# Patient Record
Sex: MALE | Race: WHITE | NOT HISPANIC OR LATINO | URBAN - METROPOLITAN AREA
[De-identification: names, ages, dates, MRNs, and addresses within clinical notes are randomized per-mention and may not be internally consistent; named-entity substitution may affect disease eponyms.]

---

## 2019-03-28 ENCOUNTER — EMERGENCY (EMERGENCY)
Facility: HOSPITAL | Age: 59
LOS: 1 days | Discharge: HOME | End: 2019-03-28
Attending: EMERGENCY MEDICINE | Admitting: EMERGENCY MEDICINE
Payer: COMMERCIAL

## 2019-03-28 VITALS
HEART RATE: 59 BPM | TEMPERATURE: 99 F | DIASTOLIC BLOOD PRESSURE: 93 MMHG | RESPIRATION RATE: 18 BRPM | OXYGEN SATURATION: 95 % | WEIGHT: 195.11 LBS | SYSTOLIC BLOOD PRESSURE: 156 MMHG

## 2019-03-28 VITALS
HEART RATE: 60 BPM | SYSTOLIC BLOOD PRESSURE: 154 MMHG | RESPIRATION RATE: 17 BRPM | OXYGEN SATURATION: 96 % | TEMPERATURE: 98 F | DIASTOLIC BLOOD PRESSURE: 85 MMHG

## 2019-03-28 DIAGNOSIS — Z88.2 ALLERGY STATUS TO SULFONAMIDES: ICD-10-CM
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
## 2019-03-28 DIAGNOSIS — Z23 ENCOUNTER FOR IMMUNIZATION: ICD-10-CM

## 2019-03-28 DIAGNOSIS — I10 ESSENTIAL (PRIMARY) HYPERTENSION: ICD-10-CM

## 2019-03-28 DIAGNOSIS — S01.81XA LACERATION WITHOUT FOREIGN BODY OF OTHER PART OF HEAD, INITIAL ENCOUNTER: ICD-10-CM
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
## 2019-03-28 PROCEDURE — 70450 CT HEAD/BRAIN W/O DYE: CPT | Mod: 26

## 2019-03-28 PROCEDURE — 12015 RPR F/E/E/N/L/M 7.6-12.5 CM: CPT

## 2019-03-28 PROCEDURE — 99284 EMERGENCY DEPT VISIT MOD MDM: CPT | Mod: 25

## 2019-03-28 RX ORDER — TETANUS TOXOID, REDUCED DIPHTHERIA TOXOID AND ACELLULAR PERTUSSIS VACCINE, ADSORBED 5; 2.5; 8; 8; 2.5 [IU]/.5ML; [IU]/.5ML; UG/.5ML; UG/.5ML; UG/.5ML
0.5 SUSPENSION INTRAMUSCULAR ONCE
Qty: 0 | Refills: 0 | Status: COMPLETED | OUTPATIENT
Start: 2019-03-28 | End: 2019-03-28

## 2019-03-28 RX ADMIN — TETANUS TOXOID, REDUCED DIPHTHERIA TOXOID AND ACELLULAR PERTUSSIS VACCINE, ADSORBED 0.5 MILLILITER(S): 5; 2.5; 8; 8; 2.5 SUSPENSION INTRAMUSCULAR at 17:49

## 2019-03-28 NOTE — ED ADULT TRIAGE NOTE - CHIEF COMPLAINT QUOTE
BIBA s/p mechanical trip and fall on sidewalk approx 45 min PTA. denies LOC, HA, nausea. +81 ASA mg. +laceration to left side of face. last tetanus unknown.

## 2019-03-28 NOTE — ED ADULT NURSE NOTE - CHPI ED NUR SYMPTOMS NEG
no vomiting/no loss of consciousness/no deformity/no tingling/no fever/no numbness/no weakness/no confusion

## 2019-03-28 NOTE — ED PROVIDER NOTE - NSFOLLOWUPINSTRUCTIONS_ED_ALL_ED_FT
Laceration    A laceration is a cut that goes through all of the layers of the skin and into the tissue that is right under the skin. Some lacerations heal on their own. Others need to be closed with skin adhesive strips, skin glue, stitches (sutures), or staples. Proper laceration care minimizes the risk of infection and helps the laceration to heal better.  If non-absorbable stitches or staples have been placed, they must be taken out within the time frame instructed by your healthcare provider.    SEEK IMMEDIATE MEDICAL CARE IF YOU HAVE ANY OF THE FOLLOWING SYMPTOMS: swelling around the wound, worsening pain, drainage from the wound, red streaking going away from your wound, inability to move finger or toe near the laceration, or discoloration of skin near the laceration.     PAIN MEDICATIONS   TYLENOL (acetaminophen) 1000mg every 6 hours as needed for pain       SUTURE REMOVAL IN APPROX 5 DAYS, RETURN TO ER OR URGENT CARE

## 2019-03-28 NOTE — ED PROVIDER NOTE - OBJECTIVE STATEMENT
57 y/o Male with a PMHx of HTN on Losartan, baby aspirin, Statin, and Synthroid for thyroid issues presents to the ED for a mechanical slip and fall. Sustained lacerations to the left side of his face. Denies LOC after fall, vision changes, N/V, numbness, tingling and alcohol use. Tetanus not UTD.

## 2019-03-28 NOTE — ED PROVIDER NOTE - ADDITIONAL RISK FACTOR FREE TEXT BOX
59 y/o Male here for a mechanical slip and fall. Sustained lacerations to the left lateral eye and cheek. Will repair wound, update tetanus, head CT, and instruct follow-up instructions for wound care/suture removal.

## 2020-06-16 ENCOUNTER — NON-APPOINTMENT (OUTPATIENT)
Age: 60
End: 2020-06-16

## 2020-07-13 ENCOUNTER — INPATIENT (INPATIENT)
Facility: HOSPITAL | Age: 60
LOS: 11 days | Discharge: HOME | DRG: 871 | End: 2020-07-25
Attending: HOSPITALIST | Admitting: STUDENT IN AN ORGANIZED HEALTH CARE EDUCATION/TRAINING PROGRAM
Payer: COMMERCIAL

## 2020-07-13 VITALS
WEIGHT: 192.02 LBS | RESPIRATION RATE: 18 BRPM | TEMPERATURE: 98 F | HEART RATE: 104 BPM | DIASTOLIC BLOOD PRESSURE: 59 MMHG | SYSTOLIC BLOOD PRESSURE: 87 MMHG | OXYGEN SATURATION: 97 % | HEIGHT: 68 IN

## 2020-07-13 LAB
ANION GAP SERPL CALC-SCNC: 16 MMOL/L — SIGNIFICANT CHANGE UP (ref 5–17)
BASE EXCESS BLDV CALC-SCNC: 0.7 MMOL/L — SIGNIFICANT CHANGE UP (ref -2–2)
BASOPHILS # BLD AUTO: 0.03 K/UL — SIGNIFICANT CHANGE UP (ref 0–0.2)
BASOPHILS NFR BLD AUTO: 0.2 % — SIGNIFICANT CHANGE UP (ref 0–2)
CA-I SERPL-SCNC: 1.15 MMOL/L — SIGNIFICANT CHANGE UP (ref 1.12–1.3)
CHLORIDE BLDV-SCNC: 102 MMOL/L — SIGNIFICANT CHANGE UP (ref 96–108)
CHLORIDE SERPL-SCNC: 98 MMOL/L — SIGNIFICANT CHANGE UP (ref 96–108)
CO2 BLDV-SCNC: 26 MMOL/L — SIGNIFICANT CHANGE UP (ref 22–30)
CO2 SERPL-SCNC: 21 MMOL/L — LOW (ref 22–31)
EOSINOPHIL # BLD AUTO: 0.16 K/UL — SIGNIFICANT CHANGE UP (ref 0–0.5)
EOSINOPHIL NFR BLD AUTO: 1.2 % — SIGNIFICANT CHANGE UP (ref 0–6)
GAS PNL BLDV: 135 MMOL/L — SIGNIFICANT CHANGE UP (ref 135–145)
GAS PNL BLDV: SIGNIFICANT CHANGE UP
GLUCOSE BLDV-MCNC: 112 MG/DL — HIGH (ref 70–99)
HCO3 BLDV-SCNC: 24 MMOL/L — SIGNIFICANT CHANGE UP (ref 21–29)
HCT VFR BLD CALC: 47 % — SIGNIFICANT CHANGE UP (ref 39–50)
HCT VFR BLDA CALC: 49 % — SIGNIFICANT CHANGE UP (ref 39–50)
HGB BLD CALC-MCNC: 16.1 G/DL — SIGNIFICANT CHANGE UP (ref 13–17)
HGB BLD-MCNC: 15.4 G/DL — SIGNIFICANT CHANGE UP (ref 13–17)
IMM GRANULOCYTES NFR BLD AUTO: 0.4 % — SIGNIFICANT CHANGE UP (ref 0–1.5)
LACTATE BLDV-MCNC: 2.5 MMOL/L — HIGH (ref 0.7–2)
LYMPHOCYTES # BLD AUTO: 0.77 K/UL — LOW (ref 1–3.3)
LYMPHOCYTES # BLD AUTO: 5.8 % — LOW (ref 13–44)
MCHC RBC-ENTMCNC: 30.1 PG — SIGNIFICANT CHANGE UP (ref 27–34)
MCHC RBC-ENTMCNC: 32.8 GM/DL — SIGNIFICANT CHANGE UP (ref 32–36)
MCV RBC AUTO: 91.8 FL — SIGNIFICANT CHANGE UP (ref 80–100)
MONOCYTES # BLD AUTO: 1.03 K/UL — HIGH (ref 0–0.9)
MONOCYTES NFR BLD AUTO: 7.7 % — SIGNIFICANT CHANGE UP (ref 2–14)
NEUTROPHILS # BLD AUTO: 11.29 K/UL — HIGH (ref 1.8–7.4)
NEUTROPHILS NFR BLD AUTO: 84.7 % — HIGH (ref 43–77)
NRBC # BLD: 0 /100 WBCS — SIGNIFICANT CHANGE UP (ref 0–0)
PCO2 BLDV: 38 MMHG — SIGNIFICANT CHANGE UP (ref 35–50)
PH BLDV: 7.42 — SIGNIFICANT CHANGE UP (ref 7.35–7.45)
PLATELET # BLD AUTO: 187 K/UL — SIGNIFICANT CHANGE UP (ref 150–400)
PO2 BLDV: 29 MMHG — SIGNIFICANT CHANGE UP (ref 25–45)
POTASSIUM BLDV-SCNC: 3.3 MMOL/L — LOW (ref 3.5–5.3)
POTASSIUM SERPL-MCNC: 3.5 MMOL/L — SIGNIFICANT CHANGE UP (ref 3.5–5.3)
POTASSIUM SERPL-SCNC: 3.5 MMOL/L — SIGNIFICANT CHANGE UP (ref 3.5–5.3)
RBC # BLD: 5.12 M/UL — SIGNIFICANT CHANGE UP (ref 4.2–5.8)
RBC # FLD: 11.9 % — SIGNIFICANT CHANGE UP (ref 10.3–14.5)
SAO2 % BLDV: 52 % — LOW (ref 67–88)
SODIUM SERPL-SCNC: 135 MMOL/L — SIGNIFICANT CHANGE UP (ref 135–145)
WBC # BLD: 13.33 K/UL — HIGH (ref 3.8–10.5)
WBC # FLD AUTO: 13.33 K/UL — HIGH (ref 3.8–10.5)

## 2020-07-13 PROCEDURE — 93010 ELECTROCARDIOGRAM REPORT: CPT

## 2020-07-13 PROCEDURE — 99291 CRITICAL CARE FIRST HOUR: CPT

## 2020-07-13 RX ORDER — SODIUM CHLORIDE 9 MG/ML
2000 INJECTION INTRAMUSCULAR; INTRAVENOUS; SUBCUTANEOUS ONCE
Refills: 0 | Status: COMPLETED | OUTPATIENT
Start: 2020-07-13 | End: 2020-07-13

## 2020-07-13 RX ORDER — VANCOMYCIN HCL 1 G
1000 VIAL (EA) INTRAVENOUS ONCE
Refills: 0 | Status: COMPLETED | OUTPATIENT
Start: 2020-07-13 | End: 2020-07-13

## 2020-07-13 RX ORDER — SODIUM CHLORIDE 9 MG/ML
1000 INJECTION, SOLUTION INTRAVENOUS ONCE
Refills: 0 | Status: COMPLETED | OUTPATIENT
Start: 2020-07-13 | End: 2020-07-13

## 2020-07-13 RX ORDER — PIPERACILLIN AND TAZOBACTAM 4; .5 G/20ML; G/20ML
3.38 INJECTION, POWDER, LYOPHILIZED, FOR SOLUTION INTRAVENOUS ONCE
Refills: 0 | Status: COMPLETED | OUTPATIENT
Start: 2020-07-13 | End: 2020-07-13

## 2020-07-13 RX ADMIN — SODIUM CHLORIDE 1000 MILLILITER(S): 9 INJECTION, SOLUTION INTRAVENOUS at 22:50

## 2020-07-13 RX ADMIN — SODIUM CHLORIDE 2000 MILLILITER(S): 9 INJECTION INTRAMUSCULAR; INTRAVENOUS; SUBCUTANEOUS at 22:11

## 2020-07-13 RX ADMIN — PIPERACILLIN AND TAZOBACTAM 200 GRAM(S): 4; .5 INJECTION, POWDER, LYOPHILIZED, FOR SOLUTION INTRAVENOUS at 22:50

## 2020-07-13 NOTE — ED ADULT NURSE NOTE - OBJECTIVE STATEMENT
60 year old Male, PMH: hypothyroidism, inguinal hernia, HLD, HTN. Patient comes to the ER reporting right sided abdominal pain with shortness of breath, headache and shortness of breath. Patient reports diverticulitis attack x3 weeks ago as well as nights sweats, chills and nausea. As of Friday patient reporting difficulty breathing, worse with exertion, last covid test yesterday- pending results, constant headache since Friday, took Tyelnol no relief.   Denies chest pain

## 2020-07-14 DIAGNOSIS — A41.9 SEPSIS, UNSPECIFIED ORGANISM: ICD-10-CM

## 2020-07-14 PROBLEM — I10 ESSENTIAL (PRIMARY) HYPERTENSION: Chronic | Status: ACTIVE | Noted: 2019-03-28

## 2020-07-14 LAB
ALBUMIN SERPL ELPH-MCNC: 2.9 G/DL — LOW (ref 3.3–5)
ALBUMIN SERPL ELPH-MCNC: 3.7 G/DL — SIGNIFICANT CHANGE UP (ref 3.3–5)
ALP SERPL-CCNC: 50 U/L — SIGNIFICANT CHANGE UP (ref 40–120)
ALP SERPL-CCNC: 62 U/L — SIGNIFICANT CHANGE UP (ref 40–120)
ALT FLD-CCNC: <5 U/L — LOW (ref 10–45)
ALT FLD-CCNC: <5 U/L — LOW (ref 10–45)
ANION GAP SERPL CALC-SCNC: 10 MMOL/L — SIGNIFICANT CHANGE UP (ref 5–17)
APPEARANCE UR: CLEAR — SIGNIFICANT CHANGE UP
APTT BLD: 28.2 SEC — SIGNIFICANT CHANGE UP (ref 27.5–35.5)
AST SERPL-CCNC: 20 U/L — SIGNIFICANT CHANGE UP (ref 10–40)
AST SERPL-CCNC: 24 U/L — SIGNIFICANT CHANGE UP (ref 10–40)
BACTERIA # UR AUTO: NEGATIVE — SIGNIFICANT CHANGE UP
BASE EXCESS BLDV CALC-SCNC: -0.3 MMOL/L — SIGNIFICANT CHANGE UP (ref -2–2)
BASE EXCESS BLDV CALC-SCNC: 0.4 MMOL/L — SIGNIFICANT CHANGE UP (ref -2–2)
BASOPHILS # BLD AUTO: 0.02 K/UL — SIGNIFICANT CHANGE UP (ref 0–0.2)
BASOPHILS # BLD AUTO: 0.02 K/UL — SIGNIFICANT CHANGE UP (ref 0–0.2)
BASOPHILS NFR BLD AUTO: 0.3 % — SIGNIFICANT CHANGE UP (ref 0–2)
BASOPHILS NFR BLD AUTO: 0.3 % — SIGNIFICANT CHANGE UP (ref 0–2)
BILIRUB SERPL-MCNC: 0.4 MG/DL — SIGNIFICANT CHANGE UP (ref 0.2–1.2)
BILIRUB SERPL-MCNC: 0.6 MG/DL — SIGNIFICANT CHANGE UP (ref 0.2–1.2)
BILIRUB UR-MCNC: NEGATIVE — SIGNIFICANT CHANGE UP
BUN SERPL-MCNC: 14 MG/DL — SIGNIFICANT CHANGE UP (ref 7–23)
BUN SERPL-MCNC: 23 MG/DL — SIGNIFICANT CHANGE UP (ref 7–23)
CA-I SERPL-SCNC: 1.09 MMOL/L — LOW (ref 1.12–1.3)
CA-I SERPL-SCNC: 1.11 MMOL/L — LOW (ref 1.12–1.3)
CALCIUM SERPL-MCNC: 8.3 MG/DL — LOW (ref 8.4–10.5)
CALCIUM SERPL-MCNC: 9.3 MG/DL — SIGNIFICANT CHANGE UP (ref 8.4–10.5)
CHLORIDE BLDV-SCNC: 104 MMOL/L — SIGNIFICANT CHANGE UP (ref 96–108)
CHLORIDE BLDV-SCNC: 111 MMOL/L — HIGH (ref 96–108)
CHLORIDE SERPL-SCNC: 107 MMOL/L — SIGNIFICANT CHANGE UP (ref 96–108)
CK MB BLD-MCNC: 3.8 % — HIGH (ref 0–3.5)
CK MB BLD-MCNC: 5.4 % — HIGH (ref 0–3.5)
CK MB CFR SERPL CALC: 6.1 NG/ML — SIGNIFICANT CHANGE UP (ref 0–6.7)
CK MB CFR SERPL CALC: 7.1 NG/ML — HIGH (ref 0–6.7)
CK SERPL-CCNC: 131 U/L — SIGNIFICANT CHANGE UP (ref 30–200)
CK SERPL-CCNC: 159 U/L — SIGNIFICANT CHANGE UP (ref 30–200)
CO2 BLDV-SCNC: 26 MMOL/L — SIGNIFICANT CHANGE UP (ref 22–30)
CO2 BLDV-SCNC: 26 MMOL/L — SIGNIFICANT CHANGE UP (ref 22–30)
CO2 SERPL-SCNC: 23 MMOL/L — SIGNIFICANT CHANGE UP (ref 22–31)
COLOR SPEC: YELLOW — SIGNIFICANT CHANGE UP
CREAT SERPL-MCNC: 0.8 MG/DL — SIGNIFICANT CHANGE UP (ref 0.5–1.3)
CREAT SERPL-MCNC: 1.16 MG/DL — SIGNIFICANT CHANGE UP (ref 0.5–1.3)
DIFF PNL FLD: ABNORMAL
EOSINOPHIL # BLD AUTO: 0.02 K/UL — SIGNIFICANT CHANGE UP (ref 0–0.5)
EOSINOPHIL # BLD AUTO: 0.13 K/UL — SIGNIFICANT CHANGE UP (ref 0–0.5)
EOSINOPHIL NFR BLD AUTO: 0.3 % — SIGNIFICANT CHANGE UP (ref 0–6)
EOSINOPHIL NFR BLD AUTO: 1.7 % — SIGNIFICANT CHANGE UP (ref 0–6)
EPI CELLS # UR: 0 /HPF — SIGNIFICANT CHANGE UP
GAS PNL BLDV: 135 MMOL/L — SIGNIFICANT CHANGE UP (ref 135–145)
GAS PNL BLDV: 137 MMOL/L — SIGNIFICANT CHANGE UP (ref 135–145)
GAS PNL BLDV: SIGNIFICANT CHANGE UP
GLUCOSE BLDV-MCNC: 105 MG/DL — HIGH (ref 70–99)
GLUCOSE BLDV-MCNC: 95 MG/DL — SIGNIFICANT CHANGE UP (ref 70–99)
GLUCOSE SERPL-MCNC: 108 MG/DL — HIGH (ref 70–99)
GLUCOSE SERPL-MCNC: 96 MG/DL — SIGNIFICANT CHANGE UP (ref 70–99)
GLUCOSE UR QL: NEGATIVE — SIGNIFICANT CHANGE UP
HCO3 BLDV-SCNC: 25 MMOL/L — SIGNIFICANT CHANGE UP (ref 21–29)
HCO3 BLDV-SCNC: 25 MMOL/L — SIGNIFICANT CHANGE UP (ref 21–29)
HCT VFR BLD CALC: 39.4 % — SIGNIFICANT CHANGE UP (ref 39–50)
HCT VFR BLD CALC: 39.5 % — SIGNIFICANT CHANGE UP (ref 39–50)
HCT VFR BLDA CALC: 41 % — SIGNIFICANT CHANGE UP (ref 39–50)
HCT VFR BLDA CALC: 42 % — SIGNIFICANT CHANGE UP (ref 39–50)
HGB BLD CALC-MCNC: 13.3 G/DL — SIGNIFICANT CHANGE UP (ref 13–17)
HGB BLD CALC-MCNC: 13.7 G/DL — SIGNIFICANT CHANGE UP (ref 13–17)
HGB BLD-MCNC: 12.7 G/DL — LOW (ref 13–17)
HGB BLD-MCNC: 13.2 G/DL — SIGNIFICANT CHANGE UP (ref 13–17)
HOROWITZ INDEX BLDV+IHG-RTO: 21 — SIGNIFICANT CHANGE UP
HYALINE CASTS # UR AUTO: 2 /LPF — SIGNIFICANT CHANGE UP (ref 0–2)
IMM GRANULOCYTES NFR BLD AUTO: 0.3 % — SIGNIFICANT CHANGE UP (ref 0–1.5)
IMM GRANULOCYTES NFR BLD AUTO: 0.3 % — SIGNIFICANT CHANGE UP (ref 0–1.5)
INR BLD: 1.12 RATIO — SIGNIFICANT CHANGE UP (ref 0.88–1.16)
KETONES UR-MCNC: ABNORMAL
LACTATE BLDV-MCNC: 1 MMOL/L — SIGNIFICANT CHANGE UP (ref 0.7–2)
LACTATE BLDV-MCNC: 1.8 MMOL/L — SIGNIFICANT CHANGE UP (ref 0.7–2)
LEUKOCYTE ESTERASE UR-ACNC: NEGATIVE — SIGNIFICANT CHANGE UP
LYMPHOCYTES # BLD AUTO: 0.55 K/UL — LOW (ref 1–3.3)
LYMPHOCYTES # BLD AUTO: 0.67 K/UL — LOW (ref 1–3.3)
LYMPHOCYTES # BLD AUTO: 7.3 % — LOW (ref 13–44)
LYMPHOCYTES # BLD AUTO: 8.8 % — LOW (ref 13–44)
MAGNESIUM SERPL-MCNC: 1.8 MG/DL — SIGNIFICANT CHANGE UP (ref 1.6–2.6)
MCHC RBC-ENTMCNC: 30.2 PG — SIGNIFICANT CHANGE UP (ref 27–34)
MCHC RBC-ENTMCNC: 31.1 PG — SIGNIFICANT CHANGE UP (ref 27–34)
MCHC RBC-ENTMCNC: 32.2 GM/DL — SIGNIFICANT CHANGE UP (ref 32–36)
MCHC RBC-ENTMCNC: 33.5 GM/DL — SIGNIFICANT CHANGE UP (ref 32–36)
MCV RBC AUTO: 92.9 FL — SIGNIFICANT CHANGE UP (ref 80–100)
MCV RBC AUTO: 93.8 FL — SIGNIFICANT CHANGE UP (ref 80–100)
MONOCYTES # BLD AUTO: 0.35 K/UL — SIGNIFICANT CHANGE UP (ref 0–0.9)
MONOCYTES # BLD AUTO: 0.54 K/UL — SIGNIFICANT CHANGE UP (ref 0–0.9)
MONOCYTES NFR BLD AUTO: 4.6 % — SIGNIFICANT CHANGE UP (ref 2–14)
MONOCYTES NFR BLD AUTO: 7.1 % — SIGNIFICANT CHANGE UP (ref 2–14)
NEUTROPHILS # BLD AUTO: 6.2 K/UL — SIGNIFICANT CHANGE UP (ref 1.8–7.4)
NEUTROPHILS # BLD AUTO: 6.61 K/UL — SIGNIFICANT CHANGE UP (ref 1.8–7.4)
NEUTROPHILS NFR BLD AUTO: 81.8 % — HIGH (ref 43–77)
NEUTROPHILS NFR BLD AUTO: 87.2 % — HIGH (ref 43–77)
NITRITE UR-MCNC: NEGATIVE — SIGNIFICANT CHANGE UP
NRBC # BLD: 0 /100 WBCS — SIGNIFICANT CHANGE UP (ref 0–0)
NRBC # BLD: 0 /100 WBCS — SIGNIFICANT CHANGE UP (ref 0–0)
OTHER CELLS CSF MANUAL: 10 ML/DL — LOW (ref 18–22)
PCO2 BLDV: 43 MMHG — SIGNIFICANT CHANGE UP (ref 35–50)
PCO2 BLDV: 44 MMHG — SIGNIFICANT CHANGE UP (ref 35–50)
PH BLDV: 7.36 — SIGNIFICANT CHANGE UP (ref 7.35–7.45)
PH BLDV: 7.38 — SIGNIFICANT CHANGE UP (ref 7.35–7.45)
PH UR: 6 — SIGNIFICANT CHANGE UP (ref 5–8)
PHOSPHATE SERPL-MCNC: 2.6 MG/DL — SIGNIFICANT CHANGE UP (ref 2.5–4.5)
PLATELET # BLD AUTO: 148 K/UL — LOW (ref 150–400)
PLATELET # BLD AUTO: 165 K/UL — SIGNIFICANT CHANGE UP (ref 150–400)
PO2 BLDV: 25 MMHG — SIGNIFICANT CHANGE UP (ref 25–45)
PO2 BLDV: 31 MMHG — SIGNIFICANT CHANGE UP (ref 25–45)
POTASSIUM BLDV-SCNC: 3.4 MMOL/L — LOW (ref 3.5–5.3)
POTASSIUM BLDV-SCNC: 4 MMOL/L — SIGNIFICANT CHANGE UP (ref 3.5–5.3)
POTASSIUM SERPL-MCNC: 3.5 MMOL/L — SIGNIFICANT CHANGE UP (ref 3.5–5.3)
POTASSIUM SERPL-SCNC: 3.5 MMOL/L — SIGNIFICANT CHANGE UP (ref 3.5–5.3)
PROT SERPL-MCNC: 5.8 G/DL — LOW (ref 6–8.3)
PROT SERPL-MCNC: 7.1 G/DL — SIGNIFICANT CHANGE UP (ref 6–8.3)
PROT UR-MCNC: ABNORMAL
PROTHROM AB SERPL-ACNC: 13.2 SEC — SIGNIFICANT CHANGE UP (ref 10.6–13.6)
RBC # BLD: 4.21 M/UL — SIGNIFICANT CHANGE UP (ref 4.2–5.8)
RBC # BLD: 4.24 M/UL — SIGNIFICANT CHANGE UP (ref 4.2–5.8)
RBC # FLD: 12.2 % — SIGNIFICANT CHANGE UP (ref 10.3–14.5)
RBC # FLD: 12.3 % — SIGNIFICANT CHANGE UP (ref 10.3–14.5)
RBC CASTS # UR COMP ASSIST: 15 /HPF — HIGH (ref 0–4)
SAO2 % BLDV: 39 % — LOW (ref 67–88)
SAO2 % BLDV: 53 % — LOW (ref 67–88)
SARS-COV-2 IGG SERPL QL IA: NEGATIVE — SIGNIFICANT CHANGE UP
SARS-COV-2 IGM SERPL IA-ACNC: <3.8 AU/ML — SIGNIFICANT CHANGE UP
SARS-COV-2 RNA SPEC QL NAA+PROBE: SIGNIFICANT CHANGE UP
SODIUM SERPL-SCNC: 140 MMOL/L — SIGNIFICANT CHANGE UP (ref 135–145)
SP GR SPEC: 1.02 — SIGNIFICANT CHANGE UP (ref 1.01–1.02)
TROPONIN T, HIGH SENSITIVITY RESULT: 159 NG/L — HIGH (ref 0–51)
TROPONIN T, HIGH SENSITIVITY RESULT: 186 NG/L — HIGH (ref 0–51)
TSH SERPL-MCNC: 1.6 UIU/ML — SIGNIFICANT CHANGE UP (ref 0.27–4.2)
UROBILINOGEN FLD QL: NEGATIVE — SIGNIFICANT CHANGE UP
WBC # BLD: 7.57 K/UL — SIGNIFICANT CHANGE UP (ref 3.8–10.5)
WBC # BLD: 7.58 K/UL — SIGNIFICANT CHANGE UP (ref 3.8–10.5)
WBC # FLD AUTO: 7.57 K/UL — SIGNIFICANT CHANGE UP (ref 3.8–10.5)
WBC # FLD AUTO: 7.58 K/UL — SIGNIFICANT CHANGE UP (ref 3.8–10.5)
WBC UR QL: 1 /HPF — SIGNIFICANT CHANGE UP (ref 0–5)

## 2020-07-14 PROCEDURE — 99223 1ST HOSP IP/OBS HIGH 75: CPT | Mod: GC

## 2020-07-14 PROCEDURE — 74177 CT ABD & PELVIS W/CONTRAST: CPT | Mod: 26

## 2020-07-14 PROCEDURE — 93308 TTE F-UP OR LMTD: CPT | Mod: 26,GC

## 2020-07-14 PROCEDURE — 99291 CRITICAL CARE FIRST HOUR: CPT

## 2020-07-14 PROCEDURE — 76604 US EXAM CHEST: CPT | Mod: 26,59,GC

## 2020-07-14 PROCEDURE — 99232 SBSQ HOSP IP/OBS MODERATE 35: CPT

## 2020-07-14 PROCEDURE — 99292 CRITICAL CARE ADDL 30 MIN: CPT

## 2020-07-14 PROCEDURE — 71275 CT ANGIOGRAPHY CHEST: CPT | Mod: 26

## 2020-07-14 RX ORDER — LEVOTHYROXINE SODIUM 125 MCG
87.5 TABLET ORAL
Refills: 0 | Status: DISCONTINUED | OUTPATIENT
Start: 2020-07-14 | End: 2020-07-14

## 2020-07-14 RX ORDER — BUDESONIDE, MICRONIZED 100 %
1 POWDER (GRAM) MISCELLANEOUS
Qty: 0 | Refills: 0 | DISCHARGE

## 2020-07-14 RX ORDER — CEFTRIAXONE 500 MG/1
1000 INJECTION, POWDER, FOR SOLUTION INTRAMUSCULAR; INTRAVENOUS EVERY 24 HOURS
Refills: 0 | Status: DISCONTINUED | OUTPATIENT
Start: 2020-07-14 | End: 2020-07-14

## 2020-07-14 RX ORDER — CHLORHEXIDINE GLUCONATE 213 G/1000ML
1 SOLUTION TOPICAL
Refills: 0 | Status: DISCONTINUED | OUTPATIENT
Start: 2020-07-14 | End: 2020-07-14

## 2020-07-14 RX ORDER — HYDROCORTISONE 20 MG
75 TABLET ORAL EVERY 8 HOURS
Refills: 0 | Status: DISCONTINUED | OUTPATIENT
Start: 2020-07-14 | End: 2020-07-15

## 2020-07-14 RX ORDER — ROSUVASTATIN CALCIUM 5 MG/1
1 TABLET ORAL
Qty: 0 | Refills: 0 | DISCHARGE

## 2020-07-14 RX ORDER — MIDODRINE HYDROCHLORIDE 2.5 MG/1
20 TABLET ORAL EVERY 8 HOURS
Refills: 0 | Status: DISCONTINUED | OUTPATIENT
Start: 2020-07-14 | End: 2020-07-15

## 2020-07-14 RX ORDER — LEVOTHYROXINE SODIUM 125 MCG
80 TABLET ORAL AT BEDTIME
Refills: 0 | Status: DISCONTINUED | OUTPATIENT
Start: 2020-07-14 | End: 2020-07-14

## 2020-07-14 RX ORDER — LEVOTHYROXINE SODIUM 125 MCG
88 TABLET ORAL
Refills: 0 | Status: DISCONTINUED | OUTPATIENT
Start: 2020-07-14 | End: 2020-07-25

## 2020-07-14 RX ORDER — LEVOTHYROXINE SODIUM 125 MCG
88 TABLET ORAL AT BEDTIME
Refills: 0 | Status: DISCONTINUED | OUTPATIENT
Start: 2020-07-14 | End: 2020-07-14

## 2020-07-14 RX ORDER — LEVOTHYROXINE SODIUM 125 MCG
175 TABLET ORAL
Refills: 0 | Status: DISCONTINUED | OUTPATIENT
Start: 2020-07-14 | End: 2020-07-14

## 2020-07-14 RX ORDER — LEVOTHYROXINE SODIUM 125 MCG
175 TABLET ORAL
Refills: 0 | Status: DISCONTINUED | OUTPATIENT
Start: 2020-07-14 | End: 2020-07-25

## 2020-07-14 RX ORDER — SODIUM CHLORIDE 9 MG/ML
1000 INJECTION INTRAMUSCULAR; INTRAVENOUS; SUBCUTANEOUS
Refills: 0 | Status: DISCONTINUED | OUTPATIENT
Start: 2020-07-14 | End: 2020-07-14

## 2020-07-14 RX ORDER — METRONIDAZOLE 500 MG
500 TABLET ORAL EVERY 8 HOURS
Refills: 0 | Status: DISCONTINUED | OUTPATIENT
Start: 2020-07-14 | End: 2020-07-14

## 2020-07-14 RX ORDER — OMEPRAZOLE 10 MG/1
1 CAPSULE, DELAYED RELEASE ORAL
Qty: 0 | Refills: 0 | DISCHARGE

## 2020-07-14 RX ORDER — ACETAMINOPHEN 500 MG
650 TABLET ORAL ONCE
Refills: 0 | Status: COMPLETED | OUTPATIENT
Start: 2020-07-14 | End: 2020-07-14

## 2020-07-14 RX ORDER — NOREPINEPHRINE BITARTRATE/D5W 8 MG/250ML
0.05 PLASTIC BAG, INJECTION (ML) INTRAVENOUS
Qty: 8 | Refills: 0 | Status: DISCONTINUED | OUTPATIENT
Start: 2020-07-14 | End: 2020-07-14

## 2020-07-14 RX ORDER — CIPROFLOXACIN LACTATE 400MG/40ML
500 VIAL (ML) INTRAVENOUS EVERY 12 HOURS
Refills: 0 | Status: DISCONTINUED | OUTPATIENT
Start: 2020-07-14 | End: 2020-07-14

## 2020-07-14 RX ORDER — SODIUM CHLORIDE 9 MG/ML
1000 INJECTION, SOLUTION INTRAVENOUS ONCE
Refills: 0 | Status: COMPLETED | OUTPATIENT
Start: 2020-07-14 | End: 2020-07-14

## 2020-07-14 RX ADMIN — PIPERACILLIN AND TAZOBACTAM 3.38 GRAM(S): 4; .5 INJECTION, POWDER, LYOPHILIZED, FOR SOLUTION INTRAVENOUS at 00:00

## 2020-07-14 RX ADMIN — MIDODRINE HYDROCHLORIDE 20 MILLIGRAM(S): 2.5 TABLET ORAL at 09:41

## 2020-07-14 RX ADMIN — Medication 75 MILLIGRAM(S): at 22:28

## 2020-07-14 RX ADMIN — Medication 500 MILLIGRAM(S): at 12:51

## 2020-07-14 RX ADMIN — CHLORHEXIDINE GLUCONATE 1 APPLICATION(S): 213 SOLUTION TOPICAL at 07:20

## 2020-07-14 RX ADMIN — SODIUM CHLORIDE 150 MILLILITER(S): 9 INJECTION INTRAMUSCULAR; INTRAVENOUS; SUBCUTANEOUS at 04:22

## 2020-07-14 RX ADMIN — SODIUM CHLORIDE 2000 MILLILITER(S): 9 INJECTION INTRAMUSCULAR; INTRAVENOUS; SUBCUTANEOUS at 00:04

## 2020-07-14 RX ADMIN — Medication 100 MILLIGRAM(S): at 12:51

## 2020-07-14 RX ADMIN — SODIUM CHLORIDE 1000 MILLILITER(S): 9 INJECTION, SOLUTION INTRAVENOUS at 00:04

## 2020-07-14 RX ADMIN — CEFTRIAXONE 100 MILLIGRAM(S): 500 INJECTION, POWDER, FOR SOLUTION INTRAMUSCULAR; INTRAVENOUS at 07:09

## 2020-07-14 RX ADMIN — MIDODRINE HYDROCHLORIDE 20 MILLIGRAM(S): 2.5 TABLET ORAL at 17:18

## 2020-07-14 RX ADMIN — Medication 75 MILLIGRAM(S): at 12:52

## 2020-07-14 RX ADMIN — Medication 650 MILLIGRAM(S): at 02:30

## 2020-07-14 RX ADMIN — Medication 75 MILLIGRAM(S): at 07:10

## 2020-07-14 RX ADMIN — SODIUM CHLORIDE 1000 MILLILITER(S): 9 INJECTION, SOLUTION INTRAVENOUS at 02:30

## 2020-07-14 RX ADMIN — Medication 250 MILLIGRAM(S): at 00:01

## 2020-07-14 RX ADMIN — Medication 175 MICROGRAM(S): at 12:51

## 2020-07-14 RX ADMIN — Medication 8.17 MICROGRAM(S)/KG/MIN: at 04:19

## 2020-07-14 NOTE — ED ADULT NURSE REASSESSMENT NOTE - NS ED NURSE REASSESS COMMENT FT1
Additional ABX hung, patient tolerated previous ABX and fluids well. Provided with urinal at this time. States he "feels better". Bed locked and in lowest position for safety with side rails up. IV patent with no redness, swelling or drainage. Call bell at bedside

## 2020-07-14 NOTE — H&P ADULT - NSHPREVIEWOFSYSTEMS_GEN_ALL_CORE
REVIEW OF SYSTEMS:    CONSTITUTIONAL: No weakness, fevers or chills  EYES/ENT: No visual changes;  No vertigo or throat pain   NECK: No pain or stiffness  RESPIRATORY: +GONZALEZ. No cough, wheezing, hemoptysis  CARDIOVASCULAR: No chest pain or palpitations  GASTROINTESTINAL: +LLQ pain. No nausea, vomiting, or hematemesis; No diarrhea or constipation. No melena or hematochezia.  GENITOURINARY: No dysuria, frequency or hematuria  NEUROLOGICAL: No numbness or weakness  SKIN: No itching, burning, rashes, or lesions   All other review of systems is negative unless indicated above.

## 2020-07-14 NOTE — ED PROVIDER NOTE - OBJECTIVE STATEMENT
61 yo M with pmhx of hypothyroidism, HTN, HLD presents with 2 days of lightheadedness, decreased appetite, nausea, headache, sob worse with exertion, fever. Last echo and stress test was 5 yrs ago. Also complains of LT lower abdominal pain started a month ago, had colonoscopy in [facility] that showed diverticulosis. Given NSAIDs for pain. Pain improved for few days until 2 ds ago. Denies cough, cp, diarrhea. Last BM was today. Took Tylenol 500mg 1.5 hrs ago. Has lost about 20 lbs over 1 month. Also mentions he noted blood in his stool 2ds ago.

## 2020-07-14 NOTE — H&P ADULT - ATTENDING COMMENTS
I have personally provided 40 minutes of critical care time concurrently with the resident/fellow.  This time excludes time spent on separate procedures and time spent teaching.  I have reviewed the resident/fellow's documentation and I agree with the assessment and plan of care.     Patient seen and examined.  Agree with resident note as above.  Patient with hx as noted including recent BRBPR dx with SCAD and on steroids who presented to ER with subacute BRBPR (small volume, hgb stable) and fever/dysuria/foul smell/CVA tenderness.  In the ER patient was found to have new aflutter to 150s and hypoTN to 70s systolic.  Received 4L and vitals improved.  Given borderline MAP ER decided to begin Levophed support and so pt is now traiged to MICU.    Will send cx, continue broad empiric abx.  Suspect shock due to sepsis from cystitis vs pyelonephritis + relative adrenal insufficiency from chronic steroids (x1 month).  Stress dose steroids.  Midodrine.  FUll plan as above and I have edited as appropriate.

## 2020-07-14 NOTE — H&P ADULT - ASSESSMENT
Assessment:   60 y M PMHx HTN, HLD, hypothyroidism s/p thyroidectomy  2/2 thyroid cancer, diverticulitis (2010), UTI/prostatitis/cystitis (2002) p/w fevers and progressively worsening LLQ pain likely 2/2 septic shock.    Plan:   #Neuro:   - Stable. A&O x3    #CV:  Atrial flutter- new diagnosis, likely 2/2 septic shock. Rate 80s  - Hold a/c in setting of GI bleed  - Consult cardiology when stable off of pressors    HOTN (70/60 in ED). /58 s/p 5L fluid, levo .05  - Wean off levo .05  - Consider midodrine   - Hold home losartan    Troponinemia- likely 2/2 demand ischemia in setting of rapid aflutter, sepsis, dehydration. Troponin 186 (down from 202).   - Trend troponins    #Respiratory:  - Stable- 96% RA    #GI:   GI bleed likely 2/2 SCAD.  - GI following- will f/u in AM  - Hold home mesalamine 375 mg QID  - F/u FOBT  - Hold home omeprazole    #Renal:  Dysuria  - See plan below    #:  BPH   - Consider doxazosin     #ID:   Septic shock 2/2 GI translocation 2/2 SCAD vs urosepsis 2/2 UTI (dysuria, cloudy urine).  s/p vanc x1, zosyn x1 in ED.  - f/u blood, urine cx  - Continue ceftriaxone 1g for GI prophylaxis     #Endo:   Possible adrenal insufficiency 2/2 steroid use  - F/u cortisol   - Start stress dose steroids (hydrocortisol 75 mg q8h)     Hypothyroidism s/p thyroidectomy 2/2 thyroid cancer   - Continue levothyroxine 80 mcg IV    Diet: NPO   Code: FULL  VTE prophylaxis: SCD Assessment:   60 y M PMHx HTN, HLD, hypothyroidism s/p thyroidectomy  2/2 thyroid cancer, diverticulitis (2010), UTI/prostatitis/cystitis (2002) p/w fevers and progressively worsening LLQ pain likely 2/2 septic shock.    Plan:   #Neuro:   - Stable. A&O x3    #CV:  Atrial flutter- new diagnosis, likely 2/2 septic shock. Rate 80s  - Hold a/c in setting of GI bleed  - Consult cardiology when stable off of pressors    HOTN (70/60 in ED). /58 s/p 5L fluid, levo .05  - Wean off levo .05  - Consider midodrine   - Hold home losartan    Troponinemia- likely 2/2 demand ischemia in setting of rapid aflutter, sepsis, dehydration. Troponin 186 (down from 202).   - Trend troponins    #Respiratory:  - Stable- 96% RA    #GI:   GI bleed likely 2/2 SCAD. CTAP showed colon diverticulosis without diverticulitis  - GI following- will f/u in AM  - Hold home mesalamine 375 mg QID  - F/u FOBT  - Hold home omeprazole    #Renal:  Dysuria  - See plan below    #:  BPH   - Consider doxazosin     #ID:   Septic shock 2/2 GI translocation 2/2 SCAD vs urosepsis 2/2 UTI (dysuria, cloudy urine).  s/p vanc x1, zosyn x1 in ED.  - f/u blood, urine cx  - Continue ceftriaxone 1g for GI prophylaxis     #Endo:   Possible adrenal insufficiency 2/2 steroid use  - F/u cortisol   - Start stress dose steroids (hydrocortisol 75 mg q8h)     Hypothyroidism s/p thyroidectomy 2/2 thyroid cancer   - Continue levothyroxine 80 mcg IV    Diet: NPO   Code: FULL  VTE prophylaxis: SCD Assessment:   60 y M PMHx HTN, HLD, hypothyroidism s/p thyroidectomy  2/2 thyroid cancer, diverticulitis (2010), UTI/prostatitis/cystitis (2002) p/w fevers and progressively worsening LLQ pain likely 2/2 septic shock.    Plan:   #Neuro:   - Stable. A&O x3    #CV:  Atrial flutter- new diagnosis, likely 2/2 septic shock. Rate 80s  - Hold a/c in setting of GI bleed  - Consult cardiology when stable off of pressors    HOTN (70/60 in ED). /58 s/p 5L fluid, levo .05  - Wean off levo .05  - Consider midodrine   - Hold home losartan    Troponinemia- likely 2/2 demand ischemia in setting of rapid aflutter, sepsis, dehydration. Troponin 186 (down from 202).   - Trend troponins    #Respiratory:  - Stable- 96% RA    #GI:   GI bleed likely 2/2 SCAD. CTAP showed colon diverticulosis without diverticulitis, SCAD can resemble IBD  - GI following- will f/u in AM  - F/u GI PCR, C diff toxin    - Hold home mesalamine 375 mg QID  - F/u FOBT  - Hold home omeprazole    #Renal:  Dysuria  - See plan below    #ID:   Septic shock 2/2 likely GI translocation 2/2 SCAD vs 2/2 UTI/pyelo (dysuria, CVA tenderness, cloudy urine). s/p vanc, zosyn, ceftriaxone  - f/u blood, urine cx  - Continue cipro/flagyl for colitis coverage    #Endo:   Possible adrenal insufficiency 2/2 steroid use  - F/u cortisol   - Start stress dose steroids (hydrocortisol 75 mg q8h)     Hypothyroidism s/p thyroidectomy 2/2 thyroid cancer   - Continue levothyroxine 88 mcg IV    Diet: NPO   Code: FULL  VTE prophylaxis: SCD Assessment:   60 y M PMHx HTN, HLD, hypothyroidism s/p thyroidectomy  2/2 thyroid cancer, diverticulitis (2010), abdominal TB ('93) s/p INH p/w fevers and progressively worsening LLQ pain likely 2/2 septic shock.    Plan:   #Neuro:   - Stable. A&O x3    #CV:  Atrial flutter- new diagnosis, likely 2/2 septic shock. Rate 80s  - Hold a/c in setting of GI bleed  - Consult cardiology when stable off of pressors    HOTN (70/60 in ED). /58 s/p 5L fluid, levo .05  - Wean off levo .05  - Consider midodrine   - Hold home losartan    Troponinemia- likely 2/2 demand ischemia in setting of rapid aflutter, sepsis, dehydration. Troponin 186 (down from 202).   - Trend troponins    #Respiratory:  - Stable- 96% RA    #GI:   GI bleed likely 2/2 SCAD. CTAP showed colon diverticulosis without diverticulitis, SCAD can resemble IBD  - GI following- will f/u in AM  - F/u GI PCR, C diff toxin    - Hold home mesalamine 375 mg QID  - F/u FOBT  - Hold home omeprazole    #Renal:  Dysuria  - See plan below    #ID:   Septic shock 2/2 likely GI translocation 2/2 SCAD vs 2/2 UTI/pyelo (dysuria, CVA tenderness, cloudy urine). s/p vanc, zosyn, ceftriaxone  - f/u blood, urine cx  - Continue cipro/flagyl for colitis coverage    #Endo:   Possible adrenal insufficiency 2/2 steroid use  - F/u cortisol   - Start stress dose steroids (hydrocortisol 75 mg q8h)     Hypothyroidism s/p thyroidectomy 2/2 thyroid cancer   - Continue levothyroxine 88 mcg IV    Diet: NPO   Code: FULL  VTE prophylaxis: SCD

## 2020-07-14 NOTE — CHART NOTE - NSCHARTNOTEFT_GEN_A_CORE
: Parish Cruz    INDICATION: Shock    PROCEDURE:  [x ] LIMITED ECHO  [x ] LIMITED CHEST  [ ] LIMITED RETROPERITONEAL  [ ] LIMITED ABDOMINAL  [ ] LIMITED DVT  [ ] NEEDLE GUIDANCE VASCULAR  [ ] NEEDLE GUIDANCE THORACENTESIS  [ ] NEEDLE GUIDANCE PARACENTESIS  [ ] NEEDLE GUIDANCE PERICARDIOCENTESIS  [ ] OTHER    FINDINGS: A line predominant pattern. No consolidations or pleural effusions seen. Normal LV systolic function. RV not enlarged. Normal mitral and aortic valve morphology. No mitral or aortic regurgitation seen. Mild tricuspid regurgitation. VTI 18. E velocity 75. No A consistent with atrial flutter rhythm. E/e' ratio 6.7. Lateral e' velocity 11. IVC 1.8 cm. No pericardial effusion.    INTERPRETATION: Normal aeration pattern. Normal LV systolic function. No clear signs of diastolic dysfunction.     --------------------------------------------------------  Parish Coronel, PGY-5  Pulmonary/Critical Care Fellow  Pager: [phone] ([facility]), [phone] ([place]) : Parish Cruz Craig    INDICATION: Shock    PROCEDURE:  [x ] LIMITED ECHO  [x ] LIMITED CHEST  [ ] LIMITED RETROPERITONEAL  [ ] LIMITED ABDOMINAL  [ ] LIMITED DVT  [ ] NEEDLE GUIDANCE VASCULAR  [ ] NEEDLE GUIDANCE THORACENTESIS  [ ] NEEDLE GUIDANCE PARACENTESIS  [ ] NEEDLE GUIDANCE PERICARDIOCENTESIS  [ ] OTHER    FINDINGS: A line predominant pattern. No consolidations or pleural effusions seen. Normal LV systolic function. RV not enlarged. Normal mitral and aortic valve morphology. No mitral or aortic regurgitation seen. Mild tricuspid regurgitation. VTI 18. E velocity 75. No A consistent with atrial flutter rhythm. E/e' ratio 6.7. Lateral e' velocity 11. IVC 1.8 cm. No pericardial effusion.    INTERPRETATION: Normal aeration pattern. Normal LV systolic function. No clear signs of diastolic dysfunction.     --------------------------------------------------------  Parish Coronel, PGY-5  Pulmonary/Critical Care Fellow  Pager: [phone] ([facility]), [phone] ([place])      Attending Attestation:    I was present during the key portions of the procedure and immediately available during the entire procedure.    Abiel Engel MD  Attending  Pulmonary & Critical Care Medicine : Parish Cruz Craig    INDICATION: Shock    PROCEDURE:  [x ] LIMITED ECHO  [x ] LIMITED CHEST  [ ] LIMITED RETROPERITONEAL  [ ] LIMITED ABDOMINAL  [ ] LIMITED DVT  [ ] NEEDLE GUIDANCE VASCULAR  [ ] NEEDLE GUIDANCE THORACENTESIS  [ ] NEEDLE GUIDANCE PARACENTESIS  [ ] NEEDLE GUIDANCE PERICARDIOCENTESIS  [ ] OTHER    FINDINGS: A line predominant pattern. No consolidations or pleural effusions seen. Normal LV systolic function. RV not enlarged. Normal mitral and aortic valve morphology. No mitral or aortic regurgitation seen. Mild tricuspid regurgitation. VTI 18. E velocity 75. No A consistent with atrial flutter rhythm. E/e' ratio 6.7. Lateral e' velocity 11. IVC 1.8 cm. No pericardial effusion.    INTERPRETATION: Normal aeration pattern. Normal LV systolic function. No clear signs of diastolic dysfunction.     --------------------------------------------------------  Parish Coronel, PGY-5  Pulmonary/Critical Care Fellow  Pager: [phone] ([facility]), [phone] ([place])    Images stored on [employer]    Attending Attestation:    I was present during the key portions of the procedure and immediately available during the entire procedure.    Abiel Engel MD  Attending  Pulmonary & Critical Care Medicine

## 2020-07-14 NOTE — ED PROVIDER NOTE - ATTENDING CONTRIBUTION TO CARE
ED attending Lili CORBETT performed a history and physical exam of the patient and discussed their management with the resident/ACP. I reviewed the resident/ACP's note and agree with the documented findings and plan of care except for the following.    60 year old male with history of HTN, HLD, Hypothyroidism presented to ED with LLQ abdominal pain associated with shortness of breath. EKG noted new onset atrial flutter. No chest pain, palpitations. Pt stated the had reduced appetite and PO intake since Friday due to abdominal discomfort. No fever, chills, recent travels, sick contact. Will treat for presumed sepsis secondary to diverticulitis(IVF, broad spetrum abx0. New onset Atrial flutter likely secondary sepsis/hypovolemia. Low suspicion for ACS. R/O PE Plan: IVF, broad spectrum abs, blood culture. CTA chest/abdomen/pelvis. Labs included trop. Cardiology evaluation for new onset atrial flutter. Re-evaluate

## 2020-07-14 NOTE — PROGRESS NOTE ADULT - ASSESSMENT
60 y M PMHx HTN, HLD, hypothyroidism s/p thyroidectomy  2/2 thyroid cancer, diverticulitis (2010), abdominal TB ('93) s/p INH p/w fevers and progressively worsening LLQ pain likely 2/2 septic shock.    Plan:   #Neuro:   - Stable. A&O x3    #CV:  Atrial flutter- new diagnosis, likely 2/2 septic shock. Rate 70s  - a/c held in setting of GI bleed  - EP consult recs pending    HOTN (70/60 in ED). /58 s/p 5L fluid  - Off levophed  - Started on midodrine 20 mg q8h  - Home med losartan held    Troponinemia- likely 2/2 demand ischemia in setting of rapid aflutter, sepsis, dehydration. Troponin 159 (down from 186).   - Will continue to trend troponin    #Respiratory:  - Stable- 96% RA    #GI:   GI bleed likely 2/2 SCAD. CTAP showed colon diverticulosis without diverticulitis, SCAD can resemble IBD  - GI consult pending  - GI PCR, C diff toxin pending  - Home med mesalamine 375 mg QID held and home omeprazole    #Renal:  Dysuria  - See plan below    #ID:   Septic shock 2/2 likely GI translocation 2/2 SCAD vs 2/2 UTI/pyelo (dysuria, CVA tenderness, cloudy urine). s/p vanc, zosyn, ceftriaxone  - f/u blood, urine cx  - Continue cipro/flagyl for colitis coverage    #Endo:   Possible adrenal insufficiency 2/2 steroid use  - F/u cortisol   - Start stress dose steroids (hydrocortisol 75 mg q8h)     Hypothyroidism s/p thyroidectomy 2/2 thyroid cancer   - Changed to home dose levothyroxine to 175 mcg M-Sat, 87.5 mcg Sunday    Diet: NPO   Code: FULL  VTE prophylaxis: SCD 60 y M PMHx HTN, HLD, hypothyroidism s/p thyroidectomy  2/2 thyroid cancer, diverticulitis (2010), abdominal TB ('93) s/p INH p/w fevers and progressively worsening LLQ pain likely 2/2 septic shock.    Plan:   #Neuro:   - Stable. A&O x3    #CV:  Atrial flutter- new diagnosis, likely 2/2 septic shock. Rate 70s  - a/c held in setting of GI bleed, will trend Hb before starting  - EP recs: once cleared for AC, ablation vs ANA/DCCV, metoprolol PRN if HR >120s  - Ordered TTE    HOTN (70/60 in ED). /58 s/p 5L fluid  - Off levophed  - Started on midodrine 20 mg q8h  - Home med losartan held    Troponinemia- likely 2/2 demand ischemia in setting of rapid aflutter, sepsis, dehydration. Troponin 159 (down from 186).   - Will continue to trend troponin    #Respiratory:  - Stable- 96% RA    #GI:   GI bleed likely 2/2 SCAD. CTAP showed colon diverticulosis without diverticulitis, SCAD can resemble IBD  - GI consult pending  - GI PCR, C diff toxin pending  - Home med mesalamine 375 mg QID and home omeprazole held    #Renal:  Dysuria  - See plan below    #ID:   Septic shock 2/2 likely GI translocation 2/2 SCAD vs 2/2 UTI/pyelo (dysuria, CVA tenderness, cloudy urine). s/p vanc, zosyn, ceftriaxone  - f/u blood, urine cx  - Continue cipro/flagyl for colitis coverage    #Endo:   Possible adrenal insufficiency 2/2 steroid use  - F/u cortisol   - Start stress dose steroids (hydrocortisol 75 mg q8h)     Hypothyroidism s/p thyroidectomy 2/2 thyroid cancer   - Changed to home dose levothyroxine to 175 mcg M-Sat, 87.5 mcg Sunday    Diet: NPO   Code: FULL  VTE prophylaxis: SCD 60 y M PMHx HTN, HLD, hypothyroidism s/p thyroidectomy  2/2 thyroid cancer, diverticulitis (2010), abdominal TB ('93) s/p INH p/w fevers and progressively worsening LLQ pain likely 2/2 septic shock.    Plan:   #Neuro:   - Stable. A&O x3    #CV:  Atrial flutter- new diagnosis, likely 2/2 septic shock. Rate 70s  - a/c held in setting of GI bleed, will trend Hb before starting  - EP recs: once cleared for AC, ablation vs ANA/DCCV, metoprolol PRN if HR >120s  - Ordered TTE    HOTN (70/60 in ED)  - No longer hypotensive, SBP 100s  - Off levophed  - Started on midodrine 20 mg q8h  - Home med losartan held    Troponinemia- likely 2/2 demand ischemia in setting of rapid aflutter, sepsis, dehydration.   - Troponin 159 (down from 186)  - Will continue to trend troponin    #Respiratory:  - Stable- 96% RA    #GI:   GI bleed likely 2/2 SCAD. CTAP showed colon diverticulosis without diverticulitis, SCAD can resemble IBD  - GI consult pending  - GI PCR, C diff toxin pending  - Home med mesalamine 375 mg QID and home omeprazole held    #Renal:  - no active issues    #ID:   Septic shock 2/2 likely GI translocation 2/2 SCAD vs 2/2 UTI/pyelo (dysuria, CVA tenderness, cloudy urine). s/p vanc, zosyn, ceftriaxone  - blood, urine cx results pending  - Continue on cipro/flagyl for colitis coverage    #Endo:   Possible adrenal insufficiency 2/2 steroid use  - F/U w/ cortisol   - On hydrocortisone 75 mg q8h     Hypothyroidism s/p thyroidectomy 2/2 thyroid cancer   - Changed to home dose levothyroxine to 175 mcg M-Sat, 87.5 mcg Sunday    Diet: NPO   Code: FULL  VTE prophylaxis: SCD 60 y M PMHx HTN, HLD, hypothyroidism s/p thyroidectomy  2/2 thyroid cancer, diverticulitis (2010), abdominal TB ('93) s/p INH p/w fevers and progressively worsening LLQ pain likely 2/2 septic shock.    Plan:   #Neuro:   - Stable. A&O x3    #CV:  Atrial flutter- new diagnosis, likely 2/2 septic shock. Rate 70s  - a/c held in setting of GI bleed, will trend Hb before starting  - EP recs: once cleared for AC, ablation vs ANA/DCCV, metoprolol PRN if HR >120s  - Ordered TTE    HOTN (70/60 in ED)  - No longer hypotensive, SBP 100s  - Off levophed  - Started on midodrine 20 mg q8h  - Home med losartan held    Troponinemia- likely 2/2 demand ischemia in setting of rapid aflutter, sepsis, dehydration.   - Troponin 159 (down from 186)  - Will continue to trend troponin    #Respiratory:  - Stable- 96% RA    #GI:   GI bleed likely 2/2 SCAD. CTAP showed colon diverticulosis without diverticulitis, SCAD can resemble IBD  - GI consult pending  - GI PCR, C diff toxin pending  - Home med mesalamine 375 mg QID and home omeprazole held    #Renal:  - no active issues    #ID:   Septic shock 2/2 likely SCAD vs 2/2 UTI/pyelo (dysuria, CVA tenderness, cloudy urine). s/p vanc, zosyn, ceftriaxone  - blood, urine cx results pending  - Continue on cipro/flagyl for colitis coverage    #Endo:   Possible adrenal insufficiency 2/2 steroid use  - F/U w/ cortisol   - On hydrocortisone 75 mg q8h     Hypothyroidism s/p thyroidectomy 2/2 thyroid cancer   - Changed to home dose levothyroxine to 175 mcg M-Sat, 87.5 mcg Sunday    Diet: NPO   Code: FULL  VTE prophylaxis: SCD

## 2020-07-14 NOTE — ED PROVIDER NOTE - PHYSICAL EXAMINATION
Gen: well developed and well nourished, NAD  Eyes: PERRL  ENT: airway patent, mmm  CV: RRR, +S1/S2, no M/R/G  Resp: CTAB, symmetric breath sounds  GI: abdomen soft non-distended, + LLQ TTP  Extremities - FROM, symmetric pulses, no edema  Neuro: A&Ox3, following commands, speech clear, moving all four extremities spontaneously

## 2020-07-14 NOTE — H&P ADULT - NSHPPHYSICALEXAM_GEN_ALL_CORE
CONSTITUTIONAL: No acute distress. Awake and alert.  HEAD: No evidence of trauma. Structures WNL.  EYES: +PERRL. +EOMI. No scleral icterus. No conjunctival injection.  ENT: Moist oral mucosa. No erythema. No pharyngeal exudates.   NECK: Supple. Appropriate ROM. No stiffness. No masses or lymphadenopathy.  RESPIRATORY: CTAB. No wheezes, rales, or rhonchi. No accessory muscle use. No apparent respiratory distress.  CARDIOVASCULAR: +S1/S2. No audible S3/S4. Regular rate and rhythm. No murmurs, rubs, or gallops. 2+ radial pulses x b/l UE; 2+ DP pulses x b/l LE.   GASTROINTESTINAL: + LLQ pain w/active guarding. Soft, nontender, nondistended. No rebound or guarding.   BACK: + L CVA tenderness. No spinal or paraspinal tenderness.   EXTREMITY: No LE swelling or edema. EXTs warm to touch.  MUSCULOSKELETAL: Spontaneous movement in all extremities.  DERMATOLOGICAL: No abnormal rashes or lesions.  NEUROLOGICAL: CN 2-12 grossly intact. No focal deficits. Sensation intact x 4EXT. A&Ox3 (oriented to person, place, and time).  PSYCHIATRIC: Appropriate affect.

## 2020-07-14 NOTE — CONSULT NOTE ADULT - ASSESSMENT
Chief Complaint:  Patient is a 60y old  Male who presents with a chief complaint of shock (2020 05:58)      HPI:  59 yo m with PMH of HTN, HLD, hypothyroidism s/p total thyroidectomy (13) 2/2 thyroid cancer, diverticulitis (), abdominal TB () s/p INH who presents with fevers and progressively worsening LLQ pain.     GI consulted for rectal bleeding.    Patient with previous workup of his LLQ done at [facility] . Reports colonoscopy with SCAD, started on mesalamine and budesonide ***      ****incomplete note***    In the ED:  =======  ED course:  BP 90/60, afib with RVR. Given 3L NS, 2L LR and started on levo .05.       Home Medications:    Hospital Medications:  chlorhexidine 4% Liquid 1 Application(s) Topical <User Schedule>  ciprofloxacin     Tablet 500 milliGRAM(s) Oral every 12 hours  hydrocortisone sodium succinate Injectable 75 milliGRAM(s) IV Push every 8 hours  levothyroxine Injectable 88 MICROGram(s) IV Push at bedtime  metroNIDAZOLE  IVPB 500 milliGRAM(s) IV Intermittent every 8 hours  midodrine 20 milliGRAM(s) Oral every 8 hours  norepinephrine Infusion 0.05 MICROgram(s)/kG/Min IV Continuous <Continuous>  sodium chloride 0.9%. 1000 milliLiter(s) IV Continuous <Continuous>      PMHX/PSHX:      Family history:      Denies family history of colon cancer/polyps, stomach cancer/polyps, pancreatic cancer/masses, liver cancer/disease, ovarian cancer and endometrial cancer.    Social History:     Tob: Denies  EtOH: Denies  Illicit Drugs: Denies    ROS:     General:  No wt loss, fevers, chills, night sweats, fatigue  Eyes:  Good vision, no reported pain  ENT:  No sore throat, pain, runny nose, dysphagia  CV:  No pain, palpitations, hypo/hypertension  Pulm:  No dyspnea, cough, tachypnea, wheezing  GI:  No pain, No nausea, No vomiting, No diarrhea, No constipation, No weight loss, No fever, No pruritis, No rectal bleeding, No tarry stools, No dysphagia,  :  No pain, bleeding, incontinence, nocturia  Muscle:  No pain, weakness  Neuro:  No weakness, tingling, memory problems  Psych:  No fatigue, insomnia, mood problems, depression  Endocrine:  No polyuria, polydipsia, cold/heat intolerance  Heme:  No petechiae, ecchymosis, easy bruisability  Skin:  No rash, tattoos, scars, edema    PHYSICAL EXAM:     GENERAL:  No acute distress  HEENT:  Normocephalic/atraumatic, no scleral icterus  CHEST:  Clear to auscultation bilaterally, no wheezes/rales/ronchi, no accessory muscle use  HEART:  Regular rate and rhythm, no murmurs/rubs/gallops  ABDOMEN:  Soft, non-tender, non-distended, normoactive bowel sounds,  no masses, no hepato-splenomegaly, no signs of chronic liver disease  EXTREMITIES: No cyanosis, clubbing, or edema  SKIN:  No rash/erythema/ecchymoses/petechiae/wounds/abscess/warm/dry  NEURO:  Alert and oriented x 3, no asterixis    Vital Signs:  Vital Signs Last 24 Hrs  T(C): 37.2 (2020 08:00), Max: 37.5 (2020 23:07)  T(F): 98.9 (2020 08:00), Max: 99.5 (2020 23:07)  HR: 87 (2020 08:00) (76 - 127)  BP: 93/62 (2020 08:00) (76/56 - 108/61)  BP(mean): 73 (2020 08:00) (71 - 78)  RR: 26 (2020 08:00) (18 - 45)  SpO2: 95% (2020 08:00) (95% - 100%)  Daily Height in cm: 172.72 (2020 05:56)    Daily     LABS:                        12.7   7.58  )-----------( 148      ( 2020 09:33 )             39.5     Mean Cell Volume: 93.8 fl (07-20 @ 09:33)    -    135  |  98  |  23  ----------------------------<  108<H>  3.5   |  21<L>  |  1.16    Ca    9.3      2020 22:52    TPro  7.1  /  Alb  3.7  /  TBili  0.6  /  DBili  x   /  AST  24  /  ALT  <5<L>  /  AlkPhos  62  07-13    LIVER FUNCTIONS - ( 2020 22:52 )  Alb: 3.7 g/dL / Pro: 7.1 g/dL / ALK PHOS: 62 U/L / ALT: <5 U/L / AST: 24 U/L / GGT: x           PT/INR - ( 2020 22:52 )   PT: 13.2 sec;   INR: 1.12 ratio         PTT - ( 2020 22:52 )  PTT:28.2 sec  Urinalysis Basic - ( 2020 00:26 )    Color: Yellow / Appearance: Clear / S.024 / pH: x  Gluc: x / Ketone: Small  / Bili: Negative / Urobili: Negative   Blood: x / Protein: 30 mg/dL / Nitrite: Negative   Leuk Esterase: Negative / RBC: 15 /hpf / WBC 1 /HPF   Sq Epi: x / Non Sq Epi: 0 /hpf / Bacteria: Negative                              12.7   7.58  )-----------( 148      ( 2020 09:33 )             39.5                         15.4   13.33 )-----------( 187      ( 2020 22:52 )             47.0       Imaging: Chief Complaint:  Patient is a 60y old  Male who presents with a chief complaint of shock (2020 05:58)    HPI:  61 yo m with PMH of HTN, HLD, hypothyroidism s/p total thyroidectomy (13) 2/2 thyroid cancer, diverticulitis (), abdominal TB () s/p INH who presents with fevers and progressively worsening LLQ pain.     GI consulted for rectal bleeding.  Patient has longstanding history (30y) of recurrent LLQ pain, diarrhea and bloody stools (red, bright, not mixed with stool, occasional clots) with known diverticulosis. He follows with Dr. Koch at [facility], with a recent colonoscopy done () showing SCAD: he was started on mesalamine and budesonide with improvement in diarrhea. Completed 30d of budesonide, now on mesalamine.    In the ED:  =======  ED course:  BP 90/60, afib with RVR. Given 3L NS, 2L LR and started on levo .05.       Home Medications:    Hospital Medications:  chlorhexidine 4% Liquid 1 Application(s) Topical <User Schedule>  ciprofloxacin     Tablet 500 milliGRAM(s) Oral every 12 hours  hydrocortisone sodium succinate Injectable 75 milliGRAM(s) IV Push every 8 hours  levothyroxine Injectable 88 MICROGram(s) IV Push at bedtime  metroNIDAZOLE  IVPB 500 milliGRAM(s) IV Intermittent every 8 hours  midodrine 20 milliGRAM(s) Oral every 8 hours  norepinephrine Infusion 0.05 MICROgram(s)/kG/Min IV Continuous <Continuous>  sodium chloride 0.9%. 1000 milliLiter(s) IV Continuous <Continuous>      PMHX/PSHX:      Family history:      Denies family history of colon cancer/polyps, stomach cancer/polyps, pancreatic cancer/masses, liver cancer/disease, ovarian cancer and endometrial cancer.    Social History:     Tob: Denies  EtOH: Denies  Illicit Drugs: Denies    ROS:     General:  No wt loss, fevers, chills, night sweats, fatigue  Eyes:  Good vision, no reported pain  ENT:  No sore throat, pain, runny nose, dysphagia  CV:  No pain, palpitations, hypo/hypertension  Pulm:  No dyspnea, cough, tachypnea, wheezing  GI:  No pain, No nausea, No vomiting, No diarrhea, No constipation, No weight loss, No fever, No pruritis, No rectal bleeding, No tarry stools, No dysphagia,  :  No pain, bleeding, incontinence, nocturia  Muscle:  No pain, weakness  Neuro:  No weakness, tingling, memory problems  Psych:  No fatigue, insomnia, mood problems, depression  Endocrine:  No polyuria, polydipsia, cold/heat intolerance  Heme:  No petechiae, ecchymosis, easy bruisability  Skin:  No rash, tattoos, scars, edema    PHYSICAL EXAM:     GENERAL:  No acute distress  HEENT:  Normocephalic/atraumatic, no scleral icterus  CHEST:  Clear to auscultation bilaterally, no wheezes/rales/ronchi, no accessory muscle use  HEART:  Regular rate and rhythm, no murmurs/rubs/gallops  ABDOMEN:  Soft, non-tender, non-distended, normoactive bowel sounds,  no masses, no hepato-splenomegaly, no signs of chronic liver disease  EXTREMITIES: No cyanosis, clubbing, or edema  SKIN:  No rash/erythema/ecchymoses/petechiae/wounds/abscess/warm/dry  NEURO:  Alert and oriented x 3, no asterixis    Vital Signs:  Vital Signs Last 24 Hrs  T(C): 37.2 (2020 08:00), Max: 37.5 (2020 23:07)  T(F): 98.9 (2020 08:00), Max: 99.5 (2020 23:07)  HR: 87 (2020 08:00) (76 - 127)  BP: 93/62 (2020 08:00) (76/56 - 108/61)  BP(mean): 73 (2020 08:00) (71 - 78)  RR: 26 (2020 08:00) (18 - 45)  SpO2: 95% (2020 08:00) (95% - 100%)  Daily Height in cm: 172.72 (2020 05:56)    Daily     LABS:                        12.7   7.58  )-----------( 148      ( 2020 09:33 )             39.5     Mean Cell Volume: 93.8 fl (07-14-20 @ 09:33)    07-13    135  |  98  |  23  ----------------------------<  108<H>  3.5   |  21<L>  |  1.16    Ca    9.3      2020 22:52    TPro  7.1  /  Alb  3.7  /  TBili  0.6  /  DBili  x   /  AST  24  /  ALT  <5<L>  /  AlkPhos  62  07-13    LIVER FUNCTIONS - ( 2020 22:52 )  Alb: 3.7 g/dL / Pro: 7.1 g/dL / ALK PHOS: 62 U/L / ALT: <5 U/L / AST: 24 U/L / GGT: x           PT/INR - ( 2020 22:52 )   PT: 13.2 sec;   INR: 1.12 ratio         PTT - ( 2020 22:52 )  PTT:28.2 sec  Urinalysis Basic - ( 2020 00:26 )    Color: Yellow / Appearance: Clear / S.024 / pH: x  Gluc: x / Ketone: Small  / Bili: Negative / Urobili: Negative   Blood: x / Protein: 30 mg/dL / Nitrite: Negative   Leuk Esterase: Negative / RBC: 15 /hpf / WBC 1 /HPF   Sq Epi: x / Non Sq Epi: 0 /hpf / Bacteria: Negative                              12.7   7.58  )-----------( 148      ( 2020 09:33 )             39.5                         15.4   13.33 )-----------( 187      ( 2020 22:52 )             47.0       Imaging: Chief Complaint:  Patient is a 60y old  Male who presents with a chief complaint of shock (2020 05:58)    HPI:  61 yo m with PMH of HTN, HLD, hypothyroidism s/p total thyroidectomy (13) 2/2 thyroid cancer, diverticulitis (), abdominal TB () s/p INH who presents with fevers and progressively worsening LLQ pain.     GI consulted for rectal bleeding.    Patient has longstanding history (30y) of recurrent LLQ pain, diarrhea and bloody stools (red, bright, not mixed with stool, occasional clots) with known diverticulosis. He follows with Dr. Koch at [facility], with a recent colonoscopy done () showing SCAD: he was started on mesalamine and budesonide with improvement in diarrhea. Completed 30d of budesonide, now on mesalamine alone.    For the past 4 days prior to admission patient endorses worsening fatigue, fever (up to 102), diffuse myalgia and pain on deep inspiration. He also endorses LLQ pain, similar to previous episodes of his abdominal pain - cramping, localized, intermittent, not related to food, not associated with nausea/vomiting. He has no active rectal bleeding - last seen minimal blood with stools 5d prior to admission. Denies SOB, rhinorrhea, urinary complaints. No exposure to suspicious foods, no sick contacts.  The day prior to admission patient reports worsening of his abdominal pain, 10/10 and worsening fatigue, fever and chill, along    In the ED:  =======  BP 90/60, afib with RVR. Given 3L NS, 2L LR and started on levo .05.     Hospital Medications:  chlorhexidine 4% Liquid 1 Application(s) Topical <User Schedule>  ciprofloxacin     Tablet 500 milliGRAM(s) Oral every 12 hours  hydrocortisone sodium succinate Injectable 75 milliGRAM(s) IV Push every 8 hours  levothyroxine Injectable 88 MICROGram(s) IV Push at bedtime  metroNIDAZOLE  IVPB 500 milliGRAM(s) IV Intermittent every 8 hours  midodrine 20 milliGRAM(s) Oral every 8 hours  norepinephrine Infusion 0.05 MICROgram(s)/kG/Min IV Continuous <Continuous>  sodium chloride 0.9%. 1000 milliLiter(s) IV Continuous <Continuous>      PMHX/PSHX:      Family history:      Denies family history of colon cancer/polyps, stomach cancer/polyps, pancreatic cancer/masses, liver cancer/disease, ovarian cancer and endometrial cancer.    Social History:     Tob: Denies  EtOH: Denies  Illicit Drugs: Denies    ROS:     General:  No wt loss, fevers, chills, night sweats, fatigue  Eyes:  Good vision, no reported pain  ENT:  No sore throat, pain, runny nose, dysphagia  CV:  No pain, palpitations, hypo/hypertension  Pulm:  No dyspnea, cough, tachypnea, wheezing  GI:  No pain, No nausea, No vomiting, No diarrhea, No constipation, No weight loss, No fever, No pruritis, No rectal bleeding, No tarry stools, No dysphagia,  :  No pain, bleeding, incontinence, nocturia  Muscle:  No pain, weakness  Neuro:  No weakness, tingling, memory problems  Psych:  No fatigue, insomnia, mood problems, depression  Endocrine:  No polyuria, polydipsia, cold/heat intolerance  Heme:  No petechiae, ecchymosis, easy bruisability  Skin:  No rash, tattoos, scars, edema    PHYSICAL EXAM:     GENERAL:  No acute distress  HEENT:  Normocephalic/atraumatic, no scleral icterus  CHEST:  Clear to auscultation bilaterally, no wheezes/rales/ronchi, no accessory muscle use  HEART:  Regular rate and rhythm, no murmurs/rubs/gallops  ABDOMEN:  Soft, non-tender, non-distended, normoactive bowel sounds,  no masses, no hepato-splenomegaly, no signs of chronic liver disease  EXTREMITIES: No cyanosis, clubbing, or edema  SKIN:  No rash/erythema/ecchymoses/petechiae/wounds/abscess/warm/dry  NEURO:  Alert and oriented x 3, no asterixis    Vital Signs:  Vital Signs Last 24 Hrs  T(C): 37.2 (2020 08:00), Max: 37.5 (2020 23:07)  T(F): 98.9 (2020 08:00), Max: 99.5 (2020 23:07)  HR: 87 (2020 08:00) (76 - 127)  BP: 93/62 (2020 08:00) (76/56 - 108/61)  BP(mean): 73 (2020 08:00) (71 - 78)  RR: 26 (2020 08:00) (18 - 45)  SpO2: 95% (2020 08:00) (95% - 100%)  Daily Height in cm: 172.72 (2020 05:56)    Daily     LABS:                        12.7   7.58  )-----------( 148      ( 2020 09:33 )             39.5     Mean Cell Volume: 93.8 fl (07-20 @ 09:33)    07-    135  |  98  |  23  ----------------------------<  108<H>  3.5   |  21<L>  |  1.16    Ca    9.3      2020 22:52    TPro  7.1  /  Alb  3.7  /  TBili  0.6  /  DBili  x   /  AST  24  /  ALT  <5<L>  /  AlkPhos  62  07-13    LIVER FUNCTIONS - ( 2020 22:52 )  Alb: 3.7 g/dL / Pro: 7.1 g/dL / ALK PHOS: 62 U/L / ALT: <5 U/L / AST: 24 U/L / GGT: x           PT/INR - ( 2020 22:52 )   PT: 13.2 sec;   INR: 1.12 ratio         PTT - ( 2020 22:52 )  PTT:28.2 sec  Urinalysis Basic - ( 2020 00:26 )    Color: Yellow / Appearance: Clear / S.024 / pH: x  Gluc: x / Ketone: Small  / Bili: Negative / Urobili: Negative   Blood: x / Protein: 30 mg/dL / Nitrite: Negative   Leuk Esterase: Negative / RBC: 15 /hpf / WBC 1 /HPF   Sq Epi: x / Non Sq Epi: 0 /hpf / Bacteria: Negative                              12.7   7.58  )-----------( 148      ( 2020 09:33 )             39.5                         15.4   13.33 )-----------( 187      ( 2020 22:52 )             47.0       Imaging: Chief Complaint:  Patient is a 60y old  Male who presents with a chief complaint of shock (2020 05:58)    HPI:  59 yo m with PMH of HTN, HLD, hypothyroidism s/p total thyroidectomy (13) 2/2 thyroid cancer, diverticulitis (), abdominal TB () s/p INH who presents with fevers and progressively worsening LLQ pain.     GI consulted for rectal bleeding.    Patient has longstanding history (30y) of recurrent LLQ pain, diarrhea and bloody stools (red, bright, not mixed with stool, occasional clots) with known diverticulosis. He follows with Dr. Koch at [facility], with a recent colonoscopy done () showing SCAD: he was started on mesalamine and budesonide with improvement in diarrhea. Completed 30d of budesonide, now on mesalamine alone.    For the past 4 days prior to admission patient endorses worsening fatigue, fever (up to 102), diffuse myalgia and pain on deep inspiration. He also endorses LLQ pain, similar to previous episodes of his abdominal pain - cramping, localized, intermittent, not related to food, not associated with nausea/vomiting. He has no active rectal bleeding - last seen minimal blood with stools 5d prior to admission. Denies SOB, rhinorrhea, urinary complaints. No exposure to suspicious foods, no sick contacts.  The day prior to admission patient reports worsening of his abdominal pain, 10/10 and worsening fatigue, fever and chill, along with new centralized chest pressure and SOB.    In the ED:  =======  BP 90/60, afib with RVR. Given 3L NS, 2L LR and started on levo .05.     Hospital Medications:  chlorhexidine 4% Liquid 1 Application(s) Topical <User Schedule>  ciprofloxacin     Tablet 500 milliGRAM(s) Oral every 12 hours  hydrocortisone sodium succinate Injectable 75 milliGRAM(s) IV Push every 8 hours  levothyroxine Injectable 88 MICROGram(s) IV Push at bedtime  metroNIDAZOLE  IVPB 500 milliGRAM(s) IV Intermittent every 8 hours  midodrine 20 milliGRAM(s) Oral every 8 hours  norepinephrine Infusion 0.05 MICROgram(s)/kG/Min IV Continuous <Continuous>  sodium chloride 0.9%. 1000 milliLiter(s) IV Continuous <Continuous>      PMHX/PSHX:      Family history:      Denies family history of colon cancer/polyps, stomach cancer/polyps, pancreatic cancer/masses, liver cancer/disease, ovarian cancer and endometrial cancer.    Social History:     Tob: Denies  EtOH: Denies  Illicit Drugs: Denies    ROS:     General:  + wt loss, + fevers, + chills, -night sweats, + fatigue  Eyes:  Good vision, no reported pain  ENT:  No sore throat, pain, runny nose, dysphagia  CV:  No pain, palpitations,  Pulm:  No dyspnea, cough, tachypnea, wheezing  GI:  As above  :  No pain, bleeding, incontinence, nocturia  Muscle:  No pain, weakness  Neuro:  No weakness, tingling, memory problems  Psych:  No fatigue, insomnia, mood problems, depression  Endocrine:  No polyuria, polydipsia, cold/heat intolerance  Heme:  No petechiae, ecchymosis, easy bruisability  Skin:  No rash, tattoos, scars, edema    PHYSICAL EXAM:     GENERAL:  No acute distress  HEENT:  Normocephalic/atraumatic, no scleral icterus  CHEST:  Clear to auscultation bilaterally, no wheezes/rales/ronchi, no accessory muscle use  HEART:  Regular rate and rhythm, no murmurs/rubs/gallops  ABDOMEN:  Soft, mild ttp at LLQ, no guarding or rebound, non-distended, normoactive bowel sounds,  no masses, no hepato-splenomegaly, no signs of chronic liver disease. IBIS with normal rectal tone and no stool on glove  EXTREMITIES: No cyanosis, clubbing, or edema  SKIN:  No rash/erythema/ecchymoses/petechiae/wounds/abscess/warm/dry  NEURO:  Alert and oriented x 3, no asterixis    Vital Signs:  Vital Signs Last 24 Hrs  T(C): 37.2 (2020 08:00), Max: 37.5 (2020 23:07)  T(F): 98.9 (2020 08:00), Max: 99.5 (2020 23:07)  HR: 87 (2020 08:00) (76 - 127)  BP: 93/62 (2020 08:00) (76/56 - 108/61)  BP(mean): 73 (2020 08:00) (71 - 78)  RR: 26 (2020 08:00) (18 - 45)  SpO2: 95% (2020 08:00) (95% - 100%)  Daily Height in cm: 172.72 (2020 05:56)    Daily     LABS:                        12.7   7.58  )-----------( 148      ( 2020 09:33 )             39.5     Mean Cell Volume: 93.8 fl (20 @ 09:33)    07-    135  |  98  |  23  ----------------------------<  108<H>  3.5   |  21<L>  |  1.16    Ca    9.3      2020 22:52    TPro  7.1  /  Alb  3.7  /  TBili  0.6  /  DBili  x   /  AST  24  /  ALT  <5<L>  /  AlkPhos  62  07-13    LIVER FUNCTIONS - ( 2020 22:52 )  Alb: 3.7 g/dL / Pro: 7.1 g/dL / ALK PHOS: 62 U/L / ALT: <5 U/L / AST: 24 U/L / GGT: x           PT/INR - ( 2020 22:52 )   PT: 13.2 sec;   INR: 1.12 ratio         PTT - ( 2020 22:52 )  PTT:28.2 sec  Urinalysis Basic - ( 2020 00:26 )    Color: Yellow / Appearance: Clear / S.024 / pH: x  Gluc: x / Ketone: Small  / Bili: Negative / Urobili: Negative   Blood: x / Protein: 30 mg/dL / Nitrite: Negative   Leuk Esterase: Negative / RBC: 15 /hpf / WBC 1 /HPF   Sq Epi: x / Non Sq Epi: 0 /hpf / Bacteria: Negative                              12.7   7.58  )-----------( 148      ( 2020 09:33 )             39.5                         15.4   13.33 )-----------( 187      ( 2020 22:52 )             47.0       Imaging:  CTA C&A&P  No pulmonary embolus.    Colon diverticulosis without diverticulitis. No bowel obstruction, drainable fluid collection or intraperitoneal free air.    Indeterminate bilateral renal lesions, which can be further characterize on a nonemergent ultrasound. Chief Complaint:  Patient is a 60y old  Male who presents with a chief complaint of shock (2020 05:58)    HPI:  61 yo m with PMH of HTN, HLD, hypothyroidism s/p total thyroidectomy (13) 2/2 thyroid cancer, diverticulitis (), abdominal TB () s/p INH who presents with fevers and progressively worsening LLQ pain.     GI consulted for rectal bleeding.    Patient has longstanding history (30y) of recurrent LLQ pain, diarrhea and bloody stools (red, bright, not mixed with stool, occasional clots - with variable frequency and intensity) with known diverticulosis. He follows with Dr. Koch at [facility], with a recent colonoscopy done () showing SCAD: he was started on mesalamine and budesonide with improvement in diarrhea. Completed 30d of budesonide, now on mesalamine alone.    For the past 4 days prior to admission patient endorses worsening fatigue, fever (up to 102), diffuse myalgia and pain on deep inspiration. He also endorses LLQ pain, similar to previous episodes of his abdominal pain - cramping, localized, intermittent, not related to food, not associated with nausea/vomiting. He has no active rectal bleeding - last seen minimal blood with stools 5d prior to admission. Denies SOB, rhinorrhea, urinary complaints. No exposure to suspicious foods, no sick contacts. The day prior to admission patient reports worsening fatigue, fever and chill, along with new centralized chest pressure and SOB.    In the ED:  =======  BP 90/60, afib with RVR. Given 3L NS, 2L LR and started on levo .05.     Hospital Medications:  chlorhexidine 4% Liquid 1 Application(s) Topical <User Schedule>  ciprofloxacin     Tablet 500 milliGRAM(s) Oral every 12 hours  hydrocortisone sodium succinate Injectable 75 milliGRAM(s) IV Push every 8 hours  levothyroxine Injectable 88 MICROGram(s) IV Push at bedtime  metroNIDAZOLE  IVPB 500 milliGRAM(s) IV Intermittent every 8 hours  midodrine 20 milliGRAM(s) Oral every 8 hours  norepinephrine Infusion 0.05 MICROgram(s)/kG/Min IV Continuous <Continuous>  sodium chloride 0.9%. 1000 milliLiter(s) IV Continuous <Continuous>      PMHX/PSHX:      Family history:      Denies family history of colon cancer/polyps, stomach cancer/polyps, pancreatic cancer/masses, liver cancer/disease, ovarian cancer and endometrial cancer.    Social History:     Tob: Denies  EtOH: Denies  Illicit Drugs: Denies    ROS:     General:  + wt loss, + fevers, + chills, -night sweats, + fatigue  Eyes:  Good vision, no reported pain  ENT:  No sore throat, pain, runny nose, dysphagia  CV:  No pain, palpitations,  Pulm:  No dyspnea, cough, tachypnea, wheezing  GI:  As above  :  No pain, bleeding, incontinence, nocturia  Muscle:  No pain, weakness  Neuro:  No weakness, tingling, memory problems  Psych:  No fatigue, insomnia, mood problems, depression  Endocrine:  No polyuria, polydipsia, cold/heat intolerance  Heme:  No petechiae, ecchymosis, easy bruisability  Skin:  No rash, tattoos, scars, edema    PHYSICAL EXAM:     GENERAL:  No acute distress  HEENT:  Normocephalic/atraumatic, no scleral icterus  CHEST:  Clear to auscultation bilaterally, no wheezes/rales/ronchi, no accessory muscle use  HEART:  Regular rate and rhythm, no murmurs/rubs/gallops  ABDOMEN:  Soft, mild ttp at LLQ, no guarding or rebound, non-distended, normoactive bowel sounds,  no masses, no hepato-splenomegaly, no signs of chronic liver disease. IBIS with normal rectal tone and no stool on glove  EXTREMITIES: No cyanosis, clubbing, or edema  SKIN:  No rash/erythema/ecchymoses/petechiae/wounds/abscess/warm/dry  NEURO:  Alert and oriented x 3, no asterixis    Vital Signs:  Vital Signs Last 24 Hrs  T(C): 37.2 (2020 08:00), Max: 37.5 (2020 23:07)  T(F): 98.9 (2020 08:00), Max: 99.5 (2020 23:07)  HR: 87 (2020 08:00) (76 - 127)  BP: 93/62 (2020 08:00) (76/56 - 108/61)  BP(mean): 73 (2020 08:00) (71 - 78)  RR: 26 (2020 08:00) (18 - 45)  SpO2: 95% (2020 08:00) (95% - 100%)  Daily Height in cm: 172.72 (2020 05:56)    Daily     LABS:                        12.7   7.58  )-----------( 148      ( 2020 09:33 )             39.5     Mean Cell Volume: 93.8 fl (20 @ 09:33)    -    135  |  98  |  23  ----------------------------<  108<H>  3.5   |  21<L>  |  1.16    Ca    9.3      2020 22:52    TPro  7.1  /  Alb  3.7  /  TBili  0.6  /  DBili  x   /  AST  24  /  ALT  <5<L>  /  AlkPhos  62      LIVER FUNCTIONS - ( 2020 22:52 )  Alb: 3.7 g/dL / Pro: 7.1 g/dL / ALK PHOS: 62 U/L / ALT: <5 U/L / AST: 24 U/L / GGT: x           PT/INR - ( 2020 22:52 )   PT: 13.2 sec;   INR: 1.12 ratio         PTT - ( 2020 22:52 )  PTT:28.2 sec  Urinalysis Basic - ( 2020 00:26 )    Color: Yellow / Appearance: Clear / S.024 / pH: x  Gluc: x / Ketone: Small  / Bili: Negative / Urobili: Negative   Blood: x / Protein: 30 mg/dL / Nitrite: Negative   Leuk Esterase: Negative / RBC: 15 /hpf / WBC 1 /HPF   Sq Epi: x / Non Sq Epi: 0 /hpf / Bacteria: Negative                              12.7   7.58  )-----------( 148      ( 2020 09:33 )             39.5                         15.4   13.33 )-----------( 187      ( 2020 22:52 )             47.0       Imaging:  CTA C&A&P  No pulmonary embolus.    Colon diverticulosis without diverticulitis. No bowel obstruction, drainable fluid collection or intraperitoneal free air.    Indeterminate bilateral renal lesions, which can be further characterize on a nonemergent ultrasound.      Assessment and Plan  ==============  61 yo m with PMH of HTN, HLD, hypothyroidism s/p total thyroidectomy (13) 2/2 thyroid cancer, diverticulitis (), abdominal TB () s/p INH who presents with fevers and progressively worsening LLQ pain, found to be in septic shock.    Given the chronic nature of the abdominal complaints and lack of localized GI findings in the CT (no signs of diverticulitis, colitis, perforation) low suspicion that the current clinical picture is due to a GI source. As patient is not currently bleeding - with reported minimal blood only 5d prior to admission - low suspicion that the hemodynamic instability was due to GI blood loss, and more likely attributed to sepsis.      Plan:  - No further GI workup currently planned  - Please obtain medical records from [facility] re: GI workup  - Infectious work up per primary team  - Antibiotics per primary team  - Please trend CBC    Discussed with attending Dr. Abraham.    Kev Fields, PGY-4  GI Fellow    Available on [employer] Teams  [phone]  After 5PM, please contact the on-call GI fellow: [phone] Assessment and Plan  ==============  59 yo m with PMH of HTN, HLD, hypothyroidism s/p total thyroidectomy (6/4/13) 2/2 thyroid cancer, diverticulitis (2010), abdominal TB ('93) s/p INH who presents with fevers and progressively worsening LLQ pain, found to be in septic shock.    Given the chronic nature of the abdominal complaints and lack of localized GI findings in the CT (no signs of diverticulitis, colitis, perforation) low suspicion that the current clinical picture is due to a GI source. As patient is not currently bleeding - with reported minimal blood only 5d prior to admission - low suspicion that the hemodynamic instability was due to GI blood loss, and more likely attributed to sepsis, the source of which is unclear at this time.     Plan:  - No further GI workup currently planned at this time given negative CT and recent colonoscopy done at [facility]  - Please obtain medical records from [facility] re: GI workup for review  - Infectious work up per primary team  - Antibiotics per primary team  - Please trend CBC    Discussed with attending Dr. Abraham.    Kev Fields, PGY-4  GI Fellow    Available on [employer] Teams  [phone]  After 5PM, please contact the on-call GI fellow: [phone]

## 2020-07-14 NOTE — H&P ADULT - NSHPLABSRESULTS_GEN_ALL_CORE
The Labs were reviewed by me   The Radiology was reviewed by me    EKG tracing reviewed by me        135  |  98  |  23  ----------------------------<  108<H>  3.5   |  21<L>  |  1.16    Ca    9.3      2020 22:52    TPro  7.1  /  Alb  3.7  /  TBili  0.6  /  DBili  x   /  AST  24  /  ALT  <5<L>  /  AlkPhos  62            PT/INR - ( 2020 22:52 )   PT: 13.2 sec;   INR: 1.12 ratio         PTT - ( 2020 22:52 )  PTT:28.2 sec              Urinalysis Basic - ( 2020 00:26 )    Color: Yellow / Appearance: Clear / S.024 / pH: x  Gluc: x / Ketone: Small  / Bili: Negative / Urobili: Negative   Blood: x / Protein: 30 mg/dL / Nitrite: Negative   Leuk Esterase: Negative / RBC: 15 /hpf / WBC 1 /HPF   Sq Epi: x / Non Sq Epi: 0 /hpf / Bacteria: Negative                         15.4   .33 )-----------( 187      ( 2020 22:52 )             47.0     CAPILLARY BLOOD GLUCOSE    CT Chest, Abdomen and Pelvis w/ IV Cont  (20):   IMPRESSION:   No pulmonary embolus.  Colon diverticulosis without diverticulitis. No bowel obstruction, drainable fluid collection or intraperitoneal free air.  Indeterminate bilateral renal lesions, which can be further characterize on a nonemergent ultrasound. The Labs were reviewed by me   The Radiology was reviewed by me    EKG tracing reviewed by me        135  |  98  |  23  ----------------------------<  108<H>  3.5   |  21<L>  |  1.16    Ca    9.3      2020 22:52    TPro  7.1  /  Alb  3.7  /  TBili  0.6  /  DBili  x   /  AST  24  /  ALT  <5<L>  /  AlkPhos  62      PT/INR - ( 2020 22:52 )   PT: 13.2 sec;   INR: 1.12 ratio       PTT - ( 2020 22:52 )  PTT:28.2 sec              Urinalysis Basic - ( 2020 00:26 )    Color: Yellow / Appearance: Clear / S.024 / pH: x  Gluc: x / Ketone: Small  / Bili: Negative / Urobili: Negative   Blood: x / Protein: 30 mg/dL / Nitrite: Negative   Leuk Esterase: Negative / RBC: 15 /hpf / WBC 1 /HPF   Sq Epi: x / Non Sq Epi: 0 /hpf / Bacteria: Negative                         15.4   .33 )-----------( 187      ( 2020 22:52 )             47.0     CAPILLARY BLOOD GLUCOSE    CT Chest, Abdomen and Pelvis w/ IV Cont  (20):   IMPRESSION:   No pulmonary embolus.  Colon diverticulosis without diverticulitis. No bowel obstruction, drainable fluid collection or intraperitoneal free air.  Indeterminate bilateral renal lesions, which can be further characterize on a nonemergent ultrasound.

## 2020-07-14 NOTE — ED PROVIDER NOTE - CLINICAL SUMMARY MEDICAL DECISION MAKING FREE TEXT BOX
Pt is septic. Eval for pneumonia, covid, PE, diverticulitis, intraabdominal abscess. Continue to monitor. Pt is septic. Eval for pneumonia, covid, PE, diverticulitis, intraabdominal abscess. Continue to monitor.    Dr. Lili Cotton DO: 60 hypothyroidism, HTN, HLD, diverticulosis presented to with left lower quadrant pain and tenderness. Noted to have new onset atrial flutter with variable block and hypotension. Has small amount of the blood mixed with stool for the past month, no gross hematochezia. Will treat for presumed sepsis secondary to diverticulitis. New onset Atrial flutter likely secondary sepsis/hypovolemia. Low suspicion for ACS. R/O PE

## 2020-07-14 NOTE — CHART NOTE - NSCHARTNOTEFT_GEN_A_CORE
MICU Transfer Note    Transfer from: MICU  Transfer to:  (  ) Medicine    (  ) Telemetry    (  ) RCU    (  ) Palliative    (  ) Stroke Unit    (  ) _______________  Accepting physican:      MICU COURSE:    59 y/o male with pmhx of HTN, HLD, hypothyroidism s/p total thyroidectomy (6/4/13) 2/2 thyroid cancer, diverticulitis (2010), abdominal TB ('93) s/p INH who presented with fevers and progressively worsening LLQ pain with hematochezia. Last colonoscopy was 6/17/20 at [facility] and was diagnosed with segmental colitis associated with diverticulitis (SCAD) and started on mesalamine and budesonide. Was in new atrial flutter with rate 100-150 and hypotensive, found to be in septic shock. Was given fluids and levophed, now off levophed and is on midodrine. GI recommended no further workup and to continue the antibiotics and infectious work up. EP was consulted for a. flutter, recommended cardiac ablation vs ANA/DCCV once on a/c.      Vital Signs Last 24 Hrs  T(C): 36.9 (14 Jul 2020 12:00), Max: 37.5 (13 Jul 2020 23:07)  T(F): 98.5 (14 Jul 2020 12:00), Max: 99.5 (13 Jul 2020 23:07)  HR: 73 (14 Jul 2020 12:00) (73 - 127)  BP: 116/80 (14 Jul 2020 12:00) (76/56 - 116/80)  BP(mean): 95 (14 Jul 2020 12:00) (67 - 95)  RR: 21 (14 Jul 2020 12:00) (18 - 45)  SpO2: 93% (14 Jul 2020 12:00) (93% - 100%)  I&O's Summary    13 Jul 2020 07:01  -  14 Jul 2020 07:00  --------------------------------------------------------  IN: 8.2 mL / OUT: 200 mL / NET: -191.8 mL    14 Jul 2020 07:01  -  14 Jul 2020 15:00  --------------------------------------------------------  IN: 250 mL / OUT: 750 mL / NET: -500 mL          MEDICATIONS  (STANDING):  chlorhexidine 4% Liquid 1 Application(s) Topical <User Schedule>  ciprofloxacin     Tablet 500 milliGRAM(s) Oral every 12 hours  hydrocortisone sodium succinate Injectable 75 milliGRAM(s) IV Push every 8 hours  levothyroxine 175 MICROGram(s) Oral <User Schedule>  levothyroxine 88 MICROGram(s) Oral <User Schedule>  metroNIDAZOLE  IVPB 500 milliGRAM(s) IV Intermittent every 8 hours  midodrine 20 milliGRAM(s) Oral every 8 hours    MEDICATIONS  (PRN):        LABS                                            12.7                  Neurophils% (auto):   81.8   (07-14 @ 09:33):    7.58 )-----------(148          Lymphocytes% (auto):  8.8                                           39.5                   Eosinphils% (auto):   1.7      Manual%: Neutrophils x    ; Lymphocytes x    ; Eosinophils x    ; Bands%: x    ; Blasts x                                    140    |  107    |  14                  Calcium: 8.3   / iCa: x      (07-14 @ 09:33)    ----------------------------<  96        Magnesium: 1.8                              3.5     |  23     |  0.80             Phosphorous: 2.6      TPro  5.8    /  Alb  2.9    /  TBili  0.4    /  DBili  x      /  AST  20     /  ALT  <5     /  AlkPhos  50     14 Jul 2020 09:33    ( 07-13 @ 22:52 )   PT: 13.2 sec;   INR: 1.12 ratio  aPTT: 28.2 sec        ASSESSMENT & PLAN:         For Follow-Up: MICU Transfer Note    Transfer from: MICU  Transfer to:  (  ) Medicine    (  ) Telemetry    (  ) RCU    (  ) Palliative    (  ) Stroke Unit    (  ) _______________  Accepting physican:      MICU COURSE:    59 y/o male with pmhx of HTN, HLD, hypothyroidism s/p total thyroidectomy (6/4/13) 2/2 thyroid cancer, diverticulitis (2010), abdominal TB ('93) s/p INH who presented with fevers and progressively worsening LLQ pain with hematochezia. Last colonoscopy was 6/17/20 at [facility] and was diagnosed with segmental colitis associated with diverticulitis (SCAD) and started on mesalamine and budesonide. Was in new atrial flutter with rate 100-150 and hypotensive, found to be in septic shock. Was given fluids and levophed, now off levophed and is on midodrine. GI recommended no further workup and to continue the antibiotics and infectious work up. EP was consulted for a. flutter, recommended cardiac ablation vs ANA/DCCV once on a/c.      Vital Signs Last 24 Hrs  T(C): 36.9 (14 Jul 2020 12:00), Max: 37.5 (13 Jul 2020 23:07)  T(F): 98.5 (14 Jul 2020 12:00), Max: 99.5 (13 Jul 2020 23:07)  HR: 73 (14 Jul 2020 12:00) (73 - 127)  BP: 116/80 (14 Jul 2020 12:00) (76/56 - 116/80)  BP(mean): 95 (14 Jul 2020 12:00) (67 - 95)  RR: 21 (14 Jul 2020 12:00) (18 - 45)  SpO2: 93% (14 Jul 2020 12:00) (93% - 100%)  I&O's Summary    13 Jul 2020 07:01  -  14 Jul 2020 07:00  --------------------------------------------------------  IN: 8.2 mL / OUT: 200 mL / NET: -191.8 mL    14 Jul 2020 07:01  -  14 Jul 2020 15:00  --------------------------------------------------------  IN: 250 mL / OUT: 750 mL / NET: -500 mL          MEDICATIONS  (STANDING):  chlorhexidine 4% Liquid 1 Application(s) Topical <User Schedule>  ciprofloxacin     Tablet 500 milliGRAM(s) Oral every 12 hours  hydrocortisone sodium succinate Injectable 75 milliGRAM(s) IV Push every 8 hours  levothyroxine 175 MICROGram(s) Oral <User Schedule>  levothyroxine 88 MICROGram(s) Oral <User Schedule>  metroNIDAZOLE  IVPB 500 milliGRAM(s) IV Intermittent every 8 hours  midodrine 20 milliGRAM(s) Oral every 8 hours    MEDICATIONS  (PRN):        LABS                                            12.7                  Neurophils% (auto):   81.8   (07-14 @ 09:33):    7.58 )-----------(148          Lymphocytes% (auto):  8.8                                           39.5                   Eosinphils% (auto):   1.7      Manual%: Neutrophils x    ; Lymphocytes x    ; Eosinophils x    ; Bands%: x    ; Blasts x                                    140    |  107    |  14                  Calcium: 8.3   / iCa: x      (07-14 @ 09:33)    ----------------------------<  96        Magnesium: 1.8                              3.5     |  23     |  0.80             Phosphorous: 2.6      TPro  5.8    /  Alb  2.9    /  TBili  0.4    /  DBili  x      /  AST  20     /  ALT  <5     /  AlkPhos  50     14 Jul 2020 09:33    ( 07-13 @ 22:52 )   PT: 13.2 sec;   INR: 1.12 ratio  aPTT: 28.2 sec        ASSESSMENT & PLAN:     60 y M PMHx HTN, HLD, hypothyroidism s/p thyroidectomy  2/2 thyroid cancer, diverticulitis (2010), abdominal TB ('93) s/p INH p/w fevers and progressively worsening LLQ pain likely 2/2 septic shock.    Plan:   #Neuro:   - Stable. A&O x3    #CV:  Atrial flutter- new diagnosis, likely 2/2 septic shock. Rate 70s  - a/c held in setting of GI bleed, will trend Hb before starting  - EP recs: once cleared for AC, ablation vs ANA/DCCV, metoprolol PRN if HR >120s  - Ordered TTE    HOTN (70/60 in ED)  - No longer hypotensive, SBP 100s  - Off levophed  - Started on midodrine 20 mg q8h  - Home med losartan held    Troponinemia- likely 2/2 demand ischemia in setting of rapid aflutter, sepsis, dehydration.   - Troponin 159 (down from 186)  - Will continue to trend troponin    #Respiratory:  - Stable- 96% RA    #GI:   GI bleed likely 2/2 SCAD. CTAP showed colon diverticulosis without diverticulitis, SCAD can resemble IBD  - GI consult pending  - GI PCR, C diff toxin pending  - Home med mesalamine 375 mg QID and home omeprazole held    #Renal:  - no active issues    #ID:   Septic shock 2/2 likely SCAD vs 2/2 UTI/pyelo (dysuria, CVA tenderness, cloudy urine). s/p vanc, zosyn, ceftriaxone  - blood, urine cx results pending  - Continue ceftriaxone     #Endo:   Possible adrenal insufficiency 2/2 steroid use  - F/U w/ cortisol   - On hydrocortisone 75 mg q8h     Hypothyroidism s/p thyroidectomy 2/2 thyroid cancer   - Changed to home dose levothyroxine to 175 mcg M-Sat, 87.5 mcg Sunday    Diet: NPO   Code: FULL  VTE prophylaxis: SCD       Follow up:  -Monitor CBCs  -Obtain records from [facility] per GI re: GI workup  -F/u EP regarding ablation vs ANA/DCCV. Pt will need to be restarted on AC prior to cardioversion  -F/u echo  -F/u blood and urine cultures  -Send stool studies if diarrhea MICU Transfer Note    Transfer from: MICU  Transfer to:  (  ) Medicine    (  ) Telemetry    (  ) RCU    (  ) Palliative    (  ) Stroke Unit    (  ) _______________  Accepting physican:      MICU COURSE:    59 y/o male with pmhx of HTN, HLD, hypothyroidism s/p total thyroidectomy (6/4/13) 2/2 thyroid cancer, diverticulitis (2010), abdominal TB ('93) s/p INH who presented with fevers and progressively worsening LLQ pain with hematochezia. Last colonoscopy was 6/17/20 at [facility] and was diagnosed with segmental colitis associated with diverticulitis (SCAD) and started on mesalamine and budesonide. Was in new atrial flutter with rate 100-150 and hypotensive, found to be in septic shock. Was given fluids and levophed, now off levophed and is on midodrine. GI recommended no further workup and to continue the antibiotics and infectious work up. EP was consulted for a. flutter, recommended cardiac ablation vs ANA/DCCV once on a/c.      Vital Signs Last 24 Hrs  T(C): 36.9 (14 Jul 2020 12:00), Max: 37.5 (13 Jul 2020 23:07)  T(F): 98.5 (14 Jul 2020 12:00), Max: 99.5 (13 Jul 2020 23:07)  HR: 73 (14 Jul 2020 12:00) (73 - 127)  BP: 116/80 (14 Jul 2020 12:00) (76/56 - 116/80)  BP(mean): 95 (14 Jul 2020 12:00) (67 - 95)  RR: 21 (14 Jul 2020 12:00) (18 - 45)  SpO2: 93% (14 Jul 2020 12:00) (93% - 100%)  I&O's Summary    13 Jul 2020 07:01  -  14 Jul 2020 07:00  --------------------------------------------------------  IN: 8.2 mL / OUT: 200 mL / NET: -191.8 mL    14 Jul 2020 07:01  -  14 Jul 2020 15:00  --------------------------------------------------------  IN: 250 mL / OUT: 750 mL / NET: -500 mL          MEDICATIONS  (STANDING):  chlorhexidine 4% Liquid 1 Application(s) Topical <User Schedule>  ciprofloxacin     Tablet 500 milliGRAM(s) Oral every 12 hours  hydrocortisone sodium succinate Injectable 75 milliGRAM(s) IV Push every 8 hours  levothyroxine 175 MICROGram(s) Oral <User Schedule>  levothyroxine 88 MICROGram(s) Oral <User Schedule>  metroNIDAZOLE  IVPB 500 milliGRAM(s) IV Intermittent every 8 hours  midodrine 20 milliGRAM(s) Oral every 8 hours    MEDICATIONS  (PRN):        LABS                                            12.7                  Neurophils% (auto):   81.8   (07-14 @ 09:33):    7.58 )-----------(148          Lymphocytes% (auto):  8.8                                           39.5                   Eosinphils% (auto):   1.7      Manual%: Neutrophils x    ; Lymphocytes x    ; Eosinophils x    ; Bands%: x    ; Blasts x                                    140    |  107    |  14                  Calcium: 8.3   / iCa: x      (07-14 @ 09:33)    ----------------------------<  96        Magnesium: 1.8                              3.5     |  23     |  0.80             Phosphorous: 2.6      TPro  5.8    /  Alb  2.9    /  TBili  0.4    /  DBili  x      /  AST  20     /  ALT  <5     /  AlkPhos  50     14 Jul 2020 09:33    ( 07-13 @ 22:52 )   PT: 13.2 sec;   INR: 1.12 ratio  aPTT: 28.2 sec        ASSESSMENT & PLAN:     60 y M PMHx HTN, HLD, hypothyroidism s/p thyroidectomy  2/2 thyroid cancer, diverticulitis (2010), abdominal TB ('93) s/p INH p/w fevers and progressively worsening LLQ pain likely 2/2 septic shock.    Plan:   #Neuro:   - Stable. A&O x3    #CV:  Atrial flutter- new diagnosis, likely 2/2 septic shock. Rate 70s  - a/c held in setting of GI bleed, will trend Hb before starting  - EP recs: once cleared for AC, ablation vs ANA/DCCV, metoprolol PRN if HR >120s  - Ordered TTE    HOTN (70/60 in ED)  - No longer hypotensive, SBP 100s  - Off levophed  - Started on midodrine 20 mg q8h  - Home med losartan held    Troponinemia- likely 2/2 demand ischemia in setting of rapid aflutter, sepsis, dehydration.   - Troponin 159 (down from 186)  - Will continue to trend troponin    #Respiratory:  - Stable- 96% RA    #GI:   GI bleed likely 2/2 SCAD. CTAP showed colon diverticulosis without diverticulitis, SCAD can resemble IBD  - GI consult pending  - GI PCR, C diff toxin pending  - Home med mesalamine 375 mg QID and home omeprazole held    #Renal:  - no active issues    #ID:   Septic shock 2/2 likely SCAD vs 2/2 UTI/pyelo (dysuria, CVA tenderness, cloudy urine). s/p vanc, zosyn, ceftriaxone  - blood, urine cx results pending  - Continue ceftriaxone     #Endo:   Possible adrenal insufficiency 2/2 steroid use  - F/U w/ cortisol   - On hydrocortisone 75 mg q8h     Hypothyroidism s/p thyroidectomy 2/2 thyroid cancer   - Changed to home dose levothyroxine to 175 mcg M-Sat, 87.5 mcg Sunday    Diet: NPO   Code: FULL  VTE prophylaxis: SCD       Follow up:  -Monitor CBCs  -Obtain records from [facility] per GI re: GI workup  -F/u EP regarding ablation vs ANA/DCCV. Pt will need to be started on AC prior to cardioversion  -F/u echo  -F/u blood and urine cultures  -Send stool studies if diarrhea MICU Transfer Note    Transfer from: MICU  Transfer to:  (*) Medicine    (  ) Telemetry    (  ) RCU    (  ) Palliative    (  ) Stroke Unit    (  ) _______________  Accepting physician: Dr. Richard SHEEHAN COURSE:    59 y/o male with pmhx of HTN, HLD, hypothyroidism s/p total thyroidectomy (6/4/13) 2/2 thyroid cancer, diverticulitis (2010), abdominal TB ('93) s/p INH who presented with fevers and progressively worsening LLQ pain with hematochezia. Last colonoscopy was 6/17/20 at [facility] and was diagnosed with segmental colitis associated with diverticulitis (SCAD) and started on mesalamine and budesonide. Was in new atrial flutter with rate 100-150 and hypotensive, found to be in septic shock. Was given fluids and levophed, now off levophed and is on midodrine. GI recommended no further workup and to continue the antibiotics and infectious work up. EP was consulted for a. flutter, recommended cardiac ablation vs ANA/DCCV once on a/c.      Vital Signs Last 24 Hrs  T(C): 36.9 (14 Jul 2020 12:00), Max: 37.5 (13 Jul 2020 23:07)  T(F): 98.5 (14 Jul 2020 12:00), Max: 99.5 (13 Jul 2020 23:07)  HR: 73 (14 Jul 2020 12:00) (73 - 127)  BP: 116/80 (14 Jul 2020 12:00) (76/56 - 116/80)  BP(mean): 95 (14 Jul 2020 12:00) (67 - 95)  RR: 21 (14 Jul 2020 12:00) (18 - 45)  SpO2: 93% (14 Jul 2020 12:00) (93% - 100%)  I&O's Summary    13 Jul 2020 07:01  -  14 Jul 2020 07:00  --------------------------------------------------------  IN: 8.2 mL / OUT: 200 mL / NET: -191.8 mL    14 Jul 2020 07:01  -  14 Jul 2020 15:00  --------------------------------------------------------  IN: 250 mL / OUT: 750 mL / NET: -500 mL          MEDICATIONS  (STANDING):  chlorhexidine 4% Liquid 1 Application(s) Topical <User Schedule>  ciprofloxacin     Tablet 500 milliGRAM(s) Oral every 12 hours  hydrocortisone sodium succinate Injectable 75 milliGRAM(s) IV Push every 8 hours  levothyroxine 175 MICROGram(s) Oral <User Schedule>  levothyroxine 88 MICROGram(s) Oral <User Schedule>  metroNIDAZOLE  IVPB 500 milliGRAM(s) IV Intermittent every 8 hours  midodrine 20 milliGRAM(s) Oral every 8 hours    MEDICATIONS  (PRN):        LABS                                            12.7                  Neurophils% (auto):   81.8   (07-14 @ 09:33):    7.58 )-----------(148          Lymphocytes% (auto):  8.8                                           39.5                   Eosinphils% (auto):   1.7      Manual%: Neutrophils x    ; Lymphocytes x    ; Eosinophils x    ; Bands%: x    ; Blasts x                                    140    |  107    |  14                  Calcium: 8.3   / iCa: x      (07-14 @ 09:33)    ----------------------------<  96        Magnesium: 1.8                              3.5     |  23     |  0.80             Phosphorous: 2.6      TPro  5.8    /  Alb  2.9    /  TBili  0.4    /  DBili  x      /  AST  20     /  ALT  <5     /  AlkPhos  50     14 Jul 2020 09:33    ( 07-13 @ 22:52 )   PT: 13.2 sec;   INR: 1.12 ratio  aPTT: 28.2 sec        ASSESSMENT & PLAN:     60 y M PMHx HTN, HLD, hypothyroidism s/p thyroidectomy  2/2 thyroid cancer, diverticulitis (2010), abdominal TB ('93) s/p INH p/w fevers and progressively worsening LLQ pain likely 2/2 septic shock.    Plan:   #Neuro:   - Stable. A&O x3    #CV:  Atrial flutter- new diagnosis, likely 2/2 septic shock. Rate 70s  - a/c held in setting of GI bleed, will trend Hb before starting  - EP recs: once cleared for AC, ablation vs ANA/DCCV, metoprolol PRN if HR >120s  - Ordered TTE    HOTN (70/60 in ED)  - No longer hypotensive, SBP 100s  - Off levophed  - Started on midodrine 20 mg q8h  - Home med losartan held    Troponinemia- likely 2/2 demand ischemia in setting of rapid aflutter, sepsis, dehydration.   - Troponin 159 (down from 186)  - Will continue to trend troponin    #Respiratory:  - Stable- 96% RA    #GI:   GI bleed likely 2/2 SCAD. CTAP showed colon diverticulosis without diverticulitis, SCAD can resemble IBD  - GI consult pending  - GI PCR, C diff toxin pending  - Home med mesalamine 375 mg QID and home omeprazole held    #Renal:  - no active issues    #ID:   Septic shock 2/2 likely SCAD vs 2/2 UTI/pyelo (dysuria, CVA tenderness, cloudy urine). s/p vanc, zosyn, ceftriaxone  - blood, urine cx results pending  - Continue ceftriaxone     #Endo:   Possible adrenal insufficiency 2/2 steroid use  - F/U w/ cortisol   - On hydrocortisone 75 mg q8h     Hypothyroidism s/p thyroidectomy 2/2 thyroid cancer   - Changed to home dose levothyroxine to 175 mcg M-Sat, 87.5 mcg Sunday    Diet: NPO   Code: FULL  VTE prophylaxis: SCD       Follow up:  -Monitor CBCs  -Obtain records from [facility] per GI re: GI workup  -F/u EP regarding ablation vs ANA/DCCV. Pt will need to be started on AC prior to cardioversion  -F/u echo  -F/u blood and urine cultures  -Send stool studies if diarrhea

## 2020-07-14 NOTE — PROGRESS NOTE ADULT - SUBJECTIVE AND OBJECTIVE BOX
María Orozco MD  PGY 1 Department of Internal Medicine  Pager: [phone]      Patient is a 60y old  Male who presents with a chief complaint of shock (2020 09:56)      SUBJECTIVE / OVERNIGHT EVENTS: Pt seen and examined. No acute overnight events. Denies fevers, chills, CP, SOB, Abdominal pain, N/V, Constipation, Diarrhea        MEDICATIONS  (STANDING):  chlorhexidine 4% Liquid 1 Application(s) Topical <User Schedule>  ciprofloxacin     Tablet 500 milliGRAM(s) Oral every 12 hours  hydrocortisone sodium succinate Injectable 75 milliGRAM(s) IV Push every 8 hours  levothyroxine Injectable 88 MICROGram(s) IV Push at bedtime  metroNIDAZOLE  IVPB 500 milliGRAM(s) IV Intermittent every 8 hours  midodrine 20 milliGRAM(s) Oral every 8 hours    MEDICATIONS  (PRN):      I&O's Summary    2020 07:  -  2020 07:00  --------------------------------------------------------  IN: 8.2 mL / OUT: 200 mL / NET: -191.8 mL    2020 07:01  -  2020 10:36  --------------------------------------------------------  IN: 150 mL / OUT: 300 mL / NET: -150 mL        Vital Signs Last 24 Hrs  T(C): 37.2 (2020 08:00), Max: 37.5 (2020 23:07)  T(F): 98.9 (2020 08:00), Max: 99.5 (2020 23:07)  HR: 74 (2020 10:00) (74 - 127)  BP: 97/63 (2020 10:00) (76/56 - 113/59)  BP(mean): 75 (2020 10:00) (67 - 78)  RR: 23 (2020 10:00) (18 - 45)  SpO2: 96% (2020 10:00) (95% - 100%)    CAPILLARY BLOOD GLUCOSE          PHYSICAL EXAM:  GENERAL: NAD,   HEAD:  Atraumatic, Normocephalic  EYES: EOMI, PERRL, conjunctiva and sclera clear  NECK: No JVD  CHEST/LUNG: Clear to auscultation bilaterally; No wheeze  HEART: Regular rate and rhythm; No murmurs, rubs, or gallops  ABDOMEN: Soft, Nontender, Nondistended; Bowel sounds present  EXTREMITIES:  2+ Peripheral Pulses, No clubbing, cyanosis, or edema  PSYCH: AAOx3  NEUROLOGY: non-focal  SKIN: No rashes or lesions       LABS:                        12.7   7.58  )-----------( 148      ( 2020 09:33 )             39.5     Auto Eosinophil # 0.13  / Auto Eosinophil % 1.7   / Auto Neutrophil # 6.20  / Auto Neutrophil % 81.8  / BANDS % x                            15.4   13.33 )-----------( 187      ( 2020 22:52 )             47.0     Auto Eosinophil # 0.16  / Auto Eosinophil % 1.2   / Auto Neutrophil # 11.29 / Auto Neutrophil % 84.7  / BANDS % x        07-14    140  |  107  |  14  ----------------------------<  96  3.5   |  23  |  0.80  07-13    135  |  98  |  23  ----------------------------<  108<H>  3.5   |  21<L>  |  1.16    Ca    8.3<L>      2020 09:33  Mg     1.8     07-14  Phos  2.6     07-14  TPro  5.8<L>  /  Alb  2.9<L>  /  TBili  0.4  /  DBili  x   /  AST  20  /  ALT  <5<L>  /  AlkPhos  50  07-14  TPro  7.1  /  Alb  3.7  /  TBili  0.6  /  DBili  x   /  AST  24  /  ALT  <5<L>  /  AlkPhos  62  07-13    PT/INR - ( 2020 22:52 )   PT: 13.2 sec;   INR: 1.12 ratio         PTT - ( 2020 22:52 )  PTT:28.2 sec  CARDIAC MARKERS ( 2020 09:33 )  x     / x     / 131 U/L / x     / 7.1 ng/mL  CARDIAC MARKERS ( 2020 04:17 )  x     / x     / 159 U/L / x     / 6.1 ng/mL      Urinalysis Basic - ( 2020 00:26 )    Color: Yellow / Appearance: Clear / S.024 / pH: x  Gluc: x / Ketone: Small  / Bili: Negative / Urobili: Negative   Blood: x / Protein: 30 mg/dL / Nitrite: Negative   Leuk Esterase: Negative / RBC: 15 /hpf / WBC 1 /HPF   Sq Epi: x / Non Sq Epi: 0 /hpf / Bacteria: Negative            RADIOLOGY & ADDITIONAL TESTS:    Imaging Personally Reviewed:    Consultant(s) Notes Reviewed:      Care Discussed with Consultants/Other Providers: María Orozco MD  PGY 1 Department of Internal Medicine  Pager: [phone]      Patient is a 60y old  Male who presents with a chief complaint of shock (2020 09:56)      SUBJECTIVE / OVERNIGHT EVENTS: Pt seen and examined. No acute overnight events.   Mild abdominal pain in LLQ intermittently. Denies fevers, chills, CP, SOB, N/V, constipation, or diarrhea. Last BM yesterday, firm stool, denies any blood.        MEDICATIONS  (STANDING):  chlorhexidine 4% Liquid 1 Application(s) Topical <User Schedule>  ciprofloxacin     Tablet 500 milliGRAM(s) Oral every 12 hours  hydrocortisone sodium succinate Injectable 75 milliGRAM(s) IV Push every 8 hours  levothyroxine Injectable 88 MICROGram(s) IV Push at bedtime  metroNIDAZOLE  IVPB 500 milliGRAM(s) IV Intermittent every 8 hours  midodrine 20 milliGRAM(s) Oral every 8 hours    MEDICATIONS  (PRN):      I&O's Summary    2020 07:01  -  2020 07:00  --------------------------------------------------------  IN: 8.2 mL / OUT: 200 mL / NET: -191.8 mL    2020 07:01  -  2020 10:36  --------------------------------------------------------  IN: 150 mL / OUT: 300 mL / NET: -150 mL        Vital Signs Last 24 Hrs  T(C): 37.2 (2020 08:00), Max: 37.5 (2020 23:07)  T(F): 98.9 (2020 08:00), Max: 99.5 (2020 23:07)  HR: 74 (2020 10:00) (74 - 127)  BP: 97/63 (2020 10:00) (76/56 - 113/59)  BP(mean): 75 (2020 10:00) (67 - 78)  RR: 23 (2020 10:00) (18 - 45)  SpO2: 96% (2020 10:00) (95% - 100%)    CAPILLARY BLOOD GLUCOSE          PHYSICAL EXAM:  GENERAL: NAD,   HEAD:  Atraumatic, Normocephalic  EYES: EOMI, PERRL, conjunctiva and sclera clear  NECK: No JVD  CHEST/LUNG: Clear to auscultation bilaterally; No wheeze  HEART: Regular rate and rhythm; No murmurs, rubs, or gallops  ABDOMEN: Soft, Nontender, Nondistended; Bowel sounds present  EXTREMITIES:  2+ Peripheral Pulses, No clubbing, cyanosis, or edema  PSYCH: AAOx3  NEUROLOGY: non-focal  SKIN: No rashes or lesions       LABS:                        12.7   7.58  )-----------( 148      ( 2020 09:33 )             39.5     Auto Eosinophil # 0.13  / Auto Eosinophil % 1.7   / Auto Neutrophil # 6.20  / Auto Neutrophil % 81.8  / BANDS % x                            15.4   13.33 )-----------( 187      ( 2020 22:52 )             47.0     Auto Eosinophil # 0.16  / Auto Eosinophil % 1.2   / Auto Neutrophil # 11.29 / Auto Neutrophil % 84.7  / BANDS % x        07-14    140  |  107  |  14  ----------------------------<  96  3.5   |  23  |  0.80  07-13    135  |  98  |  23  ----------------------------<  108<H>  3.5   |  21<L>  |  1.16    Ca    8.3<L>      2020 09:33  Mg     1.8     07-14  Phos  2.6     07-14  TPro  5.8<L>  /  Alb  2.9<L>  /  TBili  0.4  /  DBili  x   /  AST  20  /  ALT  <5<L>  /  AlkPhos  50  07-14  TPro  7.1  /  Alb  3.7  /  TBili  0.6  /  DBili  x   /  AST  24  /  ALT  <5<L>  /  AlkPhos  62  07-13    PT/INR - ( 2020 22:52 )   PT: 13.2 sec;   INR: 1.12 ratio         PTT - ( 2020 22:52 )  PTT:28.2 sec  CARDIAC MARKERS ( 2020 09:33 )  x     / x     / 131 U/L / x     / 7.1 ng/mL  CARDIAC MARKERS ( 2020 04:17 )  x     / x     / 159 U/L / x     / 6.1 ng/mL      Urinalysis Basic - ( 2020 00:26 )    Color: Yellow / Appearance: Clear / S.024 / pH: x  Gluc: x / Ketone: Small  / Bili: Negative / Urobili: Negative   Blood: x / Protein: 30 mg/dL / Nitrite: Negative   Leuk Esterase: Negative / RBC: 15 /hpf / WBC 1 /HPF   Sq Epi: x / Non Sq Epi: 0 /hpf / Bacteria: Negative            RADIOLOGY & ADDITIONAL TESTS:    Imaging Personally Reviewed:    Consultant(s) Notes Reviewed:      Care Discussed with Consultants/Other Providers:

## 2020-07-14 NOTE — CONSULT NOTE ADULT - ASSESSMENT
59 yo M with pmhx of hypothyroidism, HTN, HLD, diverticulosis presents with 2 days of fevers, chills, ab pain, diarrhea, bloody BM. Found in new onset aflutter with variable block likely secondary to sepsis. Patient with improved HR after fluid resuscitation, however still in aflutter. Elevated trops however no ECG i/o active ischemia. Patient denies CP, angina-type sx. Elevated trops likely 2/2 demand in the setting of rapid aflutter, dehydration, and sepsis.     Plan  Patient with stable HR after fluid resuscitation. Due to persistently low BP, may consider amiodarone if persistently tachy 61 yo M with pmhx of hypothyroidism, HTN, HLD, diverticulosis presents with 2 days of fevers, chills, ab pain, diarrhea, bloody BM. Found in new onset aflutter with variable block likely secondary to sepsis. Patient with improved HR after fluid resuscitation, however still in aflutter. Elevated trops however no ECG i/o active ischemia. Patient denies CP, angina-type sx. Elevated trops likely 2/2 demand in the setting of rapid aflutter, dehydration, and sepsis.     Plan  -Patient with stable HR after fluid resuscitation. Due to persistently low BP, may consider amiodarone if persistently tachy. Would titrate BB if BP stable off pressors and pt tachy.   -Recommend starting hep gtt once bloody BM resolved  -Cardiovert if pt tachy and HD unstable 59 yo M with pmhx of hypothyroidism, HTN, HLD, diverticulosis presents with 2 days of fevers, chills, ab pain, diarrhea, bloody BM. Found in new onset aflutter with variable block likely secondary to sepsis. Patient with improved HR after fluid resuscitation, however still in aflutter. Elevated trops however no ECG i/o active ischemia. Patient denies CP, angina-type sx. Elevated trops likely 2/2 demand in the setting of rapid aflutter, dehydration, and sepsis.     Plan  -Patient with stable HR after fluid resuscitation. Due to persistently low BP, may consider amiodarone if persistently tachy. Would titrate BB if BP stable off pressors and pt tachy.   -Recommend starting hep gtt once bloody BM resolved. Once anticoagulated, patient would warrant ablation.       -Luis Luna MD   Cardiology fellow, F1

## 2020-07-14 NOTE — ED PROVIDER NOTE - NS ED ROS FT
Gen: +fever, chills, weight loss  CV: denies chest pain  Resp: +SOB  GI: +nausea  : Denies dysuria, increased frequency/urgency  Neuro: +lightheadedness Denies LOC, weakness, seizures

## 2020-07-14 NOTE — ED PROVIDER NOTE - PROGRESS NOTE DETAILS
Resident Landon: Cardio is consulted at [id number] Resident Landon: MICU constueld for continous soft BP despite receiving 4L bolus. Trop is elevated, repeat is ordered - likely to be elevated secondary to demand ischemia. Resident Landon: MICU constueld for continuos soft BP despite receiving 4L bolus. Trop is elevated, repeat is ordered - likely to be elevated secondary to demand ischemia. Resident Landon: pt was continuing to be hypotensive - started on levophed and MICu was consutled  MICU examined the patient - agrees to admit the patient.

## 2020-07-15 LAB
ANION GAP SERPL CALC-SCNC: 9 MMOL/L — SIGNIFICANT CHANGE UP (ref 5–17)
BASOPHILS # BLD AUTO: 0.01 K/UL — SIGNIFICANT CHANGE UP (ref 0–0.2)
BASOPHILS NFR BLD AUTO: 0.1 % — SIGNIFICANT CHANGE UP (ref 0–2)
BUN SERPL-MCNC: 12 MG/DL — SIGNIFICANT CHANGE UP (ref 7–23)
CALCIUM SERPL-MCNC: 8.8 MG/DL — SIGNIFICANT CHANGE UP (ref 8.4–10.5)
CHLORIDE SERPL-SCNC: 107 MMOL/L — SIGNIFICANT CHANGE UP (ref 96–108)
CO2 SERPL-SCNC: 24 MMOL/L — SIGNIFICANT CHANGE UP (ref 22–31)
CREAT SERPL-MCNC: 0.69 MG/DL — SIGNIFICANT CHANGE UP (ref 0.5–1.3)
CULTURE RESULTS: NO GROWTH — SIGNIFICANT CHANGE UP
EOSINOPHIL # BLD AUTO: 0.01 K/UL — SIGNIFICANT CHANGE UP (ref 0–0.5)
EOSINOPHIL NFR BLD AUTO: 0.1 % — SIGNIFICANT CHANGE UP (ref 0–6)
GLUCOSE SERPL-MCNC: 138 MG/DL — HIGH (ref 70–99)
HCT VFR BLD CALC: 37.1 % — LOW (ref 39–50)
HCV AB S/CO SERPL IA: 0.1 S/CO — SIGNIFICANT CHANGE UP (ref 0–0.99)
HCV AB SERPL-IMP: SIGNIFICANT CHANGE UP
HGB BLD-MCNC: 12.2 G/DL — LOW (ref 13–17)
IMM GRANULOCYTES NFR BLD AUTO: 0.4 % — SIGNIFICANT CHANGE UP (ref 0–1.5)
LYMPHOCYTES # BLD AUTO: 0.89 K/UL — LOW (ref 1–3.3)
LYMPHOCYTES # BLD AUTO: 11.8 % — LOW (ref 13–44)
MAGNESIUM SERPL-MCNC: 2 MG/DL — SIGNIFICANT CHANGE UP (ref 1.6–2.6)
MCHC RBC-ENTMCNC: 30.7 PG — SIGNIFICANT CHANGE UP (ref 27–34)
MCHC RBC-ENTMCNC: 32.9 GM/DL — SIGNIFICANT CHANGE UP (ref 32–36)
MCV RBC AUTO: 93.5 FL — SIGNIFICANT CHANGE UP (ref 80–100)
MONOCYTES # BLD AUTO: 0.51 K/UL — SIGNIFICANT CHANGE UP (ref 0–0.9)
MONOCYTES NFR BLD AUTO: 6.8 % — SIGNIFICANT CHANGE UP (ref 2–14)
NEUTROPHILS # BLD AUTO: 6.08 K/UL — SIGNIFICANT CHANGE UP (ref 1.8–7.4)
NEUTROPHILS NFR BLD AUTO: 80.8 % — HIGH (ref 43–77)
NRBC # BLD: 0 /100 WBCS — SIGNIFICANT CHANGE UP (ref 0–0)
PHOSPHATE SERPL-MCNC: 3.2 MG/DL — SIGNIFICANT CHANGE UP (ref 2.5–4.5)
PLATELET # BLD AUTO: 184 K/UL — SIGNIFICANT CHANGE UP (ref 150–400)
POTASSIUM SERPL-MCNC: 3.6 MMOL/L — SIGNIFICANT CHANGE UP (ref 3.5–5.3)
POTASSIUM SERPL-SCNC: 3.6 MMOL/L — SIGNIFICANT CHANGE UP (ref 3.5–5.3)
RBC # BLD: 3.97 M/UL — LOW (ref 4.2–5.8)
RBC # FLD: 12.2 % — SIGNIFICANT CHANGE UP (ref 10.3–14.5)
SODIUM SERPL-SCNC: 140 MMOL/L — SIGNIFICANT CHANGE UP (ref 135–145)
SPECIMEN SOURCE: SIGNIFICANT CHANGE UP
WBC # BLD: 7.53 K/UL — SIGNIFICANT CHANGE UP (ref 3.8–10.5)
WBC # FLD AUTO: 7.53 K/UL — SIGNIFICANT CHANGE UP (ref 3.8–10.5)

## 2020-07-15 PROCEDURE — 99233 SBSQ HOSP IP/OBS HIGH 50: CPT | Mod: GC

## 2020-07-15 PROCEDURE — 93306 TTE W/DOPPLER COMPLETE: CPT | Mod: 26

## 2020-07-15 PROCEDURE — 99232 SBSQ HOSP IP/OBS MODERATE 35: CPT | Mod: GC

## 2020-07-15 RX ORDER — APIXABAN 2.5 MG/1
5 TABLET, FILM COATED ORAL
Refills: 0 | Status: DISCONTINUED | OUTPATIENT
Start: 2020-07-15 | End: 2020-07-15

## 2020-07-15 RX ORDER — MIDODRINE HYDROCHLORIDE 2.5 MG/1
10 TABLET ORAL EVERY 8 HOURS
Refills: 0 | Status: DISCONTINUED | OUTPATIENT
Start: 2020-07-15 | End: 2020-07-15

## 2020-07-15 RX ORDER — HYDROCORTISONE 20 MG
50 TABLET ORAL EVERY 8 HOURS
Refills: 0 | Status: DISCONTINUED | OUTPATIENT
Start: 2020-07-15 | End: 2020-07-15

## 2020-07-15 RX ORDER — HEPARIN SODIUM 5000 [USP'U]/ML
5000 INJECTION INTRAVENOUS; SUBCUTANEOUS EVERY 12 HOURS
Refills: 0 | Status: DISCONTINUED | OUTPATIENT
Start: 2020-07-15 | End: 2020-07-15

## 2020-07-15 RX ORDER — APIXABAN 2.5 MG/1
5 TABLET, FILM COATED ORAL
Refills: 0 | Status: DISCONTINUED | OUTPATIENT
Start: 2020-07-15 | End: 2020-07-25

## 2020-07-15 RX ORDER — HYDROCORTISONE 20 MG
50 TABLET ORAL EVERY 12 HOURS
Refills: 0 | Status: DISCONTINUED | OUTPATIENT
Start: 2020-07-15 | End: 2020-07-16

## 2020-07-15 RX ADMIN — MIDODRINE HYDROCHLORIDE 20 MILLIGRAM(S): 2.5 TABLET ORAL at 09:23

## 2020-07-15 RX ADMIN — Medication 50 MILLIGRAM(S): at 17:47

## 2020-07-15 RX ADMIN — MIDODRINE HYDROCHLORIDE 20 MILLIGRAM(S): 2.5 TABLET ORAL at 02:22

## 2020-07-15 RX ADMIN — Medication 175 MICROGRAM(S): at 06:37

## 2020-07-15 RX ADMIN — APIXABAN 5 MILLIGRAM(S): 2.5 TABLET, FILM COATED ORAL at 17:49

## 2020-07-15 RX ADMIN — Medication 50 MILLIGRAM(S): at 06:36

## 2020-07-15 NOTE — PROGRESS NOTE ADULT - SUBJECTIVE AND OBJECTIVE BOX
Chief Complaint: Weakness  Reason for consult: GI consult     Interval Events:   - The patient feels better today and has no specific complaints  - He has not had any bowel movements    Allergies:  sulfa drugs (Anaphylaxis)  sulfa drugs (Seizure)    Hospital Medications:  cefTRIAXone   IVPB 1000 milliGRAM(s) IV Intermittent every 24 hours  hydrocortisone sodium succinate Injectable 50 milliGRAM(s) IV Push every 8 hours  levothyroxine 175 MICROGram(s) Oral <User Schedule>  levothyroxine 88 MICROGram(s) Oral <User Schedule>  midodrine 20 milliGRAM(s) Oral every 8 hours    PMHX/PSHX:  HTN (hypertension    ROS:     General:  No wt loss, fevers, chills, night sweats, fatigue,   Eyes:  Good vision, no reported pain  ENT:  No sore throat, pain, runny nose, dysphagia  CV:  No pain, palpitations, hypo/hypertension  Pulm:  No dyspnea, cough, tachypnea, wheezing  GI:  No pain, No nausea, No vomiting, No diarrhea, No constipation, No weight loss, No fever, No pruritis, No rectal bleeding, No tarry stools, No dysphagia  :  No pain, bleeding, incontinence, nocturia  Muscle:  No pain, weakness  Neuro:  No weakness, tingling, memory problems  Psych:  No fatigue, insomnia, mood problems, depression  Endocrine:  No polyuria, polydipsia, cold/heat intolerance  Heme:  No petechiae, ecchymosis, easy bruisability  Skin:  No rash, tattoos, scars, edema    PHYSICAL EXAM:   Vital Signs:  Vital Signs Last 24 Hrs  T(C): 36.7 (15 Jul 2020 08:00), Max: 36.9 (2020 12:00)  T(F): 98.1 (15 Jul 2020 08:00), Max: 98.5 (2020 12:00)  HR: 68 (15 Jul 2020 08:00) (49 - 108)  BP: 111/74 (15 Jul 2020 08:00) (82/52 - 126/84)  BP(mean): 89 (15 Jul 2020 08:00) (63 - 100)  RR: 32 (15 Jul 2020 08:00) (14 - 41)  SpO2: 94% (15 Jul 2020 08:00) (91% - 96%)    GENERAL:  No acute distress  HEENT:  Normocephalic/atraumatic,  no scleral icterus  CHEST:  Normal effort, no accessory muscle use  HEART:  Regular rate and rhythm, no murmurs/rubs/gallops  ABDOMEN:  Soft, non-tender, non-distended, normoactive bowel sounds  EXTREMITIES:  No cyanosis, clubbing, or edema  SKIN:  No rash/erythema/ecchymoses  NEURO:  Alert and oriented x 3, no asterixis    LABS:                        12.2   7.53  )-----------( 184      ( 15 Jul 2020 06:52 )             37.1     Mean Cell Volume: 93.5 fl (07-15- @ 06:52)    07-15    140  |  107  |  12  ----------------------------<  138<H>  3.6   |  24  |  0.69    Ca    8.8      15 Jul 2020 06:52  Phos  3.2     07-15  Mg     2.0     07-15    TPro  5.8<L>  /  Alb  2.9<L>  /  TBili  0.4  /  DBili  x   /  AST  20  /  ALT  <5<L>  /  AlkPhos  50  07-14    LIVER FUNCTIONS - ( 2020 09:33 )  Alb: 2.9 g/dL / Pro: 5.8 g/dL / ALK PHOS: 50 U/L / ALT: <5 U/L / AST: 20 U/L / GGT: x           PT/INR - ( 2020 22:52 )   PT: 13.2 sec;   INR: 1.12 ratio       PTT - ( 2020 22:52 )  PTT:28.2 sec  Urinalysis Basic - ( 2020 00:26 )    Color: Yellow / Appearance: Clear / S.024 / pH: x  Gluc: x / Ketone: Small  / Bili: Negative / Urobili: Negative   Blood: x / Protein: 30 mg/dL / Nitrite: Negative   Leuk Esterase: Negative / RBC: 15 /hpf / WBC 1 /HPF   Sq Epi: x / Non Sq Epi: 0 /hpf / Bacteria: Negative               12.2   7.53  )-----------( 184      ( 15 Jul 2020 06:52 )             37.1                         13.2   7.57  )-----------( 165      ( 2020 14:56 )             39.4                         12.7   7.58  )-----------( 148      ( 2020 09:33 )             39.5                         15.4   13.33 )-----------( 187      ( 2020 22:52 )             47.0     Imaging:    No new imaging

## 2020-07-15 NOTE — PROGRESS NOTE ADULT - SUBJECTIVE AND OBJECTIVE BOX
Alena Villa MD  PGY 1 Department of Internal Medicine  Pager: [phone] ([facility])   Pager: [phone] ([facility])    Patient is a 60y old  Male who presents with a chief complaint of shock (15 Jul 2020 10:45)      SUBJECTIVE / OVERNIGHT EVENTS: Pt seen and examined. No acute overnight events.         MEDICATIONS  (STANDING):  cefTRIAXone   IVPB 1000 milliGRAM(s) IV Intermittent every 24 hours  heparin   Injectable 5000 Unit(s) SubCutaneous every 12 hours  hydrocortisone sodium succinate Injectable 50 milliGRAM(s) IV Push every 12 hours  levothyroxine 175 MICROGram(s) Oral <User Schedule>  levothyroxine 88 MICROGram(s) Oral <User Schedule>    MEDICATIONS  (PRN):      I&O's Summary    2020 07:01  -  15 Jul 2020 07:00  --------------------------------------------------------  IN: 970 mL / OUT: 2800 mL / NET: -1830 mL    15 Jul 2020 07:01  -  15 Jul 2020 12:34  --------------------------------------------------------  IN: 100 mL / OUT: 125 mL / NET: -25 mL        Vital Signs Last 24 Hrs  T(C): 36.7 (15 Jul 2020 08:00), Max: 36.8 (2020 16:00)  T(F): 98.1 (15 Jul 2020 08:00), Max: 98.3 (2020 16:00)  HR: 58 (15 Jul 2020 11:00) (49 - 108)  BP: 122/76 (15 Jul 2020 11:00) (82/52 - 126/84)  BP(mean): 95 (15 Jul 2020 11:00) (63 - 100)  RR: 47 (15 Jul 2020 11:00) (14 - 47)  SpO2: 96% (15 Jul 2020 11:00) (91% - 96%)    CAPILLARY BLOOD GLUCOSE          PHYSICAL EXAM:    GENERAL: NAD,   HEAD:  Atraumatic, Normocephalic  EYES: EOMI, PERRL, conjunctiva and sclera clear  NECK: No JVD  CHEST/LUNG: Clear to auscultation bilaterally; No wheeze  HEART: Regular rate and rhythm; No murmurs, rubs, or gallops  ABDOMEN: Soft, Nontender, Nondistended; Bowel sounds present  EXTREMITIES:  2+ Peripheral Pulses, No clubbing, cyanosis, or edema  PSYCH: AAOx3  NEUROLOGY: non-focal  SKIN: No rashes or lesions     LABS:                        12.2   7.53  )-----------( 184      ( 15 Jul 2020 06:52 )             37.1     Auto Eosinophil # 0.01  / Auto Eosinophil % 0.1   / Auto Neutrophil # 6.08  / Auto Neutrophil % 80.8  / BANDS % x                            13.2   7.57  )-----------( 165      ( 2020 14:56 )             39.4     Auto Eosinophil # 0.02  / Auto Eosinophil % 0.3   / Auto Neutrophil # 6.61  / Auto Neutrophil % 87.2  / BANDS % x                            12.7   7.58  )-----------( 148      ( 2020 09:33 )             39.5     Auto Eosinophil # 0.13  / Auto Eosinophil % 1.7   / Auto Neutrophil # 6.20  / Auto Neutrophil % 81.8  / BANDS % x        07-15    140  |  107  |  12  ----------------------------<  138<H>  3.6   |  24  |  0.69  07-14    140  |  107  |  14  ----------------------------<  96  3.5   |  23  |  0.80  07-13    135  |  98  |  23  ----------------------------<  108<H>  3.5   |  21<L>  |  1.16    Ca    8.8      15 Jul 2020 06:52  Mg     2.0     07-15  Phos  3.2     07-15  TPro  5.8<L>  /  Alb  2.9<L>  /  TBili  0.4  /  DBili  x   /  AST  20  /  ALT  <5<L>  /  AlkPhos  50  07-14  TPro  7.1  /  Alb  3.7  /  TBili  0.6  /  DBili  x   /  AST  24  /  ALT  <5<L>  /  AlkPhos  62  07-13    PT/INR - ( 2020 22:52 )   PT: 13.2 sec;   INR: 1.12 ratio         PTT - ( 2020 22:52 )  PTT:28.2 sec  CARDIAC MARKERS ( 2020 09:33 )  x     / x     / 131 U/L / x     / 7.1 ng/mL  CARDIAC MARKERS ( 2020 04:17 )  x     / x     / 159 U/L / x     / 6.1 ng/mL      Urinalysis Basic - ( 2020 00:26 )    Color: Yellow / Appearance: Clear / S.024 / pH: x  Gluc: x / Ketone: Small  / Bili: Negative / Urobili: Negative   Blood: x / Protein: 30 mg/dL / Nitrite: Negative   Leuk Esterase: Negative / RBC: 15 /hpf / WBC 1 /HPF   Sq Epi: x / Non Sq Epi: 0 /hpf / Bacteria: Negative            RADIOLOGY & ADDITIONAL TESTS:    Imaging Personally Reviewed:    Consultant(s) Notes Reviewed:      Care Discussed with Consultants/Other Providers: María Orozco MD  PGY 1 Department of Internal Medicine  Pager: [phone]      Patient is a 60y old  Male who presents with a chief complaint of shock (15 Jul 2020 12:34)      SUBJECTIVE / OVERNIGHT EVENTS: Pt seen and examined. No acute overnight events.   Denies fevers, chills, CP/palpitations, SOB/cough, abdominal pain, N/V, constipation, or diarrhea.        MEDICATIONS  (STANDING):  cefTRIAXone   IVPB 1000 milliGRAM(s) IV Intermittent every 24 hours  heparin   Injectable 5000 Unit(s) SubCutaneous every 12 hours  hydrocortisone sodium succinate Injectable 50 milliGRAM(s) IV Push every 12 hours  levothyroxine 175 MICROGram(s) Oral <User Schedule>  levothyroxine 88 MICROGram(s) Oral <User Schedule>    MEDICATIONS  (PRN):      I&O's Summary    2020 07:01  -  15 Jul 2020 07:00  --------------------------------------------------------  IN: 970 mL / OUT: 2800 mL / NET: -1830 mL    15 Jul 2020 07:01  -  15 Jul 2020 12:57  --------------------------------------------------------  IN: 100 mL / OUT: 125 mL / NET: -25 mL        Vital Signs Last 24 Hrs  T(C): 36.7 (15 Jul 2020 08:00), Max: 36.8 (2020 16:00)  T(F): 98.1 (15 Jul 2020 08:00), Max: 98.3 (2020 16:00)  HR: 58 (15 Jul 2020 11:00) (49 - 108)  BP: 122/76 (15 Jul 2020 11:00) (82/52 - 126/84)  BP(mean): 95 (15 Jul 2020 11:00) (63 - 100)  RR: 47 (15 Jul 2020 11:00) (14 - 47)  SpO2: 96% (15 Jul 2020 11:00) (91% - 96%)    CAPILLARY BLOOD GLUCOSE          PHYSICAL EXAM:  GENERAL: NAD,   HEAD:  Atraumatic, Normocephalic  EYES: EOMI, PERRL, conjunctiva and sclera clear  NECK: No JVD  CHEST/LUNG: Clear to auscultation bilaterally; No wheeze  HEART: Regular rate and rhythm; No murmurs, rubs, or gallops  ABDOMEN: Soft, Nontender, Nondistended; Bowel sounds present  EXTREMITIES:  2+ Peripheral Pulses, No clubbing, cyanosis, or edema  PSYCH: AAOx3  NEUROLOGY: non-focal  SKIN: No rashes or lesions       LABS:                        12.2   7.53  )-----------( 184      ( 15 Jul 2020 06:52 )             37.1     Auto Eosinophil # 0.01  / Auto Eosinophil % 0.1   / Auto Neutrophil # 6.08  / Auto Neutrophil % 80.8  / BANDS % x                            13.2   7.57  )-----------( 165      ( 2020 14:56 )             39.4     Auto Eosinophil # 0.02  / Auto Eosinophil % 0.3   / Auto Neutrophil # 6.61  / Auto Neutrophil % 87.2  / BANDS % x                            12.7   7.58  )-----------( 148      ( 2020 09:33 )             39.5     Auto Eosinophil # 0.13  / Auto Eosinophil % 1.7   / Auto Neutrophil # 6.20  / Auto Neutrophil % 81.8  / BANDS % x        07-15    140  |  107  |  12  ----------------------------<  138<H>  3.6   |  24  |  0.69  07-14    140  |  107  |  14  ----------------------------<  96  3.5   |  23  |  0.80  07-13    135  |  98  |  23  ----------------------------<  108<H>  3.5   |  21<L>  |  1.16    Ca    8.8      15 Jul 2020 06:52  Mg     2.0     07-15  Phos  3.2     07-15  TPro  5.8<L>  /  Alb  2.9<L>  /  TBili  0.4  /  DBili  x   /  AST  20  /  ALT  <5<L>  /  AlkPhos  50  07-14  TPro  7.1  /  Alb  3.7  /  TBili  0.6  /  DBili  x   /  AST  24  /  ALT  <5<L>  /  AlkPhos  62  07-13    PT/INR - ( 2020 22:52 )   PT: 13.2 sec;   INR: 1.12 ratio         PTT - ( 2020 22:52 )  PTT:28.2 sec  CARDIAC MARKERS ( 2020 09:33 )  x     / x     / 131 U/L / x     / 7.1 ng/mL  CARDIAC MARKERS ( 2020 04:17 )  x     / x     / 159 U/L / x     / 6.1 ng/mL      Urinalysis Basic - ( 2020 00:26 )    Color: Yellow / Appearance: Clear / S.024 / pH: x  Gluc: x / Ketone: Small  / Bili: Negative / Urobili: Negative   Blood: x / Protein: 30 mg/dL / Nitrite: Negative   Leuk Esterase: Negative / RBC: 15 /hpf / WBC 1 /HPF   Sq Epi: x / Non Sq Epi: 0 /hpf / Bacteria: Negative

## 2020-07-15 NOTE — PROGRESS NOTE ADULT - SUBJECTIVE AND OBJECTIVE BOX
Patient seen and examined at bedside.    Overnight Events:   Converted to NSR around 1819 on 7/14.  Current rates 50-60s.  C/o of pleuritic CP, otherwise no other complaints.        REVIEW OF SYSTEMS:  Constitutional:     [x ] negative [ ] fevers [ ] chills [ ] weight loss [ ] weight gain  HEENT:                  [x ] negative [ ] dry eyes [ ] eye irritation [ ] postnasal drip [ ] nasal congestion  CV:                         [ x] negative  [ ] chest pain [ ] orthopnea [ ] palpitations [ ] murmur  Resp:                     [ x] negative [ ] cough [ ] shortness of breath [ ] dyspnea [ ] wheezing [ ] sputum [ ]hemoptysis  GI:                          [ x] negative [ ] nausea [ ] vomiting [ ] diarrhea [ ] constipation [ ] abd pain [ ] dysphagia   :                        [ x] negative [ ] dysuria [ ] nocturia [ ] hematuria [ ] increased urinary frequency  Musculoskeletal: [ x] negative [ ] back pain [ ] myalgias [ ] arthralgias [ ] fracture  Skin:                       [ x] negative [ ] rash [ ] itch  Neurological:        [x ] negative [ ] headache [ ] dizziness [ ] syncope [ ] weakness [ ] numbness  Psychiatric:           [ x] negative [ ] anxiety [ ] depression  Endocrine:            [ x] negative [ ] diabetes [ ] thyroid problem  Heme/Lymph:      [ x] negative [ ] anemia [ ] bleeding problem  Allergic/Immune: [ x] negative [ ] itchy eyes [ ] nasal discharge [ ] hives [ ] angioedema    [ x] All other systems negative  [ ] Unable to assess ROS due to    Current Meds:  cefTRIAXone   IVPB 1000 milliGRAM(s) IV Intermittent every 24 hours  heparin   Injectable 5000 Unit(s) SubCutaneous every 12 hours  hydrocortisone sodium succinate Injectable 50 milliGRAM(s) IV Push every 12 hours  levothyroxine 175 MICROGram(s) Oral <User Schedule>  levothyroxine 88 MICROGram(s) Oral <User Schedule>      PAST MEDICAL & SURGICAL HISTORY:  HTN (hypertension): on losartan      Vitals:  T(F): 98.1 (07-15), Max: 98.5 (07-14)  HR: 75 (07-15) (49 - 108)  BP: 123/82 (07-15) (82/52 - 126/84)  RR: 30 (07-15)  SpO2: 94% (07-15)  I&O's Summary    14 Jul 2020 07:01  -  15 Jul 2020 07:00  --------------------------------------------------------  IN: 970 mL / OUT: 2800 mL / NET: -1830 mL        Physical Exam:  Appearance: No acute distress; well appearing  Eyes: PERRL, EOMI, pink conjunctiva  HENT: Normal oral mucosa  Cardiovascular: RRR, S1, S2, no murmurs, rubs, or gallops; no edema; no JVD  Respiratory: Clear to auscultation bilaterally  Gastrointestinal: soft, non-tender, non-distended with normal bowel sounds  Musculoskeletal: No clubbing; no joint deformity   Neurologic: Non-focal  Lymphatic: No lymphadenopathy  Psychiatry: AAOx3, mood & affect appropriate  Skin: No rashes, ecchymoses, or cyanosis                          12.2   7.53  )-----------( 184      ( 15 Jul 2020 06:52 )             37.1     07-15    140  |  107  |  12  ----------------------------<  138<H>  3.6   |  24  |  0.69    Ca    8.8      15 Jul 2020 06:52  Phos  3.2     07-15  Mg     2.0     07-15    TPro  5.8<L>  /  Alb  2.9<L>  /  TBili  0.4  /  DBili  x   /  AST  20  /  ALT  <5<L>  /  AlkPhos  50  07-14    PT/INR - ( 13 Jul 2020 22:52 )   PT: 13.2 sec;   INR: 1.12 ratio         PTT - ( 13 Jul 2020 22:52 )  PTT:28.2 sec  CARDIAC MARKERS ( 14 Jul 2020 09:33 )  x     / x     / 131 U/L / x     / 7.1 ng/mL  CARDIAC MARKERS ( 14 Jul 2020 04:17 )  x     / x     / 159 U/L / x     / 6.1 ng/mL      Serum Pro-Brain Natriuretic Peptide: 3299 pg/mL (07-13 @ 22:52)      Echo: pending      Interpretation of Telemetry:  sinus tarsha 50-60s

## 2020-07-15 NOTE — PROGRESS NOTE ADULT - ASSESSMENT
61 y/o M w/ hx of HTN, HLD, hypothyroidism s/p total thyroidectomy (6/4/13) 2/2 thyroid cancer, diverticulitis (2010), abdominal TB ('93) s/p INH who presents with fevers and progressively worsening LLQ pain, found to be in septic shock.    Given the chronic nature of the abdominal complaints and lack of localized GI findings in the CT (no signs of diverticulitis, colitis, perforation) low suspicion that the current clinical picture is due to a GI source. As patient is not currently bleeding - with reported minimal blood only 5d prior to admission - low suspicion that the hemodynamic instability was due to GI blood loss, and more likely attributed to sepsis, the source of which is unclear at this time.     Recommendations:  - No further GI workup currently planned at this time given negative CT and recent colonoscopy done at [facility]  - Please obtain medical records from [facility] re: GI workup for review  - Infectious work up per primary team  - Antibiotics per primary team  - Rest of care per primary team    Jorge A Addison MD  Gastroenterology Fellow  Please contact via Microsoft Teams    Please call GI ([phone]) or e-mail [email or web address] if there are any additional questions or concerns, during weekdays from 8 am to 5 pm.     Please call answering service for on-call GI fellow ([phone]) after 5pm and before 8am, and on weekends. 59 y/o M w/ hx of HTN, HLD, hypothyroidism s/p total thyroidectomy (6/4/13) 2/2 thyroid cancer, diverticulitis (2010), abdominal TB ('93) s/p INH who presents with fevers and progressively worsening LLQ pain, found to be in septic shock.    Given the chronic nature of the abdominal complaints and lack of localized GI findings in the CT (no signs of diverticulitis, colitis, perforation) low suspicion that the current clinical picture is due to a GI source. As patient is not currently bleeding - with reported minimal blood only 5d prior to admission - low suspicion that the hemodynamic instability was due to GI blood loss, and more likely attributed to sepsis, the source of which is unclear at this time.     Recommendations:  - No further GI workup currently planned at this time given negative CT and recent colonoscopy done at [facility]  - Please obtain medical records from [facility] re: GI workup for review  - Continue home dose of mesalamine  - Infectious work up per primary team  - Antibiotics per primary team  - Rest of care per primary team    Jorge A Addison MD  Gastroenterology Fellow  Please contact via Microsoft Teams    Please call GI ([phone]) or e-mail [email or web address] if there are any additional questions or concerns, during weekdays from 8 am to 5 pm.     Please call answering service for on-call GI fellow ([phone]) after 5pm and before 8am, and on weekends.

## 2020-07-16 ENCOUNTER — TRANSCRIPTION ENCOUNTER (OUTPATIENT)
Age: 60
End: 2020-07-16

## 2020-07-16 DIAGNOSIS — I48.92 UNSPECIFIED ATRIAL FLUTTER: ICD-10-CM

## 2020-07-16 LAB
ALBUMIN SERPL ELPH-MCNC: 3.6 G/DL — SIGNIFICANT CHANGE UP (ref 3.3–5)
ALP SERPL-CCNC: 68 U/L — SIGNIFICANT CHANGE UP (ref 40–120)
ALT FLD-CCNC: <5 U/L — LOW (ref 10–45)
ANION GAP SERPL CALC-SCNC: 14 MMOL/L — SIGNIFICANT CHANGE UP (ref 5–17)
AST SERPL-CCNC: 20 U/L — SIGNIFICANT CHANGE UP (ref 10–40)
BASOPHILS # BLD AUTO: 0.02 K/UL — SIGNIFICANT CHANGE UP (ref 0–0.2)
BASOPHILS NFR BLD AUTO: 0.2 % — SIGNIFICANT CHANGE UP (ref 0–2)
BILIRUB SERPL-MCNC: 0.4 MG/DL — SIGNIFICANT CHANGE UP (ref 0.2–1.2)
BUN SERPL-MCNC: 11 MG/DL — SIGNIFICANT CHANGE UP (ref 7–23)
CALCIUM SERPL-MCNC: 9.5 MG/DL — SIGNIFICANT CHANGE UP (ref 8.4–10.5)
CHLORIDE SERPL-SCNC: 104 MMOL/L — SIGNIFICANT CHANGE UP (ref 96–108)
CO2 SERPL-SCNC: 25 MMOL/L — SIGNIFICANT CHANGE UP (ref 22–31)
CORTIS AM PEAK SERPL-MCNC: 61.7 UG/DL — HIGH (ref 6–18.4)
CREAT SERPL-MCNC: 0.84 MG/DL — SIGNIFICANT CHANGE UP (ref 0.5–1.3)
EOSINOPHIL # BLD AUTO: 0.1 K/UL — SIGNIFICANT CHANGE UP (ref 0–0.5)
EOSINOPHIL NFR BLD AUTO: 1.2 % — SIGNIFICANT CHANGE UP (ref 0–6)
GLUCOSE SERPL-MCNC: 101 MG/DL — HIGH (ref 70–99)
HCT VFR BLD CALC: 41.9 % — SIGNIFICANT CHANGE UP (ref 39–50)
HGB BLD-MCNC: 13.4 G/DL — SIGNIFICANT CHANGE UP (ref 13–17)
IMM GRANULOCYTES NFR BLD AUTO: 0.3 % — SIGNIFICANT CHANGE UP (ref 0–1.5)
LYMPHOCYTES # BLD AUTO: 1.9 K/UL — SIGNIFICANT CHANGE UP (ref 1–3.3)
LYMPHOCYTES # BLD AUTO: 22.1 % — SIGNIFICANT CHANGE UP (ref 13–44)
MAGNESIUM SERPL-MCNC: 2.2 MG/DL — SIGNIFICANT CHANGE UP (ref 1.6–2.6)
MCHC RBC-ENTMCNC: 29.8 PG — SIGNIFICANT CHANGE UP (ref 27–34)
MCHC RBC-ENTMCNC: 32 GM/DL — SIGNIFICANT CHANGE UP (ref 32–36)
MCV RBC AUTO: 93.3 FL — SIGNIFICANT CHANGE UP (ref 80–100)
MONOCYTES # BLD AUTO: 0.61 K/UL — SIGNIFICANT CHANGE UP (ref 0–0.9)
MONOCYTES NFR BLD AUTO: 7.1 % — SIGNIFICANT CHANGE UP (ref 2–14)
NEUTROPHILS # BLD AUTO: 5.94 K/UL — SIGNIFICANT CHANGE UP (ref 1.8–7.4)
NEUTROPHILS NFR BLD AUTO: 69.1 % — SIGNIFICANT CHANGE UP (ref 43–77)
NRBC # BLD: 0 /100 WBCS — SIGNIFICANT CHANGE UP (ref 0–0)
PHOSPHATE SERPL-MCNC: 3.8 MG/DL — SIGNIFICANT CHANGE UP (ref 2.5–4.5)
PLATELET # BLD AUTO: 216 K/UL — SIGNIFICANT CHANGE UP (ref 150–400)
POTASSIUM SERPL-MCNC: 3.4 MMOL/L — LOW (ref 3.5–5.3)
POTASSIUM SERPL-SCNC: 3.4 MMOL/L — LOW (ref 3.5–5.3)
PROT SERPL-MCNC: 6.7 G/DL — SIGNIFICANT CHANGE UP (ref 6–8.3)
RBC # BLD: 4.49 M/UL — SIGNIFICANT CHANGE UP (ref 4.2–5.8)
RBC # FLD: 12.1 % — SIGNIFICANT CHANGE UP (ref 10.3–14.5)
SODIUM SERPL-SCNC: 143 MMOL/L — SIGNIFICANT CHANGE UP (ref 135–145)
WBC # BLD: 8.6 K/UL — SIGNIFICANT CHANGE UP (ref 3.8–10.5)
WBC # FLD AUTO: 8.6 K/UL — SIGNIFICANT CHANGE UP (ref 3.8–10.5)

## 2020-07-16 PROCEDURE — 99232 SBSQ HOSP IP/OBS MODERATE 35: CPT | Mod: GC

## 2020-07-16 RX ORDER — MESALAMINE 400 MG
1.5 TABLET, DELAYED RELEASE (ENTERIC COATED) ORAL DAILY
Refills: 0 | Status: DISCONTINUED | OUTPATIENT
Start: 2020-07-16 | End: 2020-07-25

## 2020-07-16 RX ORDER — CEFUROXIME AXETIL 250 MG
250 TABLET ORAL EVERY 12 HOURS
Refills: 0 | Status: COMPLETED | OUTPATIENT
Start: 2020-07-17 | End: 2020-07-18

## 2020-07-16 RX ORDER — POTASSIUM CHLORIDE 20 MEQ
40 PACKET (EA) ORAL ONCE
Refills: 0 | Status: COMPLETED | OUTPATIENT
Start: 2020-07-16 | End: 2020-07-16

## 2020-07-16 RX ORDER — LOSARTAN POTASSIUM 100 MG/1
100 TABLET, FILM COATED ORAL DAILY
Refills: 0 | Status: DISCONTINUED | OUTPATIENT
Start: 2020-07-16 | End: 2020-07-16

## 2020-07-16 RX ORDER — MESALAMINE 400 MG
4 TABLET, DELAYED RELEASE (ENTERIC COATED) ORAL
Qty: 0 | Refills: 0 | DISCHARGE

## 2020-07-16 RX ORDER — MESALAMINE 400 MG
1 TABLET, DELAYED RELEASE (ENTERIC COATED) ORAL
Qty: 0 | Refills: 0 | DISCHARGE

## 2020-07-16 RX ORDER — ATORVASTATIN CALCIUM 80 MG/1
80 TABLET, FILM COATED ORAL AT BEDTIME
Refills: 0 | Status: DISCONTINUED | OUTPATIENT
Start: 2020-07-16 | End: 2020-07-25

## 2020-07-16 RX ORDER — MESALAMINE 400 MG
375 TABLET, DELAYED RELEASE (ENTERIC COATED) ORAL
Refills: 0 | Status: DISCONTINUED | OUTPATIENT
Start: 2020-07-16 | End: 2020-07-16

## 2020-07-16 RX ADMIN — Medication 1.5 GRAM(S): at 14:35

## 2020-07-16 RX ADMIN — APIXABAN 5 MILLIGRAM(S): 2.5 TABLET, FILM COATED ORAL at 17:37

## 2020-07-16 RX ADMIN — Medication 50 MILLIGRAM(S): at 07:04

## 2020-07-16 RX ADMIN — APIXABAN 5 MILLIGRAM(S): 2.5 TABLET, FILM COATED ORAL at 07:04

## 2020-07-16 RX ADMIN — Medication 175 MICROGRAM(S): at 07:04

## 2020-07-16 RX ADMIN — Medication 40 MILLIEQUIVALENT(S): at 08:31

## 2020-07-16 NOTE — DIETITIAN INITIAL EVALUATION ADULT. - PERTINENT MEDS FT
MEDICATIONS  (STANDING):  apixaban 5 milliGRAM(s) Oral two times a day  levothyroxine 175 MICROGram(s) Oral <User Schedule>  levothyroxine 88 MICROGram(s) Oral <User Schedule>  mesalamine ER (24-Hour) Capsule 1.5 Gram(s) Oral daily

## 2020-07-16 NOTE — PROGRESS NOTE ADULT - ASSESSMENT
60 y M PMHx HTN, HLD, hypothyroidism s/p thyroidectomy  2/2 thyroid cancer, diverticulitis (2010), abdominal TB ('93) s/p INH p/w fevers and progressively worsening LLQ pain likely 2/2 septic shock.    Plan:     #Neuro:   - Stable. A&O x3    #CV:  Atrial flutter- new diagnosis, likely 2/2 septic shock. Rate 70s  - converted NSR 7/14 approx. 1800  - started on heparin  -  As per EP recs : start on Eliquis 5 mg BID and outpatient for eval for elective ablation  - ANA results pending      HOTN (70/60 in ED)  - No longer hypotensive, SBP 120s  - Off midodrine  - Home med losartan held    Troponinemia- likely 2/2 demand ischemia in setting of rapid aflutter, sepsis, dehydration.   - Troponin 159 (down from 186)    #Respiratory:  - Stable- 96% RA    #GI:   GI bleed    - CTAP showed colon diverticulosis without diverticulitis, SCAD can resemble IBD  - GI recs pending: possible restart home med mesalamine 375 mg QID   - Hold home omeprazole    #Renal:  - no active issues    #ID:   Septic shock 2/2 likely SCAD vs 2/2 UTI/pyelo (dysuria, CVA tenderness, cloudy urine). s/p vanc, zosyn, ceftriaxone  - blood cx: no growth, urine cx: no growth  - Continue on ceftriaxone    #Endo:   Possible adrenal insufficiency 2/2 steroid use  - On hydrocortisone 50 mg BID    Hypothyroidism s/p thyroidectomy 2/2 thyroid cancer   - On home dose levothyroxine to 175 mcg M-Sat, 87.5 mcg Sunday    Diet: NPO   Code: FULL  VTE prophylaxis: Heparin sq 60 y M PMHx HTN, HLD, hypothyroidism s/p thyroidectomy  2/2 thyroid cancer, diverticulitis (2010), abdominal TB ('93) s/p INH p/w fevers and progressively worsening LLQ pain likely 2/2 septic shock.    Plan:     #Neuro:   - Stable. A&O x3    #CV:  Atrial flutter- new diagnosis, likely 2/2 septic shock. Rate 70s  - converted NSR 7/14 approx. 1800  - On heparin  -  As per EP recs : start on Eliquis 5 mg BID and outpatient for eval for elective ablation  - ANA results pending  < from: Transthoracic Echocardiogram (07.15.20 @ 10:15) >  EF (Visual Estimate): 65-70 %    Conclusions:  1. Mitral annular calcification, otherwise normal mitral  valve. Mild-moderate mitral regurgitation.  2. Calcified trileaflet aortic valve with normal opening.  No aortic valve regurgitation seen.  3. Mildly dilated left atrium.  LA volume index = 38 cc/m2.  4. Normal left ventricular systolic function. No segmental  wall motion abnormalities.  5. Severe reversible diastolic dysfunction (Stage III).  6. Right ventricular enlargement with normal right  ventricular systolic function.  7. Normal pericardium with no pericardial effusion.    < end of copied text >      HOTN (70/60 in ED)  - No longer hypotensive, SBP 120s  - Off midodrine  - Home med losartan held    Troponinemia- likely 2/2 demand ischemia in setting of rapid aflutter, sepsis, dehydration.   - Troponin 159 (down from 186)    #Respiratory:  - Stable- 96% RA    #GI:   GI bleed    - CTAP showed colon diverticulosis without diverticulitis, SCAD can resemble IBD  - Start home med mesalamine 375 mg QID   - Hold home omeprazole    #Renal:  - no active issues    #ID:   Septic shock 2/2 likely SCAD vs 2/2 UTI/pyelo (dysuria, CVA tenderness, cloudy urine). s/p vanc, zosyn, ceftriaxone  - blood cx: no growth, urine cx: no growth  - Continue on ceftriaxone    #Endo:   Possible adrenal insufficiency 2/2 steroid use  - On hydrocortisone 50 mg BID    Hypothyroidism s/p thyroidectomy 2/2 thyroid cancer   - On home dose levothyroxine to 175 mcg M-Sat, 87.5 mcg Sunday    Diet: NPO   Code: FULL  VTE prophylaxis: Heparin sq 60 y M PMHx HTN, HLD, hypothyroidism s/p thyroidectomy  2/2 thyroid cancer, diverticulitis (2010), abdominal TB ('93) s/p INH p/w fevers and progressively worsening LLQ pain likely 2/2 septic shock.    Plan:     #Neuro:   - Stable. A&O x3    #CV:  Atrial flutter- new diagnosis, likely 2/2 septic shock. Rate 70s  - converted NSR 7/14 approx. 1800  - On heparin  -  As per EP recs : start on Eliquis 5 mg BID and outpatient for eval for elective ablation  - ANA results pending  < from: Transthoracic Echocardiogram (07.15.20 @ 10:15) >  EF (Visual Estimate): 65-70 %    Conclusions:  1. Mitral annular calcification, otherwise normal mitral  valve. Mild-moderate mitral regurgitation.  2. Calcified trileaflet aortic valve with normal opening.  No aortic valve regurgitation seen.  3. Mildly dilated left atrium.  LA volume index = 38 cc/m2.  4. Normal left ventricular systolic function. No segmental  wall motion abnormalities.  5. Severe reversible diastolic dysfunction (Stage III).  6. Right ventricular enlargement with normal right  ventricular systolic function.  7. Normal pericardium with no pericardial effusion.    < end of copied text >      HOTN (70/60 in ED)  - No longer hypotensive, SBP 120s  - Off midodrine  - Home med losartan held    Troponinemia- likely 2/2 demand ischemia in setting of rapid aflutter, sepsis, dehydration.   - Troponin 159 (down from 186)    #Respiratory:  - Stable- 96% RA    #GI:   GI bleed    - CTAP showed colon diverticulosis without diverticulitis, SCAD can resemble IBD  - Start home med mesalamine 375 mg QID   - Hold home omeprazole    #Renal:  - no active issues      #ID:   Septic shock 2/2 likely SCAD vs 2/2 UTI/pyelo (dysuria, CVA tenderness, cloudy urine). s/p vanc, zosyn, ceftriaxone  - blood cx: no growth, urine cx: no growth  - Continue on ceftriaxone    #Endo:   Possible adrenal insufficiency 2/2 steroid use  - On hydrocortisone 50 mg BID    Hypothyroidism s/p thyroidectomy 2/2 thyroid cancer   - On home dose levothyroxine to 175 mcg M-Sat, 87.5 mcg Sunday    Diet: NPO   Code: FULL  VTE prophylaxis: Heparin sq 60 y M PMHx HTN, HLD, hypothyroidism s/p thyroidectomy  2/2 thyroid cancer, diverticulitis (2010), abdominal TB ('93) s/p INH p/w fevers and progressively worsening LLQ pain likely 2/2 septic shock.    Plan:     #Neuro:   - Stable. A&O x3    #CV:  Atrial flutter- new diagnosis, likely 2/2 septic shock. Rate 70s  - converted NSR 7/14 approx. 1800  - On heparin  -  As per EP recs : start on Eliquis 5 mg BID and outpatient for eval for elective ablation  - ANA results pending  < from: Transthoracic Echocardiogram (07.15.20 @ 10:15) >  EF (Visual Estimate): 65-70 %    Conclusions:  1. Mitral annular calcification, otherwise normal mitral  valve. Mild-moderate mitral regurgitation.  2. Calcified trileaflet aortic valve with normal opening.  No aortic valve regurgitation seen.  3. Mildly dilated left atrium.  LA volume index = 38 cc/m2.  4. Normal left ventricular systolic function. No segmental  wall motion abnormalities.  5. Severe reversible diastolic dysfunction (Stage III).  6. Right ventricular enlargement with normal right  ventricular systolic function.  7. Normal pericardium with no pericardial effusion.    HOTN (70/60 in ED)  - No longer hypotensive, SBP 120s  - Off midodrine  - Home med losartan held    Troponinemia- likely 2/2 demand ischemia in setting of rapid aflutter, sepsis, dehydration.   - Troponin 159 (down from 186)    #Respiratory:  - Stable- 96% RA    #GI:   GI bleed    - CTAP showed colon diverticulosis without diverticulitis, SCAD can resemble IBD  - Start home med mesalamine 375 mg QID   - Hold home omeprazole    #Renal:  - no active issues      #ID:   Septic shock 2/2 likely SCAD vs 2/2 UTI/pyelo (dysuria, CVA tenderness, cloudy urine). s/p vanc, zosyn, ceftriaxone  - blood cx: no growth, urine cx: no growth  - Last day on ceftriaxone, starting tomorrow switch to PO med: Cefuroxime 250 mg BID x 2 days    #Endo:   Possible adrenal insufficiency 2/2 steroid use  - Discontinued hydrocortisone    Hypothyroidism s/p thyroidectomy 2/2 thyroid cancer   - On home dose levothyroxine to 175 mcg M-Sat, 87.5 mcg Sunday    Diet: Advanced Diet (DASH)  Code: FULL  VTE prophylaxis: Heparin sq

## 2020-07-16 NOTE — PROGRESS NOTE ADULT - SUBJECTIVE AND OBJECTIVE BOX
María Orozco MD  PGY 1 Department of Internal Medicine  Pager: [phone]      Patient is a 60y old  Male who presents with a chief complaint of shock (15 Jul 2020 12:34)      SUBJECTIVE / OVERNIGHT EVENTS: Pt seen and examined. No acute overnight events.   Denies fevers, chills, CP/palpitations, SOB/cough, abdominal pain, N/V, constipation, or diarrhea.        MEDICATIONS  (STANDING):  apixaban 5 milliGRAM(s) Oral two times a day  cefTRIAXone   IVPB 1000 milliGRAM(s) IV Intermittent every 24 hours  hydrocortisone sodium succinate Injectable 50 milliGRAM(s) IV Push every 12 hours  levothyroxine 175 MICROGram(s) Oral <User Schedule>  levothyroxine 88 MICROGram(s) Oral <User Schedule>    MEDICATIONS  (PRN):      I&O's Summary    15 Jul 2020 07:01  -  16 Jul 2020 07:00  --------------------------------------------------------  IN: 1570 mL / OUT: 2105 mL / NET: -535 mL        Vital Signs Last 24 Hrs  T(C): 36.8 (16 Jul 2020 04:00), Max: 36.9 (15 Jul 2020 20:00)  T(F): 98.2 (16 Jul 2020 04:00), Max: 98.5 (15 Jul 2020 20:00)  HR: 56 (16 Jul 2020 06:00) (50 - 75)  BP: 119/73 (16 Jul 2020 06:00) (110/73 - 159/101)  BP(mean): 91 (16 Jul 2020 06:00) (86 - 114)  RR: 43 (16 Jul 2020 06:00) (14 - 58)  SpO2: 89% (16 Jul 2020 06:00) (88% - 98%)    CAPILLARY BLOOD GLUCOSE          PHYSICAL EXAM:  GENERAL: NAD,   HEAD:  Atraumatic, Normocephalic  EYES: EOMI, PERRL, conjunctiva and sclera clear  NECK: No JVD  CHEST/LUNG: Clear to auscultation bilaterally; No wheeze  HEART: Regular rate and rhythm; No murmurs, rubs, or gallops  ABDOMEN: Soft, Nontender, Nondistended; Bowel sounds present  EXTREMITIES:  2+ Peripheral Pulses, No clubbing, cyanosis, or edema  PSYCH: AAOx3  NEUROLOGY: non-focal  SKIN: No rashes or lesions       LABS:                        13.4   8.60  )-----------( 216      ( 16 Jul 2020 06:33 )             41.9     Auto Eosinophil # 0.10  / Auto Eosinophil % 1.2   / Auto Neutrophil # 5.94  / Auto Neutrophil % 69.1  / BANDS % x                            12.2   7.53  )-----------( 184      ( 15 Jul 2020 06:52 )             37.1     Auto Eosinophil # 0.01  / Auto Eosinophil % 0.1   / Auto Neutrophil # 6.08  / Auto Neutrophil % 80.8  / BANDS % x                            13.2   7.57  )-----------( 165      ( 14 Jul 2020 14:56 )             39.4     Auto Eosinophil # 0.02  / Auto Eosinophil % 0.3   / Auto Neutrophil # 6.61  / Auto Neutrophil % 87.2  / BANDS % x        07-16    143  |  104  |  11  ----------------------------<  101<H>  3.4<L>   |  25  |  0.84  07-15    140  |  107  |  12  ----------------------------<  138<H>  3.6   |  24  |  0.69  07-14    140  |  107  |  14  ----------------------------<  96  3.5   |  23  |  0.80    Ca    9.5      16 Jul 2020 06:33  Mg     2.2     07-16  Phos  3.8     07-16  TPro  6.7  /  Alb  3.6  /  TBili  0.4  /  DBili  x   /  AST  20  /  ALT  <5<L>  /  AlkPhos  68  07-16  TPro  5.8<L>  /  Alb  2.9<L>  /  TBili  0.4  /  DBili  x   /  AST  20  /  ALT  <5<L>  /  AlkPhos  50  07-14      CARDIAC MARKERS ( 14 Jul 2020 09:33 )  x     / x     / 131 U/L / x     / 7.1 ng/mL María Orozco MD  PGY 1 Department of Internal Medicine  Pager: [phone]      Patient is a 60y old  Male who presents with a chief complaint of shock (15 Jul 2020 12:34)      SUBJECTIVE / OVERNIGHT EVENTS: Pt seen and examined. No acute overnight events. Am K dropped slightly to 3.4, was given KCl tab of 40 meq 1x.  Denies fevers, chills, CP/palpitations, SOB/cough, abdominal pain, N/V, constipation, or diarrhea.        MEDICATIONS  (STANDING):  apixaban 5 milliGRAM(s) Oral two times a day  cefTRIAXone   IVPB 1000 milliGRAM(s) IV Intermittent every 24 hours  hydrocortisone sodium succinate Injectable 50 milliGRAM(s) IV Push every 12 hours  levothyroxine 175 MICROGram(s) Oral <User Schedule>  levothyroxine 88 MICROGram(s) Oral <User Schedule>    MEDICATIONS  (PRN):      I&O's Summary    15 Jul 2020 07:01  -  16 Jul 2020 07:00  --------------------------------------------------------  IN: 1570 mL / OUT: 2105 mL / NET: -535 mL        Vital Signs Last 24 Hrs  T(C): 36.8 (16 Jul 2020 04:00), Max: 36.9 (15 Jul 2020 20:00)  T(F): 98.2 (16 Jul 2020 04:00), Max: 98.5 (15 Jul 2020 20:00)  HR: 56 (16 Jul 2020 06:00) (50 - 75)  BP: 119/73 (16 Jul 2020 06:00) (110/73 - 159/101)  BP(mean): 91 (16 Jul 2020 06:00) (86 - 114)  RR: 43 (16 Jul 2020 06:00) (14 - 58)  SpO2: 89% (16 Jul 2020 06:00) (88% - 98%)    CAPILLARY BLOOD GLUCOSE          PHYSICAL EXAM:  GENERAL: NAD,   HEAD:  Atraumatic, Normocephalic  EYES: EOMI, PERRL, conjunctiva and sclera clear  NECK: No JVD  CHEST/LUNG: Clear to auscultation bilaterally; No wheeze  HEART: Regular rate and rhythm; No murmurs, rubs, or gallops  ABDOMEN: Soft, Nontender, Nondistended; Bowel sounds present  EXTREMITIES:  2+ Peripheral Pulses, No clubbing, cyanosis, or edema  PSYCH: AAOx3  NEUROLOGY: non-focal  SKIN: No rashes or lesions       LABS:                        13.4   8.60  )-----------( 216      ( 16 Jul 2020 06:33 )             41.9     Auto Eosinophil # 0.10  / Auto Eosinophil % 1.2   / Auto Neutrophil # 5.94  / Auto Neutrophil % 69.1  / BANDS % x                            12.2   7.53  )-----------( 184      ( 15 Jul 2020 06:52 )             37.1     Auto Eosinophil # 0.01  / Auto Eosinophil % 0.1   / Auto Neutrophil # 6.08  / Auto Neutrophil % 80.8  / BANDS % x                            13.2   7.57  )-----------( 165      ( 14 Jul 2020 14:56 )             39.4     Auto Eosinophil # 0.02  / Auto Eosinophil % 0.3   / Auto Neutrophil # 6.61  / Auto Neutrophil % 87.2  / BANDS % x        07-16    143  |  104  |  11  ----------------------------<  101<H>  3.4<L>   |  25  |  0.84  07-15    140  |  107  |  12  ----------------------------<  138<H>  3.6   |  24  |  0.69  07-14    140  |  107  |  14  ----------------------------<  96  3.5   |  23  |  0.80    Ca    9.5      16 Jul 2020 06:33  Mg     2.2     07-16  Phos  3.8     07-16  TPro  6.7  /  Alb  3.6  /  TBili  0.4  /  DBili  x   /  AST  20  /  ALT  <5<L>  /  AlkPhos  68  07-16  TPro  5.8<L>  /  Alb  2.9<L>  /  TBili  0.4  /  DBili  x   /  AST  20  /  ALT  <5<L>  /  AlkPhos  50  07-14      CARDIAC MARKERS ( 14 Jul 2020 09:33 )  x     / x     / 131 U/L / x     / 7.1 ng/mL María Orozco MD  PGY 1 Department of Internal Medicine  Pager: [phone]      Patient is a 60y old  Male who presents with a chief complaint of shock (15 Jul 2020 12:34)      SUBJECTIVE / OVERNIGHT EVENTS: Pt seen and examined. No acute overnight events.  Denies fevers, chills, CP/palpitations, SOB/cough, abdominal pain, N/V, constipation, or diarrhea.        MEDICATIONS  (STANDING):  apixaban 5 milliGRAM(s) Oral two times a day  cefTRIAXone   IVPB 1000 milliGRAM(s) IV Intermittent every 24 hours  hydrocortisone sodium succinate Injectable 50 milliGRAM(s) IV Push every 12 hours  levothyroxine 175 MICROGram(s) Oral <User Schedule>  levothyroxine 88 MICROGram(s) Oral <User Schedule>    MEDICATIONS  (PRN):      I&O's Summary    15 Jul 2020 07:01  -  16 Jul 2020 07:00  --------------------------------------------------------  IN: 1570 mL / OUT: 2105 mL / NET: -535 mL        Vital Signs Last 24 Hrs  T(C): 36.8 (16 Jul 2020 04:00), Max: 36.9 (15 Jul 2020 20:00)  T(F): 98.2 (16 Jul 2020 04:00), Max: 98.5 (15 Jul 2020 20:00)  HR: 56 (16 Jul 2020 06:00) (50 - 75)  BP: 119/73 (16 Jul 2020 06:00) (110/73 - 159/101)  BP(mean): 91 (16 Jul 2020 06:00) (86 - 114)  RR: 43 (16 Jul 2020 06:00) (14 - 58)  SpO2: 89% (16 Jul 2020 06:00) (88% - 98%)    CAPILLARY BLOOD GLUCOSE          PHYSICAL EXAM:  GENERAL: NAD,   HEAD:  Atraumatic, Normocephalic  EYES: EOMI, PERRL, conjunctiva and sclera clear  NECK: No JVD  CHEST/LUNG: Clear to auscultation bilaterally; No wheeze  HEART: Regular rate and rhythm; No murmurs, rubs, or gallops  ABDOMEN: Soft, Nontender, Nondistended; Bowel sounds present  EXTREMITIES:  2+ Peripheral Pulses, No clubbing, cyanosis, or edema  PSYCH: AAOx3  NEUROLOGY: non-focal  SKIN: No rashes or lesions       LABS:                        13.4   8.60  )-----------( 216      ( 16 Jul 2020 06:33 )             41.9     Auto Eosinophil # 0.10  / Auto Eosinophil % 1.2   / Auto Neutrophil # 5.94  / Auto Neutrophil % 69.1  / BANDS % x                            12.2   7.53  )-----------( 184      ( 15 Jul 2020 06:52 )             37.1     Auto Eosinophil # 0.01  / Auto Eosinophil % 0.1   / Auto Neutrophil # 6.08  / Auto Neutrophil % 80.8  / BANDS % x                            13.2   7.57  )-----------( 165      ( 14 Jul 2020 14:56 )             39.4     Auto Eosinophil # 0.02  / Auto Eosinophil % 0.3   / Auto Neutrophil # 6.61  / Auto Neutrophil % 87.2  / BANDS % x        07-16    143  |  104  |  11  ----------------------------<  101<H>  3.4<L>   |  25  |  0.84  07-15    140  |  107  |  12  ----------------------------<  138<H>  3.6   |  24  |  0.69  07-14    140  |  107  |  14  ----------------------------<  96  3.5   |  23  |  0.80    Ca    9.5      16 Jul 2020 06:33  Mg     2.2     07-16  Phos  3.8     07-16  TPro  6.7  /  Alb  3.6  /  TBili  0.4  /  DBili  x   /  AST  20  /  ALT  <5<L>  /  AlkPhos  68  07-16  TPro  5.8<L>  /  Alb  2.9<L>  /  TBili  0.4  /  DBili  x   /  AST  20  /  ALT  <5<L>  /  AlkPhos  50  07-14      CARDIAC MARKERS ( 14 Jul 2020 09:33 )  x     / x     / 131 U/L / x     / 7.1 ng/mL

## 2020-07-16 NOTE — DIETITIAN INITIAL EVALUATION ADULT. - ENERGY NEEDS
Ht: 68"  Wt: 190  BMI: 29 kg/m2   IBW: 154 (+/-10%)     123% IBW  Edema: none        Skin: no pressure injuries documented

## 2020-07-16 NOTE — CHART NOTE - NSCHARTNOTEFT_GEN_A_CORE
Upon Nutritional Assessment by the Registered Dietitian your patient was determined to meet criteria / has evidence of the following diagnosis/diagnoses:          [ ]  Mild Protein Calorie Malnutrition        [ ]  Moderate Protein Calorie Malnutrition        [x ] Severe Protein Calorie Malnutrition        [ ] Unspecified Protein Calorie Malnutrition        [ ] Underweight / BMI <19        [ ] Morbid Obesity / BMI > 40      Findings as based on:  [x ] Comprehensive nutrition assessment   [ ] Nutrition Focused Physical Exam  [ ] Other:       Nutrition Plan/Recommendations:  see Initial Nutrition Assessment for recommendations        PROVIDER Section:     By signing this assessment you are acknowledging and agree with the diagnosis/diagnoses assigned by the Registered Dietitian    Comments:

## 2020-07-16 NOTE — DISCHARGE NOTE PROVIDER - CARE PROVIDERS DIRECT ADDRESSES
,[email or web address],DirectAddress_Unknown ,DirectAddress_Unknown,DirectAddress_Unknown ,DirectAddress_Unknown,DirectAddress_Unknown,DirectAddress_Unknown

## 2020-07-16 NOTE — DISCHARGE NOTE PROVIDER - PROVIDER TOKENS
PROVIDER:[TOKEN:[[id number]]] PROVIDER:[TOKEN:[[id number]]],PROVIDER:[TOKEN:[[id number]],FOLLOWUP:[1 week]] PROVIDER:[TOKEN:[[id number]],FOLLOWUP:[1 week]],PROVIDER:[TOKEN:[[id number]],SCHEDULEDAPPT:[07/31/2020]] PROVIDER:[TOKEN:[[id number]],FOLLOWUP:[1 week]],PROVIDER:[TOKEN:[[id number]],SCHEDULEDAPPT:[07/31/2020]],PROVIDER:[TOKEN:[[id number]],FOLLOWUP:[2 weeks]]

## 2020-07-16 NOTE — DISCHARGE NOTE PROVIDER - HOSPITAL COURSE
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 Pt was noted to be in new atrial flutter and was hypotensive requiring admission to MICU for pressor support and management of septic shock. hospital course c/b fevers.          Problem/Plan - 1:    ·  Problem: Septic shock.  Plan: - unclear source    ? pyelonephritis treated with 5 days of abx.     - s/p pressor support in MICU now transferred to telemetry floor    - bld cx NGTD, urine cx NGTD     procalcitonin - 0.08    Patient reports he has a nerve stimulator for upper airway stimulation for his NINOSKA he reports no discomfort at the insertion site or his check - no tenderness in area. no infection seen on ct chest.     ID consult appreciated - NM wbc scan nonspecific not suggestive of infection, monitor off abx per ID    esr/crp elevated    cpk, ldh, michele, anca, rf wnl     ?fever due to malignancy -CT c/a/p reviewed - renal and liver cysts - us with renal lesion with septation suggesting complex cyst - Urology consult appreciated - s/p dedicated ct with likely benign renal cyst    was spiking fevers though afebrile now over the last ~72 hours - no further inpatient workup - patient will follow up with ID and urology as outpatient.          Problem/Plan - 2:    ·  Problem: Typical atrial flutter.  Plan: - new onset aflutter in the setting of septic shock     - EP following - recommended d/c lopressor and started sotalol and titrated up - converted to sinus rhythm but now bradycardic currently in sinus rhythm. no need for ablation. f/u regarding bradycardia    qtc 400s    - c/w Eliquis 5mg po q12    - c/w Tele monitoring - monitor bradycardia.          Problem/Plan - 3:    ·  Problem: Gastrointestinal hemorrhage, unspecified gastrointestinal hemorrhage type.  Plan: no s/s of acute bleed with stable h/h - monitor h/h    - GI f/u appreciated - no clear utility in endoscopic evaluation at this time given negative NM scan.     NMwbc scan without uptake in bowel.     will continue home dose of mesalamine. Patient may have IBD though recent colonoscopy negative 60M with h/o HTN, HLD, hypothyroidism s/p total thyroidectomy, diverticulitis, abdominal TB ('93) s/p INH who presented with septic shock with unclear source. Initially suspected pyelonephritis treated with 5 days of anitbiotics. patient required pressor support in MICU. Blood cultures remained negative and Urine culture negative. patient was having perisistent fevers and ID consult was called. patient infectious workup negative with negative procalcitonin. patient had NM wbc scan that was negative for source of infection. With concern for possible intestinal Tb with LLQ abdominal pain, patient evaluated by GI who recommended no further inpatient workup given negative tagged scan and recent negative colonoscopy. Further blood work sent to be followed up with ID as outpatient. patient was afebrile for >72 hours prior to discharge. patient was found to have complex right renal cyst on CT c/a/p that was further evaluated with CT urogram thought to be likely benign. patient also evaluated by Urology inpatient and he will follow up with urology as outpatient. Patient also had new onset atrial flutter in the setting of septic shock. patient was evaluated by EP initially placed on lopressor without rate control changed to sotalol. He was planned for an ablation but converted to sinus prior to procedure. Patient developed bradycardia and sotalol held. Per EP, discharge on NO antiarrhythmic therapy. patient was started on eliquis and will continue that on discharge. patient to follow up with EP next week. patient to follow up with pmd and urology as well.

## 2020-07-16 NOTE — DISCHARGE NOTE PROVIDER - NSDCMRMEDTOKEN_GEN_ALL_CORE_FT
budesonide 9 mg oral tablet, extended release: 1 tab(s) orally once a day (in the morning)  levothyroxine 175 mcg (0.175 mg) oral tablet: 1 tab(s) orally once a day  losartan 100 mg oral tablet: 1 tab(s) orally once a day  mesalamine 0.375 g oral capsule, extended release: 4 tab(s) orally once a day in morning  omeprazole 20 mg oral delayed release capsule: 1 cap(s) orally once a day  rosuvastatin 40 mg oral tablet: 1 tab(s) orally once a day acetaminophen 325 mg oral tablet: 2 tab(s) orally every 6 hours, As needed, Mild Pain (1 - 3)  apixaban 5 mg oral tablet: 1 tab(s) orally 2 times a day  budesonide 9 mg oral tablet, extended release: 1 tab(s) orally once a day (in the morning)  levothyroxine 175 mcg (0.175 mg) oral tablet: 1 tab(s) orally once a day on Mondays, Tuesdays, Wednesdays, Thursdays, Fridays, and Saturdays. Please take levothyroxine 88mcg once daily on Sundays.   mesalamine 0.375 g oral capsule, extended release: 4 tab(s) orally once a day in morning  omeprazole 20 mg oral delayed release capsule: 1 cap(s) orally once a day  rosuvastatin 40 mg oral tablet: 1 tab(s) orally once a day  tamsulosin 0.4 mg oral capsule: 1 cap(s) orally once a day

## 2020-07-16 NOTE — PHARMACOTHERAPY INTERVENTION NOTE - COMMENTS
Patient was on ceftriaxone 1g IV q24h for UTI. Suggest to transition to cefuroxime 250mg PO q12h x 2 days.

## 2020-07-16 NOTE — DISCHARGE NOTE PROVIDER - NSDCCPCAREPLAN_GEN_ALL_CORE_FT
PRINCIPAL DISCHARGE DIAGNOSIS  Diagnosis: Septic shock  Assessment and Plan of Treatment:       SECONDARY DISCHARGE DIAGNOSES  Diagnosis: GI bleed  Assessment and Plan of Treatment:     Diagnosis: Unspecified atrial flutter  Assessment and Plan of Treatment: PRINCIPAL DISCHARGE DIAGNOSIS  Diagnosis: Septic shock  Assessment and Plan of Treatment: Take all antibiotics as ordered.  Call you Health care provider upon arrival home to make a one week follow up appointment.  If you develop fever, chills, malaise, or change in mental status call your Health Care Provider or go to the Emergency Department.  Nutrition is important, eat small frequent meals to help ensure you get adequate calories.  Do not stay in bed all day!  Increase your activity daily as tolerated.        SECONDARY DISCHARGE DIAGNOSES  Diagnosis: HTN (hypertension)  Assessment and Plan of Treatment: Low salt diet  Activity as tolerated.  Take all medication as prescribed.  Follow up with your medical doctor for routine blood pressure monitoring at your next visit.  Notify your doctor if you have any of the following symptoms:   Dizziness, Lightheadedness, Blurry vision, Headache, Chest pain, Shortness of breath      Diagnosis: Unspecified atrial flutter  Assessment and Plan of Treatment: New onset aflutter in the setting of septic shock   - EP following -  converted to sinus rhythm but now bradycardic currently in sinus rhythm. no need for ablation.   qtc 400s  - c/w Eliquis 5mg po q12      Diagnosis: GI bleed  Assessment and Plan of Treatment: There are 2 common types of GI Bleed, Upper GI Bleed and Lower GI Bleed.  Upper GI Bleed affects the esophagus, stomach, and first part of the small intestine. Lower GI Bleed affects the colon and rectum.  Upper GI Bleed signs and symptoms to notify your Health Care Provider are vomiting blood, or coffee ground vomitus, and bowel movements that look like black tar.  Lower GI Bleed signs and symptoms to notify your health care provider are bright red bloody bowel movements.   Take your medications as prescribed by your Gastroenterologist.  If you have had an Endoscopy or Colonoscopy, follow up with your Gastroenterologist for Pathology results.  Avoid NSAIDs unless your Health Care Provider tells you that it is ok (Aspirin, Ibuprofen, Advil, Motrin, Aleve).  Follow up with your Gastroenterologist within 1-2 weeks of discharge. PRINCIPAL DISCHARGE DIAGNOSIS  Diagnosis: Septic shock  Assessment and Plan of Treatment: you completed antibiotics  Call you Health care provider upon arrival home to make a one week follow up appointment.  If you develop fever, chills, malaise, or change in mental status call your Health Care Provider or go to the Emergency Department.  Nutrition is important, eat small frequent meals to help ensure you get adequate calories.  Do not stay in bed all day!  Increase your activity daily as tolerated.        SECONDARY DISCHARGE DIAGNOSES  Diagnosis: Gross hematuria  Assessment and Plan of Treatment: Folloup with urology for further work up in 1-2 weeks    Diagnosis: HTN (hypertension)  Assessment and Plan of Treatment: Low salt diet  Activity as tolerated.  Take all medication as prescribed.  Follow up with your medical doctor for routine blood pressure monitoring at your next visit.  Notify your doctor if you have any of the following symptoms:   Dizziness, Lightheadedness, Blurry vision, Headache, Chest pain, Shortness of breath      Diagnosis: Unspecified atrial flutter  Assessment and Plan of Treatment: New onset aflutter in the setting of septic shock   - EP following -  converted to sinus rhythm but now bradycardic currently in sinus rhythm. no need for ablation.   - c/w Eliquis 5mg po q12  Follow up with electrophysiology on 7/31       Diagnosis: Chronic diastolic heart failure  Assessment and Plan of Treatment: Weigh yourself daily.  If you gain 3lbs in 3 days, or 5lbs in a week call your Health Care Provider.  Do not eat or drink foods containing more than 2000mg of salt (sodium) in your diet every day.  Call your Health Care Provider if you have any swelling or increased swelling in your feet, ankles, and/or stomach.  Take all of your medication as directed.  If you become dizzy call your Health Care Provider.      Diagnosis: GI bleed  Assessment and Plan of Treatment: There are 2 common types of GI Bleed, Upper GI Bleed and Lower GI Bleed.  Upper GI Bleed affects the esophagus, stomach, and first part of the small intestine. Lower GI Bleed affects the colon and rectum.  Upper GI Bleed signs and symptoms to notify your Health Care Provider are vomiting blood, or coffee ground vomitus, and bowel movements that look like black tar.  Lower GI Bleed signs and symptoms to notify your health care provider are bright red bloody bowel movements.   Take your medications as prescribed by your Gastroenterologist.  If you have had an Endoscopy or Colonoscopy, follow up with your Gastroenterologist for Pathology results.  Avoid NSAIDs unless your Health Care Provider tells you that it is ok (Aspirin, Ibuprofen, Advil, Motrin, Aleve).  Follow up with your Gastroenterologist within 1-2 weeks of discharge. PRINCIPAL DISCHARGE DIAGNOSIS  Diagnosis: Septic shock  Assessment and Plan of Treatment: you completed antibiotics  Call you Health care provider upon arrival home to make a one week follow up appointment.  If you develop fever, chills, malaise, or change in mental status call your Health Care Provider or go to the Emergency Department.  Nutrition is important, eat small frequent meals to help ensure you get adequate calories.  Do not stay in bed all day!  Increase your activity daily as tolerated.        SECONDARY DISCHARGE DIAGNOSES  Diagnosis: Renal cyst  Assessment and Plan of Treatment: Follow up with Urology for possible cystoscopy    Diagnosis: HTN (hypertension)  Assessment and Plan of Treatment: Low salt diet  Activity as tolerated.  Take all medication as prescribed.  Follow up with your medical doctor for routine blood pressure monitoring at your next visit.  Notify your doctor if you have any of the following symptoms:   Dizziness, Lightheadedness, Blurry vision, Headache, Chest pain, Shortness of breath      Diagnosis: Unspecified atrial flutter  Assessment and Plan of Treatment: New onset aflutter in the setting of septic shock   - EP following -  converted to sinus rhythm but now bradycardic currently in sinus rhythm. no need for ablation.   - c/w Eliquis 5mg po q12  Follow up with electrophysiology on 7/31       Diagnosis: Chronic diastolic heart failure  Assessment and Plan of Treatment: Weigh yourself daily.  If you gain 3lbs in 3 days, or 5lbs in a week call your Health Care Provider.  Do not eat or drink foods containing more than 2000mg of salt (sodium) in your diet every day.  Call your Health Care Provider if you have any swelling or increased swelling in your feet, ankles, and/or stomach.  Take all of your medication as directed.  If you become dizzy call your Health Care Provider.      Diagnosis: GI bleed  Assessment and Plan of Treatment: There are 2 common types of GI Bleed, Upper GI Bleed and Lower GI Bleed.  Upper GI Bleed affects the esophagus, stomach, and first part of the small intestine. Lower GI Bleed affects the colon and rectum.  Upper GI Bleed signs and symptoms to notify your Health Care Provider are vomiting blood, or coffee ground vomitus, and bowel movements that look like black tar.  Lower GI Bleed signs and symptoms to notify your health care provider are bright red bloody bowel movements.   Take your medications as prescribed by your Gastroenterologist.  If you have had an Endoscopy or Colonoscopy, follow up with your Gastroenterologist for Pathology results.  Avoid NSAIDs unless your Health Care Provider tells you that it is ok (Aspirin, Ibuprofen, Advil, Motrin, Aleve).  Follow up with your Gastroenterologist within 1-2 weeks of discharge.

## 2020-07-16 NOTE — DIETITIAN INITIAL EVALUATION ADULT. - OTHER INFO
Pt seen for: MICU Length Of Stay   Adm dx: septic shock, cystitis, pyelonephritis    GI issues: none noted  Last BM: 7/16    Food allergies/Intolerances: NKFA, avoids dairy   Vit/supplement PTA: vitamin D, multivitamin     Diet PTA: doesn't eat much meat, likes fish, had been on vegan diet a while ago, avoids dairy products     Subjective/Objective information: pt reports decreased appetite and 15 lb wt loss over past month 2/2 diverticulitis (has had flair ups over past 33 years requiring hospitalization).  States usual wt 205 lb, dosing wt (stated) 190 lb. Declined wanting any supplements.    Education: discussed increasing protein, kcal intake

## 2020-07-16 NOTE — DISCHARGE NOTE PROVIDER - CARE PROVIDER_API CALL
Alden Mari)  Cardiac Electrophysiology; Cardiovascular Disease; Internal Medicine  [address] [place] [address]  Phone: [phone]  Fax: [phone]  Follow Up Time: Alden Mari)  Cardiac Electrophysiology; Cardiovascular Disease; Internal Medicine  [address] [place] [address]  Phone: [phone]  Fax: [phone]  Follow Up Time:     Peter Salcedo  [id number]  [address] [place] [id number]  Phone: ()-  Fax: ()-  Follow Up Time: 1 week Peter Salcedo  [id number]  [address] [place] [address], [place] [address]  Phone: ()-  Fax: ()-  Follow Up Time: 1 week    Oralia Barnes (MD; PhD)  Cardiac Electrophysiology; Cardiovascular Disease; Internal Medicine  [facility]  Dept of Cardiology, [address] [place] [address]  Phone: [phone]  Fax: [phone]  Scheduled Appointment: 07/31/2020 Peter Salcedo  [id number]  [address] [place] [address], [facility] [address]  [place] [address]  Phone: ()-  Fax: ()-  Follow Up Time: 1 week    Oralia Barnes (MD; PhD)  Cardiac Electrophysiology; Cardiovascular Disease; Internal Medicine  [facility]  Dept of Cardiology, [address] [place] [address]  Phone: [phone]  Fax: [phone]  Scheduled Appointment: 07/31/2020    Heriberto Weiner  UROLOGY  [address] [place] [address]  Phone: [phone]  Fax: [phone]  Follow Up Time: 2 weeks

## 2020-07-17 LAB
ALBUMIN SERPL ELPH-MCNC: 3 G/DL — LOW (ref 3.3–5)
ALP SERPL-CCNC: 56 U/L — SIGNIFICANT CHANGE UP (ref 40–120)
ALT FLD-CCNC: <5 U/L — LOW (ref 10–45)
ANION GAP SERPL CALC-SCNC: 12 MMOL/L — SIGNIFICANT CHANGE UP (ref 5–17)
AST SERPL-CCNC: 21 U/L — SIGNIFICANT CHANGE UP (ref 10–40)
BASE EXCESS BLDV CALC-SCNC: 3 MMOL/L — HIGH (ref -2–2)
BILIRUB SERPL-MCNC: 0.4 MG/DL — SIGNIFICANT CHANGE UP (ref 0.2–1.2)
BUN SERPL-MCNC: 13 MG/DL — SIGNIFICANT CHANGE UP (ref 7–23)
CA-I SERPL-SCNC: 1.12 MMOL/L — SIGNIFICANT CHANGE UP (ref 1.12–1.3)
CALCIUM SERPL-MCNC: 8.6 MG/DL — SIGNIFICANT CHANGE UP (ref 8.4–10.5)
CHLORIDE BLDV-SCNC: 111 MMOL/L — HIGH (ref 96–108)
CHLORIDE SERPL-SCNC: 107 MMOL/L — SIGNIFICANT CHANGE UP (ref 96–108)
CO2 BLDV-SCNC: 29 MMOL/L — SIGNIFICANT CHANGE UP (ref 22–30)
CO2 SERPL-SCNC: 23 MMOL/L — SIGNIFICANT CHANGE UP (ref 22–31)
CREAT SERPL-MCNC: 0.93 MG/DL — SIGNIFICANT CHANGE UP (ref 0.5–1.3)
GAS PNL BLDV: 137 MMOL/L — SIGNIFICANT CHANGE UP (ref 135–145)
GAS PNL BLDV: SIGNIFICANT CHANGE UP
GLUCOSE BLDV-MCNC: 98 MG/DL — SIGNIFICANT CHANGE UP (ref 70–99)
GLUCOSE SERPL-MCNC: 90 MG/DL — SIGNIFICANT CHANGE UP (ref 70–99)
HCO3 BLDV-SCNC: 28 MMOL/L — SIGNIFICANT CHANGE UP (ref 21–29)
HCT VFR BLD CALC: 36.7 % — LOW (ref 39–50)
HCT VFR BLDA CALC: 44 % — SIGNIFICANT CHANGE UP (ref 39–50)
HGB BLD CALC-MCNC: 14.3 G/DL — SIGNIFICANT CHANGE UP (ref 13–17)
HGB BLD-MCNC: 11.9 G/DL — LOW (ref 13–17)
LACTATE BLDV-MCNC: 1.2 MMOL/L — SIGNIFICANT CHANGE UP (ref 0.7–2)
LACTATE BLDV-MCNC: 1.2 MMOL/L — SIGNIFICANT CHANGE UP (ref 0.7–2)
MAGNESIUM SERPL-MCNC: 1.9 MG/DL — SIGNIFICANT CHANGE UP (ref 1.6–2.6)
MCHC RBC-ENTMCNC: 30.2 PG — SIGNIFICANT CHANGE UP (ref 27–34)
MCHC RBC-ENTMCNC: 32.4 GM/DL — SIGNIFICANT CHANGE UP (ref 32–36)
MCV RBC AUTO: 93.1 FL — SIGNIFICANT CHANGE UP (ref 80–100)
NRBC # BLD: 0 /100 WBCS — SIGNIFICANT CHANGE UP (ref 0–0)
OTHER CELLS CSF MANUAL: 13 ML/DL — LOW (ref 18–22)
PCO2 BLDV: 46 MMHG — SIGNIFICANT CHANGE UP (ref 35–50)
PH BLDV: 7.4 — SIGNIFICANT CHANGE UP (ref 7.35–7.45)
PHOSPHATE SERPL-MCNC: 3.6 MG/DL — SIGNIFICANT CHANGE UP (ref 2.5–4.5)
PLATELET # BLD AUTO: 212 K/UL — SIGNIFICANT CHANGE UP (ref 150–400)
PO2 BLDV: 38 MMHG — SIGNIFICANT CHANGE UP (ref 25–45)
POTASSIUM BLDV-SCNC: 3.2 MMOL/L — LOW (ref 3.5–5.3)
POTASSIUM SERPL-MCNC: 3.2 MMOL/L — LOW (ref 3.5–5.3)
POTASSIUM SERPL-SCNC: 3.2 MMOL/L — LOW (ref 3.5–5.3)
PROT SERPL-MCNC: 5.5 G/DL — LOW (ref 6–8.3)
RBC # BLD: 3.94 M/UL — LOW (ref 4.2–5.8)
RBC # FLD: 12.1 % — SIGNIFICANT CHANGE UP (ref 10.3–14.5)
SAO2 % BLDV: 67 % — SIGNIFICANT CHANGE UP (ref 67–88)
SODIUM SERPL-SCNC: 142 MMOL/L — SIGNIFICANT CHANGE UP (ref 135–145)
WBC # BLD: 8.8 K/UL — SIGNIFICANT CHANGE UP (ref 3.8–10.5)
WBC # FLD AUTO: 8.8 K/UL — SIGNIFICANT CHANGE UP (ref 3.8–10.5)

## 2020-07-17 PROCEDURE — 93010 ELECTROCARDIOGRAM REPORT: CPT

## 2020-07-17 PROCEDURE — 93010 ELECTROCARDIOGRAM REPORT: CPT | Mod: 77

## 2020-07-17 PROCEDURE — 71045 X-RAY EXAM CHEST 1 VIEW: CPT | Mod: 26

## 2020-07-17 PROCEDURE — 99233 SBSQ HOSP IP/OBS HIGH 50: CPT | Mod: GC

## 2020-07-17 RX ORDER — SODIUM CHLORIDE 9 MG/ML
500 INJECTION, SOLUTION INTRAVENOUS ONCE
Refills: 0 | Status: COMPLETED | OUTPATIENT
Start: 2020-07-17 | End: 2020-07-17

## 2020-07-17 RX ORDER — DILTIAZEM HCL 120 MG
60 CAPSULE, EXT RELEASE 24 HR ORAL EVERY 6 HOURS
Refills: 0 | Status: DISCONTINUED | OUTPATIENT
Start: 2020-07-17 | End: 2020-07-18

## 2020-07-17 RX ORDER — DILTIAZEM HCL 120 MG
30 CAPSULE, EXT RELEASE 24 HR ORAL EVERY 6 HOURS
Refills: 0 | Status: DISCONTINUED | OUTPATIENT
Start: 2020-07-17 | End: 2020-07-17

## 2020-07-17 RX ORDER — METOPROLOL TARTRATE 50 MG
5 TABLET ORAL ONCE
Refills: 0 | Status: COMPLETED | OUTPATIENT
Start: 2020-07-17 | End: 2020-07-17

## 2020-07-17 RX ORDER — MORPHINE SULFATE 50 MG/1
1 CAPSULE, EXTENDED RELEASE ORAL ONCE
Refills: 0 | Status: DISCONTINUED | OUTPATIENT
Start: 2020-07-17 | End: 2020-07-17

## 2020-07-17 RX ORDER — HYDROMORPHONE HYDROCHLORIDE 2 MG/ML
1 INJECTION INTRAMUSCULAR; INTRAVENOUS; SUBCUTANEOUS ONCE
Refills: 0 | Status: DISCONTINUED | OUTPATIENT
Start: 2020-07-17 | End: 2020-07-17

## 2020-07-17 RX ORDER — ONDANSETRON 8 MG/1
4 TABLET, FILM COATED ORAL ONCE
Refills: 0 | Status: COMPLETED | OUTPATIENT
Start: 2020-07-17 | End: 2020-07-17

## 2020-07-17 RX ORDER — POTASSIUM CHLORIDE 20 MEQ
40 PACKET (EA) ORAL ONCE
Refills: 0 | Status: COMPLETED | OUTPATIENT
Start: 2020-07-17 | End: 2020-07-17

## 2020-07-17 RX ORDER — DILTIAZEM HCL 120 MG
15 CAPSULE, EXT RELEASE 24 HR ORAL ONCE
Refills: 0 | Status: DISCONTINUED | OUTPATIENT
Start: 2020-07-17 | End: 2020-07-17

## 2020-07-17 RX ORDER — DILTIAZEM HCL 120 MG
20 CAPSULE, EXT RELEASE 24 HR ORAL ONCE
Refills: 0 | Status: COMPLETED | OUTPATIENT
Start: 2020-07-17 | End: 2020-07-17

## 2020-07-17 RX ORDER — DILTIAZEM HCL 120 MG
30 CAPSULE, EXT RELEASE 24 HR ORAL ONCE
Refills: 0 | Status: COMPLETED | OUTPATIENT
Start: 2020-07-17 | End: 2020-07-17

## 2020-07-17 RX ORDER — TAMSULOSIN HYDROCHLORIDE 0.4 MG/1
0.4 CAPSULE ORAL DAILY
Refills: 0 | Status: DISCONTINUED | OUTPATIENT
Start: 2020-07-17 | End: 2020-07-25

## 2020-07-17 RX ORDER — CALCIUM GLUCONATE 100 MG/ML
1 VIAL (ML) INTRAVENOUS ONCE
Refills: 0 | Status: COMPLETED | OUTPATIENT
Start: 2020-07-17 | End: 2020-07-17

## 2020-07-17 RX ORDER — SIMETHICONE 80 MG/1
80 TABLET, CHEWABLE ORAL ONCE
Refills: 0 | Status: COMPLETED | OUTPATIENT
Start: 2020-07-17 | End: 2020-07-17

## 2020-07-17 RX ORDER — HYDROMORPHONE HYDROCHLORIDE 2 MG/ML
0.5 INJECTION INTRAMUSCULAR; INTRAVENOUS; SUBCUTANEOUS ONCE
Refills: 0 | Status: DISCONTINUED | OUTPATIENT
Start: 2020-07-17 | End: 2020-07-17

## 2020-07-17 RX ORDER — HYDROMORPHONE HYDROCHLORIDE 2 MG/ML
1 INJECTION INTRAMUSCULAR; INTRAVENOUS; SUBCUTANEOUS EVERY 4 HOURS
Refills: 0 | Status: DISCONTINUED | OUTPATIENT
Start: 2020-07-17 | End: 2020-07-18

## 2020-07-17 RX ORDER — TAMSULOSIN HYDROCHLORIDE 0.4 MG/1
0.4 CAPSULE ORAL AT BEDTIME
Refills: 0 | Status: DISCONTINUED | OUTPATIENT
Start: 2020-07-17 | End: 2020-07-17

## 2020-07-17 RX ORDER — PANTOPRAZOLE SODIUM 20 MG/1
40 TABLET, DELAYED RELEASE ORAL DAILY
Refills: 0 | Status: DISCONTINUED | OUTPATIENT
Start: 2020-07-17 | End: 2020-07-19

## 2020-07-17 RX ORDER — ONDANSETRON 8 MG/1
4 TABLET, FILM COATED ORAL EVERY 4 HOURS
Refills: 0 | Status: DISCONTINUED | OUTPATIENT
Start: 2020-07-17 | End: 2020-07-25

## 2020-07-17 RX ORDER — POTASSIUM CHLORIDE 20 MEQ
10 PACKET (EA) ORAL
Refills: 0 | Status: COMPLETED | OUTPATIENT
Start: 2020-07-17 | End: 2020-07-17

## 2020-07-17 RX ORDER — TRAMADOL HYDROCHLORIDE 50 MG/1
25 TABLET ORAL EVERY 6 HOURS
Refills: 0 | Status: DISCONTINUED | OUTPATIENT
Start: 2020-07-17 | End: 2020-07-17

## 2020-07-17 RX ORDER — ACETAMINOPHEN 500 MG
650 TABLET ORAL EVERY 6 HOURS
Refills: 0 | Status: DISCONTINUED | OUTPATIENT
Start: 2020-07-17 | End: 2020-07-25

## 2020-07-17 RX ADMIN — Medication 40 MILLIEQUIVALENT(S): at 12:03

## 2020-07-17 RX ADMIN — MORPHINE SULFATE 1 MILLIGRAM(S): 50 CAPSULE, EXTENDED RELEASE ORAL at 08:20

## 2020-07-17 RX ADMIN — SODIUM CHLORIDE 1000 MILLILITER(S): 9 INJECTION, SOLUTION INTRAVENOUS at 13:55

## 2020-07-17 RX ADMIN — Medication 100 MILLIEQUIVALENT(S): at 09:55

## 2020-07-17 RX ADMIN — ONDANSETRON 4 MILLIGRAM(S): 8 TABLET, FILM COATED ORAL at 08:13

## 2020-07-17 RX ADMIN — Medication 175 MICROGRAM(S): at 06:51

## 2020-07-17 RX ADMIN — HYDROMORPHONE HYDROCHLORIDE 1 MILLIGRAM(S): 2 INJECTION INTRAMUSCULAR; INTRAVENOUS; SUBCUTANEOUS at 09:40

## 2020-07-17 RX ADMIN — MORPHINE SULFATE 1 MILLIGRAM(S): 50 CAPSULE, EXTENDED RELEASE ORAL at 09:00

## 2020-07-17 RX ADMIN — ATORVASTATIN CALCIUM 80 MILLIGRAM(S): 80 TABLET, FILM COATED ORAL at 19:14

## 2020-07-17 RX ADMIN — HYDROMORPHONE HYDROCHLORIDE 1 MILLIGRAM(S): 2 INJECTION INTRAMUSCULAR; INTRAVENOUS; SUBCUTANEOUS at 18:40

## 2020-07-17 RX ADMIN — ATORVASTATIN CALCIUM 80 MILLIGRAM(S): 80 TABLET, FILM COATED ORAL at 01:19

## 2020-07-17 RX ADMIN — HYDROMORPHONE HYDROCHLORIDE 1 MILLIGRAM(S): 2 INJECTION INTRAMUSCULAR; INTRAVENOUS; SUBCUTANEOUS at 21:45

## 2020-07-17 RX ADMIN — Medication 100 MILLIEQUIVALENT(S): at 11:55

## 2020-07-17 RX ADMIN — TAMSULOSIN HYDROCHLORIDE 0.4 MILLIGRAM(S): 0.4 CAPSULE ORAL at 16:40

## 2020-07-17 RX ADMIN — MORPHINE SULFATE 1 MILLIGRAM(S): 50 CAPSULE, EXTENDED RELEASE ORAL at 08:36

## 2020-07-17 RX ADMIN — HYDROMORPHONE HYDROCHLORIDE 1 MILLIGRAM(S): 2 INJECTION INTRAMUSCULAR; INTRAVENOUS; SUBCUTANEOUS at 21:15

## 2020-07-17 RX ADMIN — SIMETHICONE 80 MILLIGRAM(S): 80 TABLET, CHEWABLE ORAL at 07:22

## 2020-07-17 RX ADMIN — HYDROMORPHONE HYDROCHLORIDE 1 MILLIGRAM(S): 2 INJECTION INTRAMUSCULAR; INTRAVENOUS; SUBCUTANEOUS at 18:14

## 2020-07-17 RX ADMIN — Medication 5 MILLIGRAM(S): at 09:24

## 2020-07-17 RX ADMIN — Medication 5 MILLIGRAM(S): at 09:45

## 2020-07-17 RX ADMIN — TRAMADOL HYDROCHLORIDE 25 MILLIGRAM(S): 50 TABLET ORAL at 13:37

## 2020-07-17 RX ADMIN — Medication 100 GRAM(S): at 11:55

## 2020-07-17 RX ADMIN — Medication 250 MILLIGRAM(S): at 19:13

## 2020-07-17 RX ADMIN — TRAMADOL HYDROCHLORIDE 25 MILLIGRAM(S): 50 TABLET ORAL at 14:12

## 2020-07-17 RX ADMIN — Medication 1.5 GRAM(S): at 11:56

## 2020-07-17 RX ADMIN — Medication 20 MILLIGRAM(S): at 10:20

## 2020-07-17 RX ADMIN — Medication 30 MILLIGRAM(S): at 12:02

## 2020-07-17 RX ADMIN — HYDROMORPHONE HYDROCHLORIDE 0.5 MILLIGRAM(S): 2 INJECTION INTRAMUSCULAR; INTRAVENOUS; SUBCUTANEOUS at 07:45

## 2020-07-17 RX ADMIN — HYDROMORPHONE HYDROCHLORIDE 1 MILLIGRAM(S): 2 INJECTION INTRAMUSCULAR; INTRAVENOUS; SUBCUTANEOUS at 10:01

## 2020-07-17 RX ADMIN — HYDROMORPHONE HYDROCHLORIDE 0.5 MILLIGRAM(S): 2 INJECTION INTRAMUSCULAR; INTRAVENOUS; SUBCUTANEOUS at 07:46

## 2020-07-17 RX ADMIN — Medication 250 MILLIGRAM(S): at 09:55

## 2020-07-17 RX ADMIN — Medication 30 MILLILITER(S): at 19:14

## 2020-07-17 RX ADMIN — SODIUM CHLORIDE 1000 MILLILITER(S): 9 INJECTION, SOLUTION INTRAVENOUS at 23:17

## 2020-07-17 RX ADMIN — Medication 30 MILLIGRAM(S): at 13:06

## 2020-07-17 RX ADMIN — HYDROMORPHONE HYDROCHLORIDE 0.5 MILLIGRAM(S): 2 INJECTION INTRAMUSCULAR; INTRAVENOUS; SUBCUTANEOUS at 07:19

## 2020-07-17 RX ADMIN — ONDANSETRON 4 MILLIGRAM(S): 8 TABLET, FILM COATED ORAL at 21:35

## 2020-07-17 RX ADMIN — Medication 60 MILLIGRAM(S): at 17:05

## 2020-07-17 RX ADMIN — HYDROMORPHONE HYDROCHLORIDE 1 MILLIGRAM(S): 2 INJECTION INTRAMUSCULAR; INTRAVENOUS; SUBCUTANEOUS at 14:13

## 2020-07-17 RX ADMIN — MORPHINE SULFATE 1 MILLIGRAM(S): 50 CAPSULE, EXTENDED RELEASE ORAL at 08:35

## 2020-07-17 RX ADMIN — HYDROMORPHONE HYDROCHLORIDE 1 MILLIGRAM(S): 2 INJECTION INTRAMUSCULAR; INTRAVENOUS; SUBCUTANEOUS at 14:40

## 2020-07-17 RX ADMIN — PANTOPRAZOLE SODIUM 40 MILLIGRAM(S): 20 TABLET, DELAYED RELEASE ORAL at 06:52

## 2020-07-17 RX ADMIN — APIXABAN 5 MILLIGRAM(S): 2.5 TABLET, FILM COATED ORAL at 06:50

## 2020-07-17 RX ADMIN — APIXABAN 5 MILLIGRAM(S): 2.5 TABLET, FILM COATED ORAL at 17:05

## 2020-07-17 RX ADMIN — Medication 650 MILLIGRAM(S): at 20:52

## 2020-07-17 RX ADMIN — Medication 100 MILLIEQUIVALENT(S): at 08:13

## 2020-07-17 RX ADMIN — Medication 30 MILLILITER(S): at 06:51

## 2020-07-17 RX ADMIN — SODIUM CHLORIDE 1000 MILLILITER(S): 9 INJECTION, SOLUTION INTRAVENOUS at 14:50

## 2020-07-17 RX ADMIN — HYDROMORPHONE HYDROCHLORIDE 0.5 MILLIGRAM(S): 2 INJECTION INTRAMUSCULAR; INTRAVENOUS; SUBCUTANEOUS at 08:10

## 2020-07-17 NOTE — CHART NOTE - NSCHARTNOTEFT_GEN_A_CORE
Chart Note     Malnutrition f/u. Initial Nutrition Assessment completed 7/16. Pt on DASH diet, reports not feeling well this am, doesn't want any breakfast, was able to eat a small amount of dinner last night, continues to decline supplements. Will f/u on po intake.

## 2020-07-17 NOTE — PROGRESS NOTE ADULT - ASSESSMENT
60 y M PMHx HTN, HLD, hypothyroidism s/p thyroidectomy  2/2 thyroid cancer, diverticulitis (2010), abdominal TB ('93) s/p INH p/w fevers and progressively worsening LLQ pain likely 2/2 septic shock.    Plan:     #Neuro:   - Stable. A&O x3    #CV:  Atrial flutter- new diagnosis, likely 2/2 septic shock. Rate 70s  - converted NSR 7/14 approx. 1800  - On heparin  -  As per EP recs : start on Eliquis 5 mg BID and outpatient for eval for elective ablation  - ANA results pending  < from: Transthoracic Echocardiogram (07.15.20 @ 10:15) >  EF (Visual Estimate): 65-70 %    Conclusions:  1. Mitral annular calcification, otherwise normal mitral  valve. Mild-moderate mitral regurgitation.  2. Calcified trileaflet aortic valve with normal opening.  No aortic valve regurgitation seen.  3. Mildly dilated left atrium.  LA volume index = 38 cc/m2.  4. Normal left ventricular systolic function. No segmental  wall motion abnormalities.  5. Severe reversible diastolic dysfunction (Stage III).  6. Right ventricular enlargement with normal right  ventricular systolic function.  7. Normal pericardium with no pericardial effusion.    HOTN (70/60 in ED)  - No longer hypotensive, SBP 120s  - Off midodrine  - Home med losartan held    Troponinemia- likely 2/2 demand ischemia in setting of rapid aflutter, sepsis, dehydration.   - Troponin 159 (down from 186)    #Respiratory:  - Stable- 96% RA    #GI:   GI bleed    - CTAP showed colon diverticulosis without diverticulitis, SCAD can resemble IBD  - Start home med mesalamine 375 mg QID   - Hold home omeprazole    #Renal:  - no active issues      #ID:   Septic shock 2/2 likely SCAD vs 2/2 UTI/pyelo (dysuria, CVA tenderness, cloudy urine). s/p vanc, zosyn, ceftriaxone  - blood cx: no growth, urine cx: no growth  - Last day on ceftriaxone, starting tomorrow switch to PO med: Cefuroxime 250 mg BID x 2 days    #Endo:   Possible adrenal insufficiency 2/2 steroid use  - Discontinued hydrocortisone    Hypothyroidism s/p thyroidectomy 2/2 thyroid cancer   - On home dose levothyroxine to 175 mcg M-Sat, 87.5 mcg Sunday    Diet: Advanced Diet (DASH)  Code: FULL  VTE prophylaxis: Heparin sq 60 y M PMHx HTN, HLD, hypothyroidism s/p thyroidectomy  2/2 thyroid cancer, diverticulitis (2010), abdominal TB ('93) s/p INH p/w fevers and progressively worsening LLQ pain likely 2/2 septic shock.    Plan:     #Neuro:   - Stable. A&O x3    #CV:  Atrial flutter- new diagnosis, likely 2/2 septic shock. Rate 70s  - back into a. flutter and tachycardic in 140s  - On heparin  - On Eliquis 5 mg BID and outpatient for eval for elective ablation  < from: Transthoracic Echocardiogram (07.15.20 @ 10:15) >  EF (Visual Estimate): 65-70 %    Conclusions:  1. Mitral annular calcification, otherwise normal mitral  valve. Mild-moderate mitral regurgitation.  2. Calcified trileaflet aortic valve with normal opening.  No aortic valve regurgitation seen.  3. Mildly dilated left atrium.  LA volume index = 38 cc/m2.  4. Normal left ventricular systolic function. No segmental  wall motion abnormalities.  5. Severe reversible diastolic dysfunction (Stage III).  6. Right ventricular enlargement with normal right  ventricular systolic function.  7. Normal pericardium with no pericardial effusion.    HOTN (70/60 in ED)  - No longer hypotensive, SBP 70s-130s  - Off midodrine  - Home med losartan held    Troponinemia- likely 2/2 demand ischemia in setting of rapid aflutter, sepsis, dehydration.   - Troponin 159 (down from 186)    #Respiratory:  - Stable- 96% RA    #GI:   Abdominal pain  - Xray Abdomen results pending  - Given Maalox, protonix, adn simethacone    GI bleed    - CTAP showed colon diverticulosis without diverticulitis, SCAD can resemble IBD  - Start home med mesalamine 375 mg QID   - Hold home omeprazole    #Renal:  - no active issues    #ID:   Septic shock 2/2 likely SCAD vs 2/2 UTI/pyelo (dysuria, CVA tenderness, cloudy urine). s/p vanc, zosyn, ceftriaxone  - blood cx: no growth, urine cx: no growth  - On Cefuroxime 250 mg BID x 2 days    #Endo:   Possible adrenal insufficiency 2/2 steroid use  - AM cortisol: 61  - Not on hydrocortisone    Hypothyroidism s/p thyroidectomy 2/2 thyroid cancer   - On home dose levothyroxine to 175 mcg M-Sat, 87.5 mcg Sunday    Diet: Advanced Diet (DASH)  Code: FULL  VTE prophylaxis: Heparin sq 60 y M PMHx HTN, HLD, hypothyroidism s/p thyroidectomy  2/2 thyroid cancer, diverticulitis (2010), abdominal TB ('93) s/p INH p/w fevers and progressively worsening LLQ pain likely 2/2 septic shock. Found to have sudden LLQ abdominal pain and back into atrial flutter.    Plan:     #Neuro:   - Stable. A&O x3    #CV:  Atrial flutter- new diagnosis, likely 2/2 septic shock. Rate 70s  - back into a. flutter and tachycardic in 140s  - Given metoprol 0.5 mg x2 and cardizem 20 mg IV push.  - On cardizem 30 q6  - On heparin  - On Eliquis 5 mg BID   - EP recs pending  < from: Transthoracic Echocardiogram (07.15.20 @ 10:15) >  EF (Visual Estimate): 65-70 %    Conclusions:  1. Mitral annular calcification, otherwise normal mitral  valve. Mild-moderate mitral regurgitation.  2. Calcified trileaflet aortic valve with normal opening.  No aortic valve regurgitation seen.  3. Mildly dilated left atrium.  LA volume index = 38 cc/m2.  4. Normal left ventricular systolic function. No segmental  wall motion abnormalities.  5. Severe reversible diastolic dysfunction (Stage III).  6. Right ventricular enlargement with normal right  ventricular systolic function.  7. Normal pericardium with no pericardial effusion.    HOTN (70/60 in ED)  - No longer hypotensive, SBP 70s-130s  - Off midodrine  - Home med losartan held    Troponinemia- likely 2/2 demand ischemia in setting of rapid aflutter, sepsis, dehydration.   - Troponin 159 (down from 186)    #Respiratory:  - Stable- 96% RA    #GI:   Abdominal pain  - Xray Abdomen results pending  - Given Maalox, protonix, and simethacone    GI bleed    - CTAP showed colon diverticulosis without diverticulitis, SCAD can resemble IBD  - Start home med mesalamine 375 mg QID   - Hold home omeprazole    #Renal:  - no active issues    #ID:   Septic shock 2/2 likely SCAD vs 2/2 UTI/pyelo (dysuria, CVA tenderness, cloudy urine). s/p vanc, zosyn, ceftriaxone  - blood cx: no growth, urine cx: no growth  - On Cefuroxime 250 mg BID x 2 days    #Endo:   Possible adrenal insufficiency 2/2 steroid use  - AM cortisol: 61  - Not on hydrocortisone    Hypothyroidism s/p thyroidectomy 2/2 thyroid cancer   - On home dose levothyroxine to 175 mcg M-Sat, 87.5 mcg Sunday    Diet: Advanced Diet (DASH)  Code: FULL  VTE prophylaxis: Heparin sq

## 2020-07-17 NOTE — PROGRESS NOTE ADULT - ASSESSMENT
60M hx of hypothyroidism, HTN, HLD, diverticulosis p/w colitis c/b atrial flutter w/ varying conduction, s/p conversion to NSR 7/14 now back in atrial flutter w/ RVR in on 7/17.    #AFl  -initially RVR in setting of sepsis  -converted NSR 7/14 ~ 1800, however AM 7/17 reverted back to aflutter w/ RVR  -suspect in setting of catecholamine surge 2/2 intraabdominal process  -s/p metoprolol 5mg IV x2, cardizem 20mg IVP x1  -TTE reviewed, LV wnl  -on cardizem 30mg q6h, can transition to Cardizem CD 120mg daily if HR stable into PM  -if HR > 100 sustained, can increase cardizem 60mg q6h  -c/w Eliquis for AC  -no contraindication to discharge once rates are controlled.  Can be seen by Dr. Mari as outpatient for eval for elective ablation.  Please have patient call [phone] to schedule an appointment  -If patient remains in hospital for alternative reasons into next week, will consider advanced EP therapies at this time      Will discuss w/ attending  Jose Self MD  Cardiology Fellow - PGY 5  Text or Call: [phone]  For all New Consults and Questions:  [email or web address]   Login: [employer]

## 2020-07-17 NOTE — PROGRESS NOTE ADULT - SUBJECTIVE AND OBJECTIVE BOX
María Orozco MD  PGY 1 Department of Internal Medicine  Pager: [phone]      Patient is a 60y old  Male who presents with a chief complaint of shock (16 Jul 2020 15:42)      SUBJECTIVE / OVERNIGHT EVENTS: Pt seen and examined. No acute overnight events.   Denies fevers, chills, CP/palpitations, SOB/cough, abdominal pain, N/V, constipation, or diarrhea.        MEDICATIONS  (STANDING):  apixaban 5 milliGRAM(s) Oral two times a day  atorvastatin 80 milliGRAM(s) Oral at bedtime  cefuroxime   Tablet 250 milliGRAM(s) Oral every 12 hours  levothyroxine 175 MICROGram(s) Oral <User Schedule>  levothyroxine 88 MICROGram(s) Oral <User Schedule>  mesalamine ER (24-Hour) Capsule 1.5 Gram(s) Oral daily  pantoprazole  Injectable 40 milliGRAM(s) IV Push daily    MEDICATIONS  (PRN):  aluminum hydroxide/magnesium hydroxide/simethicone Suspension 30 milliLiter(s) Oral every 4 hours PRN Dyspepsia      I&O's Summary    16 Jul 2020 07:01  -  17 Jul 2020 07:00  --------------------------------------------------------  IN: 1070 mL / OUT: 600 mL / NET: 470 mL        Vital Signs Last 24 Hrs  T(C): 36.7 (17 Jul 2020 04:00), Max: 36.9 (17 Jul 2020 00:00)  T(F): 98.1 (17 Jul 2020 04:00), Max: 98.4 (17 Jul 2020 00:00)  HR: 143 (17 Jul 2020 07:00) (54 - 143)  BP: 130/86 (17 Jul 2020 07:00) (78/53 - 144/86)  BP(mean): 102 (17 Jul 2020 07:00) (61 - 110)  RR: 27 (17 Jul 2020 07:00) (12 - 72)  SpO2: 95% (17 Jul 2020 07:00) (89% - 97%)    CAPILLARY BLOOD GLUCOSE          PHYSICAL EXAM:  GENERAL: NAD,   HEAD:  Atraumatic, Normocephalic  EYES: EOMI, PERRL, conjunctiva and sclera clear  NECK: No JVD  CHEST/LUNG: Clear to auscultation bilaterally; No wheeze  HEART: Regular rate and rhythm; No murmurs, rubs, or gallops  ABDOMEN: Soft, Nontender, Nondistended; Bowel sounds present  EXTREMITIES:  2+ Peripheral Pulses, No clubbing, cyanosis, or edema  PSYCH: AAOx3  NEUROLOGY: non-focal  SKIN: No rashes or lesions       LABS:                        11.9   8.80  )-----------( 212      ( 17 Jul 2020 06:28 )             36.7     Auto Eosinophil # x     / Auto Eosinophil % x     / Auto Neutrophil # x     / Auto Neutrophil % x     / BANDS % x                            13.4   8.60  )-----------( 216      ( 16 Jul 2020 06:33 )             41.9     Auto Eosinophil # 0.10  / Auto Eosinophil % 1.2   / Auto Neutrophil # 5.94  / Auto Neutrophil % 69.1  / BANDS % x        07-16    143  |  104  |  11  ----------------------------<  101<H>  3.4<L>   |  25  |  0.84    Ca    9.5      16 Jul 2020 06:33  Mg     2.2     07-16  Phos  3.8     07-16  TPro  6.7  /  Alb  3.6  /  TBili  0.4  /  DBili  x   /  AST  20  /  ALT  <5<L>  /  AlkPhos  68  07-16                  RADIOLOGY & ADDITIONAL TESTS:    Imaging Personally Reviewed:    Consultant(s) Notes Reviewed:      Care Discussed with Consultants/Other Providers: María Orozco MD  PGY 1 Department of Internal Medicine  Pager: [phone]        SUBJECTIVE / OVERNIGHT EVENTS: Pt seen and examined.   Patient is a 60y old  Male who presents with a chief complaint of shock (16 Jul 2020 15:42). At 5 pm yesterday, he complained of sharp abdominal pain mostly in the LUQ and LLQ. Was reported to be tachycardic in the 140s and had loose stool, no blood. This morning he woke up at 1:45 am, nausea with abdominal pain in LUQ and LLQ with loose stool, no blood. X ray of Abdomen performed.  EKG: A. flutter with 2:1 AV conduction. Was given Maalox, protonix, adn simethacone. 1/2 of dilaudid?  Denies fevers, chills, CP/palpitations, SOB/cough or emesis,.        MEDICATIONS  (STANDING):  apixaban 5 milliGRAM(s) Oral two times a day  atorvastatin 80 milliGRAM(s) Oral at bedtime  cefuroxime   Tablet 250 milliGRAM(s) Oral every 12 hours  levothyroxine 175 MICROGram(s) Oral <User Schedule>  levothyroxine 88 MICROGram(s) Oral <User Schedule>  mesalamine ER (24-Hour) Capsule 1.5 Gram(s) Oral daily  pantoprazole  Injectable 40 milliGRAM(s) IV Push daily    MEDICATIONS  (PRN):  aluminum hydroxide/magnesium hydroxide/simethicone Suspension 30 milliLiter(s) Oral every 4 hours PRN Dyspepsia      I&O's Summary    16 Jul 2020 07:01  -  17 Jul 2020 07:00  --------------------------------------------------------  IN: 1070 mL / OUT: 600 mL / NET: 470 mL        Vital Signs Last 24 Hrs  T(C): 36.7 (17 Jul 2020 04:00), Max: 36.9 (17 Jul 2020 00:00)  T(F): 98.1 (17 Jul 2020 04:00), Max: 98.4 (17 Jul 2020 00:00)  HR: 143 (17 Jul 2020 07:00) (54 - 143)  BP: 130/86 (17 Jul 2020 07:00) (78/53 - 144/86)  BP(mean): 102 (17 Jul 2020 07:00) (61 - 110)  RR: 27 (17 Jul 2020 07:00) (12 - 72)  SpO2: 95% (17 Jul 2020 07:00) (89% - 97%)    CAPILLARY BLOOD GLUCOSE          PHYSICAL EXAM:  GENERAL: NAD,   HEAD:  Atraumatic, Normocephalic  EYES: EOMI, PERRL, conjunctiva and sclera clear  NECK: No JVD  CHEST/LUNG: Clear to auscultation bilaterally; No wheeze  HEART: Regular rate and rhythm; No murmurs, rubs, or gallops  ABDOMEN: Soft, Nontender, Nondistended; Bowel sounds present  EXTREMITIES:  2+ Peripheral Pulses, No clubbing, cyanosis, or edema  PSYCH: AAOx3  NEUROLOGY: non-focal  SKIN: No rashes or lesions       LABS:                        11.9   8.80  )-----------( 212      ( 17 Jul 2020 06:28 )             36.7     Auto Eosinophil # x     / Auto Eosinophil % x     / Auto Neutrophil # x     / Auto Neutrophil % x     / BANDS % x                            13.4   8.60  )-----------( 216      ( 16 Jul 2020 06:33 )             41.9     Auto Eosinophil # 0.10  / Auto Eosinophil % 1.2   / Auto Neutrophil # 5.94  / Auto Neutrophil % 69.1  / BANDS % x        07-16    143  |  104  |  11  ----------------------------<  101<H>  3.4<L>   |  25  |  0.84    Ca    9.5      16 Jul 2020 06:33  Mg     2.2     07-16  Phos  3.8     07-16  TPro  6.7  /  Alb  3.6  /  TBili  0.4  /  DBili  x   /  AST  20  /  ALT  <5<L>  /  AlkPhos  68  07-16 María Orozco MD  PGY 1 Department of Internal Medicine  Pager: [phone]        SUBJECTIVE / OVERNIGHT EVENTS: Pt seen and examined.   Patient is a 60y old  Male who presents with a chief complaint of shock (16 Jul 2020 15:42). At 5 pm yesterday, he complained of sharp abdominal pain mostly in the LUQ and LLQ. Was reported to be tachycardic in the 140s and had loose stool, no blood. This morning he woke up at 1:45 am, nausea with abdominal pain in LUQ and LLQ with loose stool, no blood. X ray of Abdomen performed. Was given Maalox, protonix, and 80 mg simethacone. Received Tylenol, 2 mg of morphine, and 2 mg dilaudid.    Went back into a. flutter with 2:1 AV conduction w/ HR in 140s-150s. Given metoprolol 0.5 x2, cardizem 20 mg IV push, and currently started on cardizem 30mg q6.  Denies fevers, chills, CP/palpitations, SOB/cough or emesis.      MEDICATIONS  (STANDING):  apixaban 5 milliGRAM(s) Oral two times a day  atorvastatin 80 milliGRAM(s) Oral at bedtime  cefuroxime   Tablet 250 milliGRAM(s) Oral every 12 hours  levothyroxine 175 MICROGram(s) Oral <User Schedule>  levothyroxine 88 MICROGram(s) Oral <User Schedule>  mesalamine ER (24-Hour) Capsule 1.5 Gram(s) Oral daily  pantoprazole  Injectable 40 milliGRAM(s) IV Push daily    MEDICATIONS  (PRN):  aluminum hydroxide/magnesium hydroxide/simethicone Suspension 30 milliLiter(s) Oral every 4 hours PRN Dyspepsia      I&O's Summary    16 Jul 2020 07:01  -  17 Jul 2020 07:00  --------------------------------------------------------  IN: 1070 mL / OUT: 600 mL / NET: 470 mL        Vital Signs Last 24 Hrs  T(C): 36.7 (17 Jul 2020 04:00), Max: 36.9 (17 Jul 2020 00:00)  T(F): 98.1 (17 Jul 2020 04:00), Max: 98.4 (17 Jul 2020 00:00)  HR: 143 (17 Jul 2020 07:00) (54 - 143)  BP: 130/86 (17 Jul 2020 07:00) (78/53 - 144/86)  BP(mean): 102 (17 Jul 2020 07:00) (61 - 110)  RR: 27 (17 Jul 2020 07:00) (12 - 72)  SpO2: 95% (17 Jul 2020 07:00) (89% - 97%)      PHYSICAL EXAM:  GENERAL: NAD,   HEAD:  Atraumatic, Normocephalic  EYES: EOMI, PERRL, conjunctiva and sclera clear  NECK: No JVD  CHEST/LUNG: Clear to auscultation bilaterally; No wheeze  HEART: Regular rate and rhythm; No murmurs, rubs, or gallops  ABDOMEN: Soft, Nontender, Nondistended; Bowel sounds present  EXTREMITIES:  2+ Peripheral Pulses, No clubbing, cyanosis, or edema  PSYCH: AAOx3  NEUROLOGY: non-focal  SKIN: No rashes or lesions       LABS:                        11.9   8.80  )-----------( 212      ( 17 Jul 2020 06:28 )             36.7     Auto Eosinophil # x     / Auto Eosinophil % x     / Auto Neutrophil # x     / Auto Neutrophil % x     / BANDS % x                            13.4   8.60  )-----------( 216      ( 16 Jul 2020 06:33 )             41.9     Auto Eosinophil # 0.10  / Auto Eosinophil % 1.2   / Auto Neutrophil # 5.94  / Auto Neutrophil % 69.1  / BANDS % x        07-16    143  |  104  |  11  ----------------------------<  101<H>  3.4<L>   |  25  |  0.84    Ca    9.5      16 Jul 2020 06:33  Mg     2.2     07-16  Phos  3.8     07-16  TPro  6.7  /  Alb  3.6  /  TBili  0.4  /  DBili  x   /  AST  20  /  ALT  <5<L>  /  AlkPhos  68  07-16

## 2020-07-17 NOTE — PROGRESS NOTE ADULT - SUBJECTIVE AND OBJECTIVE BOX
Patient seen and examined at bedside.    Overnight Events:  Pt reports excrutiating abd pain overnight into the AM.  PT subsequently converted back into atrial flutter w/ RVR, given metoprolol 5mg IV x2, diltiazem 20mg IV x1 with eventual rate control after diluaid x3 given for pain control.  Denies Cp, palpitations, dyspnea.  States abd pain has improved significantly.      REVIEW OF SYSTEMS:  Constitutional:     [x ] negative [ ] fevers [ ] chills [ ] weight loss [ ] weight gain  HEENT:                  [x ] negative [ ] dry eyes [ ] eye irritation [ ] postnasal drip [ ] nasal congestion  CV:                         [ x] negative  [ ] chest pain [ ] orthopnea [ ] palpitations [ ] murmur  Resp:                     [ x] negative [ ] cough [ ] shortness of breath [ ] dyspnea [ ] wheezing [ ] sputum [ ]hemoptysis  GI:                          [ x] negative [ ] nausea [ ] vomiting [ ] diarrhea [ ] constipation [ ] abd pain [ ] dysphagia   :                        [ x] negative [ ] dysuria [ ] nocturia [ ] hematuria [ ] increased urinary frequency  Musculoskeletal: [ x] negative [ ] back pain [ ] myalgias [ ] arthralgias [ ] fracture  Skin:                       [ x] negative [ ] rash [ ] itch  Neurological:        [x ] negative [ ] headache [ ] dizziness [ ] syncope [ ] weakness [ ] numbness  Psychiatric:           [ x] negative [ ] anxiety [ ] depression  Endocrine:            [ x] negative [ ] diabetes [ ] thyroid problem  Heme/Lymph:      [ x] negative [ ] anemia [ ] bleeding problem  Allergic/Immune: [ x] negative [ ] itchy eyes [ ] nasal discharge [ ] hives [ ] angioedema    [ x] All other systems negative  [ ] Unable to assess ROS due to    Current Meds:  acetaminophen   Tablet .. 650 milliGRAM(s) Oral every 6 hours PRN  aluminum hydroxide/magnesium hydroxide/simethicone Suspension 30 milliLiter(s) Oral every 4 hours PRN  apixaban 5 milliGRAM(s) Oral two times a day  atorvastatin 80 milliGRAM(s) Oral at bedtime  cefuroxime   Tablet 250 milliGRAM(s) Oral every 12 hours  diltiazem    Tablet 30 milliGRAM(s) Oral every 6 hours  HYDROmorphone  Injectable 1 milliGRAM(s) IV Push every 4 hours PRN  levothyroxine 175 MICROGram(s) Oral <User Schedule>  levothyroxine 88 MICROGram(s) Oral <User Schedule>  mesalamine ER (24-Hour) Capsule 1.5 Gram(s) Oral daily  ondansetron Injectable 4 milliGRAM(s) IV Push every 4 hours PRN  pantoprazole  Injectable 40 milliGRAM(s) IV Push daily  tamsulosin 0.4 milliGRAM(s) Oral at bedtime  traMADol 25 milliGRAM(s) Oral every 6 hours PRN      PAST MEDICAL & SURGICAL HISTORY:  HTN (hypertension): on losartan      Vitals:  T(F): 98 (07-17), Max: 98.4 (07-17)  HR: 130 (07-17) (54 - 149)  BP: 100/61 (07-17) (78/53 - 144/86)  RR: 33 (07-17)  SpO2: 92% (07-17)  I&O's Summary    16 Jul 2020 07:01  -  17 Jul 2020 07:00  --------------------------------------------------------  IN: 1070 mL / OUT: 700 mL / NET: 370 mL    17 Jul 2020 07:01  -  17 Jul 2020 12:53  --------------------------------------------------------  IN: 500 mL / OUT: 0 mL / NET: 500 mL        Physical Exam:  Appearance: No acute distress; well appearing  Eyes: PERRL, EOMI, pink conjunctiva  HENT: Normal oral mucosa  Cardiovascular: irregular, tachycardic, normal S1, S2, no murmurs, rubs, or gallops; no edema; no JVD  Respiratory: Clear to auscultation bilaterally  Gastrointestinal: soft, non-tender, non-distended with normal bowel sounds  Musculoskeletal: No clubbing; no joint deformity   Neurologic: Non-focal  Lymphatic: No lymphadenopathy  Psychiatry: AAOx3, mood & affect appropriate  Skin: No rashes, ecchymoses, or cyanosis                          11.9   8.80  )-----------( 212      ( 17 Jul 2020 06:28 )             36.7     07-17    142  |  107  |  13  ----------------------------<  90  3.2<L>   |  23  |  0.93    Ca    8.6      17 Jul 2020 06:27  Phos  3.6     07-17  Mg     1.9     07-17    TPro  5.5<L>  /  Alb  3.0<L>  /  TBili  0.4  /  DBili  x   /  AST  21  /  ALT  <5<L>  /  AlkPhos  56  07-17          Serum Pro-Brain Natriuretic Peptide: 3299 pg/mL (07-13 @ 22:52)    Echo:  7/15/20  Observations:  Mitral Valve: Mitral annular calcification, otherwise  normal mitral valve. Mild-moderate mitral regurgitation.  Aortic Valve/Aorta: Calcified trileaflet aortic valve with  normal opening. Peak transaortic valve gradient equals 4 mm  Hg, estimated aortic valve area equals 2.7 sqcm. No aortic  valve regurgitation seen. Peak left ventricular outflow  tract gradient equals 3 mm Hg.  Aortic Root: 3.3 cm.  Left Atrium: Mildly dilated left atrium.  LA volume index =  38 cc/m2.  Left Ventricle: Normal left ventricular systolic function.  No segmental wall motion abnormalities. Normal left  ventricular internal dimensions and wall thicknesses.  Severe reversible diastolic dysfunction (Stage III).  Right Heart: Normal right atrium. Right ventricular  enlargement with normal right ventricular systolic  function. Normal tricuspid valve. Mild tricuspid  regurgitation. Normal pulmonic valve. No pulmonic  regurgitation.  Pericardium/Pleura: Normal pericardium with no pericardial  effusion.  Hemodynamic: Estimated right atrial pressure is 8 mm Hg.  Estimated right ventricular systolic pressure equals 35 mm  Hg, assuming right atrial pressure equals 8 mm Hg,  consistent with borderline pulmonary hypertension.        Interpretation of Telemetry:  atrial flutter w/ varying conduction, V-rates 90-150s, now mainly 80-100s

## 2020-07-18 DIAGNOSIS — I51.89 OTHER ILL-DEFINED HEART DISEASES: ICD-10-CM

## 2020-07-18 DIAGNOSIS — I48.3 TYPICAL ATRIAL FLUTTER: ICD-10-CM

## 2020-07-18 DIAGNOSIS — I50.32 CHRONIC DIASTOLIC (CONGESTIVE) HEART FAILURE: ICD-10-CM

## 2020-07-18 DIAGNOSIS — A41.9 SEPSIS, UNSPECIFIED ORGANISM: ICD-10-CM

## 2020-07-18 DIAGNOSIS — K92.2 GASTROINTESTINAL HEMORRHAGE, UNSPECIFIED: ICD-10-CM

## 2020-07-18 DIAGNOSIS — I48.4 ATYPICAL ATRIAL FLUTTER: ICD-10-CM

## 2020-07-18 DIAGNOSIS — Z29.9 ENCOUNTER FOR PROPHYLACTIC MEASURES, UNSPECIFIED: ICD-10-CM

## 2020-07-18 DIAGNOSIS — I10 ESSENTIAL (PRIMARY) HYPERTENSION: ICD-10-CM

## 2020-07-18 DIAGNOSIS — E89.0 POSTPROCEDURAL HYPOTHYROIDISM: ICD-10-CM

## 2020-07-18 LAB
ANION GAP SERPL CALC-SCNC: 8 MMOL/L — SIGNIFICANT CHANGE UP (ref 5–17)
APPEARANCE UR: CLEAR — SIGNIFICANT CHANGE UP
BACTERIA # UR AUTO: 0 — SIGNIFICANT CHANGE UP
BASOPHILS # BLD AUTO: 0.03 K/UL — SIGNIFICANT CHANGE UP (ref 0–0.2)
BASOPHILS NFR BLD AUTO: 0.3 % — SIGNIFICANT CHANGE UP (ref 0–2)
BILIRUB UR-MCNC: NEGATIVE — SIGNIFICANT CHANGE UP
BUN SERPL-MCNC: 8 MG/DL — SIGNIFICANT CHANGE UP (ref 7–23)
CALCIUM SERPL-MCNC: 8.4 MG/DL — SIGNIFICANT CHANGE UP (ref 8.4–10.5)
CHLORIDE SERPL-SCNC: 101 MMOL/L — SIGNIFICANT CHANGE UP (ref 96–108)
CO2 SERPL-SCNC: 27 MMOL/L — SIGNIFICANT CHANGE UP (ref 22–31)
COLOR SPEC: SIGNIFICANT CHANGE UP
CREAT SERPL-MCNC: 1 MG/DL — SIGNIFICANT CHANGE UP (ref 0.5–1.3)
DIFF PNL FLD: ABNORMAL
EOSINOPHIL # BLD AUTO: 0.14 K/UL — SIGNIFICANT CHANGE UP (ref 0–0.5)
EOSINOPHIL NFR BLD AUTO: 1.2 % — SIGNIFICANT CHANGE UP (ref 0–6)
EPI CELLS # UR: 0 /HPF — SIGNIFICANT CHANGE UP
GLUCOSE SERPL-MCNC: 99 MG/DL — SIGNIFICANT CHANGE UP (ref 70–99)
GLUCOSE UR QL: NEGATIVE — SIGNIFICANT CHANGE UP
HCT VFR BLD CALC: 36.2 % — LOW (ref 39–50)
HGB BLD-MCNC: 11.6 G/DL — LOW (ref 13–17)
IMM GRANULOCYTES NFR BLD AUTO: 0.7 % — SIGNIFICANT CHANGE UP (ref 0–1.5)
KETONES UR-MCNC: NEGATIVE — SIGNIFICANT CHANGE UP
LEUKOCYTE ESTERASE UR-ACNC: NEGATIVE — SIGNIFICANT CHANGE UP
LYMPHOCYTES # BLD AUTO: 1.05 K/UL — SIGNIFICANT CHANGE UP (ref 1–3.3)
LYMPHOCYTES # BLD AUTO: 8.9 % — LOW (ref 13–44)
MAGNESIUM SERPL-MCNC: 1.6 MG/DL — SIGNIFICANT CHANGE UP (ref 1.6–2.6)
MCHC RBC-ENTMCNC: 30.5 PG — SIGNIFICANT CHANGE UP (ref 27–34)
MCHC RBC-ENTMCNC: 32 GM/DL — SIGNIFICANT CHANGE UP (ref 32–36)
MCV RBC AUTO: 95.3 FL — SIGNIFICANT CHANGE UP (ref 80–100)
MONOCYTES # BLD AUTO: 0.7 K/UL — SIGNIFICANT CHANGE UP (ref 0–0.9)
MONOCYTES NFR BLD AUTO: 5.9 % — SIGNIFICANT CHANGE UP (ref 2–14)
NEUTROPHILS # BLD AUTO: 9.82 K/UL — HIGH (ref 1.8–7.4)
NEUTROPHILS NFR BLD AUTO: 83 % — HIGH (ref 43–77)
NITRITE UR-MCNC: NEGATIVE — SIGNIFICANT CHANGE UP
NRBC # BLD: 0 /100 WBCS — SIGNIFICANT CHANGE UP (ref 0–0)
PH UR: 6 — SIGNIFICANT CHANGE UP (ref 5–8)
PHOSPHATE SERPL-MCNC: 3.8 MG/DL — SIGNIFICANT CHANGE UP (ref 2.5–4.5)
PLATELET # BLD AUTO: 235 K/UL — SIGNIFICANT CHANGE UP (ref 150–400)
POTASSIUM SERPL-MCNC: 3.9 MMOL/L — SIGNIFICANT CHANGE UP (ref 3.5–5.3)
POTASSIUM SERPL-SCNC: 3.9 MMOL/L — SIGNIFICANT CHANGE UP (ref 3.5–5.3)
PROT UR-MCNC: NEGATIVE — SIGNIFICANT CHANGE UP
RBC # BLD: 3.8 M/UL — LOW (ref 4.2–5.8)
RBC # FLD: 12.3 % — SIGNIFICANT CHANGE UP (ref 10.3–14.5)
RBC CASTS # UR COMP ASSIST: 2 /HPF — SIGNIFICANT CHANGE UP (ref 0–4)
SODIUM SERPL-SCNC: 136 MMOL/L — SIGNIFICANT CHANGE UP (ref 135–145)
SP GR SPEC: 1.01 — SIGNIFICANT CHANGE UP (ref 1.01–1.02)
UROBILINOGEN FLD QL: NEGATIVE — SIGNIFICANT CHANGE UP
WBC # BLD: 11.82 K/UL — HIGH (ref 3.8–10.5)
WBC # FLD AUTO: 11.82 K/UL — HIGH (ref 3.8–10.5)
WBC UR QL: 1 /HPF — SIGNIFICANT CHANGE UP (ref 0–5)

## 2020-07-18 PROCEDURE — 99233 SBSQ HOSP IP/OBS HIGH 50: CPT

## 2020-07-18 PROCEDURE — 99223 1ST HOSP IP/OBS HIGH 75: CPT

## 2020-07-18 RX ORDER — MAGNESIUM SULFATE 500 MG/ML
1 VIAL (ML) INJECTION ONCE
Refills: 0 | Status: COMPLETED | OUTPATIENT
Start: 2020-07-18 | End: 2020-07-18

## 2020-07-18 RX ORDER — OXYCODONE HYDROCHLORIDE 5 MG/1
5 TABLET ORAL EVERY 4 HOURS
Refills: 0 | Status: DISCONTINUED | OUTPATIENT
Start: 2020-07-18 | End: 2020-07-25

## 2020-07-18 RX ORDER — ACETAMINOPHEN 500 MG
1000 TABLET ORAL ONCE
Refills: 0 | Status: COMPLETED | OUTPATIENT
Start: 2020-07-18 | End: 2020-07-18

## 2020-07-18 RX ORDER — SODIUM CHLORIDE 9 MG/ML
1000 INJECTION, SOLUTION INTRAVENOUS ONCE
Refills: 0 | Status: COMPLETED | OUTPATIENT
Start: 2020-07-18 | End: 2020-07-18

## 2020-07-18 RX ORDER — METOPROLOL TARTRATE 50 MG
25 TABLET ORAL EVERY 8 HOURS
Refills: 0 | Status: DISCONTINUED | OUTPATIENT
Start: 2020-07-18 | End: 2020-07-20

## 2020-07-18 RX ORDER — SODIUM CHLORIDE 9 MG/ML
500 INJECTION, SOLUTION INTRAVENOUS ONCE
Refills: 0 | Status: COMPLETED | OUTPATIENT
Start: 2020-07-18 | End: 2020-07-18

## 2020-07-18 RX ORDER — SODIUM CHLORIDE 9 MG/ML
500 INJECTION INTRAMUSCULAR; INTRAVENOUS; SUBCUTANEOUS ONCE
Refills: 0 | Status: COMPLETED | OUTPATIENT
Start: 2020-07-18 | End: 2020-07-18

## 2020-07-18 RX ADMIN — ATORVASTATIN CALCIUM 80 MILLIGRAM(S): 80 TABLET, FILM COATED ORAL at 21:43

## 2020-07-18 RX ADMIN — HYDROMORPHONE HYDROCHLORIDE 1 MILLIGRAM(S): 2 INJECTION INTRAMUSCULAR; INTRAVENOUS; SUBCUTANEOUS at 16:02

## 2020-07-18 RX ADMIN — TAMSULOSIN HYDROCHLORIDE 0.4 MILLIGRAM(S): 0.4 CAPSULE ORAL at 11:38

## 2020-07-18 RX ADMIN — Medication 1.5 GRAM(S): at 11:38

## 2020-07-18 RX ADMIN — Medication 175 MICROGRAM(S): at 04:28

## 2020-07-18 RX ADMIN — Medication 250 MILLIGRAM(S): at 08:28

## 2020-07-18 RX ADMIN — Medication 400 MILLIGRAM(S): at 04:40

## 2020-07-18 RX ADMIN — APIXABAN 5 MILLIGRAM(S): 2.5 TABLET, FILM COATED ORAL at 05:59

## 2020-07-18 RX ADMIN — Medication 1000 MILLIGRAM(S): at 20:37

## 2020-07-18 RX ADMIN — HYDROMORPHONE HYDROCHLORIDE 1 MILLIGRAM(S): 2 INJECTION INTRAMUSCULAR; INTRAVENOUS; SUBCUTANEOUS at 01:25

## 2020-07-18 RX ADMIN — PANTOPRAZOLE SODIUM 40 MILLIGRAM(S): 20 TABLET, DELAYED RELEASE ORAL at 11:38

## 2020-07-18 RX ADMIN — Medication 25 MILLIGRAM(S): at 21:44

## 2020-07-18 RX ADMIN — HYDROMORPHONE HYDROCHLORIDE 1 MILLIGRAM(S): 2 INJECTION INTRAMUSCULAR; INTRAVENOUS; SUBCUTANEOUS at 06:26

## 2020-07-18 RX ADMIN — Medication 400 MILLIGRAM(S): at 20:30

## 2020-07-18 RX ADMIN — HYDROMORPHONE HYDROCHLORIDE 1 MILLIGRAM(S): 2 INJECTION INTRAMUSCULAR; INTRAVENOUS; SUBCUTANEOUS at 06:56

## 2020-07-18 RX ADMIN — HYDROMORPHONE HYDROCHLORIDE 1 MILLIGRAM(S): 2 INJECTION INTRAMUSCULAR; INTRAVENOUS; SUBCUTANEOUS at 01:55

## 2020-07-18 RX ADMIN — HYDROMORPHONE HYDROCHLORIDE 1 MILLIGRAM(S): 2 INJECTION INTRAMUSCULAR; INTRAVENOUS; SUBCUTANEOUS at 16:40

## 2020-07-18 RX ADMIN — SODIUM CHLORIDE 500 MILLILITER(S): 9 INJECTION INTRAMUSCULAR; INTRAVENOUS; SUBCUTANEOUS at 20:36

## 2020-07-18 RX ADMIN — Medication 100 MILLIGRAM(S): at 21:44

## 2020-07-18 RX ADMIN — Medication 100 GRAM(S): at 06:27

## 2020-07-18 RX ADMIN — SODIUM CHLORIDE 1000 MILLILITER(S): 9 INJECTION, SOLUTION INTRAVENOUS at 00:34

## 2020-07-18 RX ADMIN — Medication 60 MILLIGRAM(S): at 11:38

## 2020-07-18 RX ADMIN — Medication 250 MILLIGRAM(S): at 20:30

## 2020-07-18 RX ADMIN — HYDROMORPHONE HYDROCHLORIDE 1 MILLIGRAM(S): 2 INJECTION INTRAMUSCULAR; INTRAVENOUS; SUBCUTANEOUS at 11:45

## 2020-07-18 RX ADMIN — HYDROMORPHONE HYDROCHLORIDE 1 MILLIGRAM(S): 2 INJECTION INTRAMUSCULAR; INTRAVENOUS; SUBCUTANEOUS at 12:15

## 2020-07-18 RX ADMIN — APIXABAN 5 MILLIGRAM(S): 2.5 TABLET, FILM COATED ORAL at 18:15

## 2020-07-18 RX ADMIN — SODIUM CHLORIDE 2000 MILLILITER(S): 9 INJECTION, SOLUTION INTRAVENOUS at 09:38

## 2020-07-18 NOTE — PROGRESS NOTE ADULT - PROBLEM SELECTOR PLAN 5
- c/w Synthroid - on Losartan 100mg po qd at home ; on hold for now given soft BPs  - c/w Lopressor as above with hold parameters

## 2020-07-18 NOTE — PROGRESS NOTE ADULT - SUBJECTIVE AND OBJECTIVE BOX
María Orozco MD  PG[facility] 1 Department of Internal Medicine  Pager: [phone]      Patient is a 60y old  Male who presents with a chief complaint of shock (2020 12:51)      SUBJECTIVE / OVERNIGHT EVENTS: Pt seen and examined. No acute overnight events.   Denies fevers, chills, CP/palpitations, SOB/cough, abdominal pain, N/V, constipation, or diarrhea.        MEDICATIONS  (STANDING):  apixaban 5 milliGRAM(s) Oral two times a day  atorvastatin 80 milliGRAM(s) Oral at bedtime  cefuroxime   Tablet 250 milliGRAM(s) Oral every 12 hours  diltiazem    Tablet 60 milliGRAM(s) Oral every 6 hours  levothyroxine 175 MICROGram(s) Oral <User Schedule>  levothyroxine 88 MICROGram(s) Oral <User Schedule>  mesalamine ER (24-Hour) Capsule 1.5 Gram(s) Oral daily  pantoprazole  Injectable 40 milliGRAM(s) IV Push daily  tamsulosin 0.4 milliGRAM(s) Oral daily    MEDICATIONS  (PRN):  acetaminophen   Tablet .. 650 milliGRAM(s) Oral every 6 hours PRN Mild Pain (1 - 3)  aluminum hydroxide/magnesium hydroxide/simethicone Suspension 30 milliLiter(s) Oral every 4 hours PRN Dyspepsia  HYDROmorphone  Injectable 1 milliGRAM(s) IV Push every 4 hours PRN Severe Pain (7 - 10)  ondansetron Injectable 4 milliGRAM(s) IV Push every 4 hours PRN Nausea and/or Vomiting      I&O's Summary    2020 07:01  -  2020 07:00  --------------------------------------------------------  IN: 3140 mL / OUT: 2550 mL / NET: 590 mL        Vital Signs Last 24 Hrs  T(C): 37.1 (2020 06:00), Max: 38.3 (2020 20:50)  T(F): 98.8 (2020 06:00), Max: 101 (2020 20:50)  HR: 122 (2020 07:00) (71 - 149)  BP: 81/65 (2020 07:00) (80/53 - 120/77)  BP(mean): 70 (2020 07:00) (61 - 93)  RR: 29 (2020 07:00) (14 - 37)  SpO2: 92% (2020 07:00) (84% - 95%)    CAPILLARY BLOOD GLUCOSE          PHYSICAL EXAM:  GENERAL: NAD,   HEAD:  Atraumatic, Normocephalic  EYES: EOMI, PERRL, conjunctiva and sclera clear  NECK: No JVD  CHEST/LUNG: Clear to auscultation bilaterally; No wheeze  HEART: Regular rate and rhythm; No murmurs, rubs, or gallops  ABDOMEN: Soft, Nontender, Nondistended; Bowel sounds present  EXTREMITIES:  2+ Peripheral Pulses, No clubbing, cyanosis, or edema  PSYCH: AAOx3  NEUROLOGY: non-focal  SKIN: No rashes or lesions       LABS:                        11.6   11.82 )-----------( 235      ( 2020 04:37 )             36.2     Auto Eosinophil # 0.14  / Auto Eosinophil % 1.2   / Auto Neutrophil # 9.82  / Auto Neutrophil % 83.0  / BANDS % x                            11.9   8.80  )-----------( 212      ( 2020 06:28 )             36.7     Auto Eosinophil # x     / Auto Eosinophil % x     / Auto Neutrophil # x     / Auto Neutrophil % x     / BANDS % x        07-18    136  |  101  |  8   ----------------------------<  99  3.9   |  27  |  1.00  07-17    142  |  107  |  13  ----------------------------<  90  3.2<L>   |  23  |  0.93    Ca    8.4      2020 04:38  Mg     1.6     0718  Phos  3.8     18  TPro  5.5<L>  /  Alb  3.0<L>  /  TBili  0.4  /  DBili  x   /  AST  21  /  ALT  <5<L>  /  AlkPhos  56  07-17          Urinalysis Basic - ( 2020 00:05 )    Color: Light Yellow / Appearance: Clear / S.012 / pH: x  Gluc: x / Ketone: Negative  / Bili: Negative / Urobili: Negative   Blood: x / Protein: Negative / Nitrite: Negative   Leuk Esterase: Negative / RBC: 2 /hpf / WBC 1 /HPF   Sq Epi: x / Non Sq Epi: 0 /hpf / Bacteria: 0.0            RADIOLOGY & ADDITIONAL TESTS:    Imaging Personally Reviewed:    Consultant(s) Notes Reviewed:      Care Discussed with Consultants/Other Providers: María Orozco MD  [facility] 1 Department of Internal Medicine  Pager: [phone]      Patient is a 60y old  Male who presents with a chief complaint of shock (2020 12:51)      SUBJECTIVE / OVERNIGHT EVENTS: Pt seen and examined. No acute overnight events.     O/N   Denies fevers, chills, CP/palpitations, SOB/cough, abdominal pain, N/V, constipation, or diarrhea.        MEDICATIONS  (STANDING):  apixaban 5 milliGRAM(s) Oral two times a day  atorvastatin 80 milliGRAM(s) Oral at bedtime  cefuroxime   Tablet 250 milliGRAM(s) Oral every 12 hours  diltiazem    Tablet 60 milliGRAM(s) Oral every 6 hours  levothyroxine 175 MICROGram(s) Oral <User Schedule>  levothyroxine 88 MICROGram(s) Oral <User Schedule>  mesalamine ER (24-Hour) Capsule 1.5 Gram(s) Oral daily  pantoprazole  Injectable 40 milliGRAM(s) IV Push daily  tamsulosin 0.4 milliGRAM(s) Oral daily    MEDICATIONS  (PRN):  acetaminophen   Tablet .. 650 milliGRAM(s) Oral every 6 hours PRN Mild Pain (1 - 3)  aluminum hydroxide/magnesium hydroxide/simethicone Suspension 30 milliLiter(s) Oral every 4 hours PRN Dyspepsia  HYDROmorphone  Injectable 1 milliGRAM(s) IV Push every 4 hours PRN Severe Pain (7 - 10)  ondansetron Injectable 4 milliGRAM(s) IV Push every 4 hours PRN Nausea and/or Vomiting      I&O's Summary    2020 07:01  -  2020 07:00  --------------------------------------------------------  IN: 3140 mL / OUT: 2550 mL / NET: 590 mL        Vital Signs Last 24 Hrs  T(C): 37.1 (2020 06:00), Max: 38.3 (2020 20:50)  T(F): 98.8 (2020 06:00), Max: 101 (2020 20:50)  HR: 122 (2020 07:00) (71 - 149)  BP: 81/65 (2020 07:00) (80/53 - 120/77)  BP(mean): 70 (2020 07:00) (61 - 93)  RR: 29 (2020 07:00) (14 - 37)  SpO2: 92% (2020 07:00) (84% - 95%)    CAPILLARY BLOOD GLUCOSE          PHYSICAL EXAM:  GENERAL: NAD,   HEAD:  Atraumatic, Normocephalic  EYES: EOMI, PERRL, conjunctiva and sclera clear  NECK: No JVD  CHEST/LUNG: Clear to auscultation bilaterally; No wheeze  HEART: Regular rate and rhythm; No murmurs, rubs, or gallops  ABDOMEN: Soft, Nontender, Nondistended; Bowel sounds present  EXTREMITIES:  2+ Peripheral Pulses, No clubbing, cyanosis, or edema  PSYCH: AAOx3  NEUROLOGY: non-focal  SKIN: No rashes or lesions       LABS:                        11.6   11.82 )-----------( 235      ( 2020 04:37 )             36.2     Auto Eosinophil # 0.14  / Auto Eosinophil % 1.2   / Auto Neutrophil # 9.82  / Auto Neutrophil % 83.0  / BANDS % x                            11.9   8.80  )-----------( 212      ( 2020 06:28 )             36.7     Auto Eosinophil # x     / Auto Eosinophil % x     / Auto Neutrophil # x     / Auto Neutrophil % x     / BANDS % x        07-18    136  |  101  |  8   ----------------------------<  99  3.9   |  27  |  1.00  07-17    142  |  107  |  13  ----------------------------<  90  3.2<L>   |  23  |  0.93    Ca    8.4      2020 04:38  Mg     1.6     0718  Phos  3.8     18  TPro  5.5<L>  /  Alb  3.0<L>  /  TBili  0.4  /  DBili  x   /  AST  21  /  ALT  <5<L>  /  AlkPhos  56  07-17          Urinalysis Basic - ( 2020 00:05 )    Color: Light Yellow / Appearance: Clear / S.012 / pH: x  Gluc: x / Ketone: Negative  / Bili: Negative / Urobili: Negative   Blood: x / Protein: Negative / Nitrite: Negative   Leuk Esterase: Negative / RBC: 2 /hpf / WBC 1 /HPF   Sq Epi: x / Non Sq Epi: 0 /hpf / Bacteria: 0.0            RADIOLOGY & ADDITIONAL TESTS:    Imaging Personally Reviewed:    Consultant(s) Notes Reviewed:      Care Discussed with Consultants/Other Providers: María Orozco MD  [facility] 1 Department of Internal Medicine  Pager: [phone]      Patient is a 60y old  Male who presents with a chief complaint of shock (2020 12:51)      SUBJECTIVE / OVERNIGHT EVENTS: Pt seen and examined. Poor appetite. MAP < 65, 54-58, received 2 L of bolus during morning, MAP improved. On 2L of bolus b/c has a hx of sleep apnea and saturating in 91-92%.  O/N   Denies fevers, chills, CP/palpitations, SOB/cough, abdominal pain, N/V, constipation, or diarrhea.        MEDICATIONS  (STANDING):  apixaban 5 milliGRAM(s) Oral two times a day  atorvastatin 80 milliGRAM(s) Oral at bedtime  cefuroxime   Tablet 250 milliGRAM(s) Oral every 12 hours  diltiazem    Tablet 60 milliGRAM(s) Oral every 6 hours  levothyroxine 175 MICROGram(s) Oral <User Schedule>  levothyroxine 88 MICROGram(s) Oral <User Schedule>  mesalamine ER (24-Hour) Capsule 1.5 Gram(s) Oral daily  pantoprazole  Injectable 40 milliGRAM(s) IV Push daily  tamsulosin 0.4 milliGRAM(s) Oral daily    MEDICATIONS  (PRN):  acetaminophen   Tablet .. 650 milliGRAM(s) Oral every 6 hours PRN Mild Pain (1 - 3)  aluminum hydroxide/magnesium hydroxide/simethicone Suspension 30 milliLiter(s) Oral every 4 hours PRN Dyspepsia  HYDROmorphone  Injectable 1 milliGRAM(s) IV Push every 4 hours PRN Severe Pain (7 - 10)  ondansetron Injectable 4 milliGRAM(s) IV Push every 4 hours PRN Nausea and/or Vomiting      I&O's Summary    2020 07:01  -  2020 07:00  --------------------------------------------------------  IN: 3140 mL / OUT: 2550 mL / NET: 590 mL        Vital Signs Last 24 Hrs  T(C): 37.1 (2020 06:00), Max: 38.3 (2020 20:50)  T(F): 98.8 (2020 06:00), Max: 101 (2020 20:50)  HR: 122 (2020 07:00) (71 - 149)  BP: 81/65 (2020 07:00) (80/53 - 120/77)  BP(mean): 70 (2020 07:00) (61 - 93)  RR: 29 (2020 07:00) (14 - 37)  SpO2: 92% (2020 07:00) (84% - 95%)    CAPILLARY BLOOD GLUCOSE          PHYSICAL EXAM:  GENERAL: NAD,   HEAD:  Atraumatic, Normocephalic  EYES: EOMI, PERRL, conjunctiva and sclera clear  NECK: No JVD  CHEST/LUNG: Clear to auscultation bilaterally; No wheeze  HEART: Regular rate and rhythm; No murmurs, rubs, or gallops  ABDOMEN: Soft, Nontender, Nondistended; Bowel sounds present  EXTREMITIES:  2+ Peripheral Pulses, No clubbing, cyanosis, or edema  PSYCH: AAOx3  NEUROLOGY: non-focal  SKIN: No rashes or lesions       LABS:                        11.6   11.82 )-----------( 235      ( 2020 04:37 )             36.2     Auto Eosinophil # 0.14  / Auto Eosinophil % 1.2   / Auto Neutrophil # 9.82  / Auto Neutrophil % 83.0  / BANDS % x                            11.9   8.80  )-----------( 212      ( 2020 06:28 )             36.7     Auto Eosinophil # x     / Auto Eosinophil % x     / Auto Neutrophil # x     / Auto Neutrophil % x     / BANDS % x        07-18    136  |  101  |  8   ----------------------------<  99  3.9   |  27  |  1.00  07-17    142  |  107  |  13  ----------------------------<  90  3.2<L>   |  23  |  0.93    Ca    8.4      2020 04:38  Mg     1.6     07-18  Phos  3.8     07-18  TPro  5.5<L>  /  Alb  3.0<L>  /  TBili  0.4  /  DBili  x   /  AST  21  /  ALT  <5<L>  /  AlkPhos  56  07-17          Urinalysis Basic - ( 2020 00:05 )    Color: Light Yellow / Appearance: Clear / S.012 / pH: x  Gluc: x / Ketone: Negative  / Bili: Negative / Urobili: Negative   Blood: x / Protein: Negative / Nitrite: Negative   Leuk Esterase: Negative / RBC: 2 /hpf / WBC 1 /HPF   Sq Epi: x / Non Sq Epi: 0 /hpf / Bacteria: 0.0            RADIOLOGY & ADDITIONAL TESTS:    Imaging Personally Reviewed:    Consultant(s) Notes Reviewed:      Care Discussed with Consultants/Other Providers: María Orozco MD  [facility] 1 Department of Internal Medicine  Pager: [phone]      Patient is a 60y old  Male who presents with a chief complaint of shock (2020 12:51)      SUBJECTIVE / OVERNIGHT EVENTS: Pt seen and examined. Poor appetite. MAP < 65, 54-58, received 2 L of bolus during morning, MAP improved. Has a hx of sleep apnea and as  saturating in 91-92%, was given 2 L of NC, saturating better at 96%.  O/N   Denies fevers, chills, CP/palpitations, SOB/cough, abdominal pain, N/V, constipation, or diarrhea.        MEDICATIONS  (STANDING):  apixaban 5 milliGRAM(s) Oral two times a day  atorvastatin 80 milliGRAM(s) Oral at bedtime  cefuroxime   Tablet 250 milliGRAM(s) Oral every 12 hours  diltiazem    Tablet 60 milliGRAM(s) Oral every 6 hours  levothyroxine 175 MICROGram(s) Oral <User Schedule>  levothyroxine 88 MICROGram(s) Oral <User Schedule>  mesalamine ER (24-Hour) Capsule 1.5 Gram(s) Oral daily  pantoprazole  Injectable 40 milliGRAM(s) IV Push daily  tamsulosin 0.4 milliGRAM(s) Oral daily    MEDICATIONS  (PRN):  acetaminophen   Tablet .. 650 milliGRAM(s) Oral every 6 hours PRN Mild Pain (1 - 3)  aluminum hydroxide/magnesium hydroxide/simethicone Suspension 30 milliLiter(s) Oral every 4 hours PRN Dyspepsia  HYDROmorphone  Injectable 1 milliGRAM(s) IV Push every 4 hours PRN Severe Pain (7 - 10)  ondansetron Injectable 4 milliGRAM(s) IV Push every 4 hours PRN Nausea and/or Vomiting      I&O's Summary    2020 07:01  -  2020 07:00  --------------------------------------------------------  IN: 3140 mL / OUT: 2550 mL / NET: 590 mL        Vital Signs Last 24 Hrs  T(C): 37.1 (2020 06:00), Max: 38.3 (2020 20:50)  T(F): 98.8 (2020 06:00), Max: 101 (2020 20:50)  HR: 122 (2020 07:00) (71 - 149)  BP: 81/65 (2020 07:00) (80/53 - 120/77)  BP(mean): 70 (2020 07:00) (61 - 93)  RR: 29 (2020 07:00) (14 - 37)  SpO2: 92% (2020 07:00) (84% - 95%)    CAPILLARY BLOOD GLUCOSE          PHYSICAL EXAM:  GENERAL: NAD,   HEAD:  Atraumatic, Normocephalic  EYES: EOMI, PERRL, conjunctiva and sclera clear  NECK: No JVD  CHEST/LUNG: Clear to auscultation bilaterally; No wheeze  HEART: Regular rate and rhythm; No murmurs, rubs, or gallops  ABDOMEN: Soft, Nontender, Nondistended; Bowel sounds present  EXTREMITIES:  2+ Peripheral Pulses, No clubbing, cyanosis, or edema  PSYCH: AAOx3  NEUROLOGY: non-focal  SKIN: No rashes or lesions       LABS:                        11.6   11.82 )-----------( 235      ( 2020 04:37 )             36.2     Auto Eosinophil # 0.14  / Auto Eosinophil % 1.2   / Auto Neutrophil # 9.82  / Auto Neutrophil % 83.0  / BANDS % x                            11.9   8.80  )-----------( 212      ( 2020 06:28 )             36.7     Auto Eosinophil # x     / Auto Eosinophil % x     / Auto Neutrophil # x     / Auto Neutrophil % x     / BANDS % x        07-18    136  |  101  |  8   ----------------------------<  99  3.9   |  27  |  1.00  07-17    142  |  107  |  13  ----------------------------<  90  3.2<L>   |  23  |  0.93    Ca    8.4      2020 04:38  Mg     1.6     07-18  Phos  3.8     07-18  TPro  5.5<L>  /  Alb  3.0<L>  /  TBili  0.4  /  DBili  x   /  AST  21  /  ALT  <5<L>  /  AlkPhos  56  07-17          Urinalysis Basic - ( 2020 00:05 )    Color: Light Yellow / Appearance: Clear / S.012 / pH: x  Gluc: x / Ketone: Negative  / Bili: Negative / Urobili: Negative   Blood: x / Protein: Negative / Nitrite: Negative   Leuk Esterase: Negative / RBC: 2 /hpf / WBC 1 /HPF   Sq Epi: x / Non Sq Epi: 0 /hpf / Bacteria: 0.0            RADIOLOGY & ADDITIONAL TESTS:    Imaging Personally Reviewed:    Consultant(s) Notes Reviewed:      Care Discussed with Consultants/Other Providers:

## 2020-07-18 NOTE — PROGRESS NOTE ADULT - SUBJECTIVE AND OBJECTIVE BOX
Cardiology Progress Note    Interval: Pt resting comfortably in bed. Noted abd discomfort but no chest pain, palpitations, and SOB.    Tele: atrial flutter    Medications:  acetaminophen   Tablet .. 650 milliGRAM(s) Oral every 6 hours PRN  aluminum hydroxide/magnesium hydroxide/simethicone Suspension 30 milliLiter(s) Oral every 4 hours PRN  apixaban 5 milliGRAM(s) Oral two times a day  atorvastatin 80 milliGRAM(s) Oral at bedtime  cefuroxime   Tablet 250 milliGRAM(s) Oral every 12 hours  diltiazem    Tablet 60 milliGRAM(s) Oral every 6 hours  HYDROmorphone  Injectable 1 milliGRAM(s) IV Push every 4 hours PRN  levothyroxine 175 MICROGram(s) Oral <User Schedule>  levothyroxine 88 MICROGram(s) Oral <User Schedule>  mesalamine ER (24-Hour) Capsule 1.5 Gram(s) Oral daily  ondansetron Injectable 4 milliGRAM(s) IV Push every 4 hours PRN  pantoprazole  Injectable 40 milliGRAM(s) IV Push daily  tamsulosin 0.4 milliGRAM(s) Oral daily      Review of Systems:  Constitutional: [ ] Fever [ ] Chills [ ] Fatigue [ ] Weight change   HEENT: [ ] Blurred vision [ ] Eye Pain [ ] Headache [ ] Runny nose [ ] Sore Throat   Respiratory: [ ] Cough [ ] Wheezing [ ] Shortness of breath  Cardiovascular: [ ] Chest Pain [ ] Palpitations [ ] GONZALEZ [ ] PND [ ] Orthopnea  Gastrointestinal: [ ] Abdominal Pain [ ] Diarrhea [ ] Constipation [ ] Hemorrhoids [ ] Nausea [ ] Vomiting  Genitourinary: [ ] Nocturia [ ] Dysuria [ ] Incontinence  Extremities: [ ] Swelling [ ] Joint Pain  Neurologic: [ ] Focal deficit [ ] Paresthesias [ ] Syncope  Lymphatic: [ ] Swelling [ ] Lymphadenopathy   Skin: [ ] Rash [ ] Ecchymoses [ ] Wounds [ ] Lesions  Psychiatry: [ ] Depression [ ] Suicidal/Homicidal Ideation [ ] Anxiety [ ] Sleep Disturbances  [ ] 10 point review of systems is otherwise negative except as mentioned above            [ ]Unable to obtain    Vitals:  T(C): 37.1 (07-18-20 @ 08:00), Max: 38.3 (07-17-20 @ 20:50)  HR: 116 (07-18-20 @ 10:30) (77 - 145)  BP: 102/78 (07-18-20 @ 10:30) (74/50 - 103/64)  BP(mean): 88 (07-18-20 @ 10:30) (56 - 88)  RR: 26 (07-18-20 @ 10:30) (12 - 36)  SpO2: 92% (07-18-20 @ 10:30) (84% - 95%)  Wt(kg): --  Daily     Daily   I&O's Summary    17 Jul 2020 07:01  -  18 Jul 2020 07:00  --------------------------------------------------------  IN: 3140 mL / OUT: 2550 mL / NET: 590 mL    18 Jul 2020 07:01  -  18 Jul 2020 11:08  --------------------------------------------------------  IN: 1050 mL / OUT: 1000 mL / NET: 50 mL        Physical Exam:  General: NAD  Eye: PERRL, EOMI  HENT: Normal oral mucosa NC/AT  CV: Normal S1/S2, irregular, tachycardic, No M/R/G, no edema, no elevation in JVP  Resp: Normal respiratory effort, clear to auscultation bilaterally, no wheezing, no crackles  Abd: Soft, tender to palpation, Non-distended, BS+  Ext: No clubbing, No joint deformity   Neuro: Non-focal, motor and sensory intact  Lymph: No lymphadenopathy  Psych: AAOx3, Mood & affect appropriate  Skin: No rashes, No ecchymoses, No cyanosis    Labs:                        11.6   11.82 )-----------( 235      ( 18 Jul 2020 04:37 )             36.2     07-18    136  |  101  |  8   ----------------------------<  99  3.9   |  27  |  1.00    Ca    8.4      18 Jul 2020 04:38  Phos  3.8     07-18  Mg     1.6     07-18    TPro  5.5<L>  /  Alb  3.0<L>  /  TBili  0.4  /  DBili  x   /  AST  21  /  ALT  <5<L>  /  AlkPhos  56  07-17          Serum Pro-Brain Natriuretic Peptide: 3299 pg/mL (07-13 @ 22:52)          New results/imaging:

## 2020-07-18 NOTE — PROGRESS NOTE ADULT - ASSESSMENT
60 y M PMHx HTN, HLD, hypothyroidism s/p thyroidectomy  2/2 thyroid cancer, diverticulitis (2010), abdominal TB ('93) s/p INH p/w fevers and progressively worsening LLQ pain likely 2/2 septic shock. Found to have sudden LLQ abdominal pain and back into atrial flutter.    Plan:     #Neuro:   - Stable. A&O x3    #CV:  Atrial flutter- new diagnosis, likely 2/2 septic shock. Rate 70s  - back into a. flutter and tachycardic in 140s  - Given metoprol 0.5 mg x2 and cardizem 20 mg IV push.  - On cardizem 30 q6  - On heparin  - On Eliquis 5 mg BID   - EP recs pending  < from: Transthoracic Echocardiogram (07.15.20 @ 10:15) >  EF (Visual Estimate): 65-70 %    Conclusions:  1. Mitral annular calcification, otherwise normal mitral  valve. Mild-moderate mitral regurgitation.  2. Calcified trileaflet aortic valve with normal opening.  No aortic valve regurgitation seen.  3. Mildly dilated left atrium.  LA volume index = 38 cc/m2.  4. Normal left ventricular systolic function. No segmental  wall motion abnormalities.  5. Severe reversible diastolic dysfunction (Stage III).  6. Right ventricular enlargement with normal right  ventricular systolic function.  7. Normal pericardium with no pericardial effusion.    HOTN (70/60 in ED)  - No longer hypotensive, SBP 70s-130s  - Off midodrine  - Home med losartan held    Troponinemia- likely 2/2 demand ischemia in setting of rapid aflutter, sepsis, dehydration.   - Troponin 159 (down from 186)    #Respiratory:  - Stable- 96% RA    #GI:   Abdominal pain  - Xray Abdomen results pending  - Given Maalox, protonix, and simethacone    GI bleed    - CTAP showed colon diverticulosis without diverticulitis, SCAD can resemble IBD  - Start home med mesalamine 375 mg QID   - Hold home omeprazole    #Renal:  - no active issues    #ID:   Septic shock 2/2 likely SCAD vs 2/2 UTI/pyelo (dysuria, CVA tenderness, cloudy urine). s/p vanc, zosyn, ceftriaxone  - blood cx: no growth, urine cx: no growth  - On Cefuroxime 250 mg BID x 2 days    #Endo:   Possible adrenal insufficiency 2/2 steroid use  - AM cortisol: 61  - Not on hydrocortisone    Hypothyroidism s/p thyroidectomy 2/2 thyroid cancer   - On home dose levothyroxine to 175 mcg M-Sat, 87.5 mcg Sunday    Diet: Advanced Diet (DASH)  Code: FULL  VTE prophylaxis: Heparin sq 60 y M PMHx HTN, HLD, hypothyroidism s/p thyroidectomy  2/2 thyroid cancer, diverticulitis (2010), abdominal TB ('93) s/p INH p/w fevers and progressively worsening LLQ pain likely 2/2 septic shock. Found to have sudden LLQ abdominal pain and back into atrial flutter. BP responsive to LR bolus and HR responsive to cardizem, will keep monitor BP and feeding, otherwise could transfer to floors.     Plan:     #Neuro:   - Stable. A&O x3    #CV:  Atrial flutter- new diagnosis, likely 2/2 septic shock. Rate 70s  - back into a. flutter and tachycardic in 140s  - Given metoprol 0.5 mg x2 and cardizem 20 mg IV push.  - On cardizem 30 q6  - On heparin  - On Eliquis 5 mg BID   - EP recs pending  < from: Transthoracic Echocardiogram (07.15.20 @ 10:15) >  EF (Visual Estimate): 65-70 %    Conclusions:  1. Mitral annular calcification, otherwise normal mitral  valve. Mild-moderate mitral regurgitation.  2. Calcified trileaflet aortic valve with normal opening.  No aortic valve regurgitation seen.  3. Mildly dilated left atrium.  LA volume index = 38 cc/m2.  4. Normal left ventricular systolic function. No segmental  wall motion abnormalities.  5. Severe reversible diastolic dysfunction (Stage III).  6. Right ventricular enlargement with normal right  ventricular systolic function.  7. Normal pericardium with no pericardial effusion.    HOTN (70/60 in ED)  - Hypotensive resolving SBPs in 100s  - Received 2 L of LR bolus in morning  - Off midodrine  - Home med losartan held    Troponinemia- likely 2/2 demand ischemia in setting of rapid aflutter, sepsis, dehydration.   - Troponin 159 (down from 186)    #Respiratory:  - Stable- 96% RA    #GI:   Abdominal pain  - Xray Abdomen results pending  - Given Maalox, protonix, and simethacone    GI bleed    - CTAP showed colon diverticulosis without diverticulitis, SCAD can resemble IBD  - Start home med mesalamine 375 mg QID   - Hold home omeprazole    #Renal:  - no active issues    #ID:   Septic shock 2/2 likely SCAD vs 2/2 UTI/pyelo (dysuria, CVA tenderness, cloudy urine). s/p vanc, zosyn, ceftriaxone  - blood cx: no growth, urine cx: no growth  - On Cefuroxime 250 mg BID x 2 days    #Endo:   Possible adrenal insufficiency 2/2 steroid use  - AM cortisol: 61  - Not on hydrocortisone    Hypothyroidism s/p thyroidectomy 2/2 thyroid cancer   - On home dose levothyroxine to 175 mcg M-Sat, 87.5 mcg Sunday    Diet: Advanced Diet (DASH)- will try to eat today, if not may need to start on maintenance fluids  Code: FULL  VTE prophylaxis: Heparin sq 60 y M PMHx HTN, HLD, hypothyroidism s/p thyroidectomy  2/2 thyroid cancer, diverticulitis (2010), abdominal TB ('93) s/p INH p/w fevers and progressively worsening LLQ pain likely 2/2 septic shock. Found to have sudden LLQ abdominal pain and back into atrial flutter. BP responsive to LR bolus and HR responsive to cardizem, will keep monitor BP and feeding, otherwise could transfer to floors.     Plan:     #Neuro:   - Stable. A&O x3    #CV:  Atrial flutter- new diagnosis, likely 2/2 septic shock. Rate 70s  - back into a. flutter and tachycardic in 140s  - ANA: EF: 65-70%, normal LV systolic function, however severe reversible diastolic dysfunction (Stage III).  - Switch cardizem 60q6 to lopressor 25q8  - On Eliquis 5 mg BID     HOTN (70/60 in ED)  - Hypotensive resolving SBPs in 100s  - Received 2 L of LR bolus in morning    Troponinemia- likely 2/2 demand ischemia in setting of rapid aflutter, sepsis, dehydration.   - Troponin 159 (down from 186)    #Respiratory:  - hx of sleep apnea- needed 2L of NC b/c O2 sat: 91-92%  - Normally stable- 96% RA    #GI:   Abdominal pain  - Xray no significant findings (perforation, obstruction)  - Given Maalox, protonix, simethacone, tylenol, tramadol, and dilaudid    GI bleed    - CTAP showed colon diverticulosis without diverticulitis, SCAD can resemble IBD  - On home med mesalamine 375 mg QID     #Renal:  - no active issues    #ID:   Septic shock 2/2 likely SCAD vs 2/2 UTI/pyelo (dysuria, CVA tenderness, cloudy urine). s/p vanc, zosyn, ceftriaxone  - blood cx: no growth, urine cx: no growth  - On Cefuroxime 250 mg BID x 2 days    #Endo:   Possible adrenal insufficiency 2/2 steroid use  - AM cortisol: 61  - Not on hydrocortisone    Hypothyroidism s/p thyroidectomy 2/2 thyroid cancer   - On home dose levothyroxine to 175 mcg M-Sat, 87.5 mcg Sunday    Diet: Advanced Diet (DASH)- will try to eat today, if not may need to start on maintenance fluids  Code: FULL  VTE prophylaxis: Heparin sq

## 2020-07-18 NOTE — CHART NOTE - NSCHARTNOTEFT_GEN_A_CORE
MICU Transfer Note    Transfer from: MICU  Transfer to:  (*) Medicine    (  ) Telemetry    (  ) RCU    (  ) Palliative    (  ) Stroke Unit    (  ) _______________  Accepting physician: Dr. Richard SHEEHAN COURSE:    61 y/o male with pmhx of HTN, HLD, hypothyroidism s/p total thyroidectomy (6/4/13) 2/2 thyroid cancer, diverticulitis (2010), abdominal TB ('93) s/p INH who presented with fevers and progressively worsening LLQ pain with hematochezia. Last colonoscopy was 6/17/20 at [facility] and was diagnosed with segmental colitis associated with diverticulitis (SCAD) and started on mesalamine and budesonide. Was in new atrial flutter with rate 100-150 and hypotensive, found to be in septic shock. Was given fluids and levophed, now off levophed and is on midodrine. GI recommended no further workup and to continue the antibiotics and infectious work up. EP was consulted for a. flutter, recommended cardiac ablation vs ANA/DCCV once on a/c.      Vital Signs Last 24 Hrs  T(C): 36.9 (14 Jul 2020 12:00), Max: 37.5 (13 Jul 2020 23:07)  T(F): 98.5 (14 Jul 2020 12:00), Max: 99.5 (13 Jul 2020 23:07)  HR: 73 (14 Jul 2020 12:00) (73 - 127)  BP: 116/80 (14 Jul 2020 12:00) (76/56 - 116/80)  BP(mean): 95 (14 Jul 2020 12:00) (67 - 95)  RR: 21 (14 Jul 2020 12:00) (18 - 45)  SpO2: 93% (14 Jul 2020 12:00) (93% - 100%)  I&O's Summary    13 Jul 2020 07:01  -  14 Jul 2020 07:00  --------------------------------------------------------  IN: 8.2 mL / OUT: 200 mL / NET: -191.8 mL    14 Jul 2020 07:01  -  14 Jul 2020 15:00  --------------------------------------------------------  IN: 250 mL / OUT: 750 mL / NET: -500 mL          MEDICATIONS  (STANDING):  chlorhexidine 4% Liquid 1 Application(s) Topical <User Schedule>  ciprofloxacin     Tablet 500 milliGRAM(s) Oral every 12 hours  hydrocortisone sodium succinate Injectable 75 milliGRAM(s) IV Push every 8 hours  levothyroxine 175 MICROGram(s) Oral <User Schedule>  levothyroxine 88 MICROGram(s) Oral <User Schedule>  metroNIDAZOLE  IVPB 500 milliGRAM(s) IV Intermittent every 8 hours  midodrine 20 milliGRAM(s) Oral every 8 hours    MEDICATIONS  (PRN):        LABS                                            12.7                  Neurophils% (auto):   81.8   (07-14 @ 09:33):    7.58 )-----------(148          Lymphocytes% (auto):  8.8                                           39.5                   Eosinphils% (auto):   1.7      Manual%: Neutrophils x    ; Lymphocytes x    ; Eosinophils x    ; Bands%: x    ; Blasts x                                    140    |  107    |  14                  Calcium: 8.3   / iCa: x      (07-14 @ 09:33)    ----------------------------<  96        Magnesium: 1.8                              3.5     |  23     |  0.80             Phosphorous: 2.6      TPro  5.8    /  Alb  2.9    /  TBili  0.4    /  DBili  x      /  AST  20     /  ALT  <5     /  AlkPhos  50     14 Jul 2020 09:33    ( 07-13 @ 22:52 )   PT: 13.2 sec;   INR: 1.12 ratio  aPTT: 28.2 sec        ASSESSMENT & PLAN:     60 y M PMHx HTN, HLD, hypothyroidism s/p thyroidectomy  2/2 thyroid cancer, diverticulitis (2010), abdominal TB ('93) s/p INH p/w fevers and progressively worsening LLQ pain likely 2/2 septic shock.    Plan:   #Neuro:   - Stable. A&O x3    #CV:  Atrial flutter- new diagnosis, likely 2/2 septic shock. Rate 70s  - a/c held in setting of GI bleed, will trend Hb before starting  - EP recs: once cleared for AC, ablation vs ANA/DCCV, metoprolol PRN if HR >120s  - Ordered TTE    HOTN (70/60 in ED)  - No longer hypotensive, SBP 100s  - Off levophed  - Started on midodrine 20 mg q8h  - Home med losartan held    Troponinemia- likely 2/2 demand ischemia in setting of rapid aflutter, sepsis, dehydration.   - Troponin 159 (down from 186)  - Will continue to trend troponin    #Respiratory:  - Stable- 96% RA    #GI:   GI bleed likely 2/2 SCAD. CTAP showed colon diverticulosis without diverticulitis, SCAD can resemble IBD  - GI consult pending  - GI PCR, C diff toxin pending  - Home med mesalamine 375 mg QID and home omeprazole held    #Renal:  - no active issues    #ID:   Septic shock 2/2 likely SCAD vs 2/2 UTI/pyelo (dysuria, CVA tenderness, cloudy urine). s/p vanc, zosyn, ceftriaxone  - blood, urine cx results pending  - Continue ceftriaxone     #Endo:   Possible adrenal insufficiency 2/2 steroid use  - F/U w/ cortisol   - On hydrocortisone 75 mg q8h     Hypothyroidism s/p thyroidectomy 2/2 thyroid cancer   - Changed to home dose levothyroxine to 175 mcg M-Sat, 87.5 mcg Sunday    Diet: NPO   Code: FULL  VTE prophylaxis: SCD       Follow up:  -Monitor CBCs  -Obtain records from [facility] per GI re: GI workup  -F/u EP regarding ablation vs ANA/DCCV. Pt will need to be started on AC prior to cardioversion  -F/u echo  -F/u blood and urine cultures  -Send stool studies if diarrhea. MICU Transfer Note    Transfer from: MICU  Transfer to:  (*) Medicine    (  ) Telemetry    (  ) RCU    (  ) Palliative    (  ) Stroke Unit    (  ) _______________  Accepting physician: Dr. Richard SHEEHAN COURSE:    61 y/o male with pmhx of HTN, HLD, sleep apnea, hypothyroidism s/p total thyroidectomy (6/4/13) 2/2 thyroid cancer, diverticulitis (2010), abdominal TB ('93) s/p INH who presented with fevers and progressively worsening LLQ pain with hematochezia. Last colonoscopy was 6/17/20 at [facility] and was diagnosed with segmental colitis associated with diverticulitis (SCAD) and started on mesalamine and budesonide. Was in new atrial flutter with rate 100-150 and hypotensive, found to be in septic shock. Was given fluids and levophed, now off pressors. GI recommended no further workup and to continue his home medication of mesalamine. EP was consulted for a. flutter, recommended cardiac ablation vs ANA/DCCV once on a/c. On 7/14 EKG was in NSR and went back into atrial flutter w/ RVR on 7/17. He was tachycardic in 140s-150s. Given cardizem 20 mg IV push and currently started on cardizem 30mg q6 then recommended by EP to change to 60 mgq6. He also reported sharp abdominal pain 2 days ago in LUQ and LLQ with loose stool, no blood. Xray didn't demonstrate an acute abdomen. Was given Maalox, protonix, and simethacone. Received Tylenol, morphine, tramadol, and dilaudid. MAP was <65, however has been doing well after receiving 2 L of LR bolus.    Vital Signs Last 24 Hrs  T(C): 36.9 (14 Jul 2020 12:00), Max: 37.5 (13 Jul 2020 23:07)  T(F): 98.5 (14 Jul 2020 12:00), Max: 99.5 (13 Jul 2020 23:07)  HR: 73 (14 Jul 2020 12:00) (73 - 127)  BP: 116/80 (14 Jul 2020 12:00) (76/56 - 116/80)  BP(mean): 95 (14 Jul 2020 12:00) (67 - 95)  RR: 21 (14 Jul 2020 12:00) (18 - 45)  SpO2: 93% (14 Jul 2020 12:00) (93% - 100%)  I&O's Summary    13 Jul 2020 07:01  -  14 Jul 2020 07:00  --------------------------------------------------------  IN: 8.2 mL / OUT: 200 mL / NET: -191.8 mL    14 Jul 2020 07:01  -  14 Jul 2020 15:00  --------------------------------------------------------  IN: 250 mL / OUT: 750 mL / NET: -500 mL          MEDICATIONS  (STANDING):  chlorhexidine 4% Liquid 1 Application(s) Topical <User Schedule>  ciprofloxacin     Tablet 500 milliGRAM(s) Oral every 12 hours  hydrocortisone sodium succinate Injectable 75 milliGRAM(s) IV Push every 8 hours  levothyroxine 175 MICROGram(s) Oral <User Schedule>  levothyroxine 88 MICROGram(s) Oral <User Schedule>  metroNIDAZOLE  IVPB 500 milliGRAM(s) IV Intermittent every 8 hours  midodrine 20 milliGRAM(s) Oral every 8 hours    MEDICATIONS  (PRN):        LABS                                            12.7                  Neurophils% (auto):   81.8   (07-14 @ 09:33):    7.58 )-----------(148          Lymphocytes% (auto):  8.8                                           39.5                   Eosinphils% (auto):   1.7      Manual%: Neutrophils x    ; Lymphocytes x    ; Eosinophils x    ; Bands%: x    ; Blasts x                                    140    |  107    |  14                  Calcium: 8.3   / iCa: x      (07-14 @ 09:33)    ----------------------------<  96        Magnesium: 1.8                              3.5     |  23     |  0.80             Phosphorous: 2.6      TPro  5.8    /  Alb  2.9    /  TBili  0.4    /  DBili  x      /  AST  20     /  ALT  <5     /  AlkPhos  50     14 Jul 2020 09:33    ( 07-13 @ 22:52 )   PT: 13.2 sec;   INR: 1.12 ratio  aPTT: 28.2 sec        ASSESSMENT & PLAN:     60 y M PMHx HTN, HLD, hypothyroidism s/p thyroidectomy  2/2 thyroid cancer, diverticulitis (2010), abdominal TB ('93) s/p INH p/w fevers and progressively worsening LLQ pain likely 2/2 septic shock. Found to have sudden LLQ abdominal pain and back into atrial flutter. BP responsive to LR bolus and HR responsive to cardizem, will keep monitor BP and feeding, otherwise could transfer to floors.     Plan:     #Neuro:   - Stable. A&O x3    #CV:  Atrial flutter- new diagnosis, likely 2/2 septic shock. Rate 70s  - back into a. flutter and tachycardic in 140s  - Given metoprol 0.5 mg x2 and cardizem 20 mg IV push.  - On cardizem 30 q6  - On heparin  - On Eliquis 5 mg BID   - EP recs pending  < from: Transthoracic Echocardiogram (07.15.20 @ 10:15) >  EF (Visual Estimate): 65-70 %    Conclusions:  1. Mitral annular calcification, otherwise normal mitral  valve. Mild-moderate mitral regurgitation.  2. Calcified trileaflet aortic valve with normal opening.  No aortic valve regurgitation seen.  3. Mildly dilated left atrium.  LA volume index = 38 cc/m2.  4. Normal left ventricular systolic function. No segmental  wall motion abnormalities.  5. Severe reversible diastolic dysfunction (Stage III).  6. Right ventricular enlargement with normal right  ventricular systolic function.  7. Normal pericardium with no pericardial effusion.    HOTN (70/60 in ED)  - Hypotensive resolving SBPs in 100s  - Received 2 L of LR bolus in morning  - Off midodrine  - Home med losartan held    Troponinemia- likely 2/2 demand ischemia in setting of rapid aflutter, sepsis, dehydration.   - Troponin 159 (down from 186)    #Respiratory:  - Stable- 96% RA    #GI:   Abdominal pain  - Xray Abdomen results pending  - Given Maalox, protonix, and simethacone    GI bleed    - CTAP showed colon diverticulosis without diverticulitis, SCAD can resemble IBD  - Start home med mesalamine 375 mg QID   - Hold home omeprazole    #Renal:  - no active issues    #ID:   Septic shock 2/2 likely SCAD vs 2/2 UTI/pyelo (dysuria, CVA tenderness, cloudy urine). s/p vanc, zosyn, ceftriaxone  - blood cx: no growth, urine cx: no growth  - On Cefuroxime 250 mg BID x 2 days    #Endo:   Possible adrenal insufficiency 2/2 steroid use  - AM cortisol: 61  - Not on hydrocortisone    Hypothyroidism s/p thyroidectomy 2/2 thyroid cancer   - On home dose levothyroxine to 175 mcg M-Sat, 87.5 mcg Sunday    Diet: Advanced Diet (DASH)- will try to eat today, if not may need to start on maintenance fluids  Code: FULL  VTE prophylaxis: Heparin sq        Follow up:  -Monitor CBCs  -Obtain records from [facility] per GI re: GI workup  -F/u EP recs MICU Transfer Note    Transfer from: MICU  Transfer to:  (  ) Medicine    ( * ) Telemetry    (  ) RCU    (  ) Palliative    (  ) Stroke Unit    (  ) _______________  Accepting physician: Dr. Richard SHEEHAN COURSE:    59 y/o male with pmhx of HTN, HLD, sleep apnea, hypothyroidism s/p total thyroidectomy (6/4/13) 2/2 thyroid cancer, diverticulitis (2010), abdominal TB ('93) s/p INH who presented with fevers and progressively worsening LLQ pain with hematochezia. Last colonoscopy was 6/17/20 at [facility] and was diagnosed with segmental colitis associated with diverticulitis (SCAD) and started on mesalamine and budesonide. Was in new atrial flutter with rate 100-150 and hypotensive, found to be in septic shock. Was given fluids and levophed, now off pressors. GI recommended no further workup and to continue his home medication of mesalamine. EP was consulted for a. flutter, recommended cardiac ablation vs ANA/DCCV once on a/c. On 7/14 EKG was in NSR and went back into atrial flutter w/ RVR on 7/17. He was tachycardic in 140s-150s. Given cardizem 20 mg IV push and currently started on cardizem 30mg q6 then recommended by EP to change to 60 mgq6. However EP recommends switching from cardizem to lopressor 25 mg q8hr to protect his BP. He also reported sharp abdominal pain 2 days ago in LUQ and LLQ with loose stool, no blood. Xray didn't demonstrate an acute abdomen. Was given Maalox, protonix, and simethacone. Received Tylenol, morphine, tramadol, and dilaudid. MAP was <65, however has been doing well after receiving 2 L of LR bolus.     Vital Signs Last 24 Hrs  T(C): 36.9 (14 Jul 2020 12:00), Max: 37.5 (13 Jul 2020 23:07)  T(F): 98.5 (14 Jul 2020 12:00), Max: 99.5 (13 Jul 2020 23:07)  HR: 73 (14 Jul 2020 12:00) (73 - 127)  BP: 116/80 (14 Jul 2020 12:00) (76/56 - 116/80)  BP(mean): 95 (14 Jul 2020 12:00) (67 - 95)  RR: 21 (14 Jul 2020 12:00) (18 - 45)  SpO2: 93% (14 Jul 2020 12:00) (93% - 100%)  I&O's Summary    13 Jul 2020 07:01  -  14 Jul 2020 07:00  --------------------------------------------------------  IN: 8.2 mL / OUT: 200 mL / NET: -191.8 mL    14 Jul 2020 07:01  -  14 Jul 2020 15:00  --------------------------------------------------------  IN: 250 mL / OUT: 750 mL / NET: -500 mL          MEDICATIONS  (STANDING):  chlorhexidine 4% Liquid 1 Application(s) Topical <User Schedule>  ciprofloxacin     Tablet 500 milliGRAM(s) Oral every 12 hours  hydrocortisone sodium succinate Injectable 75 milliGRAM(s) IV Push every 8 hours  levothyroxine 175 MICROGram(s) Oral <User Schedule>  levothyroxine 88 MICROGram(s) Oral <User Schedule>  metroNIDAZOLE  IVPB 500 milliGRAM(s) IV Intermittent every 8 hours  midodrine 20 milliGRAM(s) Oral every 8 hours    MEDICATIONS  (PRN):        LABS                                            12.7                  Neurophils% (auto):   81.8   (07-14 @ 09:33):    7.58 )-----------(148          Lymphocytes% (auto):  8.8                                           39.5                   Eosinphils% (auto):   1.7      Manual%: Neutrophils x    ; Lymphocytes x    ; Eosinophils x    ; Bands%: x    ; Blasts x                                    140    |  107    |  14                  Calcium: 8.3   / iCa: x      (07-14 @ 09:33)    ----------------------------<  96        Magnesium: 1.8                              3.5     |  23     |  0.80             Phosphorous: 2.6      TPro  5.8    /  Alb  2.9    /  TBili  0.4    /  DBili  x      /  AST  20     /  ALT  <5     /  AlkPhos  50     14 Jul 2020 09:33    ( 07-13 @ 22:52 )   PT: 13.2 sec;   INR: 1.12 ratio  aPTT: 28.2 sec        ASSESSMENT & PLAN:     60 y M PMHx HTN, HLD, hypothyroidism s/p thyroidectomy  2/2 thyroid cancer, diverticulitis (2010), abdominal TB ('93) s/p INH p/w fevers and progressively worsening LLQ pain likely 2/2 septic shock. Found to have sudden LLQ abdominal pain and back into atrial flutter. BP responsive to LR bolus and HR responsive to cardizem, will keep monitor BP and feeding, otherwise could transfer to floors.     Plan:     #Neuro:   - Stable. A&O x3    #CV:  Atrial flutter- new diagnosis, likely 2/2 septic shock. Rate 70s  - back into a. flutter and tachycardic in 140s  - ANA: EF: 65-70%, normal LV systolic function, however severe reversible diastolic dysfunction (Stage III).  - Switch cardizem 60q6 to lopressor 25q8  - On Eliquis 5 mg BID     HOTN (70/60 in ED)  - Hypotensive resolving SBPs in 100s  - Received 2 L of LR bolus in morning    Troponinemia- likely 2/2 demand ischemia in setting of rapid aflutter, sepsis, dehydration.   - Troponin 159 (down from 186)    #Respiratory:  - hx of sleep apnea- needed 2L of NC b/c O2 sat: 91-92%  - Normally stable- 96% RA    #GI:   Abdominal pain  - Xray no significant findings (perforation, obstruction)  - Given Maalox, protonix, simethacone, tylenol, tramadol, and dilaudid    GI bleed    - CTAP showed colon diverticulosis without diverticulitis, SCAD can resemble IBD  - On home med mesalamine 375 mg QID     #Renal:  - no active issues    #ID:   Septic shock 2/2 likely SCAD vs 2/2 UTI/pyelo (dysuria, CVA tenderness, cloudy urine). s/p vanc, zosyn, ceftriaxone  - blood cx: no growth, urine cx: no growth  - On Cefuroxime 250 mg BID x 2 days    #Endo:   Possible adrenal insufficiency 2/2 steroid use  - AM cortisol: 61  - Not on hydrocortisone    Hypothyroidism s/p thyroidectomy 2/2 thyroid cancer   - On home dose levothyroxine to 175 mcg M-Sat, 87.5 mcg Sunday    Diet: Advanced Diet (DASH)- will try to eat today, if not may need to start on maintenance fluids  Code: FULL  VTE prophylaxis: Heparin sq      Follow up:  - Monitor HR and BP

## 2020-07-18 NOTE — CHART NOTE - NSCHARTNOTEFT_GEN_A_CORE
CC: temperature 101.1F    HPI:  Notified by RN patient with temperature of F. Patient seen and examined  by me at bedside.   Patient is alert, NAD.  Patient denied headache, dizziness, chest pain, shortness of breath, cough, rhinorrhea, N/V/D, urinary symptoms, or abdominal pain.      ROS:  CONSTITUTIONAL:  No fever, chills, rigors  CARDIOVASCULAR:  No chest pain or palpitations  RESPIRATORY:   No SOB, cough, wheezing  GASTROINTESTINAL:  No abdominal pain, N/V/D  EXTREMITIES:  No swelling or joint pain  GENITOURINARY:  No burning on urination, increased frequency or urgency.  No flank pain.  NEUROLOGIC:  No HA, visual disturbances  SKIN: No rashes        PAST MEDICAL & SURGICAL HISTORY:  HTN (hypertension): on losartan          VITAL SIGNS:  T(C): 38.7 (20 @ 20:00), Max: 38.7 (20 @ 20:00)  HR: 106 (20 @ 20:00) (75 - 129)  BP: 89/56 (20 @ 20:00) (74/50 - 107/69)  RR: 18 (20 @ 20:00) (12 - 34)  SpO2: 97% (20 @ 20:00) (84% - 97%)      Physical Exam:  General: WN/WD NAD, AOx3, nontoxic appearing  Head:  NC/AT  CV: RRR, S1S2   Respiratory: CTA B/L, nonlabored. No rales/rhonchi/wheezes.  Abdominal: (+) bowel sounds x4. Soft, NT, ND, no palpable mass, no guarding, or rebound tenderness  MSK: No BLLE edema, + peripheral pulses, FROM all 4 extremity  Skin: (+) warm, dry   Psych: Appropriate affect       LABORATORY:                        11.6   11.82 )-----------( 235      ( 2020 04:37 )             36.2       07-18    136  |  101  |  8   ----------------------------<  99  3.9   |  27  |  1.00    Ca    8.4      2020 04:38  Phos  3.8     07-18  Mg     1.6     07-18    TPro  5.5<L>  /  Alb  3.0<L>  /  TBili  0.4  /  DBili  x   /  AST  21  /  ALT  <5<L>  /  AlkPhos  56        Urinalysis Basic - ( 2020 00:05 )  Color: Light Yellow / Appearance: Clear / S.012 / pH: x  Gluc: x / Ketone: Negative  / Bili: Negative / Urobili: Negative   Blood: x / Protein: Negative / Nitrite: Negative   Leuk Esterase: Negative / RBC: 2 /hpf / WBC 1 /HPF   Sq Epi: x / Non Sq Epi: 0 /hpf / Bacteria: 0.0                RADIOLOGY:  < from: Xray Chest 1 View- PORTABLE-Urgent (20 @ 07:12) >    IMPRESSION:   Neurostimulator device.  Bibasilar atelectasis.   No pneumothorax.   Heart size cannot be accurately assessed in this projection.   No acute osseous abnormalities. Status post right shoulder surgery.    < end of copied text >              ASSESSMENT/PLAN:   HPI:  60 y M PMHx HTN, HLD, hypothyroidism s/p total thyroidectomy (13) 2/2 thyroid cancer, diverticulitis (), abdominal TB () s/p INH, p/w fevers and progressively worsening LLQ pain. Pt notes intermittent BRBPR associated with LLQ pain that started 30 days ago. He got a colonoscopy from his GI at [facility] 20 and was diagnosed with segmental colitis associated with diverticulitis (SCAD) and started on mesalamine and budesonide, no Abx. 5 days ago notes dysuria that was followed by fevers to 102.5 and worsening LLQ pain 3 days ago. This was c/b b/l LBP and GONZALEZ. Last BM - noted to be very dark.     ED course:  In the ED, he was noted to be in new atrial flutter with rate 100-150, HOTN (70/60). Given 3L NS, 2L LR and started on levo .05.   pertinent labs: WBC 13.33, Trop 202, BNP 3299, lactate 2.5, COVID negative (2020 05:57)      Patient now presenting acutely with temperature of    1) Fever  -Tylenol and cooling measures PRN pyrexia  -BCx x2  -UA/UCx  -CXR  -c/w   -ID  -Will continue to closely monitor patient/vitals   -Will endorse to primary team in AM      Itzel Meyer PA-C  Dept of Medicine CC: temperature 101.7F    HPI:  Notified by RN patient with temperature of 101.7F. Patient seen and examined  by me at bedside. Patient is alert, NAD.  He denied headache, chest pain, shortness of breath, rhinorrhea, N/V, or rashes. Patient complained of dizziness and shortness of breath when walking, a dry cough, diarrhea (pudding-like stools), and RLQ pain.       ROS:  CONSTITUTIONAL:  (+) fever. NO chills, rigors  CARDIOVASCULAR:  No chest pain or palpitations  RESPIRATORY:   (+) SOB of while walking, (+) dry cough. NO wheezing  GASTROINTESTINAL: (+) abdominal pain, (+) diarrhea. NO nausea/vomiting  GENITOURINARY:  (+) burning on urination. NO increased frequency or urgency  NEUROLOGIC:  No HA, visual disturbances  SKIN: No rashes        PAST MEDICAL & SURGICAL HISTORY:  HTN (hypertension): on losartan          VITAL SIGNS:  T(C): 38.7 (20 @ 20:00), Max: 38.7 (20 @ 20:00)  HR: 106 (20 @ 20:00) (75 - 129)  BP: 89/56 (20 @ 20:00) (74/50 - 107/69)  RR: 18 (20 @ 20:00) (12 - 34)  SpO2: 97% (20 @ 20:00) (84% - 97%)      Physical Exam:  General: WN/WD, NAD, AOx3, nontoxic appearing  Head:  NC/AT  CV: (+) irregular, (+) tachycardic.  S1S2   Respiratory: CTA B/L, nonlabored. No rales/rhonchi/wheezes.  Abdominal: (+) mild tenderness to palpation of RUQ/RLQ. (+) bowel sounds x4. Soft, no guarding or rebound tenderness  MSK: No BLLE edema, + peripheral pulses, FROM all 4 extremity  Skin: (+) warm, dry   Psych: Appropriate affect       LABORATORY:                        11.6   11.82 )-----------( 235      ( 2020 04:37 )             36.2       07-    136  |  101  |  8   ----------------------------<  99  3.9   |  27  |  1.00    Ca    8.4      2020 04:38  Phos  3.8     07-18  Mg     1.6     07-18    TPro  5.5<L>  /  Alb  3.0<L>  /  TBili  0.4  /  DBili  x   /  AST  21  /  ALT  <5<L>  /  AlkPhos  56  07-17      Urinalysis Basic - ( 2020 00:05 )  Color: Light Yellow / Appearance: Clear / S.012 / pH: x  Gluc: x / Ketone: Negative  / Bili: Negative / Urobili: Negative   Blood: x / Protein: Negative / Nitrite: Negative   Leuk Esterase: Negative / RBC: 2 /hpf / WBC 1 /HPF   Sq Epi: x / Non Sq Epi: 0 /hpf / Bacteria: 0.0    Culture - Blood (20 @ 01:57)    Specimen Source: .Blood Blood-Peripheral    Culture Results:   No growth to date.    Culture - Blood (20 @ 01:57)    Specimen Source: .Blood Blood-Peripheral    Culture Results:   No growth to date.          RADIOLOGY:  < from: Xray Chest 1 View- PORTABLE-Urgent (20 @ 07:12) >  IMPRESSION:   Neurostimulator device.  Bibasilar atelectasis.   No pneumothorax.   Heart size cannot be accurately assessed in this projection.   No acute osseous abnormalities. Status post right shoulder surgery.  < end of copied text >      < from: CT Angio Chest w/ IV Cont (20 @ 00:29) >  IMPRESSION:   No pulmonary embolus.  Colon diverticulosis without diverticulitis. No bowel obstruction, drainable fluid collection or intraperitoneal free air.  Indeterminate bilateral renal lesions, which can be further characterize on a nonemergent ultrasound.  Additional findings as described.  < end of copied text >              ASSESSMENT/PLAN:   HPI:  60 y M PMHx HTN, HLD, hypothyroidism s/p total thyroidectomy (13) 2/2 thyroid cancer, diverticulitis (), abdominal TB () s/p INH, p/w fevers and progressively worsening LLQ pain. Pt notes intermittent BRBPR associated with LLQ pain that started 30 days ago. He got a colonoscopy from his GI at [facility] 20 and was diagnosed with segmental colitis associated with diverticulitis (SCAD) and started on mesalamine and budesonide, no Abx. 5 days ago notes dysuria that was followed by fevers to 102.5 and worsening LLQ pain 3 days ago. This was c/b b/l LBP and GONZALEZ. Last BM - noted to be very dark.     ED course:  In the ED, he was noted to be in new atrial flutter with rate 100-150, HOTN (70/60). Given 3L NS, 2L LR and started on levo .05.   pertinent labs: WBC 13.33, Trop 202, BNP 3299, lactate 2.5, COVID negative (2020 05:57)      Patient now presenting acutely with temperature of    1) Fever  -Tylenol and cooling measures PRN pyrexia  -BCx x2  -UA/UCx  -CXR  -c/w   -ID  -Will continue to closely monitor patient/vitals   -Will endorse to primary team in EDIL Meyer PA-C  Dept of Medicine CC: temperature 101.7F    HPI:  Notified by RN patient with temperature of 101.7F, BP 89/56, . Patient seen and examined  by me at bedside. Patient is alert, NAD.  He denied headache, chest pain, shortness of breath, rhinorrhea, N/V, or rashes. Patient complained of dizziness and shortness of breath when walking, a dry cough, diarrhea (pudding-like stools), and RLQ pain. Patient states his SBP normally ranges in 100-120s.       ROS:  CONSTITUTIONAL:  (+) fever. NO chills, rigors  CARDIOVASCULAR:  No chest pain or palpitations  RESPIRATORY:   (+) SOB of while walking, (+) dry cough. NO wheezing  GASTROINTESTINAL: (+) abdominal pain, (+) diarrhea. NO nausea/vomiting  GENITOURINARY:  (+) burning on urination. NO increased frequency or urgency  NEUROLOGIC:  No HA, visual disturbances  SKIN: No rashes        PAST MEDICAL & SURGICAL HISTORY:  HTN (hypertension): on losartan          VITAL SIGNS:  T(C): 38.7 (20 @ 20:00), Max: 38.7 (20 @ 20:00)  HR: 106 (20 @ 20:00) (75 - 129)  BP: 89/56 (20 @ 20:00) (74/50 - 107/69)  RR: 18 (20 @ 20:00) (12 - 34)  SpO2: 97% (20 @ 20:00) (84% - 97%)      Physical Exam:  General: WN/WD, NAD, AOx3, nontoxic appearing  Head:  NC/AT  CV: (+) irregular, (+) tachycardic.  S1S2   Respiratory: (+) intermittent coughing with deep inspiration. CTA B/L, nonlabored. No rales/rhonchi/wheezes.  Abdominal: (+) mild tenderness to palpation of RUQ/RLQ. (+) bowel sounds x4. Soft, no guarding or rebound tenderness  MSK: No BLLE edema, + peripheral pulses, FROM all 4 extremity  Skin: (+) warm, dry   Psych: Appropriate affect       LABORATORY:                        11.6   11.82 )-----------( 235      ( 2020 04:37 )             36.2           136  |  101  |  8   ----------------------------<  99  3.9   |  27  |  1.00    Ca    8.4      2020 04:38  Phos  3.8       Mg     1.6         TPro  5.5<L>  /  Alb  3.0<L>  /  TBili  0.4  /  DBili  x   /  AST  21  /  ALT  <5<L>  /  AlkPhos  56        Urinalysis Basic - ( 2020 00:05 )  Color: Light Yellow / Appearance: Clear / S.012 / pH: x  Gluc: x / Ketone: Negative  / Bili: Negative / Urobili: Negative   Blood: x / Protein: Negative / Nitrite: Negative   Leuk Esterase: Negative / RBC: 2 /hpf / WBC 1 /HPF   Sq Epi: x / Non Sq Epi: 0 /hpf / Bacteria: 0.0    Culture - Blood (20 @ 01:57)    Specimen Source: .Blood Blood-Peripheral    Culture Results:   No growth to date.    Culture - Blood (20 @ 01:57)    Specimen Source: .Blood Blood-Peripheral    Culture Results:   No growth to date.          RADIOLOGY:  < from: Xray Chest 1 View- PORTABLE-Urgent (20 @ 07:12) >  IMPRESSION:   Neurostimulator device.  Bibasilar atelectasis.   No pneumothorax.   Heart size cannot be accurately assessed in this projection.   No acute osseous abnormalities. Status post right shoulder surgery.  < end of copied text >      < from: CT Angio Chest w/ IV Cont (20 @ 00:29) >  IMPRESSION:   No pulmonary embolus.  Colon diverticulosis without diverticulitis. No bowel obstruction, drainable fluid collection or intraperitoneal free air.  Indeterminate bilateral renal lesions, which can be further characterize on a nonemergent ultrasound.  Additional findings as described.  < end of copied text >      < from: CT Abdomen and Pelvis w/ IV Cont (20 @ 00:29) >  IMPRESSION:   No pulmonary embolus.  Colon diverticulosis without diverticulitis. No bowel obstruction, drainable fluid collection or intraperitoneal free air.  Indeterminate bilateral renal lesions, which can be further characterize on a nonemergent ultrasound.  < end of copied text >        < from: Transthoracic Echocardiogram (07.15.20 @ 10:15) >  EF (Visual Estimate): 65-70 %  Doppler Peak Velocity (m/sec): AoV=0.9  Conclusions:  1. Mitral annular calcification, otherwise normal mitral  valve. Mild-moderate mitral regurgitation.  2. Calcified trileaflet aortic valve with normal opening.  No aortic valve regurgitation seen.  3. Mildly dilated left atrium.  LA volume index = 38 cc/m2.  4. Normal left ventricular systolic function. No segmental  wall motion abnormalities.  5. Severe reversible diastolic dysfunction (Stage III).  6. Right ventricular enlargement with normal right  ventricular systolic function.  7. Normal pericardium with no pericardial effusion.  *** No previous Echo exam.  < end of copied text >              ASSESSMENT/PLAN:   60M with h/o HTN, HLD, hypothyroidism s/p total thyroidectomy , diverticulitis , abdominal TB ('93) s/p INH who presented with c/o  fevers and worsening LLQ pain. Pt was noted to be in new atrial flutter and was hypotensive requiring admission to MICU for pressor support and management of septic shock.  Patient now presenting acutely with temperature of 101.7F.       #Recurrent fever- ?secondary to suspected pyelo vs colitis  -Acetaminophen 1000mg IV x1  -BCx x2 collected 20, in lab  -UA (2020) negative  -CXR (2020) without evidence of pneumonia  -CT A/P (2020) with "Colon diverticulosis without diverticulitis"  -c/w Ceftin as ordered  -Consider ID evaluation if indicated  -Will continue to closely monitor patient/vitals   -Will endorse to primary team in AM    #Hypotension- elevated HR (Afib) and fever likely contributing  -Patient is asymptomatic and mentating at baseline  -IVNS bolus 500mL x1  -Close monitoring of respiratory status given underlying dHF (EF 65-70%)  -Will repeat vitals signs post bolus    #Dry cough- ?secondary to GERD (patient reports history of GERD)  -Tessalon perles  -CXR as above, without evidence of pneumonia  -Physical exam unremarkable; no adventitious lung sounds  -Continue with Maalox and IV PPI as ordered      Itzel Meyer PA-C  Dept of Medicine  [id number] CC: temperature 101.7F    HPI:  Notified by RN patient with temperature of 101.7F, BP 89/56, . Patient seen and examined  by me at bedside. Patient is alert, NAD.  He denied headache, chest pain, shortness of breath, rhinorrhea, N/V, or rashes. Patient complained of dizziness and shortness of breath when walking, a dry cough, diarrhea (pudding-like stools), and RLQ pain. Patient states his SBP normally ranges in 100-120s.       ROS:  CONSTITUTIONAL:  (+) fever. NO chills, rigors  CARDIOVASCULAR:  No chest pain or palpitations  RESPIRATORY:   (+) SOB of while walking, (+) dry cough. NO wheezing  GASTROINTESTINAL: (+) abdominal pain, (+) diarrhea. NO nausea/vomiting  GENITOURINARY:  (+) burning on urination. NO increased frequency or urgency  NEUROLOGIC:  No HA, visual disturbances  SKIN: No rashes        PAST MEDICAL & SURGICAL HISTORY:  HTN (hypertension): on losartan          VITAL SIGNS:  T(C): 38.7 (20 @ 20:00), Max: 38.7 (20 @ 20:00)  HR: 106 (20 @ 20:00) (75 - 129)  BP: 89/56 (20 @ 20:00) (74/50 - 107/69)  RR: 18 (20 @ 20:00) (12 - 34)  SpO2: 97% (20 @ 20:00) (84% - 97%)      Physical Exam:  General: WN/WD, NAD, AOx3, nontoxic appearing  Head:  NC/AT  CV: (+) irregular, (+) tachycardic.  S1S2   Respiratory: (+) intermittent coughing with deep inspiration. CTA B/L, nonlabored. No rales/rhonchi/wheezes.  Abdominal: (+) mild tenderness to palpation of RUQ/RLQ. (+) bowel sounds x4. Soft, no guarding or rebound tenderness  MSK: No BLLE edema, + peripheral pulses, FROM all 4 extremity  Skin: (+) warm, dry   Psych: Appropriate affect       LABORATORY:                        11.6   11.82 )-----------( 235      ( 2020 04:37 )             36.2           136  |  101  |  8   ----------------------------<  99  3.9   |  27  |  1.00    Ca    8.4      2020 04:38  Phos  3.8       Mg     1.6         TPro  5.5<L>  /  Alb  3.0<L>  /  TBili  0.4  /  DBili  x   /  AST  21  /  ALT  <5<L>  /  AlkPhos  56        Urinalysis Basic - ( 2020 00:05 )  Color: Light Yellow / Appearance: Clear / S.012 / pH: x  Gluc: x / Ketone: Negative  / Bili: Negative / Urobili: Negative   Blood: x / Protein: Negative / Nitrite: Negative   Leuk Esterase: Negative / RBC: 2 /hpf / WBC 1 /HPF   Sq Epi: x / Non Sq Epi: 0 /hpf / Bacteria: 0.0    Culture - Blood (20 @ 01:57)    Specimen Source: .Blood Blood-Peripheral    Culture Results:   No growth to date.    Culture - Blood (20 @ 01:57)    Specimen Source: .Blood Blood-Peripheral    Culture Results:   No growth to date.          RADIOLOGY:  < from: Xray Chest 1 View- PORTABLE-Urgent (20 @ 07:12) >  IMPRESSION:   Neurostimulator device.  Bibasilar atelectasis.   No pneumothorax.   Heart size cannot be accurately assessed in this projection.   No acute osseous abnormalities. Status post right shoulder surgery.  < end of copied text >      < from: CT Angio Chest w/ IV Cont (20 @ 00:29) >  IMPRESSION:   No pulmonary embolus.  Colon diverticulosis without diverticulitis. No bowel obstruction, drainable fluid collection or intraperitoneal free air.  Indeterminate bilateral renal lesions, which can be further characterize on a nonemergent ultrasound.  Additional findings as described.  < end of copied text >      < from: CT Abdomen and Pelvis w/ IV Cont (20 @ 00:29) >  IMPRESSION:   No pulmonary embolus.  Colon diverticulosis without diverticulitis. No bowel obstruction, drainable fluid collection or intraperitoneal free air.  Indeterminate bilateral renal lesions, which can be further characterize on a nonemergent ultrasound.  < end of copied text >        < from: Transthoracic Echocardiogram (07.15.20 @ 10:15) >  EF (Visual Estimate): 65-70 %  Doppler Peak Velocity (m/sec): AoV=0.9  Conclusions:  1. Mitral annular calcification, otherwise normal mitral  valve. Mild-moderate mitral regurgitation.  2. Calcified trileaflet aortic valve with normal opening.  No aortic valve regurgitation seen.  3. Mildly dilated left atrium.  LA volume index = 38 cc/m2.  4. Normal left ventricular systolic function. No segmental  wall motion abnormalities.  5. Severe reversible diastolic dysfunction (Stage III).  6. Right ventricular enlargement with normal right  ventricular systolic function.  7. Normal pericardium with no pericardial effusion.  *** No previous Echo exam.  < end of copied text >              ASSESSMENT/PLAN:   60M with h/o HTN, HLD, hypothyroidism s/p total thyroidectomy , diverticulitis , abdominal TB ('93) s/p INH who presented with c/o  fevers and worsening LLQ pain. Pt was noted to be in new atrial flutter and was hypotensive requiring admission to MICU for pressor support and management of septic shock.  Patient now presenting acutely with temperature of 101.7F; noted with recurrent fevers this admission.       #Recurrent fever- ?secondary to pyelo vs colitis  -Labs with leukocytosis  -Acetaminophen 1000mg IV x1  -BCx x2 collected 20, in lab  -BCx/UCx () without growth   -UA (2020) negative  -CXR (2020) without evidence of pneumonia  -CT A/P (2020) with "Colon diverticulosis without diverticulitis"  -c/w Ceftin as ordered  -Consider ID evaluation if indicated  -Will continue to closely monitor patient/vitals   -Will endorse to primary team in AM    #Hypotension- in the setting of elevated HR (Afib) and fever   -Patient is asymptomatic and mentating at baseline  -IVNS bolus 500mL x1  -Close monitoring of respiratory status given underlying dHF (EF 65-70%)  -Will repeat vitals signs post bolus    #Dry cough- ?secondary to GERD (patient reports history of GERD)  -Tessalon perles x1  -CXR as above, without evidence of pneumonia  -Physical exam unremarkable; no adventitious lung sounds  -Continue with Maalox and IV PPI as ordered      Itzel Meyer PA-C  Dept of Medicine  [id number] CC: temperature 101.7F    HPI:  Notified by RN patient with temperature of 101.7F, BP 89/56, . Patient seen and examined  by me at bedside. Patient is alert, NAD.  He denied headache, chest pain, shortness of breath, rhinorrhea, N/V, or rashes. Patient complained of dizziness and shortness of breath when walking, a dry cough, diarrhea (pudding-like stools), and RLQ pain. Patient states his SBP normally ranges in 100-120s.       ROS:  CONSTITUTIONAL:  (+) fever. NO chills, rigors  CARDIOVASCULAR:  No chest pain or palpitations  RESPIRATORY:   (+) SOB of while walking, (+) dry cough. NO wheezing  GASTROINTESTINAL: (+) abdominal pain, (+) diarrhea. NO nausea/vomiting  GENITOURINARY:  (+) burning on urination. NO increased frequency or urgency  NEUROLOGIC:  No HA, visual disturbances  SKIN: No rashes        PAST MEDICAL & SURGICAL HISTORY:  HTN (hypertension): on losartan          VITAL SIGNS:  T(C): 38.7 (20 @ 20:00), Max: 38.7 (20 @ 20:00)  HR: 106 (20 @ 20:00) (75 - 129)  BP: 89/56 (20 @ 20:00) (74/50 - 107/69)  RR: 18 (20 @ 20:00) (12 - 34)  SpO2: 97% (20 @ 20:00) (84% - 97%)      Physical Exam:  General: WN/WD, NAD, AOx3, nontoxic appearing  Head:  NC/AT  CV: (+) irregular, (+) tachycardic.  S1S2   Respiratory: (+) intermittent coughing with deep inspiration. CTA B/L, nonlabored. No rales/rhonchi/wheezes.  Abdominal: (+) mild tenderness to palpation of LUQ/LLQ. (+) bowel sounds x4. Soft, no guarding or rebound tenderness  MSK: No BLLE edema, + peripheral pulses, FROM all 4 extremity  Skin: (+) warm, dry   Psych: Appropriate affect       LABORATORY:                        11.6   11.82 )-----------( 235      ( 2020 04:37 )             36.2           136  |  101  |  8   ----------------------------<  99  3.9   |  27  |  1.00    Ca    8.4      2020 04:38  Phos  3.8       Mg     1.6         TPro  5.5<L>  /  Alb  3.0<L>  /  TBili  0.4  /  DBili  x   /  AST  21  /  ALT  <5<L>  /  AlkPhos  56        Urinalysis Basic - ( 2020 00:05 )  Color: Light Yellow / Appearance: Clear / S.012 / pH: x  Gluc: x / Ketone: Negative  / Bili: Negative / Urobili: Negative   Blood: x / Protein: Negative / Nitrite: Negative   Leuk Esterase: Negative / RBC: 2 /hpf / WBC 1 /HPF   Sq Epi: x / Non Sq Epi: 0 /hpf / Bacteria: 0.0    Culture - Blood (20 @ 01:57)    Specimen Source: .Blood Blood-Peripheral    Culture Results:   No growth to date.    Culture - Blood (20 @ 01:57)    Specimen Source: .Blood Blood-Peripheral    Culture Results:   No growth to date.          RADIOLOGY:  < from: Xray Chest 1 View- PORTABLE-Urgent (20 @ 07:12) >  IMPRESSION:   Neurostimulator device.  Bibasilar atelectasis.   No pneumothorax.   Heart size cannot be accurately assessed in this projection.   No acute osseous abnormalities. Status post right shoulder surgery.  < end of copied text >      < from: CT Angio Chest w/ IV Cont (20 @ 00:29) >  IMPRESSION:   No pulmonary embolus.  Colon diverticulosis without diverticulitis. No bowel obstruction, drainable fluid collection or intraperitoneal free air.  Indeterminate bilateral renal lesions, which can be further characterize on a nonemergent ultrasound.  Additional findings as described.  < end of copied text >      < from: CT Abdomen and Pelvis w/ IV Cont (20 @ 00:29) >  IMPRESSION:   No pulmonary embolus.  Colon diverticulosis without diverticulitis. No bowel obstruction, drainable fluid collection or intraperitoneal free air.  Indeterminate bilateral renal lesions, which can be further characterize on a nonemergent ultrasound.  < end of copied text >        < from: Transthoracic Echocardiogram (07.15.20 @ 10:15) >  EF (Visual Estimate): 65-70 %  Doppler Peak Velocity (m/sec): AoV=0.9  Conclusions:  1. Mitral annular calcification, otherwise normal mitral  valve. Mild-moderate mitral regurgitation.  2. Calcified trileaflet aortic valve with normal opening.  No aortic valve regurgitation seen.  3. Mildly dilated left atrium.  LA volume index = 38 cc/m2.  4. Normal left ventricular systolic function. No segmental  wall motion abnormalities.  5. Severe reversible diastolic dysfunction (Stage III).  6. Right ventricular enlargement with normal right  ventricular systolic function.  7. Normal pericardium with no pericardial effusion.  *** No previous Echo exam.  < end of copied text >              ASSESSMENT/PLAN:   60M with h/o HTN, HLD, hypothyroidism s/p total thyroidectomy , diverticulitis , abdominal TB ('93) s/p INH who presented with c/o  fevers and worsening LLQ pain. Pt was noted to be in new atrial flutter and was hypotensive requiring admission to MICU for pressor support and management of septic shock.  Patient now presenting acutely with temperature of 101.7F; noted with recurrent fevers this admission.       #Recurrent fever- ?secondary to pyelo vs colitis  -Labs with leukocytosis  -Acetaminophen 1000mg IV x1  -BCx x2 collected 20, in lab  -BCx/UCx () without growth   -UA (2020) negative  -CXR (2020) without evidence of pneumonia  -CT A/P (2020) with "Colon diverticulosis without diverticulitis"  -c/w Ceftin as ordered  -Consider ID evaluation if indicated  -Will continue to closely monitor patient/vitals   -Will endorse to primary team in AM    #Hypotension- in the setting of elevated HR (Afib) and fever   -Patient is asymptomatic and mentating at baseline  -IVNS bolus 500mL x1  -Close monitoring of respiratory status given underlying dHF (EF 65-70%)  -Will repeat vitals signs post bolus    #Dry cough- ?secondary to GERD (patient reports history of GERD)  -Tessalon perles x1  -CXR as above, without evidence of pneumonia  -Physical exam unremarkable; no adventitious lung sounds  -Continue with Maalox and IV PPI as ordered      Itzel Meyer PA-C  Dept of Medicine  [id number]

## 2020-07-18 NOTE — PROGRESS NOTE ADULT - PROBLEM SELECTOR PLAN 4
- on Losartan 100mg po qd at home ; on hold for now given soft BPs  - c/w Lopressor as above with hold parameters - TTE done 7/15 shows  Severe reversible diastolic dysfunction Stage III   - c/w Lopressor  - Losartan on hold for low Bps  - c/w Lipitor   - monitor for volume overload given recent IVF boluses  - Cardiology follow up

## 2020-07-18 NOTE — PROGRESS NOTE ADULT - PROBLEM SELECTOR PLAN 2
- new onset in the setting of septic shock  - EP following  - c/w Eliquis 5mg po q12  - c/w Lopressor 25mg po q8  - c/w Tele monitioring - new onset aflutter in the setting of septic shock  - EP following  - c/w Eliquis 5mg po q12  - c/w Lopressor 25mg po q8  - c/w Tele monitioring

## 2020-07-18 NOTE — CHART NOTE - NSCHARTNOTEFT_GEN_A_CORE
MAR Accept Note  Transfer to:  tele  Accepting Attending Physician:  Dr. Ramos  Assigned Room:  U 352W    Patient seen and examined.   Labs and data reviewed.   No findings precluding transfer of service.       HPI/MICU COURSE:   Please refer to MICU transfer note for full details. Briefly, this is a 59 y/o male with PMHx of HTN, HLD, sleep apnea, hypothyroidism s/p thyroidectomy 2/2 thyroid cancer, Segmental colitis associated with diverticulosis (on mesalamine and budesonide), and abdominal TB ('93) s/p INH who presented with fevers and progressively worsening LLQ pain with hematochezia.  Pt was found to be in aflutter and septic shock. Admitted to MICU 7/14 and briefly required levophed.  EP recommended cardiac ablation vs ANA/DCCV once on a/c, however pt spontaneously converted to sinus, then back to AF requiring Cardizem 20 mg IV and Cardizem 60mg q6, now on lopressor 25 mg q8hr. Source of sepsis suspected to be pyelo based on exam/perinephritic stranding on CT, however cultures negative.         FOR FOLLOW-UP:      Syd Collins  Internal Medicine, PGY3  [facility] [phone]  [facility] [address]    MAR [id number] MAR Accept Note  Transfer to:  tele  Accepting Attending Physician:  Dr. Ramos  Assigned Room:  U 352W    Patient seen and examined.   Labs and data reviewed.   No findings precluding transfer of service.       HPI/MICU COURSE:   Please refer to MICU transfer note for full details. Briefly, this is a 59 y/o male with PMHx of HTN, HLD, sleep apnea, hypothyroidism s/p thyroidectomy 2/2 thyroid cancer, Segmental colitis associated with diverticulosis (on mesalamine and budesonide), and abdominal TB ('93) s/p INH who presented with fevers and progressively worsening LLQ pain with hematochezia.  Pt was found to be in aflutter and septic shock. Admitted to MICU 7/14 and briefly required levophed.  EP recommended cardiac ablation vs ANA/DCCV once on a/c, however pt spontaneously converted to sinus, then back to AF requiring Cardizem 20 mg IV and Cardizem 60mg q6, now on lopressor 25 mg q8hr. Source of sepsis suspected to be pyelo based on exam/perinephritic stranding on CT, however cultures negative.         FOR FOLLOW-UP:  - c/w cefuroxime for suspected pyelo  - c/w lopressor 25 PO Q8H and apixaban 5mg BID for AF (EP following)  - c/w mesalamine as per YOSHI Collins  Internal Medicine, PGY3  [facility] [phone]  [facility] [address]    MAR [id number] MAR Accept Note  Transfer to:  tele  Accepting Attending Physician:  Dr. Ramos  Assigned Room:  U 352W    Patient seen and examined.   Labs and data reviewed.   No findings precluding transfer of service.       HPI/MICU COURSE:   Please refer to MICU transfer note for full details. Briefly, this is a 61 y/o male with PMHx of CHF (stage III diastolic dysfunction), HTN, HLD, sleep apnea, hypothyroidism s/p thyroidectomy 2/2 thyroid cancer, Segmental colitis associated with diverticulosis (on mesalamine and budesonide), and abdominal TB ('93) s/p INH who presented with fevers and progressively worsening LLQ pain with hematochezia.  Pt was found to be in aflutter and septic shock. Admitted to MICU 7/14 and briefly required levophed.  EP recommended cardiac ablation vs ANA/DCCV once on a/c, however pt spontaneously converted to sinus, then back to AF requiring Cardizem 20 mg IV and Cardizem 60mg q6, now on lopressor 25 mg q8hr. Source of sepsis suspected to be pyelo based on exam/perinephritic stranding on CT, however cultures negative.         FOR FOLLOW-UP:  - c/w cefuroxime for suspected pyelo  - c/w lopressor 25 PO Q8H and apixaban 5mg BID for AF (EP following)  - c/w mesalamine as per YOSHI Collins  Internal Medicine, PGY3  [facility] [phone]  [facility] [address]    MAR [id number]

## 2020-07-18 NOTE — PROGRESS NOTE ADULT - ASSESSMENT
60M with h/o HTN, HLD, hypothyroidism s/p total thyroidectomy , diverticulitis , abdominal TB ('93) s/p INH who presented with c/o  fevers and worsening LLQ pain. Pt was noted to be in new atrial flutter and was hypotensive requiring admission to MICU for pressor support and management of septic shock.

## 2020-07-18 NOTE — PROGRESS NOTE ADULT - ASSESSMENT
A/P: 60 year old male patient with hx of hypothyroidism, HTN, HLD, diverticulosis p/w colitis c/b atrial flutter w/ varying conduction, s/p conversion to NSR 7/14 now back in atrial flutter w/ RVR in on 7/17.    - clinically stable from cardiac standpoint with no chest pain and palpitations but still having abd discomfort  - telemetry reviewed, in atrial flutter  - echo reviewed, normal LV function  - given hypotension, would switch to PO lopressor as tolerated  - continue eliquis for a/c  - outpatient follow-up with Dr. Mari for elective a-flutter ablation  - will continue to follow, please call with further questions    Thomas Koch, #[phone]  Cardiology Fellow

## 2020-07-18 NOTE — PROGRESS NOTE ADULT - PROBLEM SELECTOR PLAN 6
- TTE done 7/15 shows  Severe reversible diastolic dysfunction Stage III   - c/w Lopressor  - Losartan on hold for low Bps  - c/w Lipitor   - monitor for volume overload given recent IVF boluses  - Cardiology follow up - c/w Synthroid

## 2020-07-18 NOTE — PROGRESS NOTE ADULT - SUBJECTIVE AND OBJECTIVE BOX
ACCEPTANCE NOTE  60 y M PMHx HTN, HLD, hypothyroidism s/p total thyroidectomy (13) 2/2 thyroid cancer, diverticulitis (), abdominal TB () s/p INH, p/w fevers and progressively worsening LLQ pain. Pt notes intermittent BRBPR associated with LLQ pain that started 30 days ago. He got a colonoscopy from his GI at [facility] 20 and was diagnosed with segmental colitis associated with diverticulitis (SCAD) and started on mesalamine and budesonide, no Abx. 5 days ago notes dysuria that was followed by fevers to 102.5 and worsening LLQ pain 3 days ago. This was c/b b/l LBP and GONZALEZ. Last BM - noted to be very dark.     ED course:  In the ED, he was noted to be in new atrial flutter with rate 100-150, HOTN (70/60). Given 3L NS, 2L LR and started on levo .05.   pertinent labs: WBC 13.33, Trop 202, BNP 3299, lactate 2.5, COVID negative (2020 05:57)      PAST MEDICAL & SURGICAL HISTORY:  HTN (hypertension): on losartan      Review of Systems:   CONSTITUTIONAL: No fever, weight loss, or fatigue  EYES: No eye pain, visual disturbances, or discharge  ENMT:  No difficulty hearing, tinnitus, vertigo; No sinus or throat pain  NECK: No pain or stiffness  BREASTS: No pain, masses, or nipple discharge  RESPIRATORY: No cough, wheezing, chills or hemoptysis; No shortness of breath  CARDIOVASCULAR: No chest pain, palpitations, dizziness, or leg swelling  GASTROINTESTINAL: No abdominal or epigastric pain. No nausea, vomiting, or hematemesis; No diarrhea or constipation. No melena or hematochezia.  GENITOURINARY: No dysuria, frequency, hematuria, or incontinence  NEUROLOGICAL: No headaches, memory loss, loss of strength, numbness, or tremors  SKIN: No itching, burning, rashes, or lesions   LYMPH NODES: No enlarged glands  ENDOCRINE: No heat or cold intolerance; No hair loss  MUSCULOSKELETAL: No joint pain or swelling; No muscle, back, or extremity pain  PSYCHIATRIC: No depression, anxiety, mood swings, or difficulty sleeping  HEME/LYMPH: No easy bruising, or bleeding gums  ALLERY AND IMMUNOLOGIC: No hives or eczema    Allergies    sulfa drugs (Anaphylaxis)  sulfa drugs (Seizure)    Intolerances        Social History:     FAMILY HISTORY:      MEDICATIONS  (STANDING):  apixaban 5 milliGRAM(s) Oral two times a day  atorvastatin 80 milliGRAM(s) Oral at bedtime  cefuroxime   Tablet 250 milliGRAM(s) Oral every 12 hours  levothyroxine 175 MICROGram(s) Oral <User Schedule>  levothyroxine 88 MICROGram(s) Oral <User Schedule>  mesalamine ER (24-Hour) Capsule 1.5 Gram(s) Oral daily  metoprolol tartrate 25 milliGRAM(s) Oral every 8 hours  pantoprazole  Injectable 40 milliGRAM(s) IV Push daily  tamsulosin 0.4 milliGRAM(s) Oral daily    MEDICATIONS  (PRN):  acetaminophen   Tablet .. 650 milliGRAM(s) Oral every 6 hours PRN Mild Pain (1 - 3)  aluminum hydroxide/magnesium hydroxide/simethicone Suspension 30 milliLiter(s) Oral every 4 hours PRN Dyspepsia  HYDROmorphone  Injectable 1 milliGRAM(s) IV Push every 4 hours PRN Severe Pain (7 - 10)  ondansetron Injectable 4 milliGRAM(s) IV Push every 4 hours PRN Nausea and/or Vomiting      Vital Signs Last 24 Hrs  T(C): 37.4 (2020 16:35), Max: 38.3 (2020 20:50)  T(F): 99.4 (2020 16:35), Max: 101 (2020 20:50)  HR: 76 (2020 16:35) (75 - 129)  BP: 106/69 (2020 16:35) (74/50 - 107/69)  BP(mean): 81 (2020 16:00) (56 - 88)  RR: 16 (2020 16:35) (12 - 34)  SpO2: 92% (2020 16:35) (84% - 95%)  CAPILLARY BLOOD GLUCOSE        I&O's Summary    2020 07:01  -  2020 07:00  --------------------------------------------------------  IN: 3140 mL / OUT: 2550 mL / NET: 590 mL    2020 07:01  -  2020 17:25  --------------------------------------------------------  IN: 1470 mL / OUT: 2050 mL / NET: -580 mL        PHYSICAL EXAM:  GENERAL: NAD, well-developed  HEAD:  Atraumatic, Normocephalic  EYES: EOMI, PERRLA, conjunctiva and sclera clear  NECK: Supple, No JVD  CHEST/LUNG: Clear to auscultation bilaterally; No wheeze  HEART: Regular rate and rhythm; No murmurs, rubs, or gallops  ABDOMEN: Soft, Nontender, Nondistended; Bowel sounds present  EXTREMITIES:  2+ Peripheral Pulses, No clubbing, cyanosis, or edema  PSYCH: AAOx3  NEUROLOGY: non-focal  SKIN: No rashes or lesions    LABS:                        11.6   11.82 )-----------( 235      ( 2020 04:37 )             36.2     07-18    136  |  101  |  8   ----------------------------<  99  3.9   |  27  |  1.00    Ca    8.4      2020 04:38  Phos  3.8     07-18  Mg     1.6     07-18    TPro  5.5<L>  /  Alb  3.0<L>  /  TBili  0.4  /  DBili  x   /  AST  21  /  ALT  <5<L>  /  AlkPhos  56  07-17          Urinalysis Basic - ( 2020 00:05 )    Color: Light Yellow / Appearance: Clear / S.012 / pH: x  Gluc: x / Ketone: Negative  / Bili: Negative / Urobili: Negative   Blood: x / Protein: Negative / Nitrite: Negative   Leuk Esterase: Negative / RBC: 2 /hpf / WBC 1 /HPF   Sq Epi: x / Non Sq Epi: 0 /hpf / Bacteria: 0.0        RADIOLOGY & ADDITIONAL TESTS:    Imaging Personally Reviewed:    Consultant(s) Notes Reviewed:      Care Discussed with Consultants/Other Providers: ACCEPTANCE NOTE  60M with h/o HTN, HLD, hypothyroidism s/p total thyroidectomy (13) 2/2 thyroid cancer, diverticulitis (), abdominal TB () s/p INH who presented 20 with c/o  fevers and progressively worsening LLQ pain. He also c/o  associated intermittent BRBPR . He reported dysuria and fevers to 102.5F as well. Of note he was diagnosed with segmental colitis associated with diverticulitis (SCAD) at OSH ( 20 ) and started on Mesalamine and Budesonide.   In the ED, he was noted to be in new atrial flutter with rate 100-150 and hypotensive (70/60). Pt was admitted to MICU for septic shock. CTAP showed nonspecific bilateral perinephric stranding without hydroureteronephrosis. No signs of diverticulitis, colitis, perforation on CT.  In MICU pt received pressor support (Levophed), IV fluids and IV abx. He is now off pressors.  EP was consulted for Aflutter ; he was managed with Cardizem and eventually switched to Lopressor  to protect his BP. Pt also found to have Severe reversible diastolic dysfunction Stage III on TTE.  Pt has remained off pressors and deemed clinically stable for transfer to telemetry floor.  Pt currently reports LLQ pain; denies fever/chills, flank pain, dysuria, n/v, diarrhea, hematochezia, melena. He also denies cp, sob, palpitations.    Telemetry : Aflutter , 70s- 90s    PAST MEDICAL & SURGICAL HISTORY:  HTN (hypertension): on losartan      Review of Systems:   CONSTITUTIONAL: No fever, weight loss, or fatigue  ENMT:  No difficulty hearing, tinnitus, vertigo; No sinus or throat pain  RESPIRATORY: No cough, wheezing, chills or hemoptysis; No shortness of breath  CARDIOVASCULAR: No chest pain, palpitations, dizziness, or leg swelling  GASTROINTESTINAL: +abdominal  pain. No nausea, vomiting, or hematemesis; No diarrhea or constipation. No melena or hematochezia.  GENITOURINARY: No dysuria, frequency, hematuria, or incontinence  NEUROLOGICAL: No headaches, memory loss, loss of strength, numbness, or tremors  SKIN: No itching, burning, rashes, or lesions   ENDOCRINE: No heat or cold intolerance; No hair loss  MUSCULOSKELETAL: No joint pain or swelling; No muscle, back, or extremity pain  PSYCHIATRIC: No depression, anxiety, mood swings, or difficulty sleeping  HEME/LYMPH: No easy bruising, or bleeding gums      Allergies    sulfa drugs (Anaphylaxis)  sulfa drugs (Seizure)    Intolerances        Social History:   Denies tobacco/Etoh/Illicit drug use    FAMILY HISTORY: no family h/o colitis      MEDICATIONS  (STANDING):  apixaban 5 milliGRAM(s) Oral two times a day  atorvastatin 80 milliGRAM(s) Oral at bedtime  cefuroxime   Tablet 250 milliGRAM(s) Oral every 12 hours  levothyroxine 175 MICROGram(s) Oral <User Schedule>  levothyroxine 88 MICROGram(s) Oral <User Schedule>  mesalamine ER (24-Hour) Capsule 1.5 Gram(s) Oral daily  metoprolol tartrate 25 milliGRAM(s) Oral every 8 hours  pantoprazole  Injectable 40 milliGRAM(s) IV Push daily  tamsulosin 0.4 milliGRAM(s) Oral daily    MEDICATIONS  (PRN):  acetaminophen   Tablet .. 650 milliGRAM(s) Oral every 6 hours PRN Mild Pain (1 - 3)  aluminum hydroxide/magnesium hydroxide/simethicone Suspension 30 milliLiter(s) Oral every 4 hours PRN Dyspepsia  HYDROmorphone  Injectable 1 milliGRAM(s) IV Push every 4 hours PRN Severe Pain (7 - 10)  ondansetron Injectable 4 milliGRAM(s) IV Push every 4 hours PRN Nausea and/or Vomiting      Vital Signs Last 24 Hrs  T(C): 37.4 (2020 16:35), Max: 38.3 (2020 20:50)  T(F): 99.4 (2020 16:35), Max: 101 (2020 20:50)  HR: 76 (2020 16:35) (75 - 129)  BP: 106/69 (2020 16:35) (74/50 - 107/69)  BP(mean): 81 (2020 16:00) (56 - 88)  RR: 16 (2020 16:35) (12 - 34)  SpO2: 92% (2020 16:35) (84% - 95%)  CAPILLARY BLOOD GLUCOSE        I&O's Summary    2020 07:01  -  2020 07:00  --------------------------------------------------------  IN: 3140 mL / OUT: 2550 mL / NET: 590 mL    2020 07:01  -  2020 17:25  --------------------------------------------------------  IN: 1470 mL / OUT: 2050 mL / NET: -580 mL        PHYSICAL EXAM:  GENERAL: NAD, anicteric, afebrile  HEAD:  Atraumatic, Normocephalic  EYES: EOMI, PERRLA, conjunctiva and sclera clear  NECK: Supple, No JVD  CHEST/LUNG: Clear to auscultation bilaterally; mild bibasilar rales  HEART: irregular rate and rhythm; No murmurs, rubs, or gallops  ABDOMEN: Soft,+ LLQ tenderness, Nondistended; Bowel sounds present  EXTREMITIES:  2+ Peripheral Pulses, No clubbing, cyanosis, or edema  PSYCH: AAOx3  NEUROLOGY: non-focal  SKIN: No rashes or lesions    LABS:                        11.6   11.82 )-----------( 235      ( 2020 04:37 )             36.2     07-18    136  |  101  |  8   ----------------------------<  99  3.9   |  27  |  1.00    Ca    8.4      2020 04:38  Phos  3.8     07-18  Mg     1.6     07-18    TPro  5.5<L>  /  Alb  3.0<L>  /  TBili  0.4  /  DBili  x   /  AST  21  /  ALT  <5<L>  /  AlkPhos  56  07-17          Urinalysis Basic - ( 2020 00:05 )    Color: Light Yellow / Appearance: Clear / S.012 / pH: x  Gluc: x / Ketone: Negative  / Bili: Negative / Urobili: Negative   Blood: x / Protein: Negative / Nitrite: Negative   Leuk Esterase: Negative / RBC: 2 /hpf / WBC 1 /HPF   Sq Epi: x / Non Sq Epi: 0 /hpf / Bacteria: 0.0          Consultant(s) Notes Reviewed:  EP, GI

## 2020-07-19 LAB
ANION GAP SERPL CALC-SCNC: 10 MMOL/L — SIGNIFICANT CHANGE UP (ref 5–17)
APPEARANCE UR: CLEAR — SIGNIFICANT CHANGE UP
BACTERIA # UR AUTO: NEGATIVE — SIGNIFICANT CHANGE UP
BILIRUB UR-MCNC: NEGATIVE — SIGNIFICANT CHANGE UP
BUN SERPL-MCNC: 6 MG/DL — LOW (ref 7–23)
CALCIUM SERPL-MCNC: 8.6 MG/DL — SIGNIFICANT CHANGE UP (ref 8.4–10.5)
CHLORIDE SERPL-SCNC: 103 MMOL/L — SIGNIFICANT CHANGE UP (ref 96–108)
CO2 SERPL-SCNC: 28 MMOL/L — SIGNIFICANT CHANGE UP (ref 22–31)
COLOR SPEC: SIGNIFICANT CHANGE UP
CREAT SERPL-MCNC: 0.95 MG/DL — SIGNIFICANT CHANGE UP (ref 0.5–1.3)
CULTURE RESULTS: SIGNIFICANT CHANGE UP
CULTURE RESULTS: SIGNIFICANT CHANGE UP
DIFF PNL FLD: ABNORMAL
EPI CELLS # UR: 0 /HPF — SIGNIFICANT CHANGE UP
GLUCOSE SERPL-MCNC: 84 MG/DL — SIGNIFICANT CHANGE UP (ref 70–99)
GLUCOSE UR QL: NEGATIVE — SIGNIFICANT CHANGE UP
HCT VFR BLD CALC: 38.3 % — LOW (ref 39–50)
HGB BLD-MCNC: 11.9 G/DL — LOW (ref 13–17)
HYALINE CASTS # UR AUTO: 0 /LPF — SIGNIFICANT CHANGE UP (ref 0–2)
KETONES UR-MCNC: NEGATIVE — SIGNIFICANT CHANGE UP
LEUKOCYTE ESTERASE UR-ACNC: NEGATIVE — SIGNIFICANT CHANGE UP
MAGNESIUM SERPL-MCNC: 2.1 MG/DL — SIGNIFICANT CHANGE UP (ref 1.6–2.6)
MCHC RBC-ENTMCNC: 29.8 PG — SIGNIFICANT CHANGE UP (ref 27–34)
MCHC RBC-ENTMCNC: 31.1 GM/DL — LOW (ref 32–36)
MCV RBC AUTO: 96 FL — SIGNIFICANT CHANGE UP (ref 80–100)
NITRITE UR-MCNC: NEGATIVE — SIGNIFICANT CHANGE UP
NRBC # BLD: 0 /100 WBCS — SIGNIFICANT CHANGE UP (ref 0–0)
PH UR: 6.5 — SIGNIFICANT CHANGE UP (ref 5–8)
PHOSPHATE SERPL-MCNC: 3.6 MG/DL — SIGNIFICANT CHANGE UP (ref 2.5–4.5)
PLATELET # BLD AUTO: 273 K/UL — SIGNIFICANT CHANGE UP (ref 150–400)
POTASSIUM SERPL-MCNC: 3.8 MMOL/L — SIGNIFICANT CHANGE UP (ref 3.5–5.3)
POTASSIUM SERPL-SCNC: 3.8 MMOL/L — SIGNIFICANT CHANGE UP (ref 3.5–5.3)
PROT UR-MCNC: NEGATIVE — SIGNIFICANT CHANGE UP
RBC # BLD: 3.99 M/UL — LOW (ref 4.2–5.8)
RBC # FLD: 12.1 % — SIGNIFICANT CHANGE UP (ref 10.3–14.5)
RBC CASTS # UR COMP ASSIST: 8 /HPF — HIGH (ref 0–4)
SODIUM SERPL-SCNC: 141 MMOL/L — SIGNIFICANT CHANGE UP (ref 135–145)
SP GR SPEC: 1.01 — SIGNIFICANT CHANGE UP (ref 1.01–1.02)
SPECIMEN SOURCE: SIGNIFICANT CHANGE UP
SPECIMEN SOURCE: SIGNIFICANT CHANGE UP
UROBILINOGEN FLD QL: NEGATIVE — SIGNIFICANT CHANGE UP
WBC # BLD: 11.72 K/UL — HIGH (ref 3.8–10.5)
WBC # FLD AUTO: 11.72 K/UL — HIGH (ref 3.8–10.5)
WBC UR QL: 2 /HPF — SIGNIFICANT CHANGE UP (ref 0–5)

## 2020-07-19 PROCEDURE — 71045 X-RAY EXAM CHEST 1 VIEW: CPT | Mod: 26

## 2020-07-19 PROCEDURE — 99233 SBSQ HOSP IP/OBS HIGH 50: CPT

## 2020-07-19 RX ORDER — PANTOPRAZOLE SODIUM 20 MG/1
40 TABLET, DELAYED RELEASE ORAL
Refills: 0 | Status: DISCONTINUED | OUTPATIENT
Start: 2020-07-19 | End: 2020-07-25

## 2020-07-19 RX ORDER — ACETAMINOPHEN 500 MG
1000 TABLET ORAL ONCE
Refills: 0 | Status: COMPLETED | OUTPATIENT
Start: 2020-07-19 | End: 2020-07-19

## 2020-07-19 RX ADMIN — APIXABAN 5 MILLIGRAM(S): 2.5 TABLET, FILM COATED ORAL at 17:07

## 2020-07-19 RX ADMIN — PANTOPRAZOLE SODIUM 40 MILLIGRAM(S): 20 TABLET, DELAYED RELEASE ORAL at 09:05

## 2020-07-19 RX ADMIN — Medication 650 MILLIGRAM(S): at 10:16

## 2020-07-19 RX ADMIN — Medication 25 MILLIGRAM(S): at 21:26

## 2020-07-19 RX ADMIN — TAMSULOSIN HYDROCHLORIDE 0.4 MILLIGRAM(S): 0.4 CAPSULE ORAL at 13:24

## 2020-07-19 RX ADMIN — Medication 1000 MILLIGRAM(S): at 21:28

## 2020-07-19 RX ADMIN — OXYCODONE HYDROCHLORIDE 5 MILLIGRAM(S): 5 TABLET ORAL at 18:02

## 2020-07-19 RX ADMIN — OXYCODONE HYDROCHLORIDE 5 MILLIGRAM(S): 5 TABLET ORAL at 17:05

## 2020-07-19 RX ADMIN — Medication 25 MILLIGRAM(S): at 05:33

## 2020-07-19 RX ADMIN — Medication 88 MICROGRAM(S): at 05:33

## 2020-07-19 RX ADMIN — ATORVASTATIN CALCIUM 80 MILLIGRAM(S): 80 TABLET, FILM COATED ORAL at 21:27

## 2020-07-19 RX ADMIN — Medication 1.5 GRAM(S): at 13:24

## 2020-07-19 RX ADMIN — APIXABAN 5 MILLIGRAM(S): 2.5 TABLET, FILM COATED ORAL at 05:33

## 2020-07-19 RX ADMIN — Medication 100 MILLIGRAM(S): at 21:26

## 2020-07-19 RX ADMIN — Medication 25 MILLIGRAM(S): at 14:36

## 2020-07-19 RX ADMIN — Medication 650 MILLIGRAM(S): at 09:05

## 2020-07-19 RX ADMIN — Medication 400 MILLIGRAM(S): at 21:23

## 2020-07-19 NOTE — PROVIDER CONTACT NOTE (OTHER) - ASSESSMENT
patient asymptomatic. A&O x4, denies cp/sob. patient c/o chills and cold. pox 96% on 2L n/c. Aflutter 's on tele monitor.

## 2020-07-19 NOTE — PROVIDER CONTACT NOTE (OTHER) - ACTION/TREATMENT ORDERED:
IV Tylenol ordered and NS 500cc Bolus, repeat Vitals after bolus completion. will closely monitor the patient.

## 2020-07-19 NOTE — PROGRESS NOTE ADULT - PROBLEM SELECTOR PLAN 2
- new onset aflutter in the setting of septic shock  - EP following  - c/w Eliquis 5mg po q12  - c/w Lopressor 25mg po q8  - c/w Tele monitioring

## 2020-07-19 NOTE — PROGRESS NOTE ADULT - ASSESSMENT
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 Pt was noted to be in new atrial flutter and was hypotensive requiring admission to MICU for pressor support and management of septic shock.

## 2020-07-19 NOTE — PROGRESS NOTE ADULT - SUBJECTIVE AND OBJECTIVE BOX
HOSPITALIST NOTE    Dr. Robin Sanchez DO  Attending Physician  Division of Hospital Medicine  [facility]  Pager:  [phone]    SUBJECTIVE  Reports chronic cough, llq abd pain, and dysuria.  No other complaints.    REVIEW OF SYSTEMS     12 point review of systems negative except for items noted above.      PAST MEDICAL & SURGICAL HISTORY:  HTN (hypertension): on losartan      MEDICATIONS  (STANDING):  apixaban 5 milliGRAM(s) Oral two times a day  atorvastatin 80 milliGRAM(s) Oral at bedtime  levothyroxine 175 MICROGram(s) Oral <User Schedule>  levothyroxine 88 MICROGram(s) Oral <User Schedule>  mesalamine ER (24-Hour) Capsule 1.5 Gram(s) Oral daily  metoprolol tartrate 25 milliGRAM(s) Oral every 8 hours  pantoprazole    Tablet 40 milliGRAM(s) Oral before breakfast  tamsulosin 0.4 milliGRAM(s) Oral daily    MEDICATIONS  (PRN):  acetaminophen   Tablet .. 650 milliGRAM(s) Oral every 6 hours PRN Mild Pain (1 - 3)  aluminum hydroxide/magnesium hydroxide/simethicone Suspension 30 milliLiter(s) Oral every 4 hours PRN Dyspepsia  ondansetron Injectable 4 milliGRAM(s) IV Push every 4 hours PRN Nausea and/or Vomiting  oxyCODONE    IR 5 milliGRAM(s) Oral every 4 hours PRN Severe Pain (7 - 10)      Allergies    sulfa drugs (Anaphylaxis)  sulfa drugs (Seizure)    Intolerances        T(C): 36.7 (20 @ 04:11), Max: 38.7 (20 @ 20:00)  T(F): 98 (20 @ 04:11), Max: 101.7 (20 @ 20:00)  HR: 72 (20 @ 04:11) (72 - 106)  BP: 104/60 (20 @ 04:11) (89/56 - 106/69)  ABP: --  ABP(mean): --  RR: 18 (20 @ 04:11) (16 - 32)  SpO2: 98% (20 @ 04:11) (92% - 98%)      CONSTITUTIONAL: No acute distress.   HEENT:  Conjunctiva clear B/L. Nasal mucosa normal. Moist oral mucosa. No posterior pharyngeal lesions noted.  Cardiovascular: RRR with no murmurs. No JVD noted. No lower extremity edema B/L. Extremities are warm and well perfused. Radial pulses 2+ B/L. Dorsalis pedis pulses 2+ B/L.    Respiratory: Lungs CTAB. No accessory muscle use.   Gastrointestinal:  Soft, LLQ tenderness to palpation. Non-distended. Non-rigid. No CVA tenderness B/L.  MSK:  No joint swelling. No joint erythema B/L. No midline spinal tenderness.  Neurologic:  Alert and awake. Oriented x3. Moving all extremities. Following commands. Making eye contact.    Skin:  No rashes noted. No skin erythema noted.   Psych:  Normal affect. Normal Mood.     LABS                        11.9   11.72 )-----------( 273      ( 2020 06:52 )             38.3     07-    141  |  103  |  6<L>  ----------------------------<  84  3.8   |  28  |  0.95    Ca    8.6      2020 06:52  Phos  3.6       Mg     2.1     -        Urinalysis Basic - ( 2020 10:56 )    Color: Light Yellow / Appearance: Clear / S.010 / pH: x  Gluc: x / Ketone: Negative  / Bili: Negative / Urobili: Negative   Blood: x / Protein: Negative / Nitrite: Negative   Leuk Esterase: Negative / RBC: 8 /hpf / WBC 2 /HPF   Sq Epi: x / Non Sq Epi: 0 /hpf / Bacteria: Negative

## 2020-07-19 NOTE — CHART NOTE - NSCHARTNOTEFT_GEN_A_CORE
He remains in atrial flutter with variable block with better heart rate control. Because of the paroxysmal nature of the arrhythmia, cardioversion is not likely to be effective. Options are to allow resolution of his abdominal issues and proceed to an ablation while in hospital or arrange for management of his arrhythmias as an outpatient. He may well need ablation for both atrial flutter and fibrillation.    We will continue to follow with you.    Alden Mari MD  Attending Cardiac Electrophysiologist

## 2020-07-19 NOTE — PROGRESS NOTE ADULT - PROBLEM SELECTOR PLAN 4
- TTE done 7/15 shows Severe reversible diastolic dysfunction Stage III   - c/w Lopressor  - Losartan on hold for low Bps  - c/w Lipitor   - monitor for volume overload given recent IVF boluses  - Cardiology follow up

## 2020-07-19 NOTE — CHART NOTE - NSCHARTNOTEFT_GEN_A_CORE
MEDICINE PA    Notified by RN patient with temperature 101.3. Seen and examined patient at bedside. Patient is alert, c/o chills. Denies HA, CP, SOB, cough, N/V, or abd pain.    VITAL SIGNS:  T(C): 38.5 (07-19-20 @ 21:05), Max: 38.5 (07-19-20 @ 21:05)  HR: 107 (07-19-20 @ 21:05) (72 - 118)  BP: 129/84 (07-19-20 @ 21:22) (94/60 - 141/84)  RR: 18 (07-19-20 @ 21:05) (18 - 18)  SpO2: 97% (07-19-20 @ 21:05) (95% - 98%)  Wt(kg): --      LABORATORY:                          11.9   11.72 )-----------( 273      ( 19 Jul 2020 06:52 )             38.3       07-19    141  |  103  |  6<L>  ----------------------------<  84  3.8   |  28  |  0.95    Ca    8.6      19 Jul 2020 06:52  Phos  3.6     07-19  Mg     2.1     07-19        PHYSICAL EXAM:    Constitutional: AOx3. +chills    Respiratory: clear lungs bilaterally. No wheezing, rhonchi, or crackles.    Cardiovascular: S1 S2. No murmurs.    Gastrointestinal: BS X4 active. soft. LLQ tender.    Extremities/Vascular: +2 pulses bilaterally. No BLE edema.      ASSESSMENT/PLAN:     1) Fever 101.3  -1g IV tylenol and cooling measures prn for pyrexia  -BC x2 7/18 NGTD,  -UA: 7/19 (-), Ucx pending  -CXR 7/19 Clear lungs  -Consider CT c/a/p to evaluate source of fever  -F/U primary team in AM    Tab Enriquez Jr, PA-C   Department of Medicine MEDICINE PA    Notified by RN patient with temperature 101.3. Seen and examined patient at bedside. Patient is alert, c/o chills. Denies HA, CP, SOB, cough, N/V, or abd pain.    VITAL SIGNS:  T(C): 38.5 (07-19-20 @ 21:05), Max: 38.5 (07-19-20 @ 21:05)  HR: 107 (07-19-20 @ 21:05) (72 - 118)  BP: 129/84 (07-19-20 @ 21:22) (94/60 - 141/84)  RR: 18 (07-19-20 @ 21:05) (18 - 18)  SpO2: 97% (07-19-20 @ 21:05) (95% - 98%)  Wt(kg): --      LABORATORY:                          11.9   11.72 )-----------( 273      ( 19 Jul 2020 06:52 )             38.3       07-19    141  |  103  |  6<L>  ----------------------------<  84  3.8   |  28  |  0.95    Ca    8.6      19 Jul 2020 06:52  Phos  3.6     07-19  Mg     2.1     07-19        PHYSICAL EXAM:    Constitutional: AOx3. +chills    Respiratory: clear lungs bilaterally. No wheezing, rhonchi, or crackles.    Cardiovascular: S1 S2. No murmurs.    Gastrointestinal: BS X4 active. soft. LLQ +tenderness.    Extremities/Vascular: +2 pulses bilaterally. No BLE edema.      ASSESSMENT/PLAN:     1) Fever 101.3  -1g IV tylenol and cooling measures prn for pyrexia, improved to 98.9  -BC x2 7/18 NGTD,  -UA: 7/19 (-), Ucx pending  -CXR 7/19 Clear lungs  -If recurrent fevers will order CT C/A/P to evaluate source of fever  -F/U primary team in AM    YOANNA Woodard JrC   Department of Medicine

## 2020-07-19 NOTE — PROGRESS NOTE ADULT - PROBLEM SELECTOR PLAN 5
- on Losartan 100mg po qd at home; on hold for now given soft BPs  - c/w Lopressor as above with hold parameters

## 2020-07-20 LAB
ANION GAP SERPL CALC-SCNC: 9 MMOL/L — SIGNIFICANT CHANGE UP (ref 5–17)
BUN SERPL-MCNC: 5 MG/DL — LOW (ref 7–23)
CALCIUM SERPL-MCNC: 8.8 MG/DL — SIGNIFICANT CHANGE UP (ref 8.4–10.5)
CHLORIDE SERPL-SCNC: 100 MMOL/L — SIGNIFICANT CHANGE UP (ref 96–108)
CO2 SERPL-SCNC: 28 MMOL/L — SIGNIFICANT CHANGE UP (ref 22–31)
CREAT SERPL-MCNC: 1.02 MG/DL — SIGNIFICANT CHANGE UP (ref 0.5–1.3)
CRP SERPL-MCNC: 11.78 MG/DL — HIGH (ref 0–0.4)
CULTURE RESULTS: NO GROWTH — SIGNIFICANT CHANGE UP
ERYTHROCYTE [SEDIMENTATION RATE] IN BLOOD: 52 MM/HR — HIGH (ref 0–20)
GLUCOSE SERPL-MCNC: 91 MG/DL — SIGNIFICANT CHANGE UP (ref 70–99)
HCT VFR BLD CALC: 36.8 % — LOW (ref 39–50)
HGB BLD-MCNC: 11.8 G/DL — LOW (ref 13–17)
MCHC RBC-ENTMCNC: 30.7 PG — SIGNIFICANT CHANGE UP (ref 27–34)
MCHC RBC-ENTMCNC: 32.1 GM/DL — SIGNIFICANT CHANGE UP (ref 32–36)
MCV RBC AUTO: 95.8 FL — SIGNIFICANT CHANGE UP (ref 80–100)
NRBC # BLD: 0 /100 WBCS — SIGNIFICANT CHANGE UP (ref 0–0)
PLATELET # BLD AUTO: 287 K/UL — SIGNIFICANT CHANGE UP (ref 150–400)
POTASSIUM SERPL-MCNC: 3.6 MMOL/L — SIGNIFICANT CHANGE UP (ref 3.5–5.3)
POTASSIUM SERPL-SCNC: 3.6 MMOL/L — SIGNIFICANT CHANGE UP (ref 3.5–5.3)
RBC # BLD: 3.84 M/UL — LOW (ref 4.2–5.8)
RBC # FLD: 12.1 % — SIGNIFICANT CHANGE UP (ref 10.3–14.5)
SODIUM SERPL-SCNC: 137 MMOL/L — SIGNIFICANT CHANGE UP (ref 135–145)
SPECIMEN SOURCE: SIGNIFICANT CHANGE UP
WBC # BLD: 9.91 K/UL — SIGNIFICANT CHANGE UP (ref 3.8–10.5)
WBC # FLD AUTO: 9.91 K/UL — SIGNIFICANT CHANGE UP (ref 3.8–10.5)

## 2020-07-20 PROCEDURE — 93010 ELECTROCARDIOGRAM REPORT: CPT

## 2020-07-20 PROCEDURE — 99233 SBSQ HOSP IP/OBS HIGH 50: CPT

## 2020-07-20 PROCEDURE — 99254 IP/OBS CNSLTJ NEW/EST MOD 60: CPT

## 2020-07-20 PROCEDURE — 99232 SBSQ HOSP IP/OBS MODERATE 35: CPT

## 2020-07-20 RX ORDER — SOTALOL HCL 120 MG
80 TABLET ORAL EVERY 12 HOURS
Refills: 0 | Status: DISCONTINUED | OUTPATIENT
Start: 2020-07-20 | End: 2020-07-21

## 2020-07-20 RX ADMIN — PANTOPRAZOLE SODIUM 40 MILLIGRAM(S): 20 TABLET, DELAYED RELEASE ORAL at 06:05

## 2020-07-20 RX ADMIN — APIXABAN 5 MILLIGRAM(S): 2.5 TABLET, FILM COATED ORAL at 17:40

## 2020-07-20 RX ADMIN — Medication 25 MILLIGRAM(S): at 06:03

## 2020-07-20 RX ADMIN — Medication 1.5 GRAM(S): at 16:55

## 2020-07-20 RX ADMIN — APIXABAN 5 MILLIGRAM(S): 2.5 TABLET, FILM COATED ORAL at 06:02

## 2020-07-20 RX ADMIN — Medication 175 MICROGRAM(S): at 06:02

## 2020-07-20 RX ADMIN — TAMSULOSIN HYDROCHLORIDE 0.4 MILLIGRAM(S): 0.4 CAPSULE ORAL at 11:14

## 2020-07-20 RX ADMIN — Medication 80 MILLIGRAM(S): at 17:40

## 2020-07-20 RX ADMIN — ATORVASTATIN CALCIUM 80 MILLIGRAM(S): 80 TABLET, FILM COATED ORAL at 21:54

## 2020-07-20 NOTE — PROGRESS NOTE ADULT - PROBLEM SELECTOR PLAN 4
- TTE done 7/15 shows Severe reversible diastolic dysfunction Stage III   - c/w Lopressor  - Losartan on hold for low Bps  - c/w Lipitor   - monitor for volume overload given recent IVF boluses  - Cardiology follow up chronic diastolic heart failure- TTE done 7/15 shows Severe reversible diastolic dysfunction Stage III   - c/w Lopressor  - Losartan on hold for low Bps  - c/w Lipitor   - monitor for volume overload given recent IVF boluses  - Cardiology follow up

## 2020-07-20 NOTE — PROGRESS NOTE ADULT - SUBJECTIVE AND OBJECTIVE BOX
Patient seen and evaluated today.  Tm 101.3 yesterday evening at ~2100.  WBCs 9.9 today, previously 11.7 on 07/19/20. ESR 52 and CRP 11.7 today. 07/14 & 07/18 Blood cultures x2 with NGTD. 07/14 & 07/19 UCx with NGTD. COVID-19 PCR neg x 2 on 7/13 and 7/14.    c/o at least two small loose stools daily a/w abdominal discomfort, now.  Otherwise, feels generally well.  Telemetry review demonstrates AF/AFL HR: 81 (07-20-20 @ 11:48) (74 - 107). Of note, patient reports that his last colonoscopy was 06/2020 which was "benign, with sigmoid swelling".    MEDICATIONS:  acetaminophen   Tablet .. 650 milliGRAM(s) Oral every 6 hours PRN  aluminum hydroxide/magnesium hydroxide/simethicone Suspension 30 milliLiter(s) Oral every 4 hours PRN  apixaban 5 milliGRAM(s) Oral two times a day  atorvastatin 80 milliGRAM(s) Oral at bedtime  levothyroxine 175 MICROGram(s) Oral <User Schedule>  levothyroxine 88 MICROGram(s) Oral <User Schedule>  mesalamine ER (24-Hour) Capsule 1.5 Gram(s) Oral daily  metoprolol tartrate 25 milliGRAM(s) Oral every 8 hours  ondansetron Injectable 4 milliGRAM(s) IV Push every 4 hours PRN  oxyCODONE    IR 5 milliGRAM(s) Oral every 4 hours PRN  pantoprazole    Tablet 40 milliGRAM(s) Oral before breakfast  tamsulosin 0.4 milliGRAM(s) Oral daily    REVIEW OF SYSTEMS:  CONSTITUTIONAL: +fever, no weight loss, or fatigue  NECK: No pain or stiffness  RESPIRATORY: No cough, wheezing, chills or hemoptysis; No Shortness of Breath  CARDIOVASCULAR: No chest pain, palpitations, passing out, dizziness, or leg swelling  GASTROINTESTINAL: No abdominal or epigastric pain. No nausea, vomiting, or hematemesis; +diarrhea , no constipation. No melena or hematochezia.  NEUROLOGICAL: No headaches, memory loss, loss of strength, numbness, or tremors  SKIN: No itching, burning, rashes, or lesions   PSYCHIATRIC: No depression, anxiety, mood swings, or difficulty sleeping  HEME/LYMPH: No easy bruising, or bleeding gums  ALLERGY AND IMMUNOLOGIC: No hives or eczema	  All others negative    PHYSICAL EXAM:  T(C): 37.1 (07-20-20 @ 11:48), Max: 38.5 (07-19-20 @ 21:05)  HR: 81 (07-20-20 @ 11:48) (74 - 107)  BP: 127/81 (07-20-20 @ 11:48) (89/47 - 141/84)  RR: 18 (07-20-20 @ 11:48) (18 - 18)  SpO2: 94% (07-20-20 @ 11:48) (94% - 98%)  Wt(kg): --  I&O's Summary    19 Jul 2020 07:01  -  20 Jul 2020 07:00  --------------------------------------------------------  IN: 1450 mL / OUT: 2650 mL / NET: -1200 mL    20 Jul 2020 07:01  -  20 Jul 2020 14:53  --------------------------------------------------------  IN: 360 mL / OUT: 0 mL / NET: 360 mL    Appearance: Normal	  HEENT:   Normal oral mucosa, PERRL, EOMI	  Lymphatic: No lymphadenopathy  Cardiovascular: Normal S1 S2, No JVD, No murmurs, No edema  Respiratory: Lungs clear to auscultation	  Psychiatry: A & O x 3, Mood & affect appropriate  Gastrointestinal:  Soft, Non-tender, + BS	  Skin: No rashes, No ecchymoses, No cyanosis	  Neurologic: Non-focal  Extremities: Normal range of motion, No clubbing, cyanosis or edema  Vascular: Peripheral pulses palpable 2+ bilaterally    DIAGNOSTICS:  TELEMETRY: 	  as above    LABS:	 	                          11.8   9.91  )-----------( 287      ( 20 Jul 2020 07:02 )             36.8     07-20    137  |  100  |  5<L>  ----------------------------<  91  3.6   |  28  |  1.02    Ca    8.8      20 Jul 2020 07:02  Phos  3.6     07-19  Mg     2.1     07-19      proBNP: Serum Pro-Brain Natriuretic Peptide: 3299 pg/mL (07-13 @ 22:52)

## 2020-07-20 NOTE — PROGRESS NOTE ADULT - SUBJECTIVE AND OBJECTIVE BOX
Patient is a 60y old  Male who presents with a chief complaint of shock (2020 12:36)        SUBJECTIVE / OVERNIGHT EVENTS: states still has mild headache and lower abdominal pain. one episode of loose stools. no cough, sore throat, chest pain, sob.       MEDICATIONS  (STANDING):  apixaban 5 milliGRAM(s) Oral two times a day  atorvastatin 80 milliGRAM(s) Oral at bedtime  levothyroxine 175 MICROGram(s) Oral <User Schedule>  levothyroxine 88 MICROGram(s) Oral <User Schedule>  mesalamine ER (24-Hour) Capsule 1.5 Gram(s) Oral daily  metoprolol tartrate 25 milliGRAM(s) Oral every 8 hours  pantoprazole    Tablet 40 milliGRAM(s) Oral before breakfast  tamsulosin 0.4 milliGRAM(s) Oral daily    MEDICATIONS  (PRN):  acetaminophen   Tablet .. 650 milliGRAM(s) Oral every 6 hours PRN Mild Pain (1 - 3)  aluminum hydroxide/magnesium hydroxide/simethicone Suspension 30 milliLiter(s) Oral every 4 hours PRN Dyspepsia  ondansetron Injectable 4 milliGRAM(s) IV Push every 4 hours PRN Nausea and/or Vomiting  oxyCODONE    IR 5 milliGRAM(s) Oral every 4 hours PRN Severe Pain (7 - 10)      Vital Signs Last 24 Hrs  T(C): 37.1 (2020 11:48), Max: 38.5 (2020 21:05)  T(F): 98.8 (2020 11:48), Max: 101.3 (2020 21:05)  HR: 81 (2020 11:48) (74 - 118)  BP: 127/81 (2020 11:48) (89/47 - 141/84)  BP(mean): --  RR: 18 (2020 11:48) (18 - 18)  SpO2: 94% (2020 11:48) (94% - 98%)  CAPILLARY BLOOD GLUCOSE        I&O's Summary    2020 07:01  -  2020 07:00  --------------------------------------------------------  IN: 1450 mL / OUT: 2650 mL / NET: -1200 mL      PHYSICAL EXAM:  CONSTITUTIONAL: No acute distress.   HEENT:  Conjunctiva clear B/L. Nasal mucosa normal. Moist oral mucosa. No posterior pharyngeal lesions noted.  Cardiovascular: RRR with no murmurs. No JVD noted. No lower extremity edema B/L. Extremities are warm and well perfused. Radial pulses 2+ B/L. Dorsalis pedis pulses 2+ B/L.    Respiratory: Lungs CTAB. No accessory muscle use.   Gastrointestinal:  Soft, LLQ tenderness to palpation. Non-distended. Non-rigid. No CVA tenderness B/L.  MSK:  No joint swelling. No joint erythema B/L. No midline spinal tenderness.  Neurologic:  Alert and awake. Oriented x3. Moving all extremities. Following commands. Making eye contact.    Skin:  No rashes noted. No skin erythema noted.   Psych:  Normal affect. Normal Mood.       LABS:                        11.8   9.91  )-----------( 287      ( 2020 07:02 )             36.8     07-20    137  |  100  |  5<L>  ----------------------------<  91  3.6   |  28  |  1.02    Ca    8.8      2020 07:02  Phos  3.6     07-19  Mg     2.1     07-19            Urinalysis Basic - ( 2020 10:56 )    Color: Light Yellow / Appearance: Clear / S.010 / pH: x  Gluc: x / Ketone: Negative  / Bili: Negative / Urobili: Negative   Blood: x / Protein: Negative / Nitrite: Negative   Leuk Esterase: Negative / RBC: 8 /hpf / WBC 2 /HPF   Sq Epi: x / Non Sq Epi: 0 /hpf / Bacteria: Negative        RADIOLOGY & ADDITIONAL TESTS:    Imaging Personally Reviewed: abd/renal us ordered  Consultant(s) Notes Reviewed:    Care Discussed with Consultants/Other Providers: d/w ID - Dr. Ardon regarding consult

## 2020-07-20 NOTE — CONSULT NOTE ADULT - ASSESSMENT
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    no leucocytosis, CT with no obvious source, All cultures negative to date.   ? etiology of fever, elevated markers of inflammation, I am not sure if SCAD in itself can cause fevers.   Pt with also night sweats and weight loss, s/p therapy for latent TB not active TB.       Plan:   perform repeat blood cx if spikes again  consider NM WBC scan to see if any other etiology of fever  cpk, michele, rf, anca, ldh  gi input regarding dx of intestinal TB, try to obtain bx results from [facility]   will send qunatiferon gold, though pt with known TB   observe off abx

## 2020-07-20 NOTE — PROGRESS NOTE ADULT - ASSESSMENT
59 yo M with a FH of CAD and a PMH of hypothyroidism, HTN, diverticulitis, chronic mild GONZALEZ with a fall of unclear etiology (mech vs syncope) in 04/2019, upper airway nerve stimulator for NINOSKA implanted 08/2019, abdominal TB 1993 s/p INH, who p/w a cc of dyspnea, abdominal pain and diarrhea on 07/14/2020. Required pressor support & 5d antibiotics for sepsis; source remains unclear. Hosp course further c/b rapid AFL with variable conduction & brief conversion to NSR, now with subclinical hematuria, B/L renal lesions, and left lobe liver cyst (3x2cm) on chest CTA. 2D TTE on 07/15/20 with mildly dilated LA, mild TR, mild-mod MR, severe reversible GIIIDD, RVE with normal RVSF and preserved LVEF (65%), Tm 101 last night. CHA2 DS2 VASc, 1:    #AFL  - discussed case with EP attending, Dr. Barnes  - continue Eliquis 5 BID  - V rates still mildly elevated, dc metoprolol and start Sotalol 80mg orally every 12 hours with an eye towards ablation  - f/u abd US and renal US  - trend fever curve  - further mgmt per primary team 59 yo M with a FH of CAD and a PMH of hypothyroidism, HTN, diverticulitis, chronic mild GONZALEZ with a fall of unclear etiology (mech vs syncope) in 04/2019, upper airway nerve stimulator for NINOSKA implanted 08/2019, abdominal TB 1993 s/p INH, who p/w a cc of dyspnea, abdominal pain and diarrhea on 07/14/2020. Required pressor support & 5d antibiotics for sepsis; source remains unclear. Hosp course further c/b rapid AFL with variable conduction & brief conversion to NSR, now with subclinical hematuria, B/L renal lesions, and left lobe liver cyst (3x2cm) on chest CTA. 2D TTE on 07/15/20 with mildly dilated LA, mild TR, mild-mod MR, severe reversible GIIIDD, RVE with normal RVSF and preserved LVEF (65%), Tm 101 last night. CHA2 DS2 VASc, 1:    #AFL  - discussed case with EP attending, Dr. Barnes  - continue Eliquis 5 BID  - V rates still mildly elevated, dc metoprolol and start Sotalol 80mg orally every 12 hours with an eye towards ablation  - f/u abd US and renal US  - trend fever curve, inflammatory markers  - repeat COVID-19 pending  - further mgmt per primary team 59 yo M with a FH of CAD and a PMH of HTN, total thyroidectomy 2/2 thyroid CA 2013, hypothyroidism, recent dx of segmental colitis a/w diverticulitis 06/2020, mild, yet chronic GONZALEZ, fall of unclear etiology (mech vs syncope) in 04/2019, upper airway nerve stimulator for NINOSKA implanted 08/2019, abdominal 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 Required pressor support, IVF resuscitation, & 5d abx for sepsis; source remains unclear. Hosp course further c/b rapid AFL w/ variable V conduction & brief conversion to NSR, now w/ subclinical hematuria, B/L renal lesions, and left lobe liver cyst (3x2cm) on chest/abd CTA. 2D TTE on 07/15/20 with mildly dilated LA, mild TR, mild-mod MR, severe reversible GIIIDD, RVE with normal RVSF and preserved LVEF (65%), Tm 101 last night. CHA2 DS2 VASc, 1:    #AFL  - discussed case with EP attending, Dr. Barnes  - continue Eliquis 5 BID  - V rates still mildly elevated, dc metoprolol and start Sotalol 80mg orally every 12 hours with an eye towards ablation  - f/u abd US and renal US  - trend fever curve, inflammatory markers  - repeat COVID-19 pending  - further mgmt per primary team 61 yo M with a FH of CAD and a PMH of HTN, total thyroidectomy 2/2 thyroid CA 2013, hypothyroidism, recent dx of segmental colitis a/w diverticulitis 06/2020, mild, yet chronic GONZALEZ, fall of unclear etiology (mech vs syncope) in 04/2019, upper airway nerve stimulator for NINOSKA implanted 08/2019, abdominal 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 Required pressor support, IVF resuscitation, & 5d abx for sepsis; source remains unclear. Hosp course further c/b rapid AFL w/ variable V conduction & brief conversion to NSR, now w/ subclinical hematuria, B/L renal lesions, and left lobe liver cyst (3x2cm) on chest/abd CTA. 2D TTE on 07/15/20 with mildly dilated LA, mild TR, mild-mod MR, severe reversible GIIIDD, RVE with normal RVSF and preserved LVEF (65%), Tm 101 last night. CHA2 DS2 VASc, 1:    #AFL  - discussed case with EP attending, Dr. Barnes  - continue Eliquis 5 BID  - V rates still mildly elevated, dc metoprolol and start Sotalol 80mg orally every 12 hours with an eye towards ablation; get baseline ECG, then again to check QTc 2hrs after the first three doses.  - f/u abd US and renal US  - trend fever curve, inflammatory markers  - repeat COVID-19 pending  - further mgmt per primary team 61 yo M with a FH of CAD and a PMH of HTN, total thyroidectomy 2/2 thyroid CA 2013, hypothyroidism, recent dx of segmental colitis a/w diverticulitis 06/2020, mild, yet chronic GONZALEZ, fall of unclear etiology (mech vs syncope) in 04/2019, upper airway nerve stimulator for NINOSKA implanted 08/2019, abdominal 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 Required pressor support, IVF resuscitation, & 5d abx for sepsis; source remains unclear. Hosp course further c/b rapid AFL w/ variable V conduction & brief conversion to NSR, now w/ subclinical hematuria, B/L renal lesions, and left lobe liver cyst (3x2cm) on chest/abd CTA. 2D TTE on 07/15/20 with mildly dilated LA, mild TR, mild-mod MR, severe reversible GIIIDD, RVE with normal RVSF and preserved LVEF (65%), Tm 101 last night. CHA2 DS2 VASc, 1:    #AFL  - discussed case with EP attending, Dr. Barnes  - continue Eliquis 5 BID  - V rates still mildly elevated, dc metoprolol and start Sotalol 80mg orally every 12 hours with an eye towards ablation; get baseline ECG, then again to check QTc 2hrs after each dose x 3 doses.  - f/u abd US and renal US  - trend fever curve, inflammatory markers  - repeat COVID-19 pending  - further mgmt per primary team  - discussed with primary team 59 yo M with a FH of CAD and a PMH of HTN, total thyroidectomy 2/2 thyroid CA 2013, hypothyroidism, recent dx of segmental colitis a/w diverticulitis 06/2020, mild, yet chronic GONZALEZ, fall of unclear etiology (mech vs syncope) in 04/2019, upper airway nerve stimulator for NINOSKA implanted 08/2019, abdominal 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 Required pressor support, IVF resuscitation, & 5d abx for sepsis; source remains unclear. Hosp course further c/b rapid AFL w/ variable V conduction & brief conversion to NSR, now w/ subclinical hematuria, B/L renal lesions, and left lobe liver cyst (3x2cm) on chest/abd CTA. 2D TTE on 07/15/20 with mildly dilated LA, mild TR, mild-mod MR, severe reversible GIIIDD, RVE with normal RVSF and preserved LVEF (65%), Tm 101 last night. CHA2 DS2 VASc, 1:    #AFL  - discussed case with EP attending, Dr. Barnes  - continue Eliquis 5 BID  - V rates still mildly elevated, dc metoprolol and start Sotalol 80mg orally every 12 hours with an eye towards ablation; get baseline ECG, then again to check QTc 2hrs after each dose x 5 doses.  - f/u abd US and renal US  - trend fever curve, inflammatory markers  - repeat COVID-19 pending  - further mgmt per primary team  - discussed with primary team

## 2020-07-20 NOTE — CONSULT NOTE ADULT - ATTENDING COMMENTS
Red Roth  Pager: [phone]. If no response or past 5 pm call [phone].
This 60 year old man with history of colitis and diverticulosis presents with sepsis that is being treated with antibiotics. BP has improved although he is still vasodilated. Afebrile today.    He had atrial flutter with 2:1 and 3:1 flutter with heart rate of 100-120 on arrival. With fluid resuscitation, he has slowed and currently, remains in an atrial arrhythmia although possibly more atypical than on arrival. Heart rate is 100-110.    Would commence beta blockers in the form of metoprolol tartrate 25 mg q 6 hourly and escalate dose for rate control. If BP low, you could add digoxin 0.25 mg bid for two days followed by 0.125 mg daily. Once it is safe to anticoagulate, we will proceed to restore sinus rhythm with cardioversion or ablation. Will follow with you.
Patient seen and examined. Agree with above. He presents with septic shock of unclear etiology. He has chronic abdominal pain that was diagnosed as SCAD at [facility] recently on colonoscopy, and the pain is unchanged. He had diarrhea over a week ago which is also now resolved. Doubt GI source of his septic shock, especially with no diarrhea and no findings on CT. Would continue mesalamine for SCAD, and obtain records from [facility] from recent colonoscopy for review. Workup for sepsis per primary team.

## 2020-07-20 NOTE — PROGRESS NOTE ADULT - PROBLEM SELECTOR PLAN 2
- new onset aflutter in the setting of septic shock  - EP following  - c/w Eliquis 5mg po q12  - c/w Lopressor 25mg po q8  - c/w Tele monitioring - tachycardia in setting of ?fevers.

## 2020-07-21 LAB
ALBUMIN SERPL ELPH-MCNC: 3 G/DL — LOW (ref 3.3–5)
ALP SERPL-CCNC: 64 U/L — SIGNIFICANT CHANGE UP (ref 40–120)
ALT FLD-CCNC: <5 U/L — LOW (ref 10–45)
ANION GAP SERPL CALC-SCNC: 11 MMOL/L — SIGNIFICANT CHANGE UP (ref 5–17)
AST SERPL-CCNC: 16 U/L — SIGNIFICANT CHANGE UP (ref 10–40)
BASOPHILS # BLD AUTO: 0.04 K/UL — SIGNIFICANT CHANGE UP (ref 0–0.2)
BASOPHILS NFR BLD AUTO: 0.4 % — SIGNIFICANT CHANGE UP (ref 0–2)
BILIRUB SERPL-MCNC: 0.8 MG/DL — SIGNIFICANT CHANGE UP (ref 0.2–1.2)
BUN SERPL-MCNC: 6 MG/DL — LOW (ref 7–23)
CALCIUM SERPL-MCNC: 8.8 MG/DL — SIGNIFICANT CHANGE UP (ref 8.4–10.5)
CHLORIDE SERPL-SCNC: 102 MMOL/L — SIGNIFICANT CHANGE UP (ref 96–108)
CO2 SERPL-SCNC: 25 MMOL/L — SIGNIFICANT CHANGE UP (ref 22–31)
CREAT SERPL-MCNC: 0.91 MG/DL — SIGNIFICANT CHANGE UP (ref 0.5–1.3)
EOSINOPHIL # BLD AUTO: 0.31 K/UL — SIGNIFICANT CHANGE UP (ref 0–0.5)
EOSINOPHIL NFR BLD AUTO: 2.7 % — SIGNIFICANT CHANGE UP (ref 0–6)
GLUCOSE SERPL-MCNC: 101 MG/DL — HIGH (ref 70–99)
HCT VFR BLD CALC: 38.8 % — LOW (ref 39–50)
HGB BLD-MCNC: 12.5 G/DL — LOW (ref 13–17)
IMM GRANULOCYTES NFR BLD AUTO: 0.7 % — SIGNIFICANT CHANGE UP (ref 0–1.5)
LDH SERPL L TO P-CCNC: 176 U/L — SIGNIFICANT CHANGE UP (ref 50–242)
LYMPHOCYTES # BLD AUTO: 0.78 K/UL — LOW (ref 1–3.3)
LYMPHOCYTES # BLD AUTO: 6.9 % — LOW (ref 13–44)
MCHC RBC-ENTMCNC: 30.2 PG — SIGNIFICANT CHANGE UP (ref 27–34)
MCHC RBC-ENTMCNC: 32.2 GM/DL — SIGNIFICANT CHANGE UP (ref 32–36)
MCV RBC AUTO: 93.7 FL — SIGNIFICANT CHANGE UP (ref 80–100)
MONOCYTES # BLD AUTO: 0.95 K/UL — HIGH (ref 0–0.9)
MONOCYTES NFR BLD AUTO: 8.4 % — SIGNIFICANT CHANGE UP (ref 2–14)
NEUTROPHILS # BLD AUTO: 9.16 K/UL — HIGH (ref 1.8–7.4)
NEUTROPHILS NFR BLD AUTO: 80.9 % — HIGH (ref 43–77)
NRBC # BLD: 0 /100 WBCS — SIGNIFICANT CHANGE UP (ref 0–0)
PLATELET # BLD AUTO: 339 K/UL — SIGNIFICANT CHANGE UP (ref 150–400)
POTASSIUM SERPL-MCNC: 3.8 MMOL/L — SIGNIFICANT CHANGE UP (ref 3.5–5.3)
POTASSIUM SERPL-SCNC: 3.8 MMOL/L — SIGNIFICANT CHANGE UP (ref 3.5–5.3)
PROT SERPL-MCNC: 6 G/DL — SIGNIFICANT CHANGE UP (ref 6–8.3)
RBC # BLD: 4.14 M/UL — LOW (ref 4.2–5.8)
RBC # FLD: 11.9 % — SIGNIFICANT CHANGE UP (ref 10.3–14.5)
RHEUMATOID FACT SERPL-ACNC: <10 IU/ML — SIGNIFICANT CHANGE UP (ref 0–13)
SARS-COV-2 RNA SPEC QL NAA+PROBE: SIGNIFICANT CHANGE UP
SODIUM SERPL-SCNC: 138 MMOL/L — SIGNIFICANT CHANGE UP (ref 135–145)
WBC # BLD: 11.32 K/UL — HIGH (ref 3.8–10.5)
WBC # FLD AUTO: 11.32 K/UL — HIGH (ref 3.8–10.5)

## 2020-07-21 PROCEDURE — 99233 SBSQ HOSP IP/OBS HIGH 50: CPT

## 2020-07-21 PROCEDURE — 99232 SBSQ HOSP IP/OBS MODERATE 35: CPT

## 2020-07-21 PROCEDURE — 99232 SBSQ HOSP IP/OBS MODERATE 35: CPT | Mod: GC

## 2020-07-21 PROCEDURE — 99231 SBSQ HOSP IP/OBS SF/LOW 25: CPT

## 2020-07-21 PROCEDURE — 93010 ELECTROCARDIOGRAM REPORT: CPT | Mod: 76

## 2020-07-21 RX ORDER — SOTALOL HCL 120 MG
120 TABLET ORAL EVERY 12 HOURS
Refills: 0 | Status: DISCONTINUED | OUTPATIENT
Start: 2020-07-21 | End: 2020-07-24

## 2020-07-21 RX ORDER — ACETAMINOPHEN 500 MG
650 TABLET ORAL ONCE
Refills: 0 | Status: COMPLETED | OUTPATIENT
Start: 2020-07-21 | End: 2020-07-21

## 2020-07-21 RX ADMIN — OXYCODONE HYDROCHLORIDE 5 MILLIGRAM(S): 5 TABLET ORAL at 22:30

## 2020-07-21 RX ADMIN — OXYCODONE HYDROCHLORIDE 5 MILLIGRAM(S): 5 TABLET ORAL at 04:22

## 2020-07-21 RX ADMIN — APIXABAN 5 MILLIGRAM(S): 2.5 TABLET, FILM COATED ORAL at 05:31

## 2020-07-21 RX ADMIN — APIXABAN 5 MILLIGRAM(S): 2.5 TABLET, FILM COATED ORAL at 17:17

## 2020-07-21 RX ADMIN — Medication 80 MILLIGRAM(S): at 05:32

## 2020-07-21 RX ADMIN — ATORVASTATIN CALCIUM 80 MILLIGRAM(S): 80 TABLET, FILM COATED ORAL at 21:37

## 2020-07-21 RX ADMIN — PANTOPRAZOLE SODIUM 40 MILLIGRAM(S): 20 TABLET, DELAYED RELEASE ORAL at 05:31

## 2020-07-21 RX ADMIN — Medication 650 MILLIGRAM(S): at 22:51

## 2020-07-21 RX ADMIN — Medication 1.5 GRAM(S): at 11:02

## 2020-07-21 RX ADMIN — Medication 650 MILLIGRAM(S): at 22:30

## 2020-07-21 RX ADMIN — TAMSULOSIN HYDROCHLORIDE 0.4 MILLIGRAM(S): 0.4 CAPSULE ORAL at 11:02

## 2020-07-21 RX ADMIN — Medication 120 MILLIGRAM(S): at 17:17

## 2020-07-21 RX ADMIN — OXYCODONE HYDROCHLORIDE 5 MILLIGRAM(S): 5 TABLET ORAL at 17:50

## 2020-07-21 RX ADMIN — Medication 100 MILLIGRAM(S): at 18:40

## 2020-07-21 RX ADMIN — OXYCODONE HYDROCHLORIDE 5 MILLIGRAM(S): 5 TABLET ORAL at 03:42

## 2020-07-21 RX ADMIN — OXYCODONE HYDROCHLORIDE 5 MILLIGRAM(S): 5 TABLET ORAL at 17:17

## 2020-07-21 RX ADMIN — Medication 175 MICROGRAM(S): at 05:32

## 2020-07-21 RX ADMIN — OXYCODONE HYDROCHLORIDE 5 MILLIGRAM(S): 5 TABLET ORAL at 21:37

## 2020-07-21 NOTE — PROGRESS NOTE ADULT - SUBJECTIVE AND OBJECTIVE BOX
60y old  Male who presents with a chief complaint of shock (21 Jul 2020 15:34)      Interval history:  Afebrile overnight, still with Lt sided pain.       Allergies:   sulfa drugs (Anaphylaxis)  sulfa drugs (Seizure)    Antimicrobials:      REVIEW OF SYSTEMS:  No chest pain   No SOB  No N/V  No dysuria  No rash.       Vital Signs Last 24 Hrs  T(C): 36.5 (07-21-20 @ 12:50), Max: 37.1 (07-20-20 @ 21:00)  T(F): 97.7 (07-21-20 @ 12:50), Max: 98.8 (07-20-20 @ 21:00)  HR: 74 (07-21-20 @ 12:50) (72 - 112)  BP: 110/81 (07-21-20 @ 12:50) (110/81 - 137/89)  BP(mean): --  RR: 18 (07-21-20 @ 12:50) (18 - 18)  SpO2: 94% (07-21-20 @ 12:50) (92% - 94%)      PHYSICAL EXAM:  Patient in no acute distress. AAOX3.  No icterus, no oral ulcers.  Cardiovascular: S1S2 normal.  Lungs: Good air entry B/L lung fields.  Gastrointestinal: soft, mild discomfort on deep palpation ? lt side, nondistended.  Extremities: no edema.  IV sites not inflamed.                             12.5   11.32 )-----------( 339      ( 21 Jul 2020 07:14 )             38.8   07-21    138  |  102  |  6<L>  ----------------------------<  101<H>  3.8   |  25  |  0.91    Ca    8.8      21 Jul 2020 07:13    TPro  6.0  /  Alb  3.0<L>  /  TBili  0.8  /  DBili  x   /  AST  16  /  ALT  <5<L>  /  AlkPhos  64  07-21      LIVER FUNCTIONS - ( 21 Jul 2020 07:13 )  Alb: 3.0 g/dL / Pro: 6.0 g/dL / ALK PHOS: 64 U/L / ALT: <5 U/L / AST: 16 U/L / GGT: x             Culture - Urine (collected 19 Jul 2020 14:03)  Source: .Urine Clean Catch (Midstream)  Final Report (20 Jul 2020 10:27):    No growth      Radiology:  < from: US Abdomen Complete (07.20.20 @ 17:23) >    IMPRESSION:     Cyst within the reyna hepatis region noted as above. Cyst at the left lobe of the liver as appreciated on CT cannot be appreciated on ultrasound.    There is a 1.7 cm cyst at the midpole of the right kidney with septation(s)/cannot exclude irregularity of the wall. This complex cyst can be further evaluated with cross-sectional imaging (CT or MR with intravenous contrast using dedicated renal protocol). This complex cyst corresponds with indeterminate lesion mentioned on CT examination.  Left renal cysts as noted above, one of which corresponds with indeterminate hypodensity mentioned on CT examination..     No hydronephrosis.

## 2020-07-21 NOTE — PROGRESS NOTE ADULT - PROBLEM SELECTOR PLAN 4
chronic diastolic heart failure- TTE done 7/15 shows Severe reversible diastolic dysfunction Stage III   - c/w Lopressor  - Losartan on hold for low Bps  - c/w Lipitor   monitor volume status

## 2020-07-21 NOTE — PROGRESS NOTE ADULT - SUBJECTIVE AND OBJECTIVE BOX
24H hour events:   No acute events overnight. Tele: AFL 70-90's. Denies chest pain, palpitations, dizziness, lightheadedness, and shortness of breath.     MEDICATIONS:  apixaban 5 milliGRAM(s) Oral two times a day  sotalol 80 milliGRAM(s) Oral every 12 hours  tamsulosin 0.4 milliGRAM(s) Oral daily  acetaminophen   Tablet .. 650 milliGRAM(s) Oral every 6 hours PRN  ondansetron Injectable 4 milliGRAM(s) IV Push every 4 hours PRN  oxyCODONE    IR 5 milliGRAM(s) Oral every 4 hours PRN  aluminum hydroxide/magnesium hydroxide/simethicone Suspension 30 milliLiter(s) Oral every 4 hours PRN  mesalamine ER (24-Hour) Capsule 1.5 Gram(s) Oral daily  pantoprazole    Tablet 40 milliGRAM(s) Oral before breakfast  atorvastatin 80 milliGRAM(s) Oral at bedtime  levothyroxine 175 MICROGram(s) Oral <User Schedule>  levothyroxine 88 MICROGram(s) Oral <User Schedule>        REVIEW OF SYSTEMS:  Complete 10point ROS negative.    PHYSICAL EXAM:  T(C): 36.8 (07-21-20 @ 04:17), Max: 37.1 (07-20-20 @ 11:48)  HR: 106 (07-21-20 @ 04:17) (72 - 112)  BP: 124/78 (07-21-20 @ 04:17) (122/83 - 137/89)  RR: 18 (07-21-20 @ 04:17) (18 - 18)  SpO2: 92% (07-21-20 @ 04:17) (92% - 94%)  Wt(kg): --  I&O's Summary    20 Jul 2020 07:01  -  21 Jul 2020 07:00  --------------------------------------------------------  IN: 410 mL / OUT: 700 mL / NET: -290 mL        Appearance: Normal	  Cardiovascular: Normal S1 S2, No JVD, No murmurs, No edema  Respiratory: Lungs clear to auscultation	  Psychiatry: A & O x 3, Mood & affect appropriate  Skin: No rashes, No ecchymoses, No cyanosis	  Extremities: Normal range of motion, No clubbing, cyanosis or edema  Vascular: Peripheral pulses palpable 2+ bilaterally        LABS:	 	    CBC Full  -  ( 21 Jul 2020 07:14 )  WBC Count : 11.32 K/uL  Hemoglobin : 12.5 g/dL  Hematocrit : 38.8 %  Platelet Count - Automated : 339 K/uL  Mean Cell Volume : 93.7 fl  Mean Cell Hemoglobin : 30.2 pg  Mean Cell Hemoglobin Concentration : 32.2 gm/dL  Auto Neutrophil # : 9.16 K/uL  Auto Lymphocyte # : 0.78 K/uL  Auto Monocyte # : 0.95 K/uL  Auto Eosinophil # : 0.31 K/uL  Auto Basophil # : 0.04 K/uL  Auto Neutrophil % : 80.9 %  Auto Lymphocyte % : 6.9 %  Auto Monocyte % : 8.4 %  Auto Eosinophil % : 2.7 %  Auto Basophil % : 0.4 %    07-21    138  |  102  |  6<L>  ----------------------------<  101<H>  3.8   |  25  |  0.91  07-20    137  |  100  |  5<L>  ----------------------------<  91  3.6   |  28  |  1.02    Ca    8.8      21 Jul 2020 07:13  Ca    8.8      20 Jul 2020 07:02    TPro  6.0  /  Alb  3.0<L>  /  TBili  0.8  /  DBili  x   /  AST  16  /  ALT  <5<L>  /  AlkPhos  64  07-21          TELEMETRY: AFL 70-90's 	    ECG: AFL QTc <500ms

## 2020-07-21 NOTE — PROGRESS NOTE ADULT - SUBJECTIVE AND OBJECTIVE BOX
Patient is a 60y old  Male who presents with a chief complaint of shock (21 Jul 2020 09:45)        SUBJECTIVE / OVERNIGHT EVENTS: no acute complaints       MEDICATIONS  (STANDING):  apixaban 5 milliGRAM(s) Oral two times a day  atorvastatin 80 milliGRAM(s) Oral at bedtime  levothyroxine 175 MICROGram(s) Oral <User Schedule>  levothyroxine 88 MICROGram(s) Oral <User Schedule>  mesalamine ER (24-Hour) Capsule 1.5 Gram(s) Oral daily  pantoprazole    Tablet 40 milliGRAM(s) Oral before breakfast  sotalol 120 milliGRAM(s) Oral every 12 hours  tamsulosin 0.4 milliGRAM(s) Oral daily    MEDICATIONS  (PRN):  acetaminophen   Tablet .. 650 milliGRAM(s) Oral every 6 hours PRN Mild Pain (1 - 3)  aluminum hydroxide/magnesium hydroxide/simethicone Suspension 30 milliLiter(s) Oral every 4 hours PRN Dyspepsia  ondansetron Injectable 4 milliGRAM(s) IV Push every 4 hours PRN Nausea and/or Vomiting  oxyCODONE    IR 5 milliGRAM(s) Oral every 4 hours PRN Severe Pain (7 - 10)      Vital Signs Last 24 Hrs  T(C): 36.5 (21 Jul 2020 12:50), Max: 37.1 (20 Jul 2020 21:00)  T(F): 97.7 (21 Jul 2020 12:50), Max: 98.8 (20 Jul 2020 21:00)  HR: 74 (21 Jul 2020 12:50) (72 - 112)  BP: 110/81 (21 Jul 2020 12:50) (110/81 - 137/89)  BP(mean): --  RR: 18 (21 Jul 2020 12:50) (18 - 18)  SpO2: 94% (21 Jul 2020 12:50) (92% - 94%)  CAPILLARY BLOOD GLUCOSE        I&O's Summary    20 Jul 2020 07:01  -  21 Jul 2020 07:00  --------------------------------------------------------  IN: 410 mL / OUT: 700 mL / NET: -290 mL    21 Jul 2020 07:01 - 21 Jul 2020 15:35  --------------------------------------------------------  IN: 240 mL / OUT: 0 mL / NET: 240 mL        PHYSICAL EXAM:  CONSTITUTIONAL: No acute distress.   HEENT:  Conjunctiva clear B/L. Nasal mucosa normal. Moist oral mucosa. No posterior pharyngeal lesions noted.  Cardiovascular: RRR with no murmurs. No JVD noted. No lower extremity edema B/L. Extremities are warm and well perfused. Radial pulses 2+ B/L. Dorsalis pedis pulses 2+ B/L.    Respiratory: Lungs CTAB. No accessory muscle use.   Gastrointestinal:  Soft, LLQ tenderness to palpation. Non-distended. Non-rigid. No CVA tenderness B/L.  MSK:  No joint swelling. No joint erythema B/L. No midline spinal tenderness.  Neurologic:  Alert and awake. Oriented x3. Moving all extremities. Following commands. Making eye contact.    Skin:  No rashes noted. No skin erythema noted.   Psych:  Normal affect. Normal Mood.     LABS:                        12.5   11.32 )-----------( 339      ( 21 Jul 2020 07:14 )             38.8     07-21    138  |  102  |  6<L>  ----------------------------<  101<H>  3.8   |  25  |  0.91    Ca    8.8      21 Jul 2020 07:13    TPro  6.0  /  Alb  3.0<L>  /  TBili  0.8  /  DBili  x   /  AST  16  /  ALT  <5<L>  /  AlkPhos  64  07-21              RADIOLOGY & ADDITIONAL TESTS:    Imaging Personally Reviewed: NM wbc scan ordered   Consultant(s) Notes Reviewed:    Care Discussed with Consultants/Other Providers: d/w Dr. Ardon regarding wbc tagged scan

## 2020-07-21 NOTE — PROGRESS NOTE ADULT - ASSESSMENT
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 Required pressor support, IVF resuscitation, & 5d abx for sepsis; source remains unclear. Hosp course further c/b rapid AFL w/ variable V conduction & brief conversion to NSR, now w/ subclinical hematuria, B/L renal lesions, and left lobe liver cyst (3x2cm) on chest/abd CTA. 2D TTE on 07/15/20 with mildly dilated LA, mild TR, mild-mod MR, severe reversible GIIIDD, RVE with normal RVSF and preserved LVEF (65%), Febrile 7/19. CHA2 DS2 VASc, 1:    #AFL  -Afebrile  -Continue Eliquis 5 BID  -Continue Sotalol 80mg PO Q12, obtain ekg 2hrs post each dose to assess QTc goal <500ms  -F/u abd US and renal US  -Will f/u OP with EP attending MD Mari for eventual elective AFL ablation  -Further mgmt per primary team        [id number] 59 yo M with a FH of CAD and a PMH of HTN, total thyroidectomy 2/2 thyroid CA 2013, hypothyroidism, recent dx of segmental colitis a/w diverticulitis 06/2020, mild, yet chronic GONZALEZ, fall of unclear etiology (mech vs syncope) in 04/2019, upper airway nerve stimulator for NINOSKA implanted 08/2019, abdominal 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 Required pressor support, IVF resuscitation, & 5d abx for sepsis; source remains unclear. Hosp course further c/b rapid AFL w/ variable V conduction & brief conversion to NSR, now w/ subclinical hematuria, B/L renal lesions, and left lobe liver cyst (3x2cm) on chest/abd CTA. 2D TTE on 07/15/20 with mildly dilated LA, mild TR, mild-mod MR, severe reversible GIIIDD, RVE with normal RVSF and preserved LVEF (65%), Febrile 7/19. CHA2 DS2 VASc, 1:    #AFL  -Afebrile  -Continue Eliquis 5 BID  -Continue Sotalol 80mg PO Q12, obtain ekg 2hrs post each dose to assess QTc goal <500ms  -F/u abd US and renal US  -Will f/u OP with EP attending MD Mari for eventual elective AFL ablation  -Further mgmt per primary team      2054 Addendum:  -Please increase Sotalol dose to 120mg BID and continue to obtain EKG 2hrs post dose   -D/w primary team      [id number] 61 yo M with a FH of CAD and a PMH of HTN, total thyroidectomy 2/2 thyroid CA 2013, hypothyroidism, recent dx of segmental colitis a/w diverticulitis 06/2020, mild, yet chronic GONZALEZ, fall of unclear etiology (mech vs syncope) in 04/2019, upper airway nerve stimulator for NINOSKA implanted 08/2019, abdominal 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 Required pressor support, IVF resuscitation, & 5d abx for sepsis; source remains unclear. Hosp course further c/b rapid AFL w/ variable V conduction & brief conversion to NSR, now w/ subclinical hematuria, B/L renal lesions, and left lobe liver cyst (3x2cm) on chest/abd CTA. 2D TTE on 07/15/20 with mildly dilated LA, mild TR, mild-mod MR, severe reversible GIIIDD, RVE with normal RVSF and preserved LVEF (65%), Febrile 7/19. CHA2 DS2 VASc, 1:    #AFL  -Afebrile  -Continue Eliquis 5 BID  -Continue Sotalol 80mg PO Q12, obtain ekg 2hrs post each dose to assess QTc goal <500ms  -F/u abd US and renal US  -Will f/u OP with EP attending MD Mari for eventual elective AFL ablation  -Further mgmt per primary team      1912 Addendum:  -Please increase Sotalol dose to 120mg BID and continue to obtain EKG 2hrs post dose  -Keep K>4 Mg <2   -D/w primary team      [id number] 59 yo M with a FH of CAD and a PMH of HTN, total thyroidectomy 2/2 thyroid CA 2013, hypothyroidism, recent dx of segmental colitis a/w diverticulitis 06/2020, mild, yet chronic GONZALEZ, fall of unclear etiology (mech vs syncope) in 04/2019, upper airway nerve stimulator for NINOSKA implanted 08/2019, abdominal 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 Required pressor support, IVF resuscitation, & 5d abx for sepsis; source remains unclear. Hosp course further c/b rapid AFL w/ variable V conduction & brief conversion to NSR, now w/ subclinical hematuria, B/L renal lesions, and left lobe liver cyst (3x2cm) on chest/abd CTA. 2D TTE on 07/15/20 with mildly dilated LA, mild TR, mild-mod MR, severe reversible GIIIDD, RVE with normal RVSF and preserved LVEF (65%), Febrile 7/19. CHA2 DS2 VASc, 1:    #AFL  -Afebrile  -Continue Eliquis 5 BID  -Continue Sotalol 80mg PO Q12, obtain ekg 2hrs post each dose to assess QTc goal <500ms  -F/u abd US and renal US  -Will f/u OP with EP attending MD Barnes for eventual elective AFL ablation  -Further mgmt per primary team      [id number] Addendum:  -Please increase Sotalol dose to 120mg BID and continue to obtain EKG 2hrs post dose  -Keep K>4 Mg <2   -D/w primary team      [id number]

## 2020-07-21 NOTE — PROGRESS NOTE ADULT - SUBJECTIVE AND OBJECTIVE BOX
Chief Complaint: Weakness    Re-consulted for endoscopic evaluation due to history of intestinal TB    Interval Events:   - Patient with persistent fevers throughout hospitalization. Also with ~ 20 lb weight loss over the past few months.  - Reports history of intestinal TB     Allergies:  sulfa drugs (Anaphylaxis)  sulfa drugs (Seizure)    MEDICATIONS  (STANDING):  apixaban 5 milliGRAM(s) Oral two times a day  atorvastatin 80 milliGRAM(s) Oral at bedtime  levothyroxine 175 MICROGram(s) Oral <User Schedule>  levothyroxine 88 MICROGram(s) Oral <User Schedule>  mesalamine ER (24-Hour) Capsule 1.5 Gram(s) Oral daily  pantoprazole    Tablet 40 milliGRAM(s) Oral before breakfast  sotalol 80 milliGRAM(s) Oral every 12 hours  tamsulosin 0.4 milliGRAM(s) Oral daily    MEDICATIONS  (PRN):  acetaminophen   Tablet .. 650 milliGRAM(s) Oral every 6 hours PRN Mild Pain (1 - 3)  aluminum hydroxide/magnesium hydroxide/simethicone Suspension 30 milliLiter(s) Oral every 4 hours PRN Dyspepsia  ondansetron Injectable 4 milliGRAM(s) IV Push every 4 hours PRN Nausea and/or Vomiting  oxyCODONE    IR 5 milliGRAM(s) Oral every 4 hours PRN Severe Pain (7 - 10)    PMHX/PSHX:  HTN (hypertension    ROS:     General:  No wt loss, + fevers, chills, night sweats, fatigue,   Eyes:  Good vision, no reported pain  ENT:  No sore throat, pain, runny nose, dysphagia  CV:  No pain, palpitations, hypo/hypertension  Pulm:  No dyspnea, cough, tachypnea, wheezing  GI:  No pain, No nausea, No vomiting, No diarrhea, No constipation, No weight loss, No fever, No pruritis, No rectal bleeding, No tarry stools, No dysphagia  :  No pain, bleeding, incontinence, nocturia  Muscle:  No pain, weakness  Neuro:  No weakness, tingling, memory problems  Psych:  No fatigue, insomnia, mood problems, depression  Endocrine:  No polyuria, polydipsia, cold/heat intolerance  Heme:  No petechiae, ecchymosis, easy bruisability  Skin:  No rash, tattoos, scars, edema    PHYSICAL EXAM:   Vital Signs:  Vital Signs Last 24 Hrs  T(C): 36.8 (2020 04:17), Max: 37.1 (2020 11:48)  T(F): 98.2 (2020 04:17), Max: 98.8 (2020 11:48)  HR: 106 (2020 04:17) (72 - 112)  BP: 124/78 (2020 04:17) (122/83 - 137/89)  BP(mean): --  RR: 18 (2020 04:17) (18 - 18)  SpO2: 92% (2020 04:17) (92% - 94%)  Daily Weight in k.6 (2020 04:17)    GENERAL:  No acute distress  HEENT:  Normocephalic/atraumatic,  no scleral icterus  CHEST:  Normal effort, no accessory muscle use  HEART:  Regular rate and rhythm, no murmurs/rubs/gallops  ABDOMEN:  Soft, non-tender, non-distended, normoactive bowel sounds  EXTREMITIES:  No cyanosis, clubbing, or edema  SKIN:  No rash/erythema/ecchymoses  NEURO:  Alert and oriented x 3, no asterixis    LABS:                        11.8   9.91  )-----------( 287      ( 2020 07:02 )             36.8     07-20    137  |  100  |  5<L>  ----------------------------<  91  3.6   |  28  |  1.02    Ca    8.8      2020 07:02    Urinalysis Basic - ( 2020 10:56 )    Color: Light Yellow / Appearance: Clear / S.010 / pH: x  Gluc: x / Ketone: Negative  / Bili: Negative / Urobili: Negative   Blood: x / Protein: Negative / Nitrite: Negative   Leuk Esterase: Negative / RBC: 8 /hpf / WBC 2 /HPF   Sq Epi: x / Non Sq Epi: 0 /hpf / Bacteria: Negative    Imaging:    Reviewed

## 2020-07-21 NOTE — CHART NOTE - NSCHARTNOTEFT_GEN_A_CORE
Nutrition Follow Up Note  Patient seen for malnutrition follow-up.     Chart reviewed, events noted. Pt admitted with septic shock, in MICU, s/p pressors now in telemetry unit. Pt with GIB, no active bleeding at this time. Per GI, "Fevers: Source unclear and with history of intestinal TB and reported weight loss. Concern for re-activation of TB per ID... Can consider endoscopic evaluation pending results of WBC scan." Team also suggests pt may have IBD. Noted pt with hx of diverticulitis flares for many years.    Source of information: Pt, EMR    Diet :  Clear liquids (), day 4    Patient reports no appetite, feeling full quickly even on clear liquid diet. He reports tolerating it very well, noted pt had loose stool yesterday. He has not been taking promote or jello, mostly only taking broth and water. Encouraged pt to take Promote and hi-protein jello, as these are the main sources of protein on his tray. Pt is agreeable to take both.    Pt with questions about what diet to follow at home and he states he also plans to see an RD when he is discharged. Discussed use of a low-fiber diet when experiencing acute symptoms of flare-up. When resolved, instructed the pt to slowly introduce fiber back into his diet and to resume and fibrous diet. The pt's diet is primarily fruits, vegetables, lean protein and fish. He strictly adheres to a healthful diet. Encouraged pt to keep a food log to track his symptoms and associations with various foods.     PO intake : Poor. pt is not meeting estimated energy needs.    Source for PO intake: Pt    Daily Weight in k.6 (-21), Weight in k.6 (-20), Weight in k.5 (-19), Weight in k.4 (-16)  % Weight Change    Pertinent Medications: MEDICATIONS  (STANDING):  apixaban 5 milliGRAM(s) Oral two times a day  atorvastatin 80 milliGRAM(s) Oral at bedtime  levothyroxine 175 MICROGram(s) Oral <User Schedule>  levothyroxine 88 MICROGram(s) Oral <User Schedule>  mesalamine ER (24-Hour) Capsule 1.5 Gram(s) Oral daily  pantoprazole    Tablet 40 milliGRAM(s) Oral before breakfast  sotalol 80 milliGRAM(s) Oral every 12 hours  tamsulosin 0.4 milliGRAM(s) Oral daily    MEDICATIONS  (PRN):  acetaminophen   Tablet .. 650 milliGRAM(s) Oral every 6 hours PRN Mild Pain (1 - 3)  aluminum hydroxide/magnesium hydroxide/simethicone Suspension 30 milliLiter(s) Oral every 4 hours PRN Dyspepsia  ondansetron Injectable 4 milliGRAM(s) IV Push every 4 hours PRN Nausea and/or Vomiting  oxyCODONE    IR 5 milliGRAM(s) Oral every 4 hours PRN Severe Pain (7 - 10)    Pertinent Labs:  @ 07:13: Na 138, BUN 6<L>, Cr 0.91, <H>, K+ 3.8, Phos --, Mg --, Alk Phos 64, ALT/SGPT <5<L>, AST/SGOT 16, HbA1c --    Skin per nursing documentation: no pressure injuries  Edema: none    Estimated Needs:   [x] no change since previous assessment  [ ] recalculated:     Previous Nutrition Diagnosis: severe malnutrition  Nutrition Diagnosis is: ongoing    New Nutrition Diagnosis: none at this time    Interventions: Nutrition supplementation while on clear liquid diet - Promote and hi-protein jello. TO be further addressed when diet is advanced.    Recommend  1) Continue clear liquid diet as deemed necessary by the team. When appropriate, please advance diet to low-fiber with Promote x3 daily.  2) Pt is asking for tea with meals, will consult team due to no caffeine restriction.   3) RD remains available for diet education reinforcement as needed.    Monitoring and Evaluation:   Continue to monitor Nutritional intake, Tolerance to diet prescription, weights, labs, skin integrity    Yulissa Batista RD, CDN  Pager [phone]  RD remains available upon request and will follow up per protocol Nutrition Follow Up Note  Patient seen for malnutrition follow-up.     Chart reviewed, events noted. Pt admitted with septic shock, in MICU, s/p pressors now in telemetry unit. Pt with GIB, no active bleeding at this time. Per GI, "Fevers: Source unclear and with history of intestinal TB and reported weight loss. Concern for re-activation of TB per ID... Can consider endoscopic evaluation pending results of WBC scan." Team also suggests pt may have IBD. Noted pt with hx of diverticulitis flares for many years.    Source of information: Pt, EMR    Diet :  Clear liquids (), day 4    Patient reports no appetite, feeling full quickly even on clear liquid diet. He reports tolerating it very well, noted pt had loose stool yesterday. He has not been taking promote or jello, mostly only taking broth and water. Encouraged pt to take Promote and hi-protein jello, as these are the main sources of protein on his tray. Pt is agreeable to take both.    Pt with questions about what diet to follow at home and he states he also plans to see an RD when he is discharged. Discussed use of a low-fiber diet when experiencing acute symptoms of flare-up. When resolved, instructed the pt to slowly introduce fiber back into his diet and to resume and fibrous diet. The pt's diet is primarily fruits, vegetables, lean protein and fish. He strictly adheres to a healthful diet. Encouraged pt to keep a food log to track his symptoms and associations with various foods.     PO intake : Poor. pt is not meeting estimated energy needs.    Source for PO intake: Pt    Daily Weight in k.6 (-21), Weight in k.6 (-20), Weight in k.5 (-19), Weight in k.4 (-16)  % Weight Change    Pertinent Medications: MEDICATIONS  (STANDING):  apixaban 5 milliGRAM(s) Oral two times a day  atorvastatin 80 milliGRAM(s) Oral at bedtime  levothyroxine 175 MICROGram(s) Oral <User Schedule>  levothyroxine 88 MICROGram(s) Oral <User Schedule>  mesalamine ER (24-Hour) Capsule 1.5 Gram(s) Oral daily  pantoprazole    Tablet 40 milliGRAM(s) Oral before breakfast  sotalol 80 milliGRAM(s) Oral every 12 hours  tamsulosin 0.4 milliGRAM(s) Oral daily    MEDICATIONS  (PRN):  acetaminophen   Tablet .. 650 milliGRAM(s) Oral every 6 hours PRN Mild Pain (1 - 3)  aluminum hydroxide/magnesium hydroxide/simethicone Suspension 30 milliLiter(s) Oral every 4 hours PRN Dyspepsia  ondansetron Injectable 4 milliGRAM(s) IV Push every 4 hours PRN Nausea and/or Vomiting  oxyCODONE    IR 5 milliGRAM(s) Oral every 4 hours PRN Severe Pain (7 - 10)    Pertinent Labs:  @ 07:13: Na 138, BUN 6<L>, Cr 0.91, <H>, K+ 3.8, Phos --, Mg --, Alk Phos 64, ALT/SGPT <5<L>, AST/SGOT 16, HbA1c --    Skin per nursing documentation: no pressure injuries  Edema: none    Estimated Needs:   [x] no change since previous assessment  [ ] recalculated:     Previous Nutrition Diagnosis: severe malnutrition  Nutrition Diagnosis is: ongoing    New Nutrition Diagnosis: none at this time    Interventions: Nutrition supplementation while on clear liquid diet - Promote and hi-protein jello. TO be further addressed when diet is advanced.    Recommend  1) Continue clear liquid diet as deemed necessary by the team. When appropriate, please advance diet to low-fiber with Promote x3 daily.  2) Please add Multivitamin daily   3) Pt is asking for tea with meals, will consult team due to no caffeine restriction.   4) RD remains available for diet education reinforcement as needed.    Monitoring and Evaluation:   Continue to monitor Nutritional intake, Tolerance to diet prescription, weights, labs, skin integrity    Yulissa Batista RD, CDN  Pager [phone]  RD remains available upon request and will follow up per protocol

## 2020-07-21 NOTE — PROGRESS NOTE ADULT - ASSESSMENT
61 y/o M w/ hx of HTN, HLD, hypothyroidism s/p total thyroidectomy (6/4/13) 2/2 thyroid cancer, diverticulitis (2010), abdominal TB ('93) s/p INH who presents with fevers and progressively worsening LLQ pain, initially presented with hypotension and with persistent fevers.    # Fevers: Source unclear and with history of intestinal TB and reported weight loss. Concern for re-activation of TB per ID.    Recommendations:  - F/U ID recommendations  - Can consider endoscopic evaluation pending results of WBC scan and review of recent colonoscopy at [facility]  - Please obtain recent colonoscopy records from [facility]  - Continue home dose of mesalamine  - Antibiotics per primary team  - Rest of care per primary team    Jorge A Addison MD  Gastroenterology Fellow  Please contact via [employer] Teams    Please call answering service for on-call GI fellow ([phone]) after 5pm and before 8am, and on weekends. 59 y/o M w/ hx of HTN, HLD, hypothyroidism s/p total thyroidectomy (6/4/13) 2/2 thyroid cancer, diverticulitis (2010), abdominal TB ('93) s/p INH who presents with fevers and progressively worsening LLQ pain, initially presented with hypotension and with persistent fevers.    # Fevers: Source unclear and with history of intestinal TB and reported weight loss. Concern for re-activation of TB per ID. Colonoscopy record from [facility] available on patient's phone - biopsies from terminal ileum and throughout colon with normal limits.     Recommendations:  - F/U ID recommendations  - Can consider endoscopic evaluation pending results of WBC scan  - Antibiotics per primary team  - Rest of care per primary team    Jorge A Addison MD  Gastroenterology Fellow  Please contact via [employer] Teams    Please call answering service for on-call GI fellow ([phone]) after 5pm and before 8am, and on weekends.

## 2020-07-21 NOTE — PROGRESS NOTE ADULT - PROBLEM SELECTOR PLAN 2
- new onset aflutter in the setting of septic shock  - EP following - recommended d/c lopressor and start sotalol  will need post dose EKG to monitor QTc  - c/w Eliquis 5mg po q12  - c/w Tele monitoring

## 2020-07-21 NOTE — PROGRESS NOTE ADULT - ASSESSMENT
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    no leucocytosis, CT with no obvious source, All cultures negative to date.   ? etiology of fever, elevated markers of inflammation, I am not sure if SCAD in itself can cause fevers.   Pt with also night sweats and weight loss, s/p therapy for latent TB not active TB.       Plan:   perform repeat blood cx if spikes again  NM WBC scan to see if any other etiology of fever, pending   michele, rf, anca in lab   s/p gi follow up, further work pending NM scan, pt had colonoscopy and biopsy done, the results are negative with no inflammation.   work up for complex cysts based on the pending tests.   quanatiferon gold, though pt with known TB in lab.   observe off abx

## 2020-07-22 LAB
ANA TITR SER: NEGATIVE — SIGNIFICANT CHANGE UP
ANION GAP SERPL CALC-SCNC: 15 MMOL/L — SIGNIFICANT CHANGE UP (ref 5–17)
AUTO DIFF PNL BLD: NEGATIVE — SIGNIFICANT CHANGE UP
BASOPHILS # BLD AUTO: 0.06 K/UL — SIGNIFICANT CHANGE UP (ref 0–0.2)
BASOPHILS NFR BLD AUTO: 0.7 % — SIGNIFICANT CHANGE UP (ref 0–2)
BUN SERPL-MCNC: 8 MG/DL — SIGNIFICANT CHANGE UP (ref 7–23)
C-ANCA SER-ACNC: NEGATIVE — SIGNIFICANT CHANGE UP
CALCIUM SERPL-MCNC: 9.1 MG/DL — SIGNIFICANT CHANGE UP (ref 8.4–10.5)
CHLORIDE SERPL-SCNC: 101 MMOL/L — SIGNIFICANT CHANGE UP (ref 96–108)
CO2 SERPL-SCNC: 24 MMOL/L — SIGNIFICANT CHANGE UP (ref 22–31)
CREAT SERPL-MCNC: 0.98 MG/DL — SIGNIFICANT CHANGE UP (ref 0.5–1.3)
EOSINOPHIL # BLD AUTO: 0.41 K/UL — SIGNIFICANT CHANGE UP (ref 0–0.5)
EOSINOPHIL NFR BLD AUTO: 4.7 % — SIGNIFICANT CHANGE UP (ref 0–6)
GAMMA INTERFERON BACKGROUND BLD IA-ACNC: 0.01 IU/ML — SIGNIFICANT CHANGE UP
GLUCOSE SERPL-MCNC: 95 MG/DL — SIGNIFICANT CHANGE UP (ref 70–99)
HCT VFR BLD CALC: 39.6 % — SIGNIFICANT CHANGE UP (ref 39–50)
HGB BLD-MCNC: 12.4 G/DL — LOW (ref 13–17)
IMM GRANULOCYTES NFR BLD AUTO: 0.8 % — SIGNIFICANT CHANGE UP (ref 0–1.5)
LYMPHOCYTES # BLD AUTO: 1.48 K/UL — SIGNIFICANT CHANGE UP (ref 1–3.3)
LYMPHOCYTES # BLD AUTO: 16.9 % — SIGNIFICANT CHANGE UP (ref 13–44)
M TB IFN-G BLD-IMP: ABNORMAL
M TB IFN-G CD4+ BCKGRND COR BLD-ACNC: 0.01 IU/ML — SIGNIFICANT CHANGE UP
M TB IFN-G CD4+CD8+ BCKGRND COR BLD-ACNC: 0.01 IU/ML — SIGNIFICANT CHANGE UP
MAGNESIUM SERPL-MCNC: 2 MG/DL — SIGNIFICANT CHANGE UP (ref 1.6–2.6)
MCHC RBC-ENTMCNC: 29.7 PG — SIGNIFICANT CHANGE UP (ref 27–34)
MCHC RBC-ENTMCNC: 31.3 GM/DL — LOW (ref 32–36)
MCV RBC AUTO: 95 FL — SIGNIFICANT CHANGE UP (ref 80–100)
MONOCYTES # BLD AUTO: 0.8 K/UL — SIGNIFICANT CHANGE UP (ref 0–0.9)
MONOCYTES NFR BLD AUTO: 9.1 % — SIGNIFICANT CHANGE UP (ref 2–14)
NEUTROPHILS # BLD AUTO: 5.96 K/UL — SIGNIFICANT CHANGE UP (ref 1.8–7.4)
NEUTROPHILS NFR BLD AUTO: 67.8 % — SIGNIFICANT CHANGE UP (ref 43–77)
NRBC # BLD: 0 /100 WBCS — SIGNIFICANT CHANGE UP (ref 0–0)
P-ANCA SER-ACNC: NEGATIVE — SIGNIFICANT CHANGE UP
PHOSPHATE SERPL-MCNC: 3.7 MG/DL — SIGNIFICANT CHANGE UP (ref 2.5–4.5)
PLATELET # BLD AUTO: 368 K/UL — SIGNIFICANT CHANGE UP (ref 150–400)
POTASSIUM SERPL-MCNC: 3.5 MMOL/L — SIGNIFICANT CHANGE UP (ref 3.5–5.3)
POTASSIUM SERPL-SCNC: 3.5 MMOL/L — SIGNIFICANT CHANGE UP (ref 3.5–5.3)
PROCALCITONIN SERPL-MCNC: 0.08 NG/ML — SIGNIFICANT CHANGE UP (ref 0.02–0.1)
QUANT TB PLUS MITOGEN MINUS NIL: 0.06 IU/ML — SIGNIFICANT CHANGE UP
RBC # BLD: 4.17 M/UL — LOW (ref 4.2–5.8)
RBC # FLD: 12 % — SIGNIFICANT CHANGE UP (ref 10.3–14.5)
SODIUM SERPL-SCNC: 140 MMOL/L — SIGNIFICANT CHANGE UP (ref 135–145)
WBC # BLD: 8.78 K/UL — SIGNIFICANT CHANGE UP (ref 3.8–10.5)
WBC # FLD AUTO: 8.78 K/UL — SIGNIFICANT CHANGE UP (ref 3.8–10.5)

## 2020-07-22 PROCEDURE — 93010 ELECTROCARDIOGRAM REPORT: CPT

## 2020-07-22 PROCEDURE — 99231 SBSQ HOSP IP/OBS SF/LOW 25: CPT

## 2020-07-22 PROCEDURE — 78802 RP LOCLZJ TUM WHBDY 1 D IMG: CPT | Mod: 26

## 2020-07-22 PROCEDURE — 99233 SBSQ HOSP IP/OBS HIGH 50: CPT

## 2020-07-22 RX ORDER — LANOLIN ALCOHOL/MO/W.PET/CERES
3 CREAM (GRAM) TOPICAL ONCE
Refills: 0 | Status: COMPLETED | OUTPATIENT
Start: 2020-07-22 | End: 2020-07-23

## 2020-07-22 RX ADMIN — APIXABAN 5 MILLIGRAM(S): 2.5 TABLET, FILM COATED ORAL at 17:04

## 2020-07-22 RX ADMIN — Medication 120 MILLIGRAM(S): at 22:14

## 2020-07-22 RX ADMIN — Medication 175 MICROGRAM(S): at 05:37

## 2020-07-22 RX ADMIN — OXYCODONE HYDROCHLORIDE 5 MILLIGRAM(S): 5 TABLET ORAL at 17:05

## 2020-07-22 RX ADMIN — Medication 1.5 GRAM(S): at 12:21

## 2020-07-22 RX ADMIN — Medication 650 MILLIGRAM(S): at 23:00

## 2020-07-22 RX ADMIN — APIXABAN 5 MILLIGRAM(S): 2.5 TABLET, FILM COATED ORAL at 05:37

## 2020-07-22 RX ADMIN — Medication 650 MILLIGRAM(S): at 22:28

## 2020-07-22 RX ADMIN — Medication 120 MILLIGRAM(S): at 12:20

## 2020-07-22 RX ADMIN — PANTOPRAZOLE SODIUM 40 MILLIGRAM(S): 20 TABLET, DELAYED RELEASE ORAL at 05:37

## 2020-07-22 RX ADMIN — TAMSULOSIN HYDROCHLORIDE 0.4 MILLIGRAM(S): 0.4 CAPSULE ORAL at 12:21

## 2020-07-22 RX ADMIN — Medication 100 MILLIGRAM(S): at 22:27

## 2020-07-22 RX ADMIN — OXYCODONE HYDROCHLORIDE 5 MILLIGRAM(S): 5 TABLET ORAL at 17:35

## 2020-07-22 RX ADMIN — ATORVASTATIN CALCIUM 80 MILLIGRAM(S): 80 TABLET, FILM COATED ORAL at 22:14

## 2020-07-22 NOTE — PROGRESS NOTE ADULT - ASSESSMENT
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 Required pressor support, IVF resuscitation, & 5d abx for sepsis; source remains unclear. Hosp course further c/b rapid AFL w/ variable V conduction & brief conversion to NSR, now w/ subclinical hematuria, B/L renal lesions, and left lobe liver cyst (3x2cm) on chest/abd CTA. 2D TTE on 07/15/20 with mildly dilated LA, mild TR, mild-mod MR, severe reversible GIIIDD, RVE with normal RVSF and preserved LVEF (65%), Febrile 7/19. CHA2 DS2 VASc, 1:    #AFL  -Tele: -120 overnight.   -Scheduled for DCCV today, deferred secondary to chemical conversion on Sotalol to SR rates 70-80 bpm at around 1130am while patient was down in NM WBC scan.   -Febrile overnight 38.2, no leukocytosis, CT with no obvious source, all cultures w/NGTD. S/p colonoscopy and biopsy revealing no inflammation  -Continue Eliquis 5 BID  -Continue Sotalol 120mg BID, obtain ekg 2hrs post each dose to assess QTc. goal<500ms  -K>4 Mg >2  -Further management per primary team    [id number]

## 2020-07-22 NOTE — PROGRESS NOTE ADULT - SUBJECTIVE AND OBJECTIVE BOX
24H hour events:   Patient in AFL overnight, converted to SR while in NM scan, not captured on tele. Denies chest pain, palpitations, dizziness, lightheadedness, and shortness of breath.     MEDICATIONS:  apixaban 5 milliGRAM(s) Oral two times a day  sotalol 120 milliGRAM(s) Oral every 12 hours  tamsulosin 0.4 milliGRAM(s) Oral daily  guaiFENesin   Syrup  (Sugar-Free) 100 milliGRAM(s) Oral every 6 hours PRN  acetaminophen   Tablet .. 650 milliGRAM(s) Oral every 6 hours PRN  ondansetron Injectable 4 milliGRAM(s) IV Push every 4 hours PRN  oxyCODONE    IR 5 milliGRAM(s) Oral every 4 hours PRN  aluminum hydroxide/magnesium hydroxide/simethicone Suspension 30 milliLiter(s) Oral every 4 hours PRN  mesalamine ER (24-Hour) Capsule 1.5 Gram(s) Oral daily  pantoprazole    Tablet 40 milliGRAM(s) Oral before breakfast  atorvastatin 80 milliGRAM(s) Oral at bedtime  levothyroxine 175 MICROGram(s) Oral <User Schedule>  levothyroxine 88 MICROGram(s) Oral <User Schedule>        REVIEW OF SYSTEMS:  Complete 10point ROS negative.    PHYSICAL EXAM:  T(C): 37.2 (20 @ 12:02), Max: 38.2 (20 @ 22:05)  HR: 76 (20 @ 12:02) (60 - 116)  BP: 118/84 (20 @ 12:02) (92/60 - 128/83)  RR: 18 (20 @ 12:02) (18 - 18)  SpO2: 95% (20 @ 12:02) (93% - 96%)  Wt(kg): --  I&O's Summary    2020 07:01  -  2020 07:00  --------------------------------------------------------  IN: 290 mL / OUT: 0 mL / NET: 290 mL        Appearance: Normal	  Cardiovascular: Normal S1 S2, No JVD, No murmurs, No edema  Respiratory: Lungs clear to auscultation	  Psychiatry: A & O x 3, Mood & affect appropriate  Skin: No rashes, No ecchymoses, No cyanosis	  Extremities: Normal range of motion, No clubbing, cyanosis or edema  Vascular: Peripheral pulses palpable 2+ bilaterally        LABS:	 	    CBC Full  -  ( 2020 06:58 )  WBC Count : 8.78 K/uL  Hemoglobin : 12.4 g/dL  Hematocrit : 39.6 %  Platelet Count - Automated : 368 K/uL  Mean Cell Volume : 95.0 fl  Mean Cell Hemoglobin : 29.7 pg  Mean Cell Hemoglobin Concentration : 31.3 gm/dL  Auto Neutrophil # : 5.96 K/uL  Auto Lymphocyte # : 1.48 K/uL  Auto Monocyte # : 0.80 K/uL  Auto Eosinophil # : 0.41 K/uL  Auto Basophil # : 0.06 K/uL  Auto Neutrophil % : 67.8 %  Auto Lymphocyte % : 16.9 %  Auto Monocyte % : 9.1 %  Auto Eosinophil % : 4.7 %  Auto Basophil % : 0.7 %        140  |  101  |  8   ----------------------------<  95  3.5   |  24  |  0.98      138  |  102  |  6<L>  ----------------------------<  101<H>  3.8   |  25  |  0.91    Ca    9.1      2020 06:57  Ca    8.8      2020 07:13  Phos  3.7       Mg     2.0         TPro  6.0  /  Alb  3.0<L>  /  TBili  0.8  /  DBili  x   /  AST  16  /  ALT  <5<L>  /  AlkPhos  64  -          TELEMETRY: -120 bpm overnight. SR 70-80bpm  	    EC/22 @11:49 SR 76 bpm 	    < from: US Abdomen Complete (20 @ 17:23) >  EXAM:  US ABDOMEN COMPLETE                        PROCEDURE DATE:  2020      INTERPRETATION:  CLINICAL INFORMATION: Fevers and kidney lesions in CT abdomen. Liver cyst.  COMPARISON: CT dated 2020.  TECHNIQUE: Sonography of the abdomen and kidneys.    FINDINGS:    Liver: Right lobe measures 12.9 cm in length. There is a 1.9 x 1.3 x 1.7 cm cyst noted within the reyna hepatis region.  Bile ducts: Normal caliber. Common bile duct measures 4 mm.   Gallbladder: Within normal limits.      Pancreas: Poorly visualized.  Spleen: 10.5 cm. Within normal limits.  Right kidney: 11.1 cm. No hydronephrosis. There is a 1.7 x 1.6 x 1.4 cm cyst with septation noted at the mid pole.  Left kidney: 11.5 cm.  No hydronephrosis. There are 2 exophytic cysts noted, with a 1.3 x 1.3 x 1.7 cm cyst and a 2.3 x 2.1 x 2 cm cyst at the lower pole.  Ascites: None.  Aorta and IVC: Visualized portions are within normal limits.    IMPRESSION:     Cyst within the reyna hepatis region noted as above. Cyst at the left lobe of the liver as appreciated on CT cannot be appreciated on ultrasound.    There is a 1.7 cm cyst at the midpole of the right kidney with septation(s)/cannot exclude irregularity of the wall. This complex cyst can be further evaluated with cross-sectional imaging (CT or MR with intravenous contrast using dedicated renal protocol). This complex cyst corresponds with indeterminate lesion mentioned on CT examination.  Left renal cysts as noted above, one of which corresponds with indeterminate hypodensity mentioned on CT examination..     No hydronephrosis.      JAYNA CASTELLANOS M.D., RADIOLOGY RESIDENT  This document has been electronically signed.  MARINA POLO M.D., ATTENDING RADIOLOGIST  This document has been electronically signed. 2020  5:20PM     < end of copied text >      < from: US Abdomen Complete (20 @ 17:23) >  EXAM:  US ABDOMEN COMPLETE                          PROCEDURE DATE:  2020      INTERPRETATION:  CLINICAL INFORMATION: Fevers and kidney lesions in CT abdomen. Liver cyst.    COMPARISON: CT dated 2020.    TECHNIQUE: Sonography of the abdomen and kidneys.    FINDINGS:    Liver: Right lobe measures 12.9 cm in length. There is a 1.9 x 1.3 x 1.7 cm cyst noted within the reyna hepatis region.  Bile ducts: Normal caliber. Common bile duct measures 4 mm.   Gallbladder: Within normal limits.      Pancreas: Poorly visualized.  Spleen: 10.5 cm. Within normal limits.  Right kidney: 11.1 cm. No hydronephrosis. There is a 1.7 x 1.6 x 1.4 cm cyst with septation noted at the mid pole.  Left kidney: 11.5 cm.  No hydronephrosis. There are 2 exophytic cysts noted, with a 1.3 x 1.3 x 1.7 cm cyst and a 2.3 x 2.1 x 2 cm cyst at the lower pole.  Ascites: None.  Aorta and IVC: Visualized portions are within normal limits.    IMPRESSION:     Cyst within the reyna hepatis region noted as above. Cyst at the left lobe of the liver as appreciated on CT cannot be appreciated on ultrasound.    There is a 1.7 cm cyst at the midpole of the right kidney with septation(s)/cannot exclude irregularity of the wall. This complex cyst can be further evaluated with cross-sectional imaging (CT or MR with intravenous contrast using dedicated renal protocol). This complex cyst corresponds with indeterminate lesion mentioned on CT examination.  Left renal cysts as noted above, one of which corresponds with indeterminate hypodensity mentioned on CT examination..     No hydronephrosis.    JAYNA CASTELLANOS M.D., RADIOLOGY RESIDENT  This document has been electronically signed.  MARINA POLO M.D., ATTENDING RADIOLOGIST  This document has been electronically signed. 2020  5:20PM      < end of copied text >

## 2020-07-22 NOTE — CONSULT NOTE ADULT - SUBJECTIVE AND OBJECTIVE BOX
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 Pt notes intermittent BRBPR associated with LLQ pain that started 30 days ago. He got a colonoscopy from his GI at [facility] 6/17/20 and was diagnosed with segmental colitis associated with diverticulitis (SCAD) and started on mesalamine and budesonide, no Abx. 5 days ago notes dysuria that was followed by fevers to 102.5 and worsening LLQ pain 3 days ago. This was c/b b/l LBP and GONZALEZ. Last BM 7/13- noted to be very dark.   ED course:  In the ED, he was noted to be in new atrial flutter with rate 100-150, HOTN (70/60). Given 3L NS, 2L LR and started on levo .05.   pertinent labs: WBC 13.33, Trop 202, BNP 3299, lactate 2.5, COVID negative (14 Jul 2020 05:57)  Above reviewed:   Admitted to MICU with septic shock- unclear etiology (GI translocation 2/2 SCAD vs urosepsis), GI and EP consulted. Pt stabilized and transferred out, continues to spike fever. Per pt dx after a + PPD with intestinal TB but received INH monotherapy with doxy for recurrent infection. He started having fevers and night sweats almost 11 days ago. Also endorses 24 Lbs weight loss in last 1-2 months.   Since the dx of SCAD he hasn't felt better, continues to have LLQ abdominal pain, cough on deep breath.       PAST MEDICAL & SURGICAL HISTORY:  HTN (hypertension): on losartan      REVIEW OF SYSTEMS    General: Denies any chills. + Fevers and night sweats     Skin: No rash  	  Ophthalmologic: Denies any discharge, redness.  	  ENT: No nasal congestion or throat pain.     Respiratory and Thorax: dry cough, sputum. resolved shortness of breath.  	  Cardiovascular: No chest pain,     Gastrointestinal: per hpi     Genitourinary: No dysuria, No flank pain.    Musculoskeletal: No joint swelling     Neurological: No extremity weakness.    Extremities: No swelling     Endocrine: No polyuria or polydipsia    Allergic/Immunologic: No hives       Social history:  lives with family, no smoker.       FAMILY HISTORY:  No pertinent family hx of DM in first degree relatives.       Allergies  sulfa drugs (Anaphylaxis)  sulfa drugs (Seizure)      Antimicrobials:      Vital Signs Last 24 Hrs  T(C): 37.1 (20 Jul 2020 11:48), Max: 38.5 (19 Jul 2020 21:05)  T(F): 98.8 (20 Jul 2020 11:48), Max: 101.3 (19 Jul 2020 21:05)  HR: 112 (20 Jul 2020 17:36) (74 - 112)  BP: 137/89 (20 Jul 2020 17:36) (89/47 - 141/84)  BP(mean): --  RR: 18 (20 Jul 2020 11:48) (18 - 18)  SpO2: 94% (20 Jul 2020 11:48) (94% - 98%)      PHYSICAL EXAM: Patient in no acute distress.    Constitutional: Comfortable. Awake and alert    Eyes: No discharge or conjunctival injection    ENT: No thrush. No pharyngeal exudate or erythema.    Neck: Supple,    Respiratory: + air entry bilaterally.    Cardiovascular: S1 S2 wnl, No murmurs, irregular     Gastrointestinal: Soft BS(+) LLQ discomfort, non distended.    Genitourinary: No CVA tenderness     Extremities: No edema.    Vascular: peripheral pulses felt    Neurological: AAO X 3. No grossly focal deficits.    Skin: No rash     Musculoskeletal: No joint swelling.    Psychiatric: Affect normal.                              11.8   9.91  )-----------( 287      ( 20 Jul 2020 07:02 )             36.8       07-20    137  |  100  |  5<L>  ----------------------------<  91  3.6   |  28  |  1.02    Ca    8.8      20 Jul 2020 07:02  Phos  3.6     07-19  Mg     2.1     07-19      Sedimentation Rate, Erythrocyte (07.20.20 @ 09:18)    Sedimentation Rate, Erythrocyte: 52 mm/hr      C-Reactive Protein, Serum (07.20.20 @ 07:02)    C-Reactive Protein, Serum: 11.78 mg/dL      Culture - Urine (collected 19 Jul 2020 14:03)  Source: .Urine Clean Catch (Midstream)  Final Report (20 Jul 2020 10:27):    No growth    Culture - Blood (collected 18 Jul 2020 01:19)  Source: .Blood Blood  Preliminary Report (19 Jul 2020 04:56):    No growth to date.    Culture - Blood (collected 18 Jul 2020 01:19)  Source: .Blood Blood  Preliminary Report (19 Jul 2020 04:56):    No growth to date.      COVID-19  Antibody - for prior infection screening (07.14.20 @ 10:13)    COVID-19 IgG Antibody Index: <3.80: Diasorin CMIA  Negative    <= 14.99 AU/mL  Positive    >= 15.00 AU/mL AU/mL    COVID-19 IgG Antibody Interpretation: Negative        COVID-19 PCR . (07.13.20 @ 22:53)    COVID-19 PCR: NotDetec        Radiology: Imaging reviewed and visualized personally [ x]    < from: Xray Chest 1 View- PORTABLE-Urgent (07.19.20 @ 09:15) >  IMPRESSION:    Clear lungs. No pleural effusion or pneumothorax.      < from: CT Angio Chest w/ IV Cont (07.14.20 @ 00:29) >  IMPRESSION:     No pulmonary embolus.    Colon diverticulosis without diverticulitis. No bowel obstruction, drainable fluid collection or intraperitoneal free air.    Indeterminate bilateral renal lesions, which can be further characterize on a nonemergent ultrasound.
HPI:  Patient is a 60y Male who presented with    60M with h/o HTN, HLD, hypothyroidism s/p total thyroidectomy, diverticulitis, abdominal TB ('93) s/p INH who presented with c/o  fevers and worsening LLQ pain. Pt was noted to be in new atrial flutter and was hypotensive requiring admission to MICU for pressor support and management of septic shock.  Patient was stabilized and transferred to floor and started on Eliquis.  Urine and blood cultures are negative to date. An abdominal ultrasound identified some renal cysts, and one complex cyst around 1.7cm in size.    Patient states he has seen urologists in the past, last 4 years ago; he doesnt remember who but it was [facility] in [place].  He has been seen for many years because he has had intermittent hematuria for many years.  He said he has been worked up in past with CT scans, cysto and they could not find a source. While inpatient he has had hematuria intermittently since starting Eliquis.  He also has a history of recurrent pyelonephritis and has had prostatitis.  He states when he was admitted he was having bilateral flank pain as well as dysuria and some hesitancy.  He has had some issues with urination in the past including, hesitancy, incomplete emptying and nocturia.  He was recently started on Flomax on this admission.  He does report some improvement with urination with the Flomax.  He gets PSA checked regularly and is always WNL.  He has never smoked cigarettes.          PAST MEDICAL & SURGICAL HISTORY:  HTN (hypertension): on losartan    MEDICATIONS  (STANDING):  apixaban 5 milliGRAM(s) Oral two times a day  atorvastatin 80 milliGRAM(s) Oral at bedtime  levothyroxine 175 MICROGram(s) Oral <User Schedule>  levothyroxine 88 MICROGram(s) Oral <User Schedule>  mesalamine ER (24-Hour) Capsule 1.5 Gram(s) Oral daily  pantoprazole    Tablet 40 milliGRAM(s) Oral before breakfast  sotalol 120 milliGRAM(s) Oral every 12 hours  tamsulosin 0.4 milliGRAM(s) Oral daily    MEDICATIONS  (PRN):  acetaminophen   Tablet .. 650 milliGRAM(s) Oral every 6 hours PRN Mild Pain (1 - 3)  aluminum hydroxide/magnesium hydroxide/simethicone Suspension 30 milliLiter(s) Oral every 4 hours PRN Dyspepsia  guaiFENesin   Syrup  (Sugar-Free) 100 milliGRAM(s) Oral every 6 hours PRN Cough  ondansetron Injectable 4 milliGRAM(s) IV Push every 4 hours PRN Nausea and/or Vomiting  oxyCODONE    IR 5 milliGRAM(s) Oral every 4 hours PRN Severe Pain (7 - 10)    FAMILY HISTORY:    Allergies    sulfa drugs (Anaphylaxis)  sulfa drugs (Seizure)    Intolerances      SOCIAL HISTORY:   Tobacco hx: never     REVIEW OF SYSTEMS: Pertinent positives and negatives as stated in HPI, otherwise negative    Vital signs  T(C): 37.2, Max: 38.2 (07-21 @ 22:05)  HR: 76  BP: 118/84  SpO2: 95%      Physical Exam  Gen: NAD  Pulm: No respiratory distress, no subcostal retractions  CV: RRR, no JVD  Abd: Soft, NT, ND  Back: No CVAT   : Prostate smooth, firm R> L, nontender, no distinct nodules   MSK: No edema present    LABS:          07-22 @ 06:58    WBC 8.78  / Hct 39.6  / SCr --       07-22 @ 06:57    WBC --    / Hct --    / SCr 0.98     07-22    140  |  101  |  8   ----------------------------<  95  3.5   |  24  |  0.98    Ca    9.1      22 Jul 2020 06:57  Phos  3.7     07-22  Mg     2.0     07-22    TPro  6.0  /  Alb  3.0<L>  /  TBili  0.8  /  DBili  x   /  AST  16  /  ALT  <5<L>  /  AlkPhos  64  07-21          Urine Cx:   Culture - Urine (07.19.20 @ 14:03)    Specimen Source: .Urine Clean Catch (Midstream)    Culture Results:   No growth        Blood Cx:  Culture - Blood (07.18.20 @ 01:19)    Specimen Source: .Blood Blood    Culture Results:   No growth to date.    < from: CT Abdomen and Pelvis w/ IV Cont (07.14.20 @ 00:29) >  IMPRESSION:     No pulmonary embolus.    Colon diverticulosis without diverticulitis. No bowel obstruction, drainable fluid collection or intraperitoneal free air.    Indeterminate bilateral renal lesions, which can be further characterize on a nonemergent ultrasound.    Additional findings as described.    < end of copied text >  < from: US Abdomen Complete (07.20.20 @ 17:23) >  IMPRESSION:     Cyst within the reyna hepatis region noted as above. Cyst at the left lobe of the liver as appreciated on CT cannot be appreciated on ultrasound.    There is a 1.7 cm cyst at the midpole of the right kidney with septation(s)/cannot exclude irregularity of the wall. This complex cyst can be further evaluated with cross-sectional imaging (CT or MR with intravenous contrast using dedicated renal protocol). This complex cyst corresponds with indeterminate lesion mentioned on CT examination.  Left renal cysts as noted above, one of which corresponds with indeterminate hypodensity mentioned on CT examination..     No hydronephrosis.    < end of copied text >    RADIOLOGY:
Patient seen and evaluated at bedside    Chief Complaint: bloody stools; sepsis, a flutter    HPI:  59 yo M with pmhx of hypothyroidism, HTN, HLD, diverticulosis presents with 2 days of fevers, chills, ab pain, diarrhea, bloody BM. Patient presented to the Ed w/ sepsis 2/2 diverticulitis and bloody BM likely 2/2 diverticulosis. Patient found to be tachy to 120's and hypotensive to 70's - 80's. Received 4L IVF, started on standing fluids and lopressor. S/p zosyn.     PMHx: hypothyroidism, HTN, HLD    Allergies:  sulfa drugs (Seizure)    Current Medications:   norepinephrine Infusion 0.05 MICROgram(s)/kG/Min IV Continuous <Continuous>  sodium chloride 0.9%. 1000 milliLiter(s) IV Continuous <Continuous>    REVIEW OF SYSTEMS:  Constitutional:     [ ] negative [x] fevers [x] chills [ ] weight loss [ ] weight gain  HEENT:                  [x] negative [ ] dry eyes [ ] eye irritation [ ] postnasal drip [ ] nasal congestion  CV:                         [x] negative  [ ] chest pain [ ] orthopnea [ ] palpitations [ ] murmur  Resp:                     [ ] negative [ ] cough [x] shortness of breath [ ] dyspnea [ ] wheezing [ ] sputum [ ]hemoptysis  GI:                          [ ] negative [ ] nausea [ ] vomiting [x] diarrhea [ ] constipation [x] abd pain [ ] dysphagia   :                        [x] negative [ ] dysuria [ ] nocturia [ ] hematuria [ ] increased urinary frequency  Musculoskeletal: [x] negative [ ] back pain [ ] myalgias [ ] arthralgias [ ] fracture  Skin:                       [x] negative [ ] rash [ ] itch  Neurological:        [x] negative [ ] headache [ ] dizziness [ ] syncope [ ] weakness [ ] numbness  Psychiatric:           [x] negative [ ] anxiety [ ] depression  Endocrine:            [x] negative [ ] diabetes [ ] thyroid problem  Heme/Lymph:      [ ] negative [ ] anemia [x] bleeding problem  Allergic/Immune: [x] negative [ ] itchy eyes [ ] nasal discharge [ ] hives [ ] angioedema    [x] All other systems negative      Physical Exam:  Vital Signs Last 24 Hrs  T(C): 36.9 (14 Jul 2020 03:46), Max: 37.5 (13 Jul 2020 23:07)  T(F): 98.5 (14 Jul 2020 03:46), Max: 99.5 (13 Jul 2020 23:07)  HR: 99 (14 Jul 2020 05:31) (76 - 127)  BP: 102/66 (14 Jul 2020 05:31) (76/56 - 104/91)  BP(mean): 71 (14 Jul 2020 04:38) (71 - 71)  ABP: --  ABP(mean): --  RR: 22 (14 Jul 2020 05:31) (18 - 24)  SpO2: 95% (14 Jul 2020 05:31) (95% - 100%)    GENERAL: No acute distress, well-developed  HEAD:  Atraumatic, Normocephalic  ENT: EOMI, PERRLA, conjunctiva and sclera clear, Neck supple, No JVD, moist mucosa  CHEST/LUNG: Clear to auscultation bilaterally; No wheeze, equal breath sounds bilaterally   BACK: No spinal tenderness  HEART: Regular rate and rhythm; No murmurs, rubs, or gallops  ABDOMEN: Soft, mild tenderness in LLQ, Nondistended; hypoactive BS  EXTREMITIES:  No clubbing, cyanosis, or edema  PSYCH: Nl behavior, nl affect  NEUROLOGY: AAOx3, non-focal, cranial nerves intact  SKIN: Normal color, No rashes or lesions    ECG: Personally reviewed: Aflutter with variable block    CXR: Personally reviewed    Labs: Personally reviewed                        15.4   13.33 )-----------( 187      ( 13 Jul 2020 22:52 )             47.0     07-13    135  |  98  |  23  ----------------------------<  108<H>  3.5   |  21<L>  |  1.16    Ca    9.3      13 Jul 2020 22:52    TPro  7.1  /  Alb  3.7  /  TBili  0.6  /  DBili  x   /  AST  24  /  ALT  <5<L>  /  AlkPhos  62  07-13    PT/INR - ( 13 Jul 2020 22:52 )   PT: 13.2 sec;   INR: 1.12 ratio      EXAM:  CT ABDOMEN AND PELVIS IC                          EXAM:  CT ANGIO CHEST (W)AW IC                            PROCEDURE DATE:  07/14/2020      IMPRESSION:     No pulmonary embolus.    Colon diverticulosis without diverticulitis. No bowel obstruction, drainable fluid collection or intraperitoneal free air.    Indeterminate bilateral renal lesions, which can be further characterize on a nonemergent ultrasound.    PTT - ( 13 Jul 2020 22:52 )  PTT:28.2 sec  Serum Pro-Brain Natriuretic Peptide: 3299 pg/mL (07-13 @ 22:52)    Troponin T, High Sensitivity (07.14.20 @ 04:17)    Troponin T, High Sensitivity Result: 186    Troponin T, High Sensitivity (07.13.20 @ 22:52)    Troponin T, High Sensitivity Result: 202
Chief Complaint:  Patient is a 60y old  Male who presents with a chief complaint of shock (2020 05:58)    HPI:  61 yo m with PMH of HTN, HLD, hypothyroidism s/p total thyroidectomy (13) 2/2 thyroid cancer, diverticulitis (), abdominal TB () s/p INH who presents with fevers and progressively worsening LLQ pain.     GI consulted for rectal bleeding.    Patient has longstanding history (30y) of recurrent LLQ pain, diarrhea and bloody stools (red, bright, not mixed with stool, occasional clots - with variable frequency and intensity) with known diverticulosis. He follows with Dr. Koch at [facility], with a recent colonoscopy done () showing SCAD: he was started on mesalamine and budesonide with improvement in diarrhea. Completed 30d of budesonide, now on mesalamine alone and reports compliance.     For the past 4 days prior to admission patient endorses worsening fatigue, fever (up to 102), diffuse myalgia and pain on deep inspiration. He also endorses LLQ pain, similar to previous episodes of his abdominal pain - cramping, localized, intermittent, not related to food, not associated with nausea/vomiting. The pain is unchanged and has been ongoing for many years now. He has no active rectal bleeding - last seen minimal blood with stools 5d prior to admission. Denies SOB, rhinorrhea, urinary complaints. No exposure to suspicious foods, no sick contacts. The day prior to admission patient reports worsening fatigue, fever and chill, along with new centralized chest pressure and SOB. He is not sure how he wound up with abdominal tuberculosis, but it was more than 15 years ago that he was treated.    In the ED:  =======  BP 90/60, afib with RVR. Given 3L NS, 2L LR and started on levo .05.     Hospital Medications:  chlorhexidine 4% Liquid 1 Application(s) Topical <User Schedule>  ciprofloxacin     Tablet 500 milliGRAM(s) Oral every 12 hours  hydrocortisone sodium succinate Injectable 75 milliGRAM(s) IV Push every 8 hours  levothyroxine Injectable 88 MICROGram(s) IV Push at bedtime  metroNIDAZOLE  IVPB 500 milliGRAM(s) IV Intermittent every 8 hours  midodrine 20 milliGRAM(s) Oral every 8 hours  norepinephrine Infusion 0.05 MICROgram(s)/kG/Min IV Continuous <Continuous>  sodium chloride 0.9%. 1000 milliLiter(s) IV Continuous <Continuous>      PMHX/PSHX:      Family history:      Denies family history of colon cancer/polyps, stomach cancer/polyps, pancreatic cancer/masses, liver cancer/disease, ovarian cancer and endometrial cancer.    Social History:     Tob: Denies  EtOH: Denies  Illicit Drugs: Denies    ROS:     General:  + wt loss, + fevers, + chills, -night sweats, + fatigue  Eyes:  Good vision, no reported pain  ENT:  No sore throat, pain, runny nose, dysphagia  CV:  No pain, palpitations,  Pulm:  No dyspnea, cough, tachypnea, wheezing  GI:  As above  :  No pain, bleeding, incontinence, nocturia  Muscle:  No pain, weakness  Neuro:  No weakness, tingling, memory problems  Psych:  No fatigue, insomnia, mood problems, depression  Endocrine:  No polyuria, polydipsia, cold/heat intolerance  Heme:  No petechiae, ecchymosis, easy bruisability  Skin:  No rash, tattoos, scars, edema    PHYSICAL EXAM:     GENERAL:  No acute distress  HEENT:  Normocephalic/atraumatic, no scleral icterus  CHEST:  Clear to auscultation bilaterally, no wheezes/rales/ronchi, no accessory muscle use  HEART:  Regular rate and rhythm, no murmurs/rubs/gallops  ABDOMEN:  Soft, mild ttp at LLQ, no guarding or rebound, non-distended, normoactive bowel sounds,  no masses, no hepato-splenomegaly, no signs of chronic liver disease. IBIS with normal rectal tone and no stool on glove  EXTREMITIES: No cyanosis, clubbing, or edema  SKIN:  No rash/erythema/ecchymoses/petechiae/wounds/abscess/warm/dry  NEURO:  Alert and oriented x 3, no asterixis    Vital Signs:  Vital Signs Last 24 Hrs  T(C): 37.2 (2020 08:00), Max: 37.5 (2020 23:07)  T(F): 98.9 (2020 08:00), Max: 99.5 (2020 23:07)  HR: 87 (2020 08:00) (76 - 127)  BP: 93/62 (2020 08:00) (76/56 - 108/61)  BP(mean): 73 (2020 08:00) (71 - 78)  RR: 26 (2020 08:00) (18 - 45)  SpO2: 95% (2020 08:00) (95% - 100%)  Daily Height in cm: 172.72 (2020 05:56)    Daily     LABS:                        12.7   7.58  )-----------( 148      ( 2020 09:33 )             39.5     Mean Cell Volume: 93.8 fl (- @ 09:33)    07-    135  |  98  |  23  ----------------------------<  108<H>  3.5   |  21<L>  |  1.16    Ca    9.3      2020 22:52    TPro  7.1  /  Alb  3.7  /  TBili  0.6  /  DBili  x   /  AST  24  /  ALT  <5<L>  /  AlkPhos  62  07-13    LIVER FUNCTIONS - ( 2020 22:52 )  Alb: 3.7 g/dL / Pro: 7.1 g/dL / ALK PHOS: 62 U/L / ALT: <5 U/L / AST: 24 U/L / GGT: x           PT/INR - ( 2020 22:52 )   PT: 13.2 sec;   INR: 1.12 ratio         PTT - ( 2020 22:52 )  PTT:28.2 sec  Urinalysis Basic - ( 2020 00:26 )    Color: Yellow / Appearance: Clear / S.024 / pH: x  Gluc: x / Ketone: Small  / Bili: Negative / Urobili: Negative   Blood: x / Protein: 30 mg/dL / Nitrite: Negative   Leuk Esterase: Negative / RBC: 15 /hpf / WBC 1 /HPF   Sq Epi: x / Non Sq Epi: 0 /hpf / Bacteria: Negative                              12.7   7.58  )-----------( 148      ( 2020 09:33 )             39.5                         15.4   13.33 )-----------( 187      ( 2020 22:52 )             47.0       Imaging:  CTA C&A&P  No pulmonary embolus.    Colon diverticulosis without diverticulitis. No bowel obstruction, drainable fluid collection or intraperitoneal free air.    Indeterminate bilateral renal lesions, which can be further characterize on a nonemergent ultrasound.

## 2020-07-22 NOTE — CONSULT NOTE ADULT - ASSESSMENT
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 Pt was noted to be in new atrial flutter and was hypotensive requiring admission to MICU for pressor support and management of septic shock.  Fairly recent workup for gross hematuria, however will need additional workup even w/ new anticoagulation    - No urologic intervention  - Will need CT urogram and cystoscopy which can be performed as outpatient  - Please reach out with any questions  - DIscussed with attending on call Dr. Weiner

## 2020-07-22 NOTE — PROGRESS NOTE ADULT - SUBJECTIVE AND OBJECTIVE BOX
Patient is a 60y old  Male who presents with a chief complaint of shock (22 Jul 2020 13:26)        SUBJECTIVE / OVERNIGHT EVENTS: c/o night sweats last night with fever        MEDICATIONS  (STANDING):  apixaban 5 milliGRAM(s) Oral two times a day  atorvastatin 80 milliGRAM(s) Oral at bedtime  levothyroxine 175 MICROGram(s) Oral <User Schedule>  levothyroxine 88 MICROGram(s) Oral <User Schedule>  mesalamine ER (24-Hour) Capsule 1.5 Gram(s) Oral daily  pantoprazole    Tablet 40 milliGRAM(s) Oral before breakfast  sotalol 120 milliGRAM(s) Oral every 12 hours  tamsulosin 0.4 milliGRAM(s) Oral daily    MEDICATIONS  (PRN):  acetaminophen   Tablet .. 650 milliGRAM(s) Oral every 6 hours PRN Mild Pain (1 - 3)  aluminum hydroxide/magnesium hydroxide/simethicone Suspension 30 milliLiter(s) Oral every 4 hours PRN Dyspepsia  guaiFENesin   Syrup  (Sugar-Free) 100 milliGRAM(s) Oral every 6 hours PRN Cough  ondansetron Injectable 4 milliGRAM(s) IV Push every 4 hours PRN Nausea and/or Vomiting  oxyCODONE    IR 5 milliGRAM(s) Oral every 4 hours PRN Severe Pain (7 - 10)      Vital Signs Last 24 Hrs  T(C): 37.2 (22 Jul 2020 12:02), Max: 38.2 (21 Jul 2020 22:05)  T(F): 99 (22 Jul 2020 12:02), Max: 100.7 (21 Jul 2020 22:05)  HR: 76 (22 Jul 2020 12:02) (60 - 116)  BP: 118/84 (22 Jul 2020 12:02) (92/60 - 128/83)  BP(mean): --  RR: 18 (22 Jul 2020 12:02) (18 - 18)  SpO2: 95% (22 Jul 2020 12:02) (93% - 96%)  CAPILLARY BLOOD GLUCOSE        I&O's Summary    21 Jul 2020 07:01  -  22 Jul 2020 07:00  --------------------------------------------------------  IN: 290 mL / OUT: 0 mL / NET: 290 mL    22 Jul 2020 07:01  -  22 Jul 2020 16:55  --------------------------------------------------------  IN: 480 mL / OUT: 0 mL / NET: 480 mL        PHYSICAL EXAM:  GENERAL: NAD, breathing normal  HEAD:  Atraumatic, Normocephalic  EYES: conjunctiva and sclera clear  NECK: supple, No JVD  CHEST/LUNG: CTA b/l  HEART: S1 S2 RRR  ABDOMEN: +BS Soft, NT/ND  EXTREMITIES:  2+ DP Pulses, No c/c/e  NEUROLOGY: AAOx3, no facial droop, no focal deficits   SKIN: No rashes or lesions    LABS:                        12.4   8.78  )-----------( 368      ( 22 Jul 2020 06:58 )             39.6     07-22    140  |  101  |  8   ----------------------------<  95  3.5   |  24  |  0.98    Ca    9.1      22 Jul 2020 06:57  Phos  3.7     07-22  Mg     2.0     07-22    TPro  6.0  /  Alb  3.0<L>  /  TBili  0.8  /  DBili  x   /  AST  16  /  ALT  <5<L>  /  AlkPhos  64  07-21              RADIOLOGY & ADDITIONAL TESTS:    Imaging Personally Reviewed: nm wbc scan reviewed -  Abnormal Indium-111 labeled leukocyte scan.    Increased uptake in the bilateral lungs represents nonspecific. The differential diagnosis includes ARDS, opportunistic infection, septicemia, and diffuse pneumonia. Please correlate clinically.      Consultant(s) Notes Reviewed:    Care Discussed with Consultants/Other Providers: d/w ID - Dr. Ardon regarding nm wbc scan Patient is a 60y old  Male who presents with a chief complaint of shock (22 Jul 2020 13:26)        SUBJECTIVE / OVERNIGHT EVENTS: c/o night sweats last night with fever        MEDICATIONS  (STANDING):  apixaban 5 milliGRAM(s) Oral two times a day  atorvastatin 80 milliGRAM(s) Oral at bedtime  levothyroxine 175 MICROGram(s) Oral <User Schedule>  levothyroxine 88 MICROGram(s) Oral <User Schedule>  mesalamine ER (24-Hour) Capsule 1.5 Gram(s) Oral daily  pantoprazole    Tablet 40 milliGRAM(s) Oral before breakfast  sotalol 120 milliGRAM(s) Oral every 12 hours  tamsulosin 0.4 milliGRAM(s) Oral daily    MEDICATIONS  (PRN):  acetaminophen   Tablet .. 650 milliGRAM(s) Oral every 6 hours PRN Mild Pain (1 - 3)  aluminum hydroxide/magnesium hydroxide/simethicone Suspension 30 milliLiter(s) Oral every 4 hours PRN Dyspepsia  guaiFENesin   Syrup  (Sugar-Free) 100 milliGRAM(s) Oral every 6 hours PRN Cough  ondansetron Injectable 4 milliGRAM(s) IV Push every 4 hours PRN Nausea and/or Vomiting  oxyCODONE    IR 5 milliGRAM(s) Oral every 4 hours PRN Severe Pain (7 - 10)      Vital Signs Last 24 Hrs  T(C): 37.2 (22 Jul 2020 12:02), Max: 38.2 (21 Jul 2020 22:05)  T(F): 99 (22 Jul 2020 12:02), Max: 100.7 (21 Jul 2020 22:05)  HR: 76 (22 Jul 2020 12:02) (60 - 116)  BP: 118/84 (22 Jul 2020 12:02) (92/60 - 128/83)  BP(mean): --  RR: 18 (22 Jul 2020 12:02) (18 - 18)  SpO2: 95% (22 Jul 2020 12:02) (93% - 96%)  CAPILLARY BLOOD GLUCOSE        I&O's Summary    21 Jul 2020 07:01  -  22 Jul 2020 07:00  --------------------------------------------------------  IN: 290 mL / OUT: 0 mL / NET: 290 mL    22 Jul 2020 07:01  -  22 Jul 2020 16:55  --------------------------------------------------------  IN: 480 mL / OUT: 0 mL / NET: 480 mL        PHYSICAL EXAM:  GENERAL: NAD, breathing normal  HEAD:  Atraumatic, Normocephalic  EYES: conjunctiva and sclera clear  NECK: supple, No JVD  CHEST/LUNG: CTA b/l, chest wall stimulator without erythema or tenderness  HEART: S1 S2 RRR  ABDOMEN: +BS Soft, LLQ tenderness, no rebound or guarding, nondistended  EXTREMITIES:  2+ DP Pulses, No c/c. no LE edema  NEUROLOGY: AAOx3, no facial droop, moving all extremities  SKIN: No rashes or lesions    LABS:                        12.4   8.78  )-----------( 368      ( 22 Jul 2020 06:58 )             39.6     07-22    140  |  101  |  8   ----------------------------<  95  3.5   |  24  |  0.98    Ca    9.1      22 Jul 2020 06:57  Phos  3.7     07-22  Mg     2.0     07-22    TPro  6.0  /  Alb  3.0<L>  /  TBili  0.8  /  DBili  x   /  AST  16  /  ALT  <5<L>  /  AlkPhos  64  07-21              RADIOLOGY & ADDITIONAL TESTS:    Imaging Personally Reviewed: nm wbc scan reviewed -  Abnormal Indium-111 labeled leukocyte scan.    Increased uptake in the bilateral lungs represents nonspecific. The differential diagnosis includes ARDS, opportunistic infection, septicemia, and diffuse pneumonia. Please correlate clinically.      Consultant(s) Notes Reviewed:    Care Discussed with Consultants/Other Providers: d/w ID - Dr. Ardon regarding nm wbc scan

## 2020-07-22 NOTE — PROGRESS NOTE ADULT - PROBLEM SELECTOR PLAN 2
- new onset aflutter in the setting of septic shock  - EP following - recommended d/c lopressor and started sotalol and now increased - still uncontrolled on telemetry - had plan for cardioversion but converted to sinus with higher dose sotalol  will need post dose EKG to monitor QTc  - c/w Eliquis 5mg po q12  - c/w Tele monitoring

## 2020-07-23 ENCOUNTER — TRANSCRIPTION ENCOUNTER (OUTPATIENT)
Age: 60
End: 2020-07-23

## 2020-07-23 LAB
ANION GAP SERPL CALC-SCNC: 14 MMOL/L — SIGNIFICANT CHANGE UP (ref 5–17)
BUN SERPL-MCNC: 13 MG/DL — SIGNIFICANT CHANGE UP (ref 7–23)
CALCIUM SERPL-MCNC: 9.3 MG/DL — SIGNIFICANT CHANGE UP (ref 8.4–10.5)
CHLORIDE SERPL-SCNC: 102 MMOL/L — SIGNIFICANT CHANGE UP (ref 96–108)
CO2 SERPL-SCNC: 23 MMOL/L — SIGNIFICANT CHANGE UP (ref 22–31)
CREAT SERPL-MCNC: 0.95 MG/DL — SIGNIFICANT CHANGE UP (ref 0.5–1.3)
CULTURE RESULTS: SIGNIFICANT CHANGE UP
CULTURE RESULTS: SIGNIFICANT CHANGE UP
GLUCOSE SERPL-MCNC: 93 MG/DL — SIGNIFICANT CHANGE UP (ref 70–99)
HCT VFR BLD CALC: 41.7 % — SIGNIFICANT CHANGE UP (ref 39–50)
HGB BLD-MCNC: 13.2 G/DL — SIGNIFICANT CHANGE UP (ref 13–17)
MAGNESIUM SERPL-MCNC: 2.1 MG/DL — SIGNIFICANT CHANGE UP (ref 1.6–2.6)
MCHC RBC-ENTMCNC: 30 PG — SIGNIFICANT CHANGE UP (ref 27–34)
MCHC RBC-ENTMCNC: 31.7 GM/DL — LOW (ref 32–36)
MCV RBC AUTO: 94.8 FL — SIGNIFICANT CHANGE UP (ref 80–100)
NRBC # BLD: 0 /100 WBCS — SIGNIFICANT CHANGE UP (ref 0–0)
PLATELET # BLD AUTO: 225 K/UL — SIGNIFICANT CHANGE UP (ref 150–400)
POTASSIUM SERPL-MCNC: 3.8 MMOL/L — SIGNIFICANT CHANGE UP (ref 3.5–5.3)
POTASSIUM SERPL-SCNC: 3.8 MMOL/L — SIGNIFICANT CHANGE UP (ref 3.5–5.3)
RBC # BLD: 4.4 M/UL — SIGNIFICANT CHANGE UP (ref 4.2–5.8)
RBC # FLD: 12.1 % — SIGNIFICANT CHANGE UP (ref 10.3–14.5)
SODIUM SERPL-SCNC: 139 MMOL/L — SIGNIFICANT CHANGE UP (ref 135–145)
SPECIMEN SOURCE: SIGNIFICANT CHANGE UP
SPECIMEN SOURCE: SIGNIFICANT CHANGE UP
WBC # BLD: 9.28 K/UL — SIGNIFICANT CHANGE UP (ref 3.8–10.5)
WBC # FLD AUTO: 9.28 K/UL — SIGNIFICANT CHANGE UP (ref 3.8–10.5)

## 2020-07-23 PROCEDURE — 99231 SBSQ HOSP IP/OBS SF/LOW 25: CPT

## 2020-07-23 PROCEDURE — 99233 SBSQ HOSP IP/OBS HIGH 50: CPT

## 2020-07-23 PROCEDURE — 93010 ELECTROCARDIOGRAM REPORT: CPT | Mod: 76

## 2020-07-23 PROCEDURE — 74177 CT ABD & PELVIS W/CONTRAST: CPT | Mod: 26

## 2020-07-23 RX ORDER — METOPROLOL TARTRATE 50 MG
50 TABLET ORAL ONCE
Refills: 0 | Status: DISCONTINUED | OUTPATIENT
Start: 2020-07-23 | End: 2020-07-23

## 2020-07-23 RX ORDER — LANOLIN ALCOHOL/MO/W.PET/CERES
3 CREAM (GRAM) TOPICAL ONCE
Refills: 0 | Status: COMPLETED | OUTPATIENT
Start: 2020-07-23 | End: 2020-07-23

## 2020-07-23 RX ADMIN — APIXABAN 5 MILLIGRAM(S): 2.5 TABLET, FILM COATED ORAL at 05:55

## 2020-07-23 RX ADMIN — TAMSULOSIN HYDROCHLORIDE 0.4 MILLIGRAM(S): 0.4 CAPSULE ORAL at 12:59

## 2020-07-23 RX ADMIN — Medication 3 MILLIGRAM(S): at 21:08

## 2020-07-23 RX ADMIN — Medication 120 MILLIGRAM(S): at 17:55

## 2020-07-23 RX ADMIN — APIXABAN 5 MILLIGRAM(S): 2.5 TABLET, FILM COATED ORAL at 17:55

## 2020-07-23 RX ADMIN — Medication 650 MILLIGRAM(S): at 14:07

## 2020-07-23 RX ADMIN — Medication 175 MICROGRAM(S): at 06:08

## 2020-07-23 RX ADMIN — PANTOPRAZOLE SODIUM 40 MILLIGRAM(S): 20 TABLET, DELAYED RELEASE ORAL at 05:55

## 2020-07-23 RX ADMIN — ATORVASTATIN CALCIUM 80 MILLIGRAM(S): 80 TABLET, FILM COATED ORAL at 21:08

## 2020-07-23 RX ADMIN — Medication 650 MILLIGRAM(S): at 20:12

## 2020-07-23 RX ADMIN — Medication 650 MILLIGRAM(S): at 19:12

## 2020-07-23 RX ADMIN — Medication 650 MILLIGRAM(S): at 13:00

## 2020-07-23 RX ADMIN — Medication 1.5 GRAM(S): at 13:00

## 2020-07-23 RX ADMIN — Medication 120 MILLIGRAM(S): at 05:56

## 2020-07-23 RX ADMIN — Medication 3 MILLIGRAM(S): at 00:14

## 2020-07-23 NOTE — PROGRESS NOTE ADULT - SUBJECTIVE AND OBJECTIVE BOX
UROLOGY DAILY PROGRESS NOTE:     Subjective:    Patient with complaints of LLQ pain. Also having night sweats.  Urinating without complaints. No hematuria.    Objective:    NAD, awake and alert  Respirations nonlabored  Abdomen soft, TTP LLQ  No peritoneal signs    MEDICATIONS  (STANDING):  apixaban 5 milliGRAM(s) Oral two times a day  atorvastatin 80 milliGRAM(s) Oral at bedtime  levothyroxine 175 MICROGram(s) Oral <User Schedule>  levothyroxine 88 MICROGram(s) Oral <User Schedule>  mesalamine ER (24-Hour) Capsule 1.5 Gram(s) Oral daily  pantoprazole    Tablet 40 milliGRAM(s) Oral before breakfast  sotalol 120 milliGRAM(s) Oral every 12 hours  tamsulosin 0.4 milliGRAM(s) Oral daily    MEDICATIONS  (PRN):  acetaminophen   Tablet .. 650 milliGRAM(s) Oral every 6 hours PRN Mild Pain (1 - 3)  aluminum hydroxide/magnesium hydroxide/simethicone Suspension 30 milliLiter(s) Oral every 4 hours PRN Dyspepsia  guaiFENesin   Syrup  (Sugar-Free) 100 milliGRAM(s) Oral every 6 hours PRN Cough  ondansetron Injectable 4 milliGRAM(s) IV Push every 4 hours PRN Nausea and/or Vomiting  oxyCODONE    IR 5 milliGRAM(s) Oral every 4 hours PRN Severe Pain (7 - 10)      Vital Signs Last 24 Hrs  T(C): 36.8 (2020 12:25), Max: 37.4 (2020 20:48)  T(F): 98.3 (2020 12:25), Max: 99.3 (2020 20:48)  HR: 61 (2020 17:27) (51 - 63)  BP: 116/69 (2020 17:27) (99/62 - 116/69)  BP(mean): --  RR: 18 (2020 12:25) (18 - 18)  SpO2: 95% (2020 12:25) (95% - 97%)    I&O's Detail    2020 07:01  -  2020 07:00  --------------------------------------------------------  IN:    Oral Fluid: 480 mL  Total IN: 480 mL    OUT:  Total OUT: 0 mL    Total NET: 480 mL      2020 07:01  -  2020 18:29  --------------------------------------------------------  IN:    Oral Fluid: 420 mL  Total IN: 420 mL    OUT:  Total OUT: 0 mL    Total NET: 420 mL          Daily     Daily Weight in k.9 (2020 04:25)    LABS:                        13.2   9.28  )-----------( 225      ( 2020 05:54 )             41.7     07    139  |  102  |  13  ----------------------------<  93  3.8   |  23  |  0.95    Ca    9.3      2020 05:54  Phos  3.7     07-22  Mg     2.1

## 2020-07-23 NOTE — PROVIDER CONTACT NOTE (OTHER) - REASON
pt. on continuous monitor shows pox = 45%, pt. was on RA, c/o slight SOB, placed immediately back on 2 L NC, 97% on 2 L NC

## 2020-07-23 NOTE — PROGRESS NOTE ADULT - SUBJECTIVE AND OBJECTIVE BOX
Patient is a 60y old  Male who presents with a chief complaint of shock (23 Jul 2020 08:30)        SUBJECTIVE / OVERNIGHT EVENTS: no acute complaints. states he took tylenol last night for headache (may have masked fever last night)      MEDICATIONS  (STANDING):  apixaban 5 milliGRAM(s) Oral two times a day  atorvastatin 80 milliGRAM(s) Oral at bedtime  levothyroxine 175 MICROGram(s) Oral <User Schedule>  levothyroxine 88 MICROGram(s) Oral <User Schedule>  mesalamine ER (24-Hour) Capsule 1.5 Gram(s) Oral daily  pantoprazole    Tablet 40 milliGRAM(s) Oral before breakfast  sotalol 120 milliGRAM(s) Oral every 12 hours  tamsulosin 0.4 milliGRAM(s) Oral daily    MEDICATIONS  (PRN):  acetaminophen   Tablet .. 650 milliGRAM(s) Oral every 6 hours PRN Mild Pain (1 - 3)  aluminum hydroxide/magnesium hydroxide/simethicone Suspension 30 milliLiter(s) Oral every 4 hours PRN Dyspepsia  guaiFENesin   Syrup  (Sugar-Free) 100 milliGRAM(s) Oral every 6 hours PRN Cough  ondansetron Injectable 4 milliGRAM(s) IV Push every 4 hours PRN Nausea and/or Vomiting  oxyCODONE    IR 5 milliGRAM(s) Oral every 4 hours PRN Severe Pain (7 - 10)      Vital Signs Last 24 Hrs  T(C): 36.8 (23 Jul 2020 12:25), Max: 37.4 (22 Jul 2020 20:48)  T(F): 98.3 (23 Jul 2020 12:25), Max: 99.3 (22 Jul 2020 20:48)  HR: 55 (23 Jul 2020 12:25) (51 - 63)  BP: 100/60 (23 Jul 2020 12:25) (99/62 - 108/69)  BP(mean): --  RR: 18 (23 Jul 2020 12:25) (18 - 18)  SpO2: 95% (23 Jul 2020 12:25) (95% - 97%)  CAPILLARY BLOOD GLUCOSE        I&O's Summary    22 Jul 2020 07:01  -  23 Jul 2020 07:00  --------------------------------------------------------  IN: 480 mL / OUT: 0 mL / NET: 480 mL    23 Jul 2020 07:01  -  23 Jul 2020 13:18  --------------------------------------------------------  IN: 240 mL / OUT: 0 mL / NET: 240 mL        PHYSICAL EXAM:  GENERAL: NAD, breathing normal  HEAD:  Atraumatic, Normocephalic  EYES: conjunctiva and sclera clear  NECK: supple, No JVD  CHEST/LUNG: CTA b/l, chest wall stimulator without erythema or tenderness  HEART: S1 S2 RRR  ABDOMEN: +BS Soft, LLQ tenderness, no rebound or guarding, nondistended  EXTREMITIES:  2+ DP Pulses, No c/c. no LE edema  NEUROLOGY: AAOx3, no facial droop, moving all extremities  SKIN: No rashes or lesions      LABS:                        13.2   9.28  )-----------( 225      ( 23 Jul 2020 05:54 )             41.7     07-23    139  |  102  |  13  ----------------------------<  93  3.8   |  23  |  0.95    Ca    9.3      23 Jul 2020 05:54  Phos  3.7     07-22  Mg     2.1     07-23                RADIOLOGY & ADDITIONAL TESTS:    Imaging Personally Reviewed: CT a/p ordered  Consultant(s) Notes Reviewed:    Care Discussed with Consultants/Other Providers: d/w urology regarding complex right renal cyst

## 2020-07-23 NOTE — PROGRESS NOTE ADULT - ASSESSMENT
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    no leucocytosis, CT with no obvious source, All cultures negative to date.   ? etiology of fever, elevated markers of inflammation, I am not sure if SCAD in itself can cause fevers.   Pt with also night sweats and weight loss, s/p therapy for latent TB not active TB.       Plan:   repeat blood cx in lab   NM WBC scan to see if any other etiology of fever, with non specific uptake in lungs,   reviewed CT with radiology, low concern for malignancy or infection based on the scan in bowel or kidneys  CT chest unremarkable  michele, rf, anca negative  s/p gi follow up, discussed with GI, given pt had recent colonoscopy with random biopsies from each part and CT with no localizing abnormality, the risk of procedure more than the benefit.   quanatiferon gold, indeterminate   observe off abx   will send atypical infection work up, the results can be followed as outpt if pt is discharged.

## 2020-07-23 NOTE — PROGRESS NOTE ADULT - SUBJECTIVE AND OBJECTIVE BOX
24H hour events: remains in SR, complains of continued gastric distress and ongoig abdominal pain     MEDICATIONS:  apixaban 5 milliGRAM(s) Oral two times a day  sotalol 120 milliGRAM(s) Oral every 12 hours  tamsulosin 0.4 milliGRAM(s) Oral daily    guaiFENesin   Syrup  (Sugar-Free) 100 milliGRAM(s) Oral every 6 hours PRN    acetaminophen   Tablet .. 650 milliGRAM(s) Oral every 6 hours PRN  ondansetron Injectable 4 milliGRAM(s) IV Push every 4 hours PRN  oxyCODONE    IR 5 milliGRAM(s) Oral every 4 hours PRN    aluminum hydroxide/magnesium hydroxide/simethicone Suspension 30 milliLiter(s) Oral every 4 hours PRN  mesalamine ER (24-Hour) Capsule 1.5 Gram(s) Oral daily  pantoprazole    Tablet 40 milliGRAM(s) Oral before breakfast    atorvastatin 80 milliGRAM(s) Oral at bedtime  levothyroxine 175 MICROGram(s) Oral <User Schedule>  levothyroxine 88 MICROGram(s) Oral <User Schedule>    REVIEW OF SYSTEMS:  See HPI, otherwise ROS negative.    PHYSICAL EXAM:  T(C): 36.7 (07-23-20 @ 04:25), Max: 37.4 (07-22-20 @ 20:48)  HR: 51 (07-23-20 @ 04:25) (51 - 63)  BP: 101/64 (07-23-20 @ 04:25) (99/62 - 108/69)  RR: 18 (07-23-20 @ 04:25) (18 - 18)  SpO2: 97% (07-23-20 @ 04:25) (96% - 97%)    I&O's Summary    22 Jul 2020 07:01  -  23 Jul 2020 07:00  --------------------------------------------------------  IN: 480 mL / OUT: 0 mL / NET: 480 mL    23 Jul 2020 07:01  -  23 Jul 2020 12:17  --------------------------------------------------------  IN: 240 mL / OUT: 0 mL / NET: 240 mL    Appearance: Alert. NAD	  Cardiovascular: +S1S2 RRR no m/g/r  Respiratory: CTA B/L	  Psychiatry: A & O x 3, Mood & affect appropriate  Extremities: No edema BLE  Vascular: Peripheral pulses palpable 2+ bilaterally      LABS:	 	    CBC Full  -  ( 23 Jul 2020 05:54 )  WBC Count : 9.28 K/uL  Hemoglobin : 13.2 g/dL  Hematocrit : 41.7 %  Platelet Count - Automated : 225 K/uL  Mean Cell Volume : 94.8 fl  Mean Cell Hemoglobin : 30.0 pg  Mean Cell Hemoglobin Concentration : 31.7 gm/dL  Auto Neutrophil # : x  Auto Lymphocyte # : x  Auto Monocyte # : x  Auto Eosinophil # : x  Auto Basophil # : x  Auto Neutrophil % : x  Auto Lymphocyte % : x  Auto Monocyte % : x  Auto Eosinophil % : x  Auto Basophil % : x    07-23    139  |  102  |  13  ----------------------------<  93  3.8   |  23  |  0.95  07-22    140  |  101  |  8   ----------------------------<  95  3.5   |  24  |  0.98    Ca    9.3      23 Jul 2020 05:54  Ca    9.1      22 Jul 2020 06:57  Phos  3.7     07-22  Mg     2.1     07-23  Mg     2.0     07-22    proBNP: Serum Pro-Brain Natriuretic Peptide (07.13.20 @ 22:52)    Serum Pro-Brain Natriuretic Peptide: 3299 pg/mL    TSH: Thyroid Stimulating Hormone, Serum (07.14.20 @ 03:24)    Thyroid Stimulating Hormone, Serum: 1.60 uIU/mL  TeleMETRY: SR 45- 55 bpm over night   	    ECG:  	SB 50 pm , QTc 386 ms     RADIOLOGY:    < from: US Abdomen Complete (07.20.20 @ 17:23) >  EXAM:  US ABDOMEN COMPLETE                        PROCEDURE DATE:  07/20/2020      INTERPRETATION:  CLINICAL INFORMATION: Fevers and kidney lesions in CT abdomen. Liver cyst.  COMPARISON: CT dated 7/14/2020.  TECHNIQUE: Sonography of the abdomen and kidneys.    FINDINGS:    Liver: Right lobe measures 12.9 cm in length. There is a 1.9 x 1.3 x 1.7 cm cyst noted within the reyna hepatis region.  Bile ducts: Normal caliber. Common bile duct measures 4 mm.   Gallbladder: Within normal limits.      Pancreas: Poorly visualized.  Spleen: 10.5 cm. Within normal limits.  Right kidney: 11.1 cm. No hydronephrosis. There is a 1.7 x 1.6 x 1.4 cm cyst with septation noted at the mid pole.  Left kidney: 11.5 cm.  No hydronephrosis. There are 2 exophytic cysts noted, with a 1.3 x 1.3 x 1.7 cm cyst and a 2.3 x 2.1 x 2 cm cyst at the lower pole.  Ascites: None.  Aorta and IVC: Visualized portions are within normal limits.    IMPRESSION:     Cyst within the reyna hepatis region noted as above. Cyst at the left lobe of the liver as appreciated on CT cannot be appreciated on ultrasound.    There is a 1.7 cm cyst at the midpole of the right kidney with septation(s)/cannot exclude irregularity of the wall. This complex cyst can be further evaluated with cross-sectional imaging (CT or MR with intravenous contrast using dedicated renal protocol). This complex cyst corresponds with indeterminate lesion mentioned on CT examination.  Left renal cysts as noted above, one of which corresponds with indeterminate hypodensity mentioned on CT examination..     No hydronephrosis.      JAYNA CASTELLANOS M.D., RADIOLOGY RESIDENT  This document has been electronically signed.  MARINA POLO M.D., ATTENDING RADIOLOGIST  This document has been electronically signed. Jul 20 2020  5:20PM     < end of copied text >      < from: US Abdomen Complete (07.20.20 @ 17:23) >  EXAM:  US ABDOMEN COMPLETE                          PROCEDURE DATE:  07/20/2020      INTERPRETATION:  CLINICAL INFORMATION: Fevers and kidney lesions in CT abdomen. Liver cyst.    COMPARISON: CT dated 7/14/2020.    TECHNIQUE: Sonography of the abdomen and kidneys.    FINDINGS:    Liver: Right lobe measures 12.9 cm in length. There is a 1.9 x 1.3 x 1.7 cm cyst noted within the reyna hepatis region.  Bile ducts: Normal caliber. Common bile duct measures 4 mm.   Gallbladder: Within normal limits.      Pancreas: Poorly visualized.  Spleen: 10.5 cm. Within normal limits.  Right kidney: 11.1 cm. No hydronephrosis. There is a 1.7 x 1.6 x 1.4 cm cyst with septation noted at the mid pole.  Left kidney: 11.5 cm.  No hydronephrosis. There are 2 exophytic cysts noted, with a 1.3 x 1.3 x 1.7 cm cyst and a 2.3 x 2.1 x 2 cm cyst at the lower pole.  Ascites: None.  Aorta and IVC: Visualized portions are within normal limits.    IMPRESSION:     Cyst within the reyna hepatis region noted as above. Cyst at the left lobe of the liver as appreciated on CT cannot be appreciated on ultrasound.    There is a 1.7 cm cyst at the midpole of the right kidney with septation(s)/cannot exclude irregularity of the wall. This complex cyst can be further evaluated with cross-sectional imaging (CT or MR with intravenous contrast using dedicated renal protocol). This complex cyst corresponds with indeterminate lesion mentioned on CT examination.  Left renal cysts as noted above, one of which corresponds with indeterminate hypodensity mentioned on CT examination..     No hydronephrosis.    JAYNA CASTELLANOS M.D., RADIOLOGY RESIDENT  This document has been electronically signed.  MARINA POLO M.D., ATTENDING RADIOLOGIST  This document has been electronically signed. Jul 20 2020  5:20PM

## 2020-07-23 NOTE — DISCHARGE NOTE NURSING/CASE MANAGEMENT/SOCIAL WORK - PATIENT PORTAL LINK FT
You can access the FollowMyHealth Patient Portal offered by [facility] by registering at the following website: [email or web address]. By joining [facility]’s FollowMyHealth portal, you will also be able to view your health information using other applications (apps) compatible with our system.

## 2020-07-23 NOTE — PROGRESS NOTE ADULT - ASSESSMENT
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 Required pressor support, IVF resuscitation, & 5d abx for sepsis; source remains unclear. Hosp course further c/b rapid AFL w/ variable V conduction & brief conversion to NSR, now w/ subclinical hematuria, B/L renal lesions, and left lobe liver cyst (3x2cm) on chest/abd CTA. 2D TTE on 07/15/20 with mildly dilated LA, mild TR, mild-mod MR, severe reversible GIIIDD, RVE with normal RVSF and preserved LVEF (65%)    #Atrial  flutter remains in SR over night s/p chemical conversion with Sotalol   # fevers  # abdominal pain     continue to monitor on  telemetry  Keep K+>4, MG++>2  -Continue Sotalol 120mg oral BID, QTc stable   -Continue Eliquis 5mg oral BID    continues with Abdominal complaints none Febrile overnight , no leukocytosis, CT with no obvious source, all cultures w/NGTD. S/p colonoscopy and biopsy revealing no inflammation  -Further management per primary team  EP signing off     [phone]

## 2020-07-23 NOTE — PROGRESS NOTE ADULT - NSHPATTENDINGPLANDISCUSS_GEN_ALL_CORE
GI, Radiology, Medicine Attending
medicine attending
MICU team
PETRONA Starks
med np

## 2020-07-23 NOTE — PROGRESS NOTE ADULT - SUBJECTIVE AND OBJECTIVE BOX
60y old  Male who presents with a chief complaint of shock (23 Jul 2020 13:17)      Interval history:  Afebrile today but still does not feel well.       Allergies:   sulfa drugs (Anaphylaxis)  sulfa drugs (Seizure)    Antimicrobials:      REVIEW OF SYSTEMS:  No chest pain   No SOB  No N/V  No dysuria   No rash.     Vital Signs Last 24 Hrs  T(C): 36.8 (07-23-20 @ 12:25), Max: 37.4 (07-22-20 @ 20:48)  T(F): 98.3 (07-23-20 @ 12:25), Max: 99.3 (07-22-20 @ 20:48)  HR: 61 (07-23-20 @ 17:27) (51 - 63)  BP: 116/69 (07-23-20 @ 17:27) (99/62 - 116/69)  BP(mean): --  RR: 18 (07-23-20 @ 12:25) (18 - 18)  SpO2: 95% (07-23-20 @ 12:25) (95% - 97%)      PHYSICAL EXAM:  Patient in no acute distress. AAOX3.  No icterus, no oral ulcers.  Cardiovascular: S1S2 normal.  Lungs: Good air entry B/L lung fields.  Gastrointestinal: soft, Lt LQ mild discomfort on deep palpation, nondistended.  Extremities: no edema.  IV sites not inflamed.                             13.2   9.28  )-----------( 225      ( 23 Jul 2020 05:54 )             41.7   07-23    139  |  102  |  13  ----------------------------<  93  3.8   |  23  |  0.95    Ca    9.3      23 Jul 2020 05:54  Phos  3.7     07-22  Mg     2.1     07-23      Culture - Blood (collected 22 Jul 2020 01:57)  Source: .Blood Blood-Peripheral  Preliminary Report (23 Jul 2020 02:01):    No growth to date.    Culture - Blood (collected 22 Jul 2020 01:57)  Source: .Blood Blood-Peripheral  Preliminary Report (23 Jul 2020 02:01):    No growth to date.      Radiology:  < from: CT Abdomen and Pelvis w/ IV Cont (07.23.20 @ 16:51) >  IMPRESSION:   Septated right renal cyst, likely benign.      < from: NM Inflammatory Loc Wholebody WBC, Single Day (07.22.20 @ 11:09) >  IMPRESSION: Abnormal Indium-111 labeled leukocyte scan.    Increased uptake in the bilateral lungs represents nonspecific. The differential diagnosis includes ARDS, opportunistic infection, septicemia, and diffuse pneumonia. Please correlate clinically.

## 2020-07-23 NOTE — CHART NOTE - NSCHARTNOTEFT_GEN_A_CORE
Reviewed results of WBC scan with non-specific pulmonary findings however, with no activity noted in GI tract. Discussed with ID Attending Dr. Roth. Patient with recent colonoscopy on 6/17/20 at [facility] with biopsies negative for intestinal TB. Likely low utility of upper endoscopy for evaluation of intestinal TB given negative imaging findings / negative WBC scan.     - No plan for further endoscopic evaluation at this time  - Please re-consult GI as needed    Discussed with GI attending Brendon.    Please call GI ([phone]) or e-mail [email or web address] if there are any additional questions or concerns, during weekdays from 8 am to 5 pm.     Please call answering service for on-call GI fellow ([phone]) after 5pm and before 8am, and on weekends.

## 2020-07-23 NOTE — PROGRESS NOTE ADULT - PROBLEM SELECTOR PLAN 2
- new onset aflutter in the setting of septic shock  - EP following - recommended d/c lopressor and started sotalol and titrated up - currently in sinus rhythm. no need for ablation. f/u regarding bradycardia  will need post dose EKG to monitor QTc  - c/w Eliquis 5mg po q12  - c/w Tele monitoring - monitor bradycardia

## 2020-07-23 NOTE — PROGRESS NOTE ADULT - ASSESSMENT
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 Pt was noted to be in new atrial flutter and was hypotensive requiring admission to MICU for pressor support and management of septic shock.   Has hx of intermittent gross hematuria with negative workup in the past. Last hematuria episode was ~4months ago.    CT urogram today with 2cm renal cyst which is benign appearing. No hydronephrosis, ureteral filling defects or discernable bladder masses    - No urologic intervention  - Based on findings on CT urogram, patient's night sweats unlikely to be urologic in origin  - Patient will need to followup as an outpatient for surveillance of this renal cyst and possible cystoscopy  Discussed with patient  - Please reach out with any questions    [facility]  [address] [place] [address], [facility] [id number]  [place] [address] [phone]

## 2020-07-23 NOTE — PROGRESS NOTE ADULT - ASSESSMENT
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 Pt was noted to be in new atrial flutter and was hypotensive requiring admission to MICU for pressor support and management of septic shock. hospital course c/b persistent fevers.

## 2020-07-24 LAB
CRP SERPL-MCNC: 3.2 MG/DL — HIGH (ref 0–0.4)
CRYPTOC AG FLD QL: NEGATIVE — SIGNIFICANT CHANGE UP
ERYTHROCYTE [SEDIMENTATION RATE] IN BLOOD: 61 MM/HR — HIGH (ref 0–20)
FERRITIN SERPL-MCNC: 464 NG/ML — HIGH (ref 30–400)
LEGIONELLA AG UR QL: NEGATIVE — SIGNIFICANT CHANGE UP

## 2020-07-24 PROCEDURE — 99231 SBSQ HOSP IP/OBS SF/LOW 25: CPT

## 2020-07-24 PROCEDURE — 99232 SBSQ HOSP IP/OBS MODERATE 35: CPT

## 2020-07-24 PROCEDURE — 93010 ELECTROCARDIOGRAM REPORT: CPT

## 2020-07-24 PROCEDURE — 99233 SBSQ HOSP IP/OBS HIGH 50: CPT

## 2020-07-24 RX ORDER — LANOLIN ALCOHOL/MO/W.PET/CERES
3 CREAM (GRAM) TOPICAL ONCE
Refills: 0 | Status: COMPLETED | OUTPATIENT
Start: 2020-07-24 | End: 2020-07-24

## 2020-07-24 RX ADMIN — Medication 650 MILLIGRAM(S): at 20:12

## 2020-07-24 RX ADMIN — APIXABAN 5 MILLIGRAM(S): 2.5 TABLET, FILM COATED ORAL at 05:23

## 2020-07-24 RX ADMIN — Medication 650 MILLIGRAM(S): at 19:43

## 2020-07-24 RX ADMIN — APIXABAN 5 MILLIGRAM(S): 2.5 TABLET, FILM COATED ORAL at 17:33

## 2020-07-24 RX ADMIN — Medication 175 MICROGRAM(S): at 05:23

## 2020-07-24 RX ADMIN — PANTOPRAZOLE SODIUM 40 MILLIGRAM(S): 20 TABLET, DELAYED RELEASE ORAL at 05:23

## 2020-07-24 RX ADMIN — ATORVASTATIN CALCIUM 80 MILLIGRAM(S): 80 TABLET, FILM COATED ORAL at 21:36

## 2020-07-24 RX ADMIN — TAMSULOSIN HYDROCHLORIDE 0.4 MILLIGRAM(S): 0.4 CAPSULE ORAL at 11:23

## 2020-07-24 RX ADMIN — Medication 3 MILLIGRAM(S): at 22:55

## 2020-07-24 RX ADMIN — Medication 1.5 GRAM(S): at 11:23

## 2020-07-24 NOTE — PROGRESS NOTE ADULT - ASSESSMENT
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 Required pressor support, IVF resuscitation, & 5d abx for sepsis; source remains unclear. Hosp course further c/b rapid AFL w/ variable V conduction & brief conversion to NSR, now w/ subclinical hematuria, B/L renal lesions, and left lobe liver cyst (3x2cm) on chest/abd CTA. 2D TTE on 07/15/20 with mildly dilated LA, mild TR, mild-mod MR, severe reversible GIIIDD, RVE with normal RVSF and preserved LVEF (65%)    1. Atrial  flutter chemically converted to  NSR on Sotalol   -Maintaining NSR on telemetry  -Continue Eliquis 5mg oral BID  -Bradycardia today with rates 40's-50's, recommend monitor heart rates today and reduce Sotalol to 80mg oral BID to start this evening if heart rates stable   Keep K+>4, MG++>2      2.  Febrile Illness  -no leucocytosis, CT with no obvious source, All cultures negative to date.   -NM WBC scan to see if any other etiology of fever, with non specific uptake in lungs,     3. abdominal pain improving   with history of intestinal TB and reported weight loss  -F/U GI and ID recommendations     [id number] 60 year old male with a family history of CAD, PMHX of total thyroidectomy 2/2 thyroid CA 2013, hypothyroidism, recent dx of segmental colitis a/w diverticulitis 06/2020, HTN, mild chronic GONZALEZ, fall of unclear etiology (mech vs syncope) in 04/2019, upper airway nerve stimulator for NINOSKA implanted 08/2019, abdominal TB 1993 s/p INH, admit with dyspnea, LLQ pain, dysuria, and diarrhea on 07/14/2020. Required pressor support, IVF resuscitation, & 5d abx for sepsis; source remains unclear. Hosp course further c/b rapid AFL w/ variable V conduction & brief conversion to NSR, now w/ subclinical hematuria, B/L renal lesions, and left lobe liver cyst (3x2cm) on chest/abd CTA. 2D TTE on 07/15/20 with mildly dilated LA, mild TR, mild-mod MR, severe reversible GIIIDD, RVE with normal RVSF and preserved LVEF (65%)    1. Atrial  flutter chemically converted to  NSR on Sotalol   -Maintaining NSR on telemetry  -Continue Eliquis 5mg oral BID  -Bradycardia today with rates 40's-50's, recommend monitor heart rates today and reduce Sotalol to 80mg oral BID to start this evening if heart rates stable   Keep K+>4, MG++>2      2.  Febrile Illness  -no leucocytosis, CT with no obvious source, All cultures negative to date.   -NM WBC scan to see if any other etiology of fever, with non specific uptake in lungs,     3. abdominal pain improving   with history of intestinal TB and reported weight loss  -F/U GI and ID recommendations     [id number]     Addendum- Patient remains in Sinus Bradycardia 40's, would discontinue Sotalol. Monitor on telemetry over night. Consider changing antiarrythmic in AM.     [id number]

## 2020-07-24 NOTE — CHART NOTE - NSCHARTNOTEFT_GEN_A_CORE
Nutrition Follow Up Note  Patient seen for: Malnutrition Follow Up     Interim events noted, chart reviewed. Pt admitted c sepsis, h/o intestinal TB, new onset aflutter, chronic diastolic dysfunction.     Source: pt     Diet : Fiber/Residue Restricted (LOWFIBER) (07-22-20 @ 12:19)    Patient reports: he was able to eat half of his breakfast this morning. Pt agreeable to trial of Ensure Enlive, will provide trial. Pt able to verbalize importance of protein intake at this time. Noted last BM 7/23.       Daily Weight: 190.4 pounds on admit, 7/21: 193->7/23: 200.4 pounds, stable at this time, slight increase noted, would monitor     Pertinent Medications: MEDICATIONS  (STANDING):  apixaban 5 milliGRAM(s) Oral two times a day  atorvastatin 80 milliGRAM(s) Oral at bedtime  levothyroxine 175 MICROGram(s) Oral <User Schedule>  levothyroxine 88 MICROGram(s) Oral <User Schedule>  mesalamine ER (24-Hour) Capsule 1.5 Gram(s) Oral daily  pantoprazole    Tablet 40 milliGRAM(s) Oral before breakfast  tamsulosin 0.4 milliGRAM(s) Oral daily    MEDICATIONS  (PRN):  acetaminophen   Tablet .. 650 milliGRAM(s) Oral every 6 hours PRN Mild Pain (1 - 3)  aluminum hydroxide/magnesium hydroxide/simethicone Suspension 30 milliLiter(s) Oral every 4 hours PRN Dyspepsia  guaiFENesin   Syrup  (Sugar-Free) 100 milliGRAM(s) Oral every 6 hours PRN Cough  ondansetron Injectable 4 milliGRAM(s) IV Push every 4 hours PRN Nausea and/or Vomiting  oxyCODONE    IR 5 milliGRAM(s) Oral every 4 hours PRN Severe Pain (7 - 10)    Pertinent Labs: None pertinent to address at this time.   Finger Sticks: None pertinent to address at this time.       Skin per nursing documentation: no pressure injuries   Edema: none at this time     Estimated Needs:   [x] no change since previous assessment      Previous Nutrition Diagnosis: severe malnutrition   Nutrition Diagnosis continues at this time, addressed c improving intake, care plan in progress     New Nutrition Diagnosis: none at this time       Recommend  1) Continue c current diet. Would continue c liberalized diet at this time given suboptimal intake and pt typically overall healthy eating pattern PTA.   2) Recommend Ensure Enlive x 2 daily (350kcal, 20g protein per 8 ounces).   3) Reinforced low fiber diet education and discussed importance of Prot intake and sources of Prot available on menu.   4) RD to provide food preferences.     Monitoring and Evaluation:     Continue to monitor Nutritional intake, Tolerance to diet prescription, weights, labs, skin integrity    RD remains available upon request and will follow up per protocol  Jessica uGrrola MS RD CDN [facility],  #[phone]

## 2020-07-24 NOTE — PROGRESS NOTE ADULT - PROBLEM SELECTOR PLAN 4
chronic diastolic heart failure- TTE done 7/15 shows Severe reversible diastolic dysfunction Stage III   - Losartan on hold for low Bps  - c/w Lipitor   monitor volume status

## 2020-07-24 NOTE — PROGRESS NOTE ADULT - PROBLEM SELECTOR PLAN 7
- pt is on systemic AC with Eliquis

## 2020-07-24 NOTE — PROGRESS NOTE ADULT - SUBJECTIVE AND OBJECTIVE BOX
Patient is a 60y old  Male who presents with a chief complaint of shock (24 Jul 2020 13:23)        SUBJECTIVE / OVERNIGHT EVENTS: no acute complaints. no fevers or night sweats for now almost 72 hours      MEDICATIONS  (STANDING):  apixaban 5 milliGRAM(s) Oral two times a day  atorvastatin 80 milliGRAM(s) Oral at bedtime  levothyroxine 175 MICROGram(s) Oral <User Schedule>  levothyroxine 88 MICROGram(s) Oral <User Schedule>  mesalamine ER (24-Hour) Capsule 1.5 Gram(s) Oral daily  pantoprazole    Tablet 40 milliGRAM(s) Oral before breakfast  tamsulosin 0.4 milliGRAM(s) Oral daily    MEDICATIONS  (PRN):  acetaminophen   Tablet .. 650 milliGRAM(s) Oral every 6 hours PRN Mild Pain (1 - 3)  aluminum hydroxide/magnesium hydroxide/simethicone Suspension 30 milliLiter(s) Oral every 4 hours PRN Dyspepsia  guaiFENesin   Syrup  (Sugar-Free) 100 milliGRAM(s) Oral every 6 hours PRN Cough  ondansetron Injectable 4 milliGRAM(s) IV Push every 4 hours PRN Nausea and/or Vomiting  oxyCODONE    IR 5 milliGRAM(s) Oral every 4 hours PRN Severe Pain (7 - 10)      Vital Signs Last 24 Hrs  T(C): 36.6 (24 Jul 2020 12:20), Max: 37.1 (23 Jul 2020 21:35)  T(F): 97.9 (24 Jul 2020 12:20), Max: 98.7 (23 Jul 2020 21:35)  HR: 46 (24 Jul 2020 12:20) (44 - 61)  BP: 125/74 (24 Jul 2020 12:20) (110/71 - 125/74)  BP(mean): --  RR: 18 (24 Jul 2020 12:20) (16 - 19)  SpO2: 95% (24 Jul 2020 12:20) (95% - 97%)  CAPILLARY BLOOD GLUCOSE        I&O's Summary    23 Jul 2020 07:01  -  24 Jul 2020 07:00  --------------------------------------------------------  IN: 720 mL / OUT: 100 mL / NET: 620 mL    24 Jul 2020 07:01  -  24 Jul 2020 17:09  --------------------------------------------------------  IN: 420 mL / OUT: 400 mL / NET: 20 mL        PHYSICAL EXAM:  GENERAL: NAD, breathing normal  HEAD:  Atraumatic, Normocephalic  EYES: conjunctiva and sclera clear  NECK: supple, No JVD  CHEST/LUNG: CTA b/l, chest wall stimulator without erythema or tenderness  HEART: S1 S2 RRR  ABDOMEN: +BS Soft, LLQ tenderness, no rebound or guarding, nondistended  EXTREMITIES:  2+ DP Pulses, No c/c. no LE edema  NEUROLOGY: AAOx3, no facial droop, moving all extremities  SKIN: No rashes or lesions      LABS:                        13.2   9.28  )-----------( 225      ( 23 Jul 2020 05:54 )             41.7     07-23    139  |  102  |  13  ----------------------------<  93  3.8   |  23  |  0.95    Ca    9.3      23 Jul 2020 05:54  Mg     2.1     07-23                RADIOLOGY & ADDITIONAL TESTS:    Imaging Personally Reviewed: CT a/p reviewed - renal cysts, likely benign  Consultant(s) Notes Reviewed:    Care Discussed with Consultants/Other Providers: d/w EP regarding bradycardia

## 2020-07-24 NOTE — PROGRESS NOTE ADULT - SUBJECTIVE AND OBJECTIVE BOX
24H hour events: Patient's Sotalol held this morning for Sinus Bradycardia 40's.  Denies c/o CP, palpitations or SOB.  Afebrile x 2 days and improvement in abdominal discomfort.     MEDICATIONS:  apixaban 5 milliGRAM(s) Oral two times a day  tamsulosin 0.4 milliGRAM(s) Oral daily    guaiFENesin   Syrup  (Sugar-Free) 100 milliGRAM(s) Oral every 6 hours PRN    acetaminophen   Tablet .. 650 milliGRAM(s) Oral every 6 hours PRN  ondansetron Injectable 4 milliGRAM(s) IV Push every 4 hours PRN  oxyCODONE    IR 5 milliGRAM(s) Oral every 4 hours PRN    aluminum hydroxide/magnesium hydroxide/simethicone Suspension 30 milliLiter(s) Oral every 4 hours PRN  mesalamine ER (24-Hour) Capsule 1.5 Gram(s) Oral daily  pantoprazole    Tablet 40 milliGRAM(s) Oral before breakfast    atorvastatin 80 milliGRAM(s) Oral at bedtime  levothyroxine 175 MICROGram(s) Oral <User Schedule>  levothyroxine 88 MICROGram(s) Oral <User Schedule>        REVIEW OF SYSTEMS:  Complete 10point ROS negative.    PHYSICAL EXAM:  T(C): 36.8 (07-24-20 @ 04:56), Max: 37.1 (07-23-20 @ 21:35)  HR: 47 (07-24-20 @ 08:30) (44 - 61)  BP: 113/74 (07-24-20 @ 04:56) (100/60 - 116/69)  RR: 16 (07-24-20 @ 04:56) (16 - 19)  SpO2: 95% (07-24-20 @ 04:56) (95% - 97%)    23 Jul 2020 07:01  -  24 Jul 2020 07:00  --------------------------------------------------------  IN: 720 mL / OUT: 100 mL / NET: 620 mL      Appearance: Normal	  Cardiovascular: Normal S1 S2, regular.  No JVD, No murmurs, No edema  Respiratory: Lungs clear to auscultation	  Psychiatry: A & O x 3, Mood & affect appropriate  Gastrointestinal:  Soft, Non-tender, + BS	  Extremities: Normal range of motion, No clubbing, cyanosis or edema  Vascular: Peripheral pulses palpable 2+ bilaterally      LABS:	 	    CBC Full  -  ( 23 Jul 2020 05:54 )  WBC Count : 9.28 K/uL  Hemoglobin : 13.2 g/dL  Hematocrit : 41.7 %  Platelet Count - Automated : 225 K/uL  Mean Cell Volume : 94.8 fl  Mean Cell Hemoglobin : 30.0 pg  Mean Cell Hemoglobin Concentration : 31.7 gm/dL  Auto Neutrophil # : x  Auto Lymphocyte # : x  Auto Monocyte # : x  Auto Eosinophil # : x  Auto Basophil # : x  Auto Neutrophil % : x  Auto Lymphocyte % : x  Auto Monocyte % : x  Auto Eosinophil % : x  Auto Basophil % : x    07-23    139  |  102  |  13  ----------------------------<  93  3.8   |  23  |  0.95    Ca    9.3      23 Jul 2020 05:54  Mg     2.1     07-23        TELEMETRY: 	  Sinus Bradycardia 40-50's  ECG:  	QT 392ms/ QTc 346ms

## 2020-07-24 NOTE — PROGRESS NOTE ADULT - SUBJECTIVE AND OBJECTIVE BOX
60y old  Male who presents with a chief complaint of febrile illness, abdominal pain with hx of intestinal TBB,  AFL w/ variable Ventricular conduction (24 Jul 2020 10:31)      Interval history:  Afebrile, feels better today, headache resolved, abdominal pain better. Appetite better.       Allergies:   sulfa drugs (Anaphylaxis)  sulfa drugs (Seizure)      Antimicrobials:      REVIEW OF SYSTEMS:  No chest pain   No SOB  No N/V  No rash.       Vital Signs Last 24 Hrs  T(C): 36.6 (07-24-20 @ 12:20), Max: 37.1 (07-23-20 @ 21:35)  T(F): 97.9 (07-24-20 @ 12:20), Max: 98.7 (07-23-20 @ 21:35)  HR: 46 (07-24-20 @ 12:20) (44 - 61)  BP: 125/74 (07-24-20 @ 12:20) (110/71 - 125/74)  BP(mean): --  RR: 18 (07-24-20 @ 12:20) (16 - 19)  SpO2: 95% (07-24-20 @ 12:20) (95% - 97%)      PHYSICAL EXAM:  Patient in no acute distress. AAOX3.  No icterus, no oral ulcers.  Cardiovascular: S1S2 normal.  Lungs: Good air entry B/L lung fields.  Gastrointestinal: soft, no Lt LQ discomfort on deep palpation, nondistended.  Extremities: no edema.  IV sites not inflamed.                             13.2   9.28  )-----------( 225      ( 23 Jul 2020 05:54 )             41.7   07-23    139  |  102  |  13  ----------------------------<  93  3.8   |  23  |  0.95    Ca    9.3      23 Jul 2020 05:54  Mg     2.1     07-23        Culture - Blood (collected 22 Jul 2020 01:57)  Source: .Blood Blood-Peripheral  Preliminary Report (23 Jul 2020 02:01):    No growth to date.    Culture - Blood (collected 22 Jul 2020 01:57)  Source: .Blood Blood-Peripheral  Preliminary Report (23 Jul 2020 02:01):    No growth to date.      Radiology:  < from: CT Abdomen and Pelvis w/ IV Cont (07.23.20 @ 16:51) >  IMPRESSION:   Septated right renal cyst, likely benign.

## 2020-07-24 NOTE — PROGRESS NOTE ADULT - PROBLEM SELECTOR PLAN 2
- new onset aflutter in the setting of septic shock   - EP following - recommended d/c lopressor and started sotalol and titrated up - converted to sinus rhythm but now bradycardic currently in sinus rhythm. no need for ablation. f/u regarding bradycardia  qtc 400s  - c/w Eliquis 5mg po q12  - c/w Tele monitoring - monitor bradycardia

## 2020-07-24 NOTE — PROGRESS NOTE ADULT - PROBLEM SELECTOR PLAN 3
no s/s of acute bleed with stable h/h - monitor h/h  - Seen by GI; recs no further GI workup  at this time given negative CT and recent colonoscopy done at [facility] - recalled with concern for ?possible intestinal Tb causing fevers  NMwbc scan without uptake in bowel.   will continue home dose of mesalamine. Patient may have IBD though recent colonoscopy negative  advancing diet
- patient is not currently bleeding   - reported minimal blood 5d prior to admission; low suspicion that the hemodynamic instability was due to GI blood loss, and more likely attributed to sepsis  - Seen by GI ; recs no further GI workup  at this time given negative CT and recent colonoscopy done at [facility]  - Continue home dose of mesalamine. Patient may have IBD. Will need collateral.   - pt wishes to c/w Clear liquid diet for now ; advance as tolerated
-  patient is not currently bleeding   - reported minimal blood  5d prior to admission ;  low suspicion that the hemodynamic instability was due to GI blood loss, and more likely attributed to sepsis  - Seen by GI ; reccs no further GI workup  at this time given negative CT and recent colonoscopy done at [facility]  - Continue home dose of mesalamine  - d/c diluadid and start Oxycodone IR 5mg q 4 prn for abd pain  - pt wishes to c/w Clear liquid diet for now ; advance as tolerated
- patient is not currently bleeding   - reported minimal blood  5d prior to admission ;  low suspicion that the hemodynamic instability was due to GI blood loss, and more likely attributed to sepsis  - Seen by GI ; recs no further GI workup  at this time given negative CT and recent colonoscopy done at [facility]  - Continue home dose of mesalamine. Patient may have IBD. Will need collateral.   - pt wishes to c/w Clear liquid diet for now ; advance as tolerated
no s/s of acute bleed with stable h/h - monitor h/h  - GI f/u appreciated - no clear utility in endoscopic evaluation at this time given negative NM scan.   NMwbc scan without uptake in bowel.   will continue home dose of mesalamine. Patient may have IBD though recent colonoscopy negative  advancing diet as tolerated
no s/s of acute bleed with stable h/h - monitor h/h  - Seen by GI; recs no further GI workup  at this time given negative CT and recent colonoscopy done at [facility] - recalled with concern for ?possible intestinal Tb causing fevers  f/u NMwbc scan  -will continue home dose of mesalamine. Patient may have IBD though recent colonoscopy negative  - pt wishes to c/w Clear liquid diet for now ; advance as tolerated
no s/s of acute bleed with stable h/h - monitor h/h  - GI f/u appreciated - no clear utility in endoscopic evaluation at this time given negative NM scan.   NMwbc scan without uptake in bowel.   will continue home dose of mesalamine. Patient may have IBD though recent colonoscopy negative  advancing diet as tolerated

## 2020-07-24 NOTE — PROGRESS NOTE ADULT - ASSESSMENT
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    no leucocytosis, CT with no obvious source, All cultures negative to date.   ? etiology of fever, elevated markers of inflammation, I am not sure if SCAD in itself can cause fevers.   Pt with also night sweats and weight loss, s/p therapy for latent TB not active TB.   CLINICALLY FEELING BETTER.      Plan:   repeat blood cx NTD   NM WBC scan with non specific uptake in lungs,   reviewed CT with radiology, low concern for malignancy or infection based on the scan in bowel or kidneys  CT chest unremarkable  michele, rf, anca negative  s/p gi follow up, discussed with GI, given pt had recent colonoscopy with random biopsies from each part and CT with no localizing abnormality, the risk of procedure more than the benefit.   quanatiferon gold, indeterminate   observe off abx   atypical infection work up in lab, the results can be followed as outpt if pt is discharged.   Pt to follow with me as outpt.

## 2020-07-24 NOTE — PROGRESS NOTE ADULT - ASSESSMENT
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 Pt was noted to be in new atrial flutter and was hypotensive requiring admission to MICU for pressor support and management of septic shock. hospital course c/b fevers.

## 2020-07-24 NOTE — PROGRESS NOTE ADULT - PROBLEM SELECTOR PLAN 1
- unclear source ? pyelonephritis treated with 5 days of abx. still spiking fevers  - s/p pressor support in MICU now transferred to telemetry floor  - bld cx NGTD, urine cx NGTD  CT c/a/p reviewed - renal and liver cysts - us ordered to better evaluate   repeat covid test  if further loose stool, will send stool culture.   Patient reports he has a nerve stimulator for upper airway stimulation for his NINOSKA he reports no discomfort at the insertion site or his neck. It was placed Last August. no infection seen on ct chest.   ID consult called
- unclear source ? pyelonephritis  - s/p pressor support in MICU now transferred to telemetry floor  - bld cx NGTD, urine cx NGTD  - c/w Cefuroxime 250mg po 12
- unclear source   ? pyelonephritis treated with 5 days of abx.   still spiking fevers (though afebrile over the last 24 hours) - will continue to monitor   - s/p pressor support in MICU now transferred to telemetry floor  - bld cx NGTD, urine cx NGTD  CT c/a/p reviewed - renal and liver cysts - us ordered to better evaluate   repeat covid test negative  if further loose stool, will send stool culture.   Patient reports he has a nerve stimulator for upper airway stimulation for his NINOSKA he reports no discomfort at the insertion site or his check - no tenderness in area. no infection seen on ct chest.   ID consult appreciated - NM wbc scan, monitor off abx   esr/crp elevated  cpk, ldh wnl,   f/u michele, anca, rf, quant gold
- unclear source  ? pyelonephritis treated with 5 days of abx.   still spiking fevers (though afebrile over the last 24 hours) - will continue to monitor   - s/p pressor support in MICU now transferred to telemetry floor  - bld cx NGTD, urine cx NGTD   if further loose stool, will send stool culture.   procalcitonin - 0.08  Patient reports he has a nerve stimulator for upper airway stimulation for his NINOSKA he reports no discomfort at the insertion site or his check - no tenderness in area. no infection seen on ct chest.   ID consult appreciated - NM wbc scan nonspecific not suggestive of infection, monitor off abx per ID  esr/crp elevated  cpk, ldh wnl,   f/u michele, anca, rf, quant gold  ?fever due to malignancy -CT c/a/p reviewed - renal and liver cysts - us with renal lesion with septation suggesting complex cyst - Urology consult appreciated - d/w them today - will order dedicated ct to better evaluate renal cyst
- unclear source ? pyelonephritis  - s/p pressor support in MICU now transferred to telemetry floor  - bld cx NGTD, urine cx NGTD  - S/p abx course.   - Had fever overnight (7/18-19). No clear source of infection. CXR and UA pending. Most recent Bcx with ngtd as above. All complaints are chronic in nature. Patient reports he has a nerve stimulator for upper airway stimulation for his NINOSKA -- he reports no discomfort at the insertion site or his neck. It was placed Last August. Will continue to monitor off abx. If wbc elevation and fever is persistent, will pursue CT c/a/p to evaluate for source.
- unclear source  ? pyelonephritis treated with 5 days of abx.   - s/p pressor support in MICU now transferred to telemetry floor  - bld cx NGTD, urine cx NGTD   procalcitonin - 0.08  Patient reports he has a nerve stimulator for upper airway stimulation for his NINOSKA he reports no discomfort at the insertion site or his check - no tenderness in area. no infection seen on ct chest.   ID consult appreciated - NM wbc scan nonspecific not suggestive of infection, monitor off abx per ID  esr/crp elevated  cpk, ldh, michele, anca, rf wnl   ?fever due to malignancy -CT c/a/p reviewed - renal and liver cysts - us with renal lesion with septation suggesting complex cyst - Urology consult appreciated - s/p dedicated ct with likely benign renal cyst  was spiking fevers though afebrile now over the last ~72 hours - no further inpatient workup - patient will follow up with ID and urology as outpatient
- unclear source  ? pyelonephritis treated with 5 days of abx.   still spiking fevers (though afebrile over the last 24 hours) - will continue to monitor   - s/p pressor support in MICU now transferred to telemetry floor  - bld cx NGTD, urine cx NGTD   if further loose stool, will send stool culture.   procalcitonin - 0.08  Patient reports he has a nerve stimulator for upper airway stimulation for his NINOSKA he reports no discomfort at the insertion site or his check - no tenderness in area. no infection seen on ct chest.   ID consult appreciated - NM wbc scan nonspecific not suggestive of infection, monitor off abx per ID  esr/crp elevated  cpk, ldh wnl,   f/u michele, anca, rf, quant gold  ?fever due to malignancy -CT c/a/p reviewed - renal and liver cysts - us with renal lesion with septation suggesting complex cyst - Urology consult

## 2020-07-25 VITALS
TEMPERATURE: 98 F | OXYGEN SATURATION: 94 % | RESPIRATION RATE: 18 BRPM | HEART RATE: 57 BPM | SYSTOLIC BLOOD PRESSURE: 129 MMHG | DIASTOLIC BLOOD PRESSURE: 87 MMHG

## 2020-07-25 PROCEDURE — 84295 ASSAY OF SERUM SODIUM: CPT

## 2020-07-25 PROCEDURE — 83605 ASSAY OF LACTIC ACID: CPT

## 2020-07-25 PROCEDURE — 85652 RBC SED RATE AUTOMATED: CPT

## 2020-07-25 PROCEDURE — 80048 BASIC METABOLIC PNL TOTAL CA: CPT

## 2020-07-25 PROCEDURE — 83615 LACTATE (LD) (LDH) ENZYME: CPT

## 2020-07-25 PROCEDURE — 74177 CT ABD & PELVIS W/CONTRAST: CPT

## 2020-07-25 PROCEDURE — 81001 URINALYSIS AUTO W/SCOPE: CPT

## 2020-07-25 PROCEDURE — 86803 HEPATITIS C AB TEST: CPT

## 2020-07-25 PROCEDURE — 86480 TB TEST CELL IMMUN MEASURE: CPT

## 2020-07-25 PROCEDURE — 76700 US EXAM ABDOM COMPLETE: CPT

## 2020-07-25 PROCEDURE — 82947 ASSAY GLUCOSE BLOOD QUANT: CPT

## 2020-07-25 PROCEDURE — 86140 C-REACTIVE PROTEIN: CPT

## 2020-07-25 PROCEDURE — 87471 BARTONELLA DNA AMP PROBE: CPT

## 2020-07-25 PROCEDURE — 96361 HYDRATE IV INFUSION ADD-ON: CPT | Mod: XU

## 2020-07-25 PROCEDURE — 93005 ELECTROCARDIOGRAM TRACING: CPT

## 2020-07-25 PROCEDURE — 99291 CRITICAL CARE FIRST HOUR: CPT | Mod: 25

## 2020-07-25 PROCEDURE — 78802 RP LOCLZJ TUM WHBDY 1 D IMG: CPT

## 2020-07-25 PROCEDURE — 71275 CT ANGIOGRAPHY CHEST: CPT

## 2020-07-25 PROCEDURE — 86622 BRUCELLA ANTIBODY: CPT

## 2020-07-25 PROCEDURE — 82330 ASSAY OF CALCIUM: CPT

## 2020-07-25 PROCEDURE — 93306 TTE W/DOPPLER COMPLETE: CPT

## 2020-07-25 PROCEDURE — 87385 HISTOPLASMA CAPSUL AG IA: CPT

## 2020-07-25 PROCEDURE — 86638 Q FEVER ANTIBODY: CPT

## 2020-07-25 PROCEDURE — 82803 BLOOD GASES ANY COMBINATION: CPT

## 2020-07-25 PROCEDURE — 87040 BLOOD CULTURE FOR BACTERIA: CPT

## 2020-07-25 PROCEDURE — A9570: CPT

## 2020-07-25 PROCEDURE — 84443 ASSAY THYROID STIM HORMONE: CPT

## 2020-07-25 PROCEDURE — 87086 URINE CULTURE/COLONY COUNT: CPT

## 2020-07-25 PROCEDURE — 86635 COCCIDIOIDES ANTIBODY: CPT

## 2020-07-25 PROCEDURE — 85014 HEMATOCRIT: CPT

## 2020-07-25 PROCEDURE — 82533 TOTAL CORTISOL: CPT

## 2020-07-25 PROCEDURE — 85610 PROTHROMBIN TIME: CPT

## 2020-07-25 PROCEDURE — 87449 NOS EACH ORGANISM AG IA: CPT

## 2020-07-25 PROCEDURE — 82550 ASSAY OF CK (CPK): CPT

## 2020-07-25 PROCEDURE — 84484 ASSAY OF TROPONIN QUANT: CPT

## 2020-07-25 PROCEDURE — 84100 ASSAY OF PHOSPHORUS: CPT

## 2020-07-25 PROCEDURE — 85027 COMPLETE CBC AUTOMATED: CPT

## 2020-07-25 PROCEDURE — 86038 ANTINUCLEAR ANTIBODIES: CPT

## 2020-07-25 PROCEDURE — 85730 THROMBOPLASTIN TIME PARTIAL: CPT

## 2020-07-25 PROCEDURE — 71045 X-RAY EXAM CHEST 1 VIEW: CPT

## 2020-07-25 PROCEDURE — 99231 SBSQ HOSP IP/OBS SF/LOW 25: CPT

## 2020-07-25 PROCEDURE — 86698 HISTOPLASMA ANTIBODY: CPT

## 2020-07-25 PROCEDURE — 83735 ASSAY OF MAGNESIUM: CPT

## 2020-07-25 PROCEDURE — 84145 PROCALCITONIN (PCT): CPT

## 2020-07-25 PROCEDURE — 86403 PARTICLE AGGLUT ANTBDY SCRN: CPT

## 2020-07-25 PROCEDURE — 82553 CREATINE MB FRACTION: CPT

## 2020-07-25 PROCEDURE — U0003: CPT

## 2020-07-25 PROCEDURE — 84132 ASSAY OF SERUM POTASSIUM: CPT

## 2020-07-25 PROCEDURE — 99239 HOSP IP/OBS DSCHRG MGMT >30: CPT

## 2020-07-25 PROCEDURE — 83880 ASSAY OF NATRIURETIC PEPTIDE: CPT

## 2020-07-25 PROCEDURE — 86769 SARS-COV-2 COVID-19 ANTIBODY: CPT

## 2020-07-25 PROCEDURE — 86606 ASPERGILLUS ANTIBODY: CPT

## 2020-07-25 PROCEDURE — 96365 THER/PROPH/DIAG IV INF INIT: CPT | Mod: XU

## 2020-07-25 PROCEDURE — 80053 COMPREHEN METABOLIC PANEL: CPT

## 2020-07-25 PROCEDURE — 86431 RHEUMATOID FACTOR QUANT: CPT

## 2020-07-25 PROCEDURE — 82435 ASSAY OF BLOOD CHLORIDE: CPT

## 2020-07-25 PROCEDURE — 82728 ASSAY OF FERRITIN: CPT

## 2020-07-25 PROCEDURE — 86611 BARTONELLA ANTIBODY: CPT

## 2020-07-25 PROCEDURE — 96375 TX/PRO/DX INJ NEW DRUG ADDON: CPT | Mod: XU

## 2020-07-25 PROCEDURE — 86036 ANCA SCREEN EACH ANTIBODY: CPT

## 2020-07-25 RX ORDER — LOSARTAN POTASSIUM 100 MG/1
1 TABLET, FILM COATED ORAL
Qty: 0 | Refills: 0 | DISCHARGE

## 2020-07-25 RX ORDER — ACETAMINOPHEN 500 MG
2 TABLET ORAL
Qty: 0 | Refills: 0 | DISCHARGE
Start: 2020-07-25

## 2020-07-25 RX ORDER — APIXABAN 2.5 MG/1
1 TABLET, FILM COATED ORAL
Qty: 60 | Refills: 0
Start: 2020-07-25 | End: 2020-08-23

## 2020-07-25 RX ORDER — LEVOTHYROXINE SODIUM 125 MCG
1 TABLET ORAL
Qty: 0 | Refills: 0 | DISCHARGE

## 2020-07-25 RX ORDER — TAMSULOSIN HYDROCHLORIDE 0.4 MG/1
1 CAPSULE ORAL
Qty: 0 | Refills: 0 | DISCHARGE
Start: 2020-07-25

## 2020-07-25 RX ADMIN — Medication 175 MICROGRAM(S): at 06:00

## 2020-07-25 RX ADMIN — PANTOPRAZOLE SODIUM 40 MILLIGRAM(S): 20 TABLET, DELAYED RELEASE ORAL at 06:00

## 2020-07-25 RX ADMIN — TAMSULOSIN HYDROCHLORIDE 0.4 MILLIGRAM(S): 0.4 CAPSULE ORAL at 12:18

## 2020-07-25 RX ADMIN — Medication 1.5 GRAM(S): at 12:18

## 2020-07-25 RX ADMIN — APIXABAN 5 MILLIGRAM(S): 2.5 TABLET, FILM COATED ORAL at 05:59

## 2020-07-25 NOTE — PROGRESS NOTE ADULT - PROVIDER SPECIALTY LIST ADULT
Electrophysiology
Gastroenterology
Gastroenterology
Hospitalist
Infectious Disease
MICU
Urology
Hospitalist
Hospitalist

## 2020-07-25 NOTE — PROGRESS NOTE ADULT - ASSESSMENT
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 Required pressor support, IVF resuscitation, & 5d abx for sepsis; source remains unclear. Hosp course further c/b rapid AFL w/ variable V conduction & brief conversion to NSR, now w/ subclinical hematuria, B/L renal lesions, and left lobe liver cyst (3x2cm) on chest/abd CTA. 2D TTE on 07/15/20 with mildly dilated LA, mild TR, mild-mod MR, severe reversible GIIIDD, RVE with normal RVSF and preserved LVEF (65%)    ***IN progress    1. Atrial  flutter chemically converted to  NSR on Sotalol   -Maintaining NSR on telemetry  -Continue Eliquis 5mg oral BID  - Mp ***  Keep K+>4, MG++>2      2.  Febrile Illness  -no leucocytosis, CT with no obvious source, All cultures negative to date.   -NM WBC scan to see if any other etiology of fever, with non specific uptake in lungs,     3. 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 Required pressor support, IVF resuscitation, & 5d abx for sepsis; source remains unclear. Hosp course further c/b rapid AFL w/ variable V conduction & brief conversion to NSR, now w/ subclinical hematuria, B/L renal lesions, and left lobe liver cyst (3x2cm) on chest/abd CTA. 2D TTE on 07/15/20 with mildly dilated LA, mild TR, mild-mod MR, severe reversible GIIIDD, RVE with normal RVSF and preserved LVEF (65%)    He remains in sinus rhythm with heart rates 40-50s despite holding sotalol since 7/23.    #Atrial  flutter chemically converted to  NSR on Sotalol   - Continue Eliquis 5 mg PO BID  - Continue to hold sotalol, would not start another anti-arrhythmic drug at this time  - Okay to discharge home from an EP standpoint, he should follow-up with Dr. Barnes in 1 week     AARON Cardona MD  Cardiology Fellow  All cardiology service information may be found 24/7 on [email or web address], password: [email or web address]  [phone]

## 2020-07-25 NOTE — PROGRESS NOTE ADULT - SUBJECTIVE AND OBJECTIVE BOX
Patient seen and examined at bedside.    Overnight Events:       REVIEW OF SYSTEMS:  Constitutional:     No fevers, chills, weight loss, weight gain  HEENT:                  No dry eyes, nasal congestion, postnasal drip  CV:                         No chest pain, palpitations, orthopnea, PND  Resp:                     No cough, SOB, dyspnea, wheezing, sputum  GI:                          No nausea, vomiting, abdominal pain, diarrhea, constipation  :                        No dysuria, nocturia, hematuria, increased urinary frequency  Musculoskeletal: No back pain, myalgias, arthralgias   Skin:                       No rash, pruritus, ecchymoses  Neurological:        No headache, dizziness, syncope, weakness, numbness  Psychiatric:           No anxiety, depression   Endocrine:            No hot/cold intolerance, polydipsia  Heme/Lymph:      No bleeding, easy bruising  Allergic/Immune: No itchy eyes, rhinorrhea, hives angioedema      Current Meds:  acetaminophen   Tablet .. 650 milliGRAM(s) Oral every 6 hours PRN  aluminum hydroxide/magnesium hydroxide/simethicone Suspension 30 milliLiter(s) Oral every 4 hours PRN  apixaban 5 milliGRAM(s) Oral two times a day  atorvastatin 80 milliGRAM(s) Oral at bedtime  guaiFENesin   Syrup  (Sugar-Free) 100 milliGRAM(s) Oral every 6 hours PRN  levothyroxine 175 MICROGram(s) Oral <User Schedule>  levothyroxine 88 MICROGram(s) Oral <User Schedule>  mesalamine ER (24-Hour) Capsule 1.5 Gram(s) Oral daily  ondansetron Injectable 4 milliGRAM(s) IV Push every 4 hours PRN  oxyCODONE    IR 5 milliGRAM(s) Oral every 4 hours PRN  pantoprazole    Tablet 40 milliGRAM(s) Oral before breakfast  tamsulosin 0.4 milliGRAM(s) Oral daily      PAST MEDICAL & SURGICAL HISTORY:  HTN (hypertension): on losartan      Vitals:  T(F): 98.2 (07-25), Max: 98.7 (07-24)  HR: 49 (07-25) (46 - 53)  BP: 135/83 (07-25) (125/74 - 135/83)  RR: 18 (07-25)  SpO2: 92% (07-25)  I&O's Summary    24 Jul 2020 07:01  -  25 Jul 2020 07:00  --------------------------------------------------------  IN: 420 mL / OUT: 400 mL / NET: 20 mL        Physical Exam:  Appearance: No acute distress; well appearing  Eyes: PERRL, EOMI, pink conjunctiva  HENT: Normal oral mucosa  Cardiovascular: RRR, S1, S2, no murmurs, rubs, or gallops; no edema; no JVD  Respiratory: Clear to auscultation bilaterally  Gastrointestinal: soft, non-tender, non-distended with normal bowel sounds  Musculoskeletal: No clubbing; no joint deformity   Neurologic: Non-focal  Lymphatic: No lymphadenopathy  Psychiatry: AAOx3, mood & affect appropriate  Skin: No rashes, ecchymoses, or cyanosis                          New ECG(s): Personally reviewed    Echo:    Stress Testing:     Cath:    Imaging:    Interpretation of Telemetry: Patient seen and examined at bedside.    Overnight Events: Patient feels well. He has had no recurrence of the fatigue and SOB that he felt when in AFlutter.       REVIEW OF SYSTEMS:  Constitutional:     No fevers, chills, weight loss, weight gain  HEENT:                  No dry eyes, nasal congestion, postnasal drip  CV:                         No chest pain, palpitations, orthopnea, PND  Resp:                     No cough, SOB, dyspnea, wheezing, sputum  GI:                          No nausea, vomiting, abdominal pain, diarrhea, constipation  :                        No dysuria, nocturia, hematuria, increased urinary frequency  Musculoskeletal: No back pain, myalgias, arthralgias   Skin:                       No rash, pruritus, ecchymoses  Neurological:        No headache, dizziness, syncope, weakness, numbness  Psychiatric:           No anxiety, depression   Endocrine:            No hot/cold intolerance, polydipsia  Heme/Lymph:      No bleeding, easy bruising  Allergic/Immune: No itchy eyes, rhinorrhea, hives angioedema      Current Meds:  acetaminophen   Tablet .. 650 milliGRAM(s) Oral every 6 hours PRN  aluminum hydroxide/magnesium hydroxide/simethicone Suspension 30 milliLiter(s) Oral every 4 hours PRN  apixaban 5 milliGRAM(s) Oral two times a day  atorvastatin 80 milliGRAM(s) Oral at bedtime  guaiFENesin   Syrup  (Sugar-Free) 100 milliGRAM(s) Oral every 6 hours PRN  levothyroxine 175 MICROGram(s) Oral <User Schedule>  levothyroxine 88 MICROGram(s) Oral <User Schedule>  mesalamine ER (24-Hour) Capsule 1.5 Gram(s) Oral daily  ondansetron Injectable 4 milliGRAM(s) IV Push every 4 hours PRN  oxyCODONE    IR 5 milliGRAM(s) Oral every 4 hours PRN  pantoprazole    Tablet 40 milliGRAM(s) Oral before breakfast  tamsulosin 0.4 milliGRAM(s) Oral daily      PAST MEDICAL & SURGICAL HISTORY:  HTN (hypertension): on losartan      Vitals:  T(F): 98.2 (07-25), Max: 98.7 (07-24)  HR: 49 (07-25) (46 - 53)  BP: 135/83 (07-25) (125/74 - 135/83)  RR: 18 (07-25)  SpO2: 92% (07-25)  I&O's Summary    24 Jul 2020 07:01  -  25 Jul 2020 07:00  --------------------------------------------------------  IN: 420 mL / OUT: 400 mL / NET: 20 mL        Physical Exam:  Appearance: No acute distress; well appearing  Cardiovascular: RRR, S1, S2, no murmurs, rubs, or gallops; no edema;   Respiratory: Clear to auscultation bilaterally  Gastrointestinal: soft, non-tender, non-distended with normal bowel sounds  Musculoskeletal: No clubbing; no joint deformity   Neurologic: Non-focal  Psychiatry: AAOx3, mood & affect appropriate        Interpretation of Telemetry: Sinus bradycardia 40-50s, intermittently up to HR 60s

## 2020-07-25 NOTE — PROGRESS NOTE ADULT - REASON FOR ADMISSION
shock
shock
Hypotension
Shock
febrile illness, abdominal pain with hx of intestinal TBB,  AFL w/ variable Ventricular conduction
shock

## 2020-07-25 NOTE — CHART NOTE - NSCHARTNOTEFT_GEN_A_CORE
patient seen and examined. no acute complaints. no further fevers, night sweats >72 hours    60M with h/o HTN, HLD, hypothyroidism s/p total thyroidectomy, diverticulitis, abdominal TB ('93) s/p INH who presented with septic shock with unclear source. Initially suspected pyelonephritis treated with 5 days of anitbiotics. patient required pressor support in MICU. Blood cultures remained negative and Urine culture negative. patient was having perisistent fevers and ID consult was called. patient infectious workup negative with negative procalcitonin. patient had NM wbc scan that was negative for source of infection. With concern for possible intestinal Tb with LLQ abdominal pain, patient evaluated by GI who recommended no further inpatient workup given negative tagged scan and recent negative colonoscopy. Further blood work sent to be followed up with ID as outpatient. patient was afebrile for >72 hours prior to discharge. patient was found to have complex right renal cyst on CT c/a/p that was further evaluated with CT urogram thought to be likely benign. patient also evaluated by Urology inpatient and he will follow up with urology as outpatient. Patient also had new onset atrial flutter in the setting of septic shock. patient was evaluated by EP initially placed on lopressor without rate control changed to sotalol. He was planned for an ablation but converted to sinus prior to procedure. Patient developed bradycardia and sotalol held. Per EP, discharge on NO antiarrhythmic therapy. patient was started on eliquis and will continue that on discharge. patient to follow up with EP next week. patient to follow up with pmd and urology as well.     discharge time - 36 minutes   Dr. M. Luke  Medicine Hospitalist  [phone]

## 2020-07-25 NOTE — PROGRESS NOTE ADULT - ATTENDING COMMENTS
Red Roth  Pager: [phone]. If no response or past 5 pm call [phone].
Red Roth  Pager: [phone]. If no response or past 5 pm call [phone].
Red Roth  Pager: [phone]. If no response or past 5 pm call [phone].
He would benefit from the use of a beta blocker as opposed to the diltiazem as his arrhythmia is triggered by sympathetic surge.
Heart rates in sinus too slow to tolerate sotalol. May need to consider dofetilide or no therapy if sinus rates stay slow. Rhythm may be better with improvement in inflammation. Will still need ablation in future. Possible discharge tomorrow but he will need to stay if dofetilide initiated.
This 60 year old man with sepsis related to a possible large bowel infection has developed atrial arrhythmias with both right atrial flutter and possible atrial fibrillation. With control of infection and pain, the arrhythmia resolved but recurred. This is adrenergically mediated and he would benefit from treatment with a regular dosing of beta blockers. He will also need to be anticoagulated if he risk of GI bleed is low.     Once stabilized, his atrial arrhythmias will have to be addressed as this likely to recur, given his age.
60 M with HTN. HLD, thyroid cancer s/p thyroidectomy, diverticulitis (2010) who presented with BRBPR and LLQ pain for nearly one month but worsening over past three days associated with foul smelling urine, dysuria, and fever. Brought to MICU for shock state, briefly on vasopressors. He has been fluid resuscitated. Now off IV vasopressors and feeling better. Course c/b new atrial flutter. Converted to NSR this morning.    1. Septic shock due to cystitis / pyelonephritis (CT with stranding) although US negative LE and nitrites. Low suspicion for acute intraabdominal infection given negative CT findings. Continue ceftriaxone and follow up urine cultures. Currently hemodynamically stable. D/C midodrine, titrate down stress dose steroids.  2. Atrial flutter - Hgb is stable. Start A/C. Follow up with EP.
I will see him in one week and plan for ablation in next few weeks
Patient seen and examined. Agree with above. Abdominal pain is at baseline, patient feels well. Doubt GI source is cause of his sepsis/hypotension. Would resume oral mesalamine for history of SCAD. No additional GI intervention warranted at this time.
He wishes to follow up with me. Will plan ablation once inflammatory bowel issues better
60 M with HTN. HLD, thyroid cancer s/p thyroidectomy, diverticulitis (2010) who presented with BRBPR and LLQ pain for nearly one month but worsening over past three days associated with foul smelling urine, dysuria, and fever. Brought to MICU for shock state, briefly on vasopressors. He has been fluid resuscitated. Now off IV vasopressors and feeling better. Course c/b new atrial flutter.    1. Septic shock due to cystitis / pyelonephritis (CT with stranding) although UA negative for LE and nitrites. Low suspicion for acute intraabdominal infection given negative CT findings. Currently hemodynamically stable. Off midodrine and stress dose steroids. Plan for two more days of antibiotics.  2. Atrial flutter - Eliquis. Initially concerted to NSR but now back in flutter with RVR. Responded well to IV cardizem. Will start PO and titrate to effective dose.  3. Severe diastolic dysfunction on TTE - maintain euvolemia, rate control  4. LLQ abdominal pain - ?underlying IBD, on mesalamine, pain control, follow up with GI as outpatient
60 M with HTN. HLD, thyroid cancer s/p thyroidectomy, diverticulitis (2010) who presented with BRBPR and LLQ pain for nearly one month but worsening over past three days associated with foul smelling urine, dysuria, and fever. Brought to MICU for shock state, briefly on vasopressors. He has been fluid resuscitated. Now off IV vasopressors and on midodrine 20 Q8H. Course c/b new atrial flutter.    1. Septic shock due to cystitis / pyelonephritis (CT with stranding) although US negative LE and nitrites. Low suspicion for acute intraabdominal infection given negative CT findings. Continue ceftriaxone and follow up urine cultures. Currently hemodynamically stable.  2. Atrial flutter - rate control, initial concern for GIB as Hgb dropped 15.4 - 12.7. If remains stable can be started on AC with eventual plan for ANA/cardioversion per EPS. Awaiting official TTE.
60 M with HTN. HLD, thyroid cancer s/p thyroidectomy, diverticulitis (2010) who presented with BRBPR and LLQ pain for nearly one month but worsening over past three days associated with foul smelling urine, dysuria, and fever. Brought to MICU for shock state, briefly on vasopressors. He has been fluid resuscitated. Now off IV vasopressors and feeling better. Course c/b new atrial flutter. Converted to NSR this 7/15.    1. Septic shock due to cystitis / pyelonephritis (CT with stranding) although UA negative for LE and nitrites. Low suspicion for acute intraabdominal infection given negative CT findings. Continue ceftriaxone and follow up urine cultures. Can change to PO tomorrow and plan for five day course. Currently hemodynamically stable. Off midodrine and stress dose steroids.  2. Atrial flutter - Hgb is stable. On Eliquis. Follow up with EP as outpatient
60 y M PMHx HTN, HLD, hypothyroidism s/p thyroidectomy  2/2 thyroid cancer, diverticulitis (2010), abdominal TB ('93) s/p INH p/w fevers and progressively worsening LLQ pain likely 2/2 septic shock.  Pt BP stable but has had AF that is rapid. Today better rate controlled to low 100s and pt denies sx from it. Cont tx w/ BB and cont a/c. Complete abx for sepsis poss urosepsis. Plan for transfer to [facility].
Since he is on apixaban and feels poorly in atrial flutter (which is paroxysmal with self conversion), will try to maintain sinus with sotalol. He will need ablation in long term. Spent 30 minutes with patient
Dr. [facility]  [phone]
[facility]  [phone]
Dr. [facility]  [phone]
[facility]. [facility]  [phone]
Dr. LINDSEY HebertPeoples Hospitalist  [phone]

## 2020-07-27 LAB
CULTURE RESULTS: SIGNIFICANT CHANGE UP
CULTURE RESULTS: SIGNIFICANT CHANGE UP
H CAPSUL AG SPEC-ACNC: SIGNIFICANT CHANGE UP
H CAPSUL AG UR QL IA: SIGNIFICANT CHANGE UP
SPECIMEN SOURCE: SIGNIFICANT CHANGE UP
SPECIMEN SOURCE: SIGNIFICANT CHANGE UP

## 2020-07-29 LAB
A FLAVUS AB FLD QL: NEGATIVE — SIGNIFICANT CHANGE UP
A NIGER AB FLD QL: NEGATIVE — SIGNIFICANT CHANGE UP
A NIGER AB FLD QL: NEGATIVE — SIGNIFICANT CHANGE UP
H CAPSUL MYC AB FLD-ACNC: SIGNIFICANT CHANGE UP
H CAPSUL YST AB FLD-ACNC: SIGNIFICANT CHANGE UP

## 2020-07-30 LAB
B HENSELAE IGG SER-ACNC: SIGNIFICANT CHANGE UP TITER
B HENSELAE IGM SER-ACNC: SIGNIFICANT CHANGE UP TITER
B QUINTANA IGG SERPL-ACNC: SIGNIFICANT CHANGE UP TITER
B QUINTANA IGM SER-ACNC: SIGNIFICANT CHANGE UP TITER
C BURNET PH1 + PH2 IGG+IGM SER-IMP: SIGNIFICANT CHANGE UP
C BURNET PH1 IGG TITR FLD: SIGNIFICANT CHANGE UP
C BURNET PH1 IGG TITR FLD: SIGNIFICANT CHANGE UP
C BURNET PH1 IGM TITR FLD: SIGNIFICANT CHANGE UP
C BURNET PH1 IGM TITR FLD: SIGNIFICANT CHANGE UP
C IMMITIS AB FLD QL CF: NEGATIVE — SIGNIFICANT CHANGE UP
C IMMITIS IGM SPEC QL IA: NEGATIVE — SIGNIFICANT CHANGE UP
COCCIDIOIDES IGG SPEC QL IA: NEGATIVE — SIGNIFICANT CHANGE UP
GALACTOMANNAN AG SERPL-ACNC: <0.5 INDEX — SIGNIFICANT CHANGE UP

## 2020-07-31 LAB
B HENSELAE DNA SPEC QL NAA+PROBE: SIGNIFICANT CHANGE UP
B QUINTANA DNA SPEC QL NAA+PROBE: NEGATIVE — SIGNIFICANT CHANGE UP
BARTONELLA PCR SPECIMEN SOURCE: SIGNIFICANT CHANGE UP

## 2020-08-21 LAB
BRUCELLA IGG SER QL IA: NEGATIVE — SIGNIFICANT CHANGE UP
BRUCELLA IGG+IGM SER-IMP: SIGNIFICANT CHANGE UP
BRUCELLA IGM SER QL IA: NEGATIVE — SIGNIFICANT CHANGE UP

## 2021-01-06 ENCOUNTER — TRANSCRIPTION ENCOUNTER (OUTPATIENT)
Age: 61
End: 2021-01-06

## 2021-01-08 ENCOUNTER — APPOINTMENT (OUTPATIENT)
Dept: INTERNAL MEDICINE | Facility: CLINIC | Age: 61
End: 2021-01-08
Payer: COMMERCIAL

## 2021-01-08 VITALS
HEIGHT: 68 IN | WEIGHT: 209 LBS | HEART RATE: 75 BPM | OXYGEN SATURATION: 98 % | DIASTOLIC BLOOD PRESSURE: 86 MMHG | SYSTOLIC BLOOD PRESSURE: 160 MMHG | BODY MASS INDEX: 31.67 KG/M2 | TEMPERATURE: 97.3 F

## 2021-01-08 DIAGNOSIS — Z86.11 PERSONAL HISTORY OF TUBERCULOSIS: ICD-10-CM

## 2021-01-08 PROCEDURE — 99204 OFFICE O/P NEW MOD 45 MIN: CPT

## 2021-01-08 PROCEDURE — 99072 ADDL SUPL MATRL&STAF TM PHE: CPT

## 2021-01-08 NOTE — HISTORY OF PRESENT ILLNESS
[FreeTextEntry8] : Swelling in arms and legs\par HPI: Pt usually seen in the [facility] system. Notes about 2 weeks of swelling, initially in right arm, then bilat legs. Also notes belly is bigger, and weight is up although he is not eating more and had lost weight over the summer when he had COVID.  Seen by endocrine and cariology yesterday, synthroid increased based on TSH of 13.04 (new) and Lasix added by cardiologist for swelling, who told him to see an internist and nephrologist ASAP. Apparently had blood and protein in urine yesterday, no other labs from there known. Pt reports still urinating ok, nocturia x 2, decreased appetite. Brings in labs from 12/29/20 which show new anemia (10.6 down from 14), normal BMP, low protein and albumin, and elevated TSH. Prior studies including U/A from Sept 2020 WNL. Pt reports new GONZALEZ and nocturnal cough. Had right shoulder surgery Nov 2020, and thryoid surgery for cancer June 2013.

## 2021-01-08 NOTE — ASSESSMENT
[FreeTextEntry1] : Need to get all old records, pt wants to establish care here. Had flu and Shingrix. Discussed long term effects of PPI, would like to de-escalate to H2 blocker and if breakthrough pt needs another EGD. Will address all other HM issues when pt returns.

## 2021-01-08 NOTE — REVIEW OF SYSTEMS
[Recent Change In Weight] : ~T recent weight change [Cough] : cough [Dyspnea on Exertion] : dyspnea on exertion [Hematuria] : hematuria [Easy Bruising] : easy bruising [Fever] : no fever [Night Sweats] : no night sweats [Chest Pain] : no chest pain [Wheezing] : no wheezing [FreeTextEntry5] : Told of HF with COVID in summer. [FreeTextEntry7] : GERD symptoms if he misses PPI dose, on 7 years, had EGD 2013 [FreeTextEntry8] : states he always has blood in uring since his TB dx [de-identified] : this past week due to pressure of clothes on swollen legs

## 2021-01-08 NOTE — PHYSICAL EXAM
[No Acute Distress] : no acute distress [Well Developed] : well developed [Normal Sclera/Conjunctiva] : normal sclera/conjunctiva [Normal Outer Ear/Nose] : the outer ears and nose were normal in appearance [Normal Oropharynx] : the oropharynx was normal [Normal TMs] : both tympanic membranes were normal [No JVD] : no jugular venous distention [Supple] : supple [No Respiratory Distress] : no respiratory distress  [Decreased Breath Sounds] : breath sounds were decreased diffusely [Decreased Breath Sounds Bilaterally Bases] : breath sounds were diminished over both bases [Dullness Right Base] : dullness to percussion present over the lower right lung [Dullness Left Base] : dullness to percussion present over the lower left lung [Normal Rate] : normal rate  [Regular Rhythm] : with a regular rhythm [Normal S1, S2] : normal S1 and S2 [Soft] : abdomen soft [No Masses] : no abdominal mass palpated [No HSM] : no HSM [No CVA Tenderness] : no CVA  tenderness [No Spinal Tenderness] : no spinal tenderness [Grossly Normal Strength/Tone] : grossly normal strength/tone [No Focal Deficits] : no focal deficits [Normal Gait] : normal gait [Normal Affect] : the affect was normal [Normal Mood] : the mood was normal [de-identified] : no palpable thyroid [de-identified] : no rub heard [de-identified] : +3 RUE from shoulder to hand and RLE edema  from foot to thigh; +2 LLE, all pitting [de-identified] : distended [de-identified] : no right axillary LN palpated

## 2021-01-10 ENCOUNTER — RESULT CHARGE (OUTPATIENT)
Age: 61
End: 2021-01-10

## 2021-01-11 ENCOUNTER — APPOINTMENT (OUTPATIENT)
Dept: PULMONOLOGY | Facility: CLINIC | Age: 61
End: 2021-01-11

## 2021-01-11 ENCOUNTER — APPOINTMENT (OUTPATIENT)
Dept: PULMONOLOGY | Facility: CLINIC | Age: 61
End: 2021-01-11
Payer: COMMERCIAL

## 2021-01-11 ENCOUNTER — APPOINTMENT (OUTPATIENT)
Dept: VASCULAR SURGERY | Facility: CLINIC | Age: 61
End: 2021-01-11
Payer: COMMERCIAL

## 2021-01-11 VITALS
TEMPERATURE: 98.1 F | HEIGHT: 68 IN | DIASTOLIC BLOOD PRESSURE: 77 MMHG | HEART RATE: 67 BPM | WEIGHT: 209 LBS | BODY MASS INDEX: 31.67 KG/M2 | SYSTOLIC BLOOD PRESSURE: 131 MMHG

## 2021-01-11 VITALS
HEART RATE: 65 BPM | DIASTOLIC BLOOD PRESSURE: 76 MMHG | SYSTOLIC BLOOD PRESSURE: 128 MMHG | BODY MASS INDEX: 31.67 KG/M2 | OXYGEN SATURATION: 99 % | HEIGHT: 68 IN | WEIGHT: 209 LBS

## 2021-01-11 LAB
APPEARANCE: CLEAR
BACTERIA: NEGATIVE
BILIRUBIN URINE: NEGATIVE
BLOOD URINE: ABNORMAL
COLOR: YELLOW
GLUCOSE QUALITATIVE U: NEGATIVE
HYALINE CASTS: 6 /LPF
KETONES URINE: NEGATIVE
LEUKOCYTE ESTERASE URINE: NEGATIVE
MICROSCOPIC-UA: NORMAL
NITRITE URINE: NEGATIVE
PH URINE: 6.5
PROTEIN URINE: ABNORMAL
RED BLOOD CELLS URINE: 4 /HPF
SPECIFIC GRAVITY URINE: 1.03
SQUAMOUS EPITHELIAL CELLS: 8 /HPF
URINE COMMENTS: NORMAL
UROBILINOGEN URINE: NORMAL
WHITE BLOOD CELLS URINE: 6 /HPF

## 2021-01-11 PROCEDURE — 94729 DIFFUSING CAPACITY: CPT

## 2021-01-11 PROCEDURE — 99072 ADDL SUPL MATRL&STAF TM PHE: CPT

## 2021-01-11 PROCEDURE — 94727 GAS DIL/WSHOT DETER LNG VOL: CPT

## 2021-01-11 PROCEDURE — 99204 OFFICE O/P NEW MOD 45 MIN: CPT | Mod: 25

## 2021-01-11 PROCEDURE — ZZZZZ: CPT

## 2021-01-11 PROCEDURE — 93971 EXTREMITY STUDY: CPT

## 2021-01-11 PROCEDURE — 71046 X-RAY EXAM CHEST 2 VIEWS: CPT

## 2021-01-11 PROCEDURE — 95012 NITRIC OXIDE EXP GAS DETER: CPT

## 2021-01-11 PROCEDURE — 99204 OFFICE O/P NEW MOD 45 MIN: CPT

## 2021-01-11 PROCEDURE — 94010 BREATHING CAPACITY TEST: CPT

## 2021-01-11 NOTE — PROCEDURE
[FreeTextEntry1] : PFT: Normal spirometry.  Lung volumes normal. DLCO moderately reduced.\par \par Exhaled nitric oxide: 44, elevated\par \par CXR: clear lungs, no focal consolidation or pleural effusions, cardiac silhouette appears normal.  No bony abnormality.  Inspire sleep device in place, right shoulder replacement. \par \par \par Reviewed:\par EXAM: CT ABDOMEN AND PELVIS IC \par \par EXAM: CT ANGIO CHEST (W)AW IC \par \par \par PROCEDURE DATE: 07/14/2020 \par \par \par \par \par INTERPRETATION: CLINICAL INFORMATION: Shortness of breath and tachycardia. \par Evaluate for pulmonary embolism. Abdominal pain. Evaluate for diverticulitis \par or intra-abdominal abscess. \par \par COMPARISON: None available. \par \par PROCEDURE: \par CT Angiography of the Chest was performed followed by portal venous phase \par imaging of the Abdomen and Pelvis. \par IV Contrast: 90 ml of Omnipaque 350 was injected intravenously. 10 ml were \par discarded. \par Oral contrast: None. \par Sagittal and coronal reformats were performed as well as 3D (MIP) \par reconstructions. \par \par FINDINGS: \par CHEST: \par LUNGS AND LARGE AIRWAYS: Patent central airways. Mild dependent atelectasis. \par No focal consolidation. \par PLEURA: No pleural effusion or pneumothorax. \par VESSELS: Adequate contrast opacification of the pulmonary arteries. No \par pulmonary embolus. Borderline main pulmonary artery (3.0 cm). Normal caliber \par of the thoracic aorta. Bovine arch. Atherosclerosis at the great vessel \par origin and aortic root. \par HEART: Borderline enlarged heart. Trace pericardial effusion. \par MEDIASTINUM AND LATRICE: Subcentimeter lymph nodes without lymphadenopathy. \par CHEST WALL AND LOWER NECK: Mild bilateral gynecomastia. \par \par ABDOMEN AND PELVIS: \par LIVER: A 3.1 x 2.4 cm cyst in the left lobe. Scattered subcentimeter \par hypodense foci, too small to characterize. \par BILE DUCTS: Normal caliber. \par GALLBLADDER: No significant gallbladder wall edema. \par SPLEEN: Within normal limits. \par PANCREAS: Within normal limits. \par \par ADRENALS: Within normal limits. \par KIDNEYS/URETERS: Nonspecific bilateral perinephric stranding without \par hydroureteronephrosis. Indeterminate bilateral renal lesions measuring up to \par 2.4 x 1.6 cm. A 1.1 x 1.3 cm left renal cyst. Subcentimeter hypodense foci \par in the kidneys, too small to characterize. \par BLADDER: Partially distended. \par REPRODUCTIVE ORGANS: Within normal limits. \par \par BOWEL: No bowel obstruction. Unremarkable appendix. Colon diverticulosis. No \par significant bowel wall thickening or inflammatory change. \par PERITONEUM: No free air or drainable fluid collection. \par VESSELS: Atherosclerosis. \par RETROPERITONEUM/LYMPH NODES: No lymphadenopathy. \par ABDOMINAL WALL: Within normal limits. \par BONES: Degenerative changes of the spine, sacroiliac, hip and glenohumeral \par joints. Metallic anchors in the right glenoid. Likely chronic mild anterior \par wedging of the mid/lower thoracic spine. Nonspecific small sclerotic foci in \par the pelvic bones. \par \par IMPRESSION: \par \par No pulmonary embolus. \par \par Colon diverticulosis without diverticulitis. No bowel obstruction, drainable \par fluid collection or intraperitoneal free air. \par \par Indeterminate bilateral renal lesions, which can be further characterize on \par a nonemergent ultrasound. \par \par Additional findings as described.

## 2021-01-11 NOTE — PHYSICAL EXAM
[No Acute Distress] : no acute distress [Normal Appearance] : normal appearance [Normal S1, S2] : normal s1, s2 [No Resp Distress] : no resp distress [Clear to Auscultation Bilaterally] : clear to auscultation bilaterally [No Clubbing] : no clubbing [No Cyanosis] : no cyanosis [TextBox_105] : right arm visibly swollen

## 2021-01-11 NOTE — ASSESSMENT
[FreeTextEntry1] : trial of anoro daily\par fu with renal regarding nephrotic syndrome\par cont ppi for gerd\par cont xarelto for dvt

## 2021-01-11 NOTE — HISTORY OF PRESENT ILLNESS
[FreeTextEntry1] : I have just had the pleasure of seeing Mr. Keith Shabazz in consultation for right upper extremity edema.\par \par Mr. Shabazz is a pleasant 60-year-old gentleman who underwent right shoulder replacement surgery on 11/11/2020. He reports that this is his sixth operation and that he has had multiple infections affecting the right shoulder. He reports that on ERMELINDA, he noted new onset edema of the right arm. On 1/8/2021, he underwent a venous duplex at [facility], which reportedly demonstrated DVT. He was started on Xarelto. He reports that he is concurrently being evaluated for infection and blood cultures are pending. He is scheduled to see his orthopedist later this week. He denies any personal or family history of DVT, SVT or thromboembolic disease. He denies any sensorimotor deficits. He is being evaluated and treated for nephrotic syndrome as well and reports edema of the lower extremities as well. He reports that he is prone to infection and had infection following bilateral inguinal hernia repair as well. He was hospitalized with COVID-19 related pneumonia in July of last year. \par \par He denies any history of CAD, MI, CHF, arrhythmia, CVA, TIA, or DM. \par \par His medical history is significant for HTN, HLD, hypothyroidism, GERD, TB, and shoulder surgery\par \par Medications: Losartan, Aspirin, Lasix, Synthroid, Rosuvastatin, and Omeprazole\par \par Allergies: Sulfa\par \par Social history: Never smoker\par \par FH: NC\par \par

## 2021-01-11 NOTE — REVIEW OF SYSTEMS
[Cough] : cough [Sputum] : sputum [SOB on Exertion] : sob on exertion [GERD] : gerd [Negative] : Allergy/Immunology

## 2021-01-11 NOTE — ASSESSMENT
[FreeTextEntry1] : [facility] duplex report from 1/8/21: “Right cephalic vein SVT. Avascular heterogenous mass visualized within the muscle at the level of the shoulder/proximal upper arm. Possible muscle tear?”\par \par On repeat duplex today, there was no evidence of DVT or SVT. Cephalic vein could not be visualized.\par \par In summary, Mr. Shabazz presents with right upper extremity SVT following orthopedic surgery. He was started on Xarelto, which he should continue for a month. He should follow up with his orthopedic surgeon to exclude any evidence of hardware infection. He will follow up with me for a repeat duplex in three months.\par \par Thank you for allowing me to participate in the care of this nice man. Please do not hesitate to contact me with any questions. \par

## 2021-01-11 NOTE — HISTORY OF PRESENT ILLNESS
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  Waxes and wanes, always worse at night.  Has tried flonase in the past without relief.  Has gerd on PPI already.  Has never tried inhalers in the past.  Has also noticed a significant increase in dyspnea recently.  Will be going for kidney biopsy soon but waiting to get blood pressure regulated.

## 2021-01-11 NOTE — PHYSICAL EXAM
[de-identified] : On physical examination the patient is in no acute distress and neurologically intact. The lungs are clear to auscultation and the heart has a regular rate and rhythm. Bilateral brachial, radial, femoral and pedal pulses are palpable. Right upper extremity edema. Bilateral ankle edema.

## 2021-01-13 ENCOUNTER — APPOINTMENT (OUTPATIENT)
Dept: INTERNAL MEDICINE | Facility: CLINIC | Age: 61
End: 2021-01-13

## 2021-01-15 ENCOUNTER — NON-APPOINTMENT (OUTPATIENT)
Age: 61
End: 2021-01-15

## 2021-01-15 ENCOUNTER — APPOINTMENT (OUTPATIENT)
Dept: INTERNAL MEDICINE | Facility: CLINIC | Age: 61
End: 2021-01-15

## 2021-01-15 RX ORDER — OMEPRAZOLE 20 MG/1
20 CAPSULE, DELAYED RELEASE ORAL DAILY
Qty: 30 | Refills: 0 | Status: DISCONTINUED | COMMUNITY
Start: 2021-01-08 | End: 2021-01-15

## 2021-01-22 ENCOUNTER — APPOINTMENT (OUTPATIENT)
Dept: INTERNAL MEDICINE | Facility: CLINIC | Age: 61
End: 2021-01-22
Payer: COMMERCIAL

## 2021-01-22 VITALS
HEIGHT: 66.54 IN | DIASTOLIC BLOOD PRESSURE: 74 MMHG | HEART RATE: 51 BPM | SYSTOLIC BLOOD PRESSURE: 124 MMHG | WEIGHT: 194.75 LBS | TEMPERATURE: 97.9 F | OXYGEN SATURATION: 96 % | BODY MASS INDEX: 30.93 KG/M2

## 2021-01-22 DIAGNOSIS — R05 COUGH: ICD-10-CM

## 2021-01-22 PROCEDURE — 36415 COLL VENOUS BLD VENIPUNCTURE: CPT

## 2021-01-22 PROCEDURE — 99072 ADDL SUPL MATRL&STAF TM PHE: CPT

## 2021-01-22 PROCEDURE — 99215 OFFICE O/P EST HI 40 MIN: CPT | Mod: 25

## 2021-01-22 RX ORDER — CLONIDINE HYDROCHLORIDE 0.2 MG/1
0.2 TABLET ORAL
Qty: 60 | Refills: 0 | Status: DISCONTINUED | COMMUNITY
End: 2021-01-22

## 2021-01-22 RX ORDER — FUROSEMIDE 40 MG/1
40 TABLET ORAL TWICE DAILY
Qty: 60 | Refills: 2 | Status: DISCONTINUED | COMMUNITY
Start: 2021-01-08 | End: 2021-01-22

## 2021-01-22 NOTE — PHYSICAL EXAM
[No Acute Distress] : no acute distress [Well-Appearing] : well-appearing [No JVD] : no jugular venous distention [Supple] : supple [No Respiratory Distress] : no respiratory distress  [Clear to Auscultation] : lungs were clear to auscultation bilaterally [Normal] : normal rate, regular rhythm, normal S1 and S2 and no murmur heard [Soft] : abdomen soft [Non-distended] : non-distended [Normal Gait] : normal gait [Normal Affect] : the affect was normal [de-identified] : +1 edema of RUE from hand to shoulder, with minimal tenderness of upper arm, no warmth or erythema or streaking, +dilated veins, no LE or LUE edema [de-identified] : well-healed surgical scars over right shoulder area

## 2021-01-22 NOTE — HISTORY OF PRESENT ILLNESS
[FreeTextEntry1] : f/u, nephrotic syndrome [de-identified] : 61 yo m, new dx of NS, biopsy showed minimal change disease, started on prednisone 2 days ago, 20 mg tid. Pt reports abrupt improvement in swelling after this. Also feels like has more energy. Still taking Lasix 40 mg bid (I thought he was on qd), clonidine 0.1 mg bid, calcium, Vit D, Vit C, xarelto 20 mg qd, famotidine 20 mg bid, synthroid 200 mcg daily, and rosuvastatin 40 mg daily. Has appt with [facility] on Monday. Needs referrals for endo and GI for h/o recurrent diverticulitis. Had scan of right shoulder yesterday, awaiting results. Has not had labs in over a week. \par Pt gave me access to his [facility] MyChart and I reviewed all his records, which included full immunologic and infectious w/u for NS, Doppler of RUE which showed acute non-occlusive SVT of right Cephalic vein at level of the elbow, with significant fluid in the right should and upper arm, normal Echo, and all other lab work reviewed. \par Pt reports feeling very well. Plans to get COVID-19 vaccine later today.

## 2021-01-22 NOTE — ASSESSMENT
[FreeTextEntry1] : Pt with improvement in RUE swelling, doubt infection given improvement without specific treatment and absence of fever or significant pain Likely all fluid was related to NS and not primary joint process. Will await CT scan. \par \par I answered pt's questions about his kidney disease, treatment, prognosis, anemia, and other lab studies that he had questions about. \par \par

## 2021-01-22 NOTE — PLAN
[FreeTextEntry1] : I told pt to D/C Vit C. He can continue Calcium and Vit D.\par \par I gave him a note to get the COVID vaccine.\par \par Need to confirm if pt is still on losartan.

## 2021-01-22 NOTE — REVIEW OF SYSTEMS
[Fever] : no fever [Chills] : no chills [Shortness Of Breath] : no shortness of breath [Dyspnea on Exertion] : not dyspnea on exertion [FreeTextEntry9] : minimal pain in right upper arm and shoulder

## 2021-01-25 ENCOUNTER — APPOINTMENT (OUTPATIENT)
Dept: NEPHROLOGY | Facility: CLINIC | Age: 61
End: 2021-01-25
Payer: COMMERCIAL

## 2021-01-25 ENCOUNTER — NON-APPOINTMENT (OUTPATIENT)
Age: 61
End: 2021-01-25

## 2021-01-25 VITALS
WEIGHT: 195.11 LBS | DIASTOLIC BLOOD PRESSURE: 93 MMHG | TEMPERATURE: 97.8 F | SYSTOLIC BLOOD PRESSURE: 161 MMHG | BODY MASS INDEX: 31.36 KG/M2 | OXYGEN SATURATION: 99 % | HEIGHT: 66 IN | HEART RATE: 68 BPM

## 2021-01-25 DIAGNOSIS — Z80.9 FAMILY HISTORY OF MALIGNANT NEOPLASM, UNSPECIFIED: ICD-10-CM

## 2021-01-25 LAB
ANION GAP SERPL CALC-SCNC: 13 MMOL/L
APPEARANCE: CLEAR
BACTERIA: NEGATIVE
BASOPHILS # BLD AUTO: 0.02 K/UL
BASOPHILS NFR BLD AUTO: 0.2 %
BILIRUB UR QL STRIP: NORMAL
BILIRUBIN URINE: NEGATIVE
BLOOD URINE: ABNORMAL
BUN SERPL-MCNC: 25 MG/DL
CALCIUM SERPL-MCNC: 8.8 MG/DL
CHLORIDE SERPL-SCNC: 105 MMOL/L
CLARITY UR: CLEAR
CO2 SERPL-SCNC: 23 MMOL/L
COLLECTION METHOD: NORMAL
COLOR: YELLOW
CREAT SERPL-MCNC: 1.14 MG/DL
EOSINOPHIL # BLD AUTO: 0 K/UL
EOSINOPHIL NFR BLD AUTO: 0 %
GLUCOSE QUALITATIVE U: NEGATIVE
GLUCOSE SERPL-MCNC: 98 MG/DL
GLUCOSE UR-MCNC: NEGATIVE
HCG UR QL: 1 EU/DL
HCT VFR BLD CALC: 38.3 %
HGB BLD-MCNC: 11.1 G/DL
HGB UR QL STRIP.AUTO: NORMAL
HYALINE CASTS: 6 /LPF
IMM GRANULOCYTES NFR BLD AUTO: 0.5 %
KETONES UR-MCNC: NEGATIVE
KETONES URINE: NEGATIVE
LEUKOCYTE ESTERASE UR QL STRIP: NEGATIVE
LEUKOCYTE ESTERASE URINE: NEGATIVE
LYMPHOCYTES # BLD AUTO: 0.62 K/UL
LYMPHOCYTES NFR BLD AUTO: 4.9 %
MAN DIFF?: NORMAL
MCHC RBC-ENTMCNC: 27.3 PG
MCHC RBC-ENTMCNC: 29 GM/DL
MCV RBC AUTO: 94.3 FL
MICROSCOPIC-UA: NORMAL
MONOCYTES # BLD AUTO: 0.37 K/UL
MONOCYTES NFR BLD AUTO: 2.9 %
NEUTROPHILS # BLD AUTO: 11.61 K/UL
NEUTROPHILS NFR BLD AUTO: 91.5 %
NITRITE UR QL STRIP: NEGATIVE
NITRITE URINE: NEGATIVE
PH UR STRIP: 6
PH URINE: 6
PLATELET # BLD AUTO: 485 K/UL
POTASSIUM SERPL-SCNC: 4.2 MMOL/L
PROT UR STRIP-MCNC: 300
PROTEIN URINE: ABNORMAL
RBC # BLD: 4.06 M/UL
RBC # FLD: 13.1 %
RED BLOOD CELLS URINE: 24 /HPF
SODIUM SERPL-SCNC: 141 MMOL/L
SP GR UR STRIP: 1.02
SPECIFIC GRAVITY URINE: 1.02
SQUAMOUS EPITHELIAL CELLS: 2 /HPF
UROBILINOGEN URINE: NORMAL
WBC # FLD AUTO: 12.68 K/UL
WHITE BLOOD CELLS URINE: 2 /HPF

## 2021-01-25 PROCEDURE — 99072 ADDL SUPL MATRL&STAF TM PHE: CPT

## 2021-01-25 PROCEDURE — 99245 OFF/OP CONSLTJ NEW/EST HI 55: CPT

## 2021-01-25 RX ORDER — SULFAMETHOXAZOLE AND TRIMETHOPRIM 400; 80 MG/1; MG/1
400-80 TABLET ORAL DAILY
Qty: 30 | Refills: 0 | Status: DISCONTINUED | COMMUNITY
Start: 2021-01-25 | End: 2021-01-25

## 2021-01-25 NOTE — HISTORY OF PRESENT ILLNESS
[FreeTextEntry1] : Pt. referred for management of nephrotic syndrome. He is transitioning his care from [facility] to [facility].\par \par Mr. 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 \par \par Pt. gives hx of known microscopic hematuria for over 30 years, and states  work up has been negative. He noticed onset of edema in end of December, which initially was attributed to hypothyroidism, however later found to also have nephrotic syndrome. He had right shoulder replacement done in Nov 2020 after which took NSAIDs and oxycodone intermittently. Denies any travel or use of abx prior to onset of edema. \par \par Pertinent labs at time of NS onset: Serum Cr 0.8. BUN 18.  Serum glucose 104.  Hb A1c 5.6.  Albumin 2.4. Cholesterol 193. C3 172. C4 21.  Negative for MPR, ME-3 ANCA, KRISTIN and cryoglobulin. HepBsAg negative. HepC negative. WBC 8.3. Hemoglobin 11.  Hematocrit 36. Platelet count 447,000. Up/cr 8.9g. Urine sediment >100 RBC. /97. BMI 31.78 kg/m2. SARS-COV-2 not detected by PCR. \par \par He was initiated on prednisone on 1/15/21 by Dr. Stovall at [facility] and is currently taking prednisone 20mg TID. weight and swelling has markedly improved since the start of prednisone. He is now off lasix and clonidine was discontinued last week by his PCP as well. States that he has been having night sweats and intermittent tremors over his hands since last week. He is very anxious today after noticing elevated BP readings. Denies any known kidney problems in the family members.

## 2021-01-25 NOTE — CONSULT LETTER
[Dear  ___] : Dear  [unfilled], [Consult Letter:] : I had the pleasure of evaluating your patient, [unfilled]. [( Thank you for referring [unfilled] for consultation for _____ )] : Thank you for referring [unfilled] for consultation for [unfilled] [Please see my note below.] : Please see my note below. [Consult Closing:] : Thank you very much for allowing me to participate in the care of this patient.  If you have any questions, please do not hesitate to contact me. [Sincerely,] : Sincerely, [FreeTextEntry3] : Patricia aHy MD

## 2021-01-25 NOTE — PHYSICAL EXAM
[General Appearance - Alert] : alert [General Appearance - In No Acute Distress] : in no acute distress [General Appearance - Well Nourished] : well nourished [General Appearance - Well Developed] : well developed [General Appearance - Well-Appearing] : healthy appearing [Outer Ear] : the ears and nose were normal in appearance [Hearing Threshold Finger Rub Not [place]] : hearing was normal [Jugular Venous Distention Increased] : there was no jugular-venous distention [Heart Sounds Pericardial Friction Rub] : no pericardial rub [Abdomen Soft] : soft [No CVA Tenderness] : no ~M costovertebral angle tenderness [Abnormal Walk] : normal gait [] : no rash [No Focal Deficits] : no focal deficits [Oriented To Time, Place, And Person] : oriented to person, place, and time

## 2021-01-25 NOTE — PHYSICAL EXAM
[General Appearance - Alert] : alert [General Appearance - In No Acute Distress] : in no acute distress [General Appearance - Well Nourished] : well nourished [General Appearance - Well Developed] : well developed [General Appearance - Well-Appearing] : healthy appearing [Outer Ear] : the ears and nose were normal in appearance [Hearing Threshold Finger Rub Not [place]] : hearing was normal [Jugular Venous Distention Increased] : there was no jugular-venous distention [Heart Sounds Pericardial Friction Rub] : no pericardial rub [Abdomen Soft] : soft [No CVA Tenderness] : no ~M costovertebral angle tenderness [Abnormal Walk] : normal gait [] : no rash [No Focal Deficits] : no focal deficits [Oriented To Time, Place, And Person] : oriented to person, place, and time

## 2021-01-25 NOTE — ASSESSMENT
[FreeTextEntry1] : Nephrotic syndrome 2/2 KENAN (proven on kidney biopsy)\par -responding well to prednisone therapy (started on prdnisone on 1/15/21)\par -continue prednisone 20mg TID for now (plan to keep on 60mg daily for at least 2 weeks)\par -on statin for HLD\par -on losartan for antiproteinuric effects\par -on famotidinefor GERD. Was started on Calcium supplements and is also taking Vit D.  Will add bactrim for ppx since pt. will be on chronic steroid therapy\par -no need for lasix as edema has resolved\par -repeat labs today\par \par HTN: BP elevated during office visit\par Likely rebound HTN after discontinuation of clonidine\par Will consider starting nifedipine for BP control (need to d/w PCP)

## 2021-01-26 LAB
ALBUMIN SERPL ELPH-MCNC: 2.6 G/DL
ANION GAP SERPL CALC-SCNC: 15 MMOL/L
APPEARANCE: CLEAR
BACTERIA: NEGATIVE
BILIRUBIN URINE: NEGATIVE
BLOOD URINE: ABNORMAL
BUN SERPL-MCNC: 23 MG/DL
CALCIUM SERPL-MCNC: 8.9 MG/DL
CHLORIDE SERPL-SCNC: 107 MMOL/L
CO2 SERPL-SCNC: 21 MMOL/L
COLOR: YELLOW
CREAT SERPL-MCNC: 1.09 MG/DL
CREAT SPEC-SCNC: 116 MG/DL
CREAT/PROT UR: 6.2 RATIO
GLUCOSE QUALITATIVE U: NEGATIVE
GLUCOSE SERPL-MCNC: 120 MG/DL
HYALINE CASTS: 2 /LPF
KETONES URINE: NEGATIVE
LEUKOCYTE ESTERASE URINE: NEGATIVE
MICROSCOPIC-UA: NORMAL
NITRITE URINE: NEGATIVE
PH URINE: 7
PHOSPHATE SERPL-MCNC: 3.7 MG/DL
POTASSIUM SERPL-SCNC: 3.9 MMOL/L
PROT UR-MCNC: 720 MG/DL
PROTEIN URINE: ABNORMAL
RED BLOOD CELLS URINE: 53 /HPF
SODIUM SERPL-SCNC: 143 MMOL/L
SPECIFIC GRAVITY URINE: 1.02
SQUAMOUS EPITHELIAL CELLS: 2 /HPF
UROBILINOGEN URINE: NORMAL
WHITE BLOOD CELLS URINE: 3 /HPF

## 2021-01-27 NOTE — CONSULT LETTER
[Dear  ___] : Dear  [unfilled], [Consult Letter:] : I had the pleasure of evaluating your patient, [unfilled]. [( Thank you for referring [unfilled] for consultation for _____ )] : Thank you for referring [unfilled] for consultation for [unfilled] [Please see my note below.] : Please see my note below. [Consult Closing:] : Thank you very much for allowing me to participate in the care of this patient.  If you have any questions, please do not hesitate to contact me. [Sincerely,] : Sincerely, [FreeTextEntry3] : Patircia Hay MD

## 2021-01-27 NOTE — HISTORY OF PRESENT ILLNESS
[FreeTextEntry1] : Pt. referred for management of nephrotic syndrome. He is transitioning his care from [facility] to [facility].\par \par Mr. 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 \par \par Pt. gives hx of known microscopic hematuria for over 30 years, and states  work up has been negative. He noticed onset of edema in end of December, which initially was attributed to hypothyroidism, however later found to also have nephrotic syndrome. He had right shoulder replacement done in Nov 2020 after which took NSAIDs and oxycodone intermittently. Denies any travel or use of abx prior to onset of edema. \par \par Pertinent labs at time of NS onset: Serum Cr 0.8. BUN 18.  Serum glucose 104.  Hb A1c 5.6.  Albumin 2.4. Cholesterol 193. C3 172. C4 21.  Negative for MPR, MS-3 ANCA, KRISTIN and cryoglobulin. HepBsAg negative. HepC negative. WBC 8.3. Hemoglobin 11.  Hematocrit 36. Platelet count 447,000. Up/cr 8.9g. Urine sediment >100 RBC. /97. BMI 31.78 kg/m2. SARS-COV-2 not detected by PCR. \par \par He was initiated on prednisone on 1/15/21 by Dr. Stovall at [facility] and is currently taking prednisone 20mg TID. weight and swelling has markedly improved since the start of prednisone. He is now off lasix and clonidine was discontinued last week by his PCP as well. States that he has been having night sweats and intermittent tremors over his hands since last week. He is very anxious today after noticing elevated BP readings. Denies any known kidney problems in the family members.

## 2021-02-03 LAB
ALBUMIN SERPL ELPH-MCNC: 2.9 G/DL
ANION GAP SERPL CALC-SCNC: 13 MMOL/L
APPEARANCE: CLEAR
BACTERIA: NEGATIVE
BASOPHILS # BLD AUTO: 0.01 K/UL
BASOPHILS NFR BLD AUTO: 0.1 %
BILIRUBIN URINE: NEGATIVE
BLOOD URINE: NORMAL
BUN SERPL-MCNC: 27 MG/DL
CALCIUM SERPL-MCNC: 9.3 MG/DL
CHLORIDE SERPL-SCNC: 98 MMOL/L
CO2 SERPL-SCNC: 24 MMOL/L
COLOR: YELLOW
CREAT SERPL-MCNC: 1.03 MG/DL
CREAT SPEC-SCNC: 93 MG/DL
CREAT/PROT UR: 2.1 RATIO
EOSINOPHIL # BLD AUTO: 0.02 K/UL
EOSINOPHIL NFR BLD AUTO: 0.1 %
GLUCOSE QUALITATIVE U: NEGATIVE
GLUCOSE SERPL-MCNC: 90 MG/DL
HCT VFR BLD CALC: 44.3 %
HGB BLD-MCNC: 13.8 G/DL
HYALINE CASTS: 1 /LPF
IMM GRANULOCYTES NFR BLD AUTO: 0.6 %
KETONES URINE: NEGATIVE
LEUKOCYTE ESTERASE URINE: NEGATIVE
LYMPHOCYTES # BLD AUTO: 1.37 K/UL
LYMPHOCYTES NFR BLD AUTO: 9.4 %
MAN DIFF?: NORMAL
MCHC RBC-ENTMCNC: 27.6 PG
MCHC RBC-ENTMCNC: 31.2 GM/DL
MCV RBC AUTO: 88.6 FL
MICROSCOPIC-UA: NORMAL
MONOCYTES # BLD AUTO: 1.13 K/UL
MONOCYTES NFR BLD AUTO: 7.8 %
NEUTROPHILS # BLD AUTO: 11.96 K/UL
NEUTROPHILS NFR BLD AUTO: 82 %
NITRITE URINE: NEGATIVE
PH URINE: 7
PHOSPHATE SERPL-MCNC: 3.2 MG/DL
PLATELET # BLD AUTO: 359 K/UL
POTASSIUM SERPL-SCNC: 3.7 MMOL/L
PROT UR-MCNC: 193 MG/DL
PROTEIN URINE: ABNORMAL
RBC # BLD: 5 M/UL
RBC # FLD: 15.1 %
RED BLOOD CELLS URINE: 7 /HPF
SODIUM SERPL-SCNC: 135 MMOL/L
SPECIFIC GRAVITY URINE: 1.02
SQUAMOUS EPITHELIAL CELLS: 1 /HPF
URINE COMMENTS: NORMAL
UROBILINOGEN URINE: NORMAL
WBC # FLD AUTO: 14.58 K/UL
WHITE BLOOD CELLS URINE: 2 /HPF

## 2021-02-10 LAB
ALBUMIN SERPL ELPH-MCNC: 3.6 G/DL
ANION GAP SERPL CALC-SCNC: 14 MMOL/L
APPEARANCE: CLEAR
BACTERIA: NEGATIVE
BASOPHILS # BLD AUTO: 0.03 K/UL
BASOPHILS NFR BLD AUTO: 0.2 %
BILIRUBIN URINE: NEGATIVE
BLOOD URINE: NEGATIVE
BUN SERPL-MCNC: 29 MG/DL
CALCIUM SERPL-MCNC: 9.4 MG/DL
CHLORIDE SERPL-SCNC: 93 MMOL/L
CO2 SERPL-SCNC: 28 MMOL/L
COLOR: YELLOW
CREAT SERPL-MCNC: 0.94 MG/DL
CREAT SPEC-SCNC: 90 MG/DL
CREAT/PROT UR: 1.2 RATIO
EOSINOPHIL # BLD AUTO: 0.03 K/UL
EOSINOPHIL NFR BLD AUTO: 0.2 %
GLUCOSE QUALITATIVE U: NEGATIVE
GLUCOSE SERPL-MCNC: 54 MG/DL
HCT VFR BLD CALC: 47.3 %
HGB BLD-MCNC: 14.2 G/DL
HYALINE CASTS: 2 /LPF
IMM GRANULOCYTES NFR BLD AUTO: 1.1 %
KETONES URINE: NEGATIVE
LEUKOCYTE ESTERASE URINE: NEGATIVE
LYMPHOCYTES # BLD AUTO: 1.55 K/UL
LYMPHOCYTES NFR BLD AUTO: 9 %
MAN DIFF?: NORMAL
MCHC RBC-ENTMCNC: 27.2 PG
MCHC RBC-ENTMCNC: 30 GM/DL
MCV RBC AUTO: 90.6 FL
MICROSCOPIC-UA: NORMAL
MONOCYTES # BLD AUTO: 1.28 K/UL
MONOCYTES NFR BLD AUTO: 7.4 %
NEUTROPHILS # BLD AUTO: 14.15 K/UL
NEUTROPHILS NFR BLD AUTO: 82.1 %
NITRITE URINE: NEGATIVE
PH URINE: 6.5
PHOSPHATE SERPL-MCNC: 3 MG/DL
PLATELET # BLD AUTO: 273 K/UL
POTASSIUM SERPL-SCNC: 3.8 MMOL/L
PROT UR-MCNC: 110 MG/DL
PROTEIN URINE: ABNORMAL
RBC # BLD: 5.22 M/UL
RBC # FLD: 15.8 %
RED BLOOD CELLS URINE: 9 /HPF
SODIUM SERPL-SCNC: 135 MMOL/L
SPECIFIC GRAVITY URINE: 1.02
SQUAMOUS EPITHELIAL CELLS: 1 /HPF
UROBILINOGEN URINE: NORMAL
WBC # FLD AUTO: 17.23 K/UL
WHITE BLOOD CELLS URINE: 2 /HPF

## 2021-02-16 LAB
BASOPHILS # BLD AUTO: 0.03 K/UL
BASOPHILS NFR BLD AUTO: 0.2 %
EOSINOPHIL # BLD AUTO: 0.04 K/UL
EOSINOPHIL NFR BLD AUTO: 0.3 %
HCT VFR BLD CALC: 39.7 %
HGB BLD-MCNC: 11.7 G/DL
IMM GRANULOCYTES NFR BLD AUTO: 1.6 %
LYMPHOCYTES # BLD AUTO: 0.95 K/UL
LYMPHOCYTES NFR BLD AUTO: 7.2 %
MAN DIFF?: NORMAL
MCHC RBC-ENTMCNC: 27.2 PG
MCHC RBC-ENTMCNC: 29.5 GM/DL
MCV RBC AUTO: 92.3 FL
MONOCYTES # BLD AUTO: 0.61 K/UL
MONOCYTES NFR BLD AUTO: 4.6 %
NEUTROPHILS # BLD AUTO: 11.29 K/UL
NEUTROPHILS NFR BLD AUTO: 86.1 %
PLATELET # BLD AUTO: 186 K/UL
RBC # BLD: 4.3 M/UL
RBC # FLD: 16.3 %
WBC # FLD AUTO: 13.13 K/UL

## 2021-02-17 LAB
ALBUMIN SERPL ELPH-MCNC: 3.4 G/DL
ANION GAP SERPL CALC-SCNC: 16 MMOL/L
APPEARANCE: CLEAR
BACTERIA: NEGATIVE
BILIRUBIN URINE: NEGATIVE
BLOOD URINE: NORMAL
BUN SERPL-MCNC: 16 MG/DL
CALCIUM SERPL-MCNC: 9.2 MG/DL
CHLORIDE SERPL-SCNC: 98 MMOL/L
CO2 SERPL-SCNC: 23 MMOL/L
COLOR: YELLOW
CREAT SERPL-MCNC: 0.8 MG/DL
CREAT SPEC-SCNC: 42 MG/DL
CREAT/PROT UR: 0.7 RATIO
GLUCOSE QUALITATIVE U: NEGATIVE
GLUCOSE SERPL-MCNC: 90 MG/DL
HYALINE CASTS: 0 /LPF
KETONES URINE: NEGATIVE
LEUKOCYTE ESTERASE URINE: NEGATIVE
MICROSCOPIC-UA: NORMAL
NITRITE URINE: NEGATIVE
PH URINE: 7
PHOSPHATE SERPL-MCNC: 3.3 MG/DL
POTASSIUM SERPL-SCNC: 3.6 MMOL/L
PROT UR-MCNC: 27 MG/DL
PROTEIN URINE: NORMAL
RED BLOOD CELLS URINE: 13 /HPF
SODIUM SERPL-SCNC: 138 MMOL/L
SPECIFIC GRAVITY URINE: 1.01
SQUAMOUS EPITHELIAL CELLS: 0 /HPF
UROBILINOGEN URINE: NORMAL
WHITE BLOOD CELLS URINE: 0 /HPF

## 2021-02-19 ENCOUNTER — TRANSCRIPTION ENCOUNTER (OUTPATIENT)
Age: 61
End: 2021-02-19

## 2021-02-19 ENCOUNTER — APPOINTMENT (OUTPATIENT)
Dept: NEPHROLOGY | Facility: CLINIC | Age: 61
End: 2021-02-19
Payer: COMMERCIAL

## 2021-02-19 PROCEDURE — 99214 OFFICE O/P EST MOD 30 MIN: CPT | Mod: 95

## 2021-02-19 NOTE — HISTORY OF PRESENT ILLNESS
[FreeTextEntry1] : Pt. doing follow up for KENAN.\par \par Mr. Shabazz is a 60 year old male, works with [employer], with OCD, hypothyroidism s/p thyroid surgery for cancer, HTN, HLD, genitourinary TB (30 years ago), COVID-19 in July 2020, recently diagnosed minimal change disease on kidney biopsy, doing follow up via tele health. \par \par He was initiated on prednisone on 1/15/21 by Dr. Stovall at [facility] and is currently taking prednisone 50mg daily. (dose reduced from 60mg po daily to 50mg po daily 3 days ago). He also has long standing hx of microscopic hematuria, with negative  work up, no e/o IgA nephropathy seen on kidney biopsy. \par \par Today, he overall feels well and offers no complaints. He had the procedure done on his shoulder last week. LE swelling remians resolved. He reports BP to be in 120/80 range.

## 2021-02-19 NOTE — CONSULT LETTER
[Dear  ___] : Dear  [unfilled], [( Thank you for referring [unfilled] for consultation for _____ )] : Thank you for referring [unfilled] for consultation for [unfilled] [Please see my note below.] : Please see my note below. [Consult Closing:] : Thank you very much for allowing me to participate in the care of this patient.  If you have any questions, please do not hesitate to contact me. [Sincerely,] : Sincerely, [Courtesy Letter:] : I had the pleasure of seeing your patient, [unfilled], in my office today. [FreeTextEntry3] : Patricia Hay MD

## 2021-02-19 NOTE — ASSESSMENT
[FreeTextEntry1] : Nephrotic syndrome 2/2 KENAN (proven on kidney biopsy)\par -responding well to prednisone therapy (started on prdnisone on 1/15/21)\par -continue prednisone 50mg po daily for total of 2 weeks (until 2/28/21)\par -on statin for HLD\par -on losartan for antiproteinuric effects\par -on famotidine for GERD. continue Calcium supplements, Vit D and bactrim for ppx for the total duration of steroid therapy\par -conitnue to hold lasix as edema remains resolved\par -repeat labs today\par \par HTN: BP improved\par Likely rebound HTN after discontinuation of clonidine\par being managed by PCP, on losartan and chlorthalidone at this time\par \par \par repeat labs and follow up with me in 2 weeks

## 2021-02-19 NOTE — PHYSICAL EXAM
[General Appearance - Alert] : alert [General Appearance - In No Acute Distress] : in no acute distress [General Appearance - Well Nourished] : well nourished [General Appearance - Well Developed] : well developed [General Appearance - Well-Appearing] : healthy appearing [Hearing Threshold Finger Rub Not [place]] : hearing was normal [Jugular Venous Distention Increased] : there was no jugular-venous distention [Heart Sounds Pericardial Friction Rub] : no pericardial rub [] : no rash [No Focal Deficits] : no focal deficits [Oriented To Time, Place, And Person] : oriented to person, place, and time [FreeTextEntry1] : exam limited due to tele visit

## 2021-02-19 NOTE — REASON FOR VISIT
[Home] : at home, [unfilled] , at the time of the visit. [Medical Office: ([facility])___] : at the medical office located in  [Verbal consent obtained from patient] : the patient, [unfilled] [Initial Evaluation] : an initial evaluation

## 2021-03-02 ENCOUNTER — LABORATORY RESULT (OUTPATIENT)
Age: 61
End: 2021-03-02

## 2021-03-02 LAB
APPEARANCE: CLEAR
BACTERIA: NEGATIVE
BASOPHILS # BLD AUTO: 0.03 K/UL
BASOPHILS NFR BLD AUTO: 0.2 %
BILIRUBIN URINE: NEGATIVE
BLOOD URINE: NEGATIVE
COLOR: YELLOW
EOSINOPHIL # BLD AUTO: 0.03 K/UL
EOSINOPHIL NFR BLD AUTO: 0.2 %
GLUCOSE QUALITATIVE U: NEGATIVE
HCT VFR BLD CALC: 42.4 %
HGB BLD-MCNC: 13.2 G/DL
HYALINE CASTS: 1 /LPF
IMM GRANULOCYTES NFR BLD AUTO: 2.6 %
KETONES URINE: NEGATIVE
LEUKOCYTE ESTERASE URINE: NEGATIVE
LYMPHOCYTES # BLD AUTO: 0.99 K/UL
LYMPHOCYTES NFR BLD AUTO: 7.3 %
MAN DIFF?: NORMAL
MCHC RBC-ENTMCNC: 28.1 PG
MCHC RBC-ENTMCNC: 31.1 GM/DL
MCV RBC AUTO: 90.2 FL
MICROSCOPIC-UA: NORMAL
MONOCYTES # BLD AUTO: 0.97 K/UL
MONOCYTES NFR BLD AUTO: 7.2 %
NEUTROPHILS # BLD AUTO: 11.13 K/UL
NEUTROPHILS NFR BLD AUTO: 82.5 %
NITRITE URINE: NEGATIVE
PH URINE: 7
PLATELET # BLD AUTO: 276 K/UL
PROTEIN URINE: ABNORMAL
RBC # BLD: 4.7 M/UL
RBC # FLD: 18.2 %
RED BLOOD CELLS URINE: 10 /HPF
SPECIFIC GRAVITY URINE: 1.02
SQUAMOUS EPITHELIAL CELLS: 1 /HPF
UROBILINOGEN URINE: NORMAL
WBC # FLD AUTO: 13.5 K/UL
WHITE BLOOD CELLS URINE: 1 /HPF

## 2021-03-03 LAB
ALBUMIN SERPL ELPH-MCNC: 4 G/DL
ANION GAP SERPL CALC-SCNC: 12 MMOL/L
BUN SERPL-MCNC: 32 MG/DL
CALCIUM SERPL-MCNC: 9.8 MG/DL
CHLORIDE SERPL-SCNC: 97 MMOL/L
CO2 SERPL-SCNC: 28 MMOL/L
CREAT SERPL-MCNC: 0.79 MG/DL
CREAT SPEC-SCNC: 82 MG/DL
CREAT/PROT UR: 0.5 RATIO
GLUCOSE SERPL-MCNC: 96 MG/DL
PHOSPHATE SERPL-MCNC: 3.3 MG/DL
POTASSIUM SERPL-SCNC: 3.4 MMOL/L
PROT UR-MCNC: 42 MG/DL
SODIUM SERPL-SCNC: 137 MMOL/L

## 2021-03-04 ENCOUNTER — APPOINTMENT (OUTPATIENT)
Dept: NEPHROLOGY | Facility: CLINIC | Age: 61
End: 2021-03-04
Payer: COMMERCIAL

## 2021-03-04 PROCEDURE — 99214 OFFICE O/P EST MOD 30 MIN: CPT | Mod: 95

## 2021-03-04 NOTE — REASON FOR VISIT
[Home] : at home, [unfilled] , at the time of the visit. [Medical Office: ([facility])___] : at the medical office located in  [Verbal consent obtained from patient] : the patient, [unfilled] [Initial Evaluation] : an initial evaluation

## 2021-03-04 NOTE — ASSESSMENT
[FreeTextEntry1] : Nephrotic syndrome 2/2 KENAN (proven on kidney biopsy that was performed at [facility], pt. showed me results on his online patient portal, however unable to print report or get a hard copy of the report)\par -responding well clinically to prednisone therapy (started on prednisone on 1/15/21)\par -continue prednisone 50mg  (until 3/30/21)\par -on statin for HLD\par -on losartan for antiproteinuric effects\par -on famotidine for GERD. continue Calcium supplements, Vit D and bactrim for ppx for the total duration of steroid therapy\par -continue to hold lasix as edema remains resolved\par -repeat labs in 2 weeks\par \par HTN: BP improved\par Likely had rebound HTN after discontinuation of clonidine\par being managed by PCP, on losartan and chlorthalidone at this time\par \par Muscle cramps: could be related to hypokalemia, in the setting of use of thiazide diuretic, also could be related to use of statin.\par check serum Mg, CPK levels\par will discuss with PCP regarding switching to different antihypertensive vs starting on electrolyte supplements.\par \par \par called lab to add on serum Mg and CPK level\par message sent to PCP\par repeat labs in 2 weeks and follow up with me in 4 weeks

## 2021-03-04 NOTE — CONSULT LETTER
[Dear  ___] : Dear  [unfilled], [Courtesy Letter:] : I had the pleasure of seeing your patient, [unfilled], in my office today. [( Thank you for referring [unfilled] for consultation for _____ )] : Thank you for referring [unfilled] for consultation for [unfilled] [Please see my note below.] : Please see my note below. [Consult Closing:] : Thank you very much for allowing me to participate in the care of this patient.  If you have any questions, please do not hesitate to contact me. [Sincerely,] : Sincerely, [FreeTextEntry3] : Patricia Hay MD

## 2021-03-04 NOTE — HISTORY OF PRESENT ILLNESS
[Home] : at home, [unfilled] , at the time of the visit. [Medical Office: ([facility])___] : at the medical office located in  [Verbal consent obtained from patient] : the patient, [unfilled] [FreeTextEntry1] : Pt. doing follow up for KENAN.\par \par Mr. Shabazz is a 60 year old male, works with [employer], with OCD, hypothyroidism s/p thyroid surgery for cancer, HTN, HLD, genitourinary TB (30 years ago), COVID-19 in July 2020, with NS 2/2 minimal change disease on kidney biopsy, doing follow up via [employer]. \par \par He was initiated on prednisone on 1/15/21 by Dr. Stovall at [facility] and is currently taking prednisone 50mg daily . (dose reduced from 60mg po daily to 50mg po daily on 2/16/21, as was not tolerating 60mg po daily that well). He also has long standing hx of microscopic hematuria, with negative  work up in the past, no e/o IgA nephropathy seen on kidney biopsy. \par \par Today, he complains of intermittent hand tremors, and muscle cramps. LE swelling remains resolved and he is not noticing any bubbles in his urine. He reports BP to be in 120/80 range.

## 2021-03-04 NOTE — PHYSICAL EXAM
[General Appearance - Alert] : alert [General Appearance - In No Acute Distress] : in no acute distress [General Appearance - Well Nourished] : well nourished [General Appearance - Well Developed] : well developed [General Appearance - Well-Appearing] : healthy appearing [Hearing Threshold Finger Rub Not [place]] : hearing was normal [Heart Sounds Pericardial Friction Rub] : no pericardial rub [No Focal Deficits] : no focal deficits [Oriented To Time, Place, And Person] : oriented to person, place, and time [FreeTextEntry1] : exam limited due to tele visit

## 2021-03-05 RX ORDER — CHLORTHALIDONE 25 MG/1
25 TABLET ORAL DAILY
Qty: 90 | Refills: 2 | Status: DISCONTINUED | COMMUNITY
Start: 2021-01-25 | End: 2021-03-05

## 2021-03-08 ENCOUNTER — APPOINTMENT (OUTPATIENT)
Dept: ENDOCRINOLOGY | Facility: CLINIC | Age: 61
End: 2021-03-08
Payer: COMMERCIAL

## 2021-03-08 VITALS
SYSTOLIC BLOOD PRESSURE: 102 MMHG | DIASTOLIC BLOOD PRESSURE: 68 MMHG | WEIGHT: 178 LBS | TEMPERATURE: 98.2 F | HEIGHT: 66 IN | BODY MASS INDEX: 28.61 KG/M2 | OXYGEN SATURATION: 98 % | HEART RATE: 92 BPM

## 2021-03-08 PROCEDURE — 99072 ADDL SUPL MATRL&STAF TM PHE: CPT

## 2021-03-08 PROCEDURE — 99205 OFFICE O/P NEW HI 60 MIN: CPT

## 2021-03-08 NOTE — ASSESSMENT
[FreeTextEntry1] : 60 year old male with history of thyroid cancer (likely low risk of recurrence) with total thyroidectomy in 2013, here for evaluation. \par \par -Will obtain medical records from Dr. Ling's office \par -Baseline ultrasound will be obtained from [facility] \par -Check TFTs and thyroglobulin \par -Continue levothyroxine 250 mcg, recently was increased \par -Follow up in 6 months \par

## 2021-03-08 NOTE — HISTORY OF PRESENT ILLNESS
[FreeTextEntry1] : 60 year old male with history of nephrotic syndrome, thyroid cancer here for evaluation \par \par 2013 total thyroidectomy at [facility], did not receive ECKERT \par Last neck ultrasound in the last 3 months ( [facility]) \par Currently on levothyroxine to 250 mcg ( Changed on 01/07/2020) - previous dose of 162.5 mcg \par No recurrence of thyroid cancer \par \par Symptoms: \par -Increased HP or tremors \par -Weight loss of 50 lbs \par -No hair loss \par -Increased bowel movements  \par -Increased heat intolerance  \par -Low energy levels \par \par Recently got diagnosed with nephrotic syndrome\par On prednisone 50 mg daily \par Lost 50 lbs \par

## 2021-03-08 NOTE — REVIEW OF SYSTEMS
[Fatigue] : no fatigue [Decreased Appetite] : appetite not decreased [Recent Weight Gain (___ Lbs)] : no recent weight gain [Recent Weight Loss (___ Lbs)] : no recent weight loss [Visual Field Defect] : no visual field defect [Dry Eyes] : no dryness [Dysphagia] : no dysphagia [Neck Pain] : no neck pain [Dysphonia] : no dysphonia [Nasal Congestion] : no nasal congestion [Chest Pain] : no chest pain [Slow Heart Rate] : heart rate is not slow [Palpitations] : no palpitations [Fast Heart Rate] : heart rate is not fast [Shortness Of Breath] : no shortness of breath [Cough] : no cough [Nausea] : no nausea [Constipation] : no constipation [Vomiting] : no vomiting [Diarrhea] : no diarrhea [Polyuria] : no polyuria [Hesistancy] : no hesitancy [Joint Pain] : no joint pain [Muscle Weakness] : no muscle weakness [Acanthosis] : no acanthosis  [Acne] : no acne [Headaches] : no headaches [Dizziness] : no dizziness [Tremors] : no tremors [Depression] : no depression [Polydipsia] : no polydipsia [Cold Intolerance] : no cold intolerance [Easy Bleeding] : no ~M tendency for easy bleeding [Easy Bruising] : no tendency for easy bruising

## 2021-03-10 ENCOUNTER — TRANSCRIPTION ENCOUNTER (OUTPATIENT)
Age: 61
End: 2021-03-10

## 2021-03-11 ENCOUNTER — TRANSCRIPTION ENCOUNTER (OUTPATIENT)
Age: 61
End: 2021-03-11

## 2021-03-15 ENCOUNTER — APPOINTMENT (OUTPATIENT)
Dept: ENDOCRINOLOGY | Facility: CLINIC | Age: 61
End: 2021-03-15
Payer: COMMERCIAL

## 2021-03-15 PROCEDURE — ZZZZZ: CPT

## 2021-03-16 ENCOUNTER — TRANSCRIPTION ENCOUNTER (OUTPATIENT)
Age: 61
End: 2021-03-16

## 2021-03-17 ENCOUNTER — NON-APPOINTMENT (OUTPATIENT)
Age: 61
End: 2021-03-17

## 2021-03-17 ENCOUNTER — TRANSCRIPTION ENCOUNTER (OUTPATIENT)
Age: 61
End: 2021-03-17

## 2021-03-18 ENCOUNTER — NON-APPOINTMENT (OUTPATIENT)
Age: 61
End: 2021-03-18

## 2021-03-19 ENCOUNTER — NON-APPOINTMENT (OUTPATIENT)
Age: 61
End: 2021-03-19

## 2021-03-20 LAB
ANION GAP SERPL CALC-SCNC: 10 MMOL/L
B-OH-BUTYR SERPL-SCNC: 0.1 MMOL/L
BUN SERPL-MCNC: 30 MG/DL
C PEPTIDE SERPL-MCNC: 3.9 NG/ML
CALCIUM SERPL-MCNC: 9 MG/DL
CHLORIDE SERPL-SCNC: 103 MMOL/L
CO2 SERPL-SCNC: 28 MMOL/L
CORTIS SERPL-MCNC: 1.5 UG/DL
CREAT SERPL-MCNC: 0.71 MG/DL
ESTIMATED AVERAGE GLUCOSE: 111 MG/DL
GLUCOSE SERPL-MCNC: 97 MG/DL
HBA1C MFR BLD HPLC: 5.5 %
INSULIN P FAST SERPL-ACNC: 11.9 UU/ML
POTASSIUM SERPL-SCNC: 4 MMOL/L
SODIUM SERPL-SCNC: 141 MMOL/L
T4 FREE SERPL-MCNC: 3.2 NG/DL
THYROGLOB AB SERPL-ACNC: <20 IU/ML
THYROGLOB SERPL-MCNC: <0.2 NG/ML
TSH SERPL-ACNC: 0.01 UIU/ML

## 2021-03-23 ENCOUNTER — TRANSCRIPTION ENCOUNTER (OUTPATIENT)
Age: 61
End: 2021-03-23

## 2021-03-24 ENCOUNTER — TRANSCRIPTION ENCOUNTER (OUTPATIENT)
Age: 61
End: 2021-03-24

## 2021-03-24 LAB — PROINSULIN SERPL-MCNC: 16.4 PMOL/L

## 2021-03-26 ENCOUNTER — TRANSCRIPTION ENCOUNTER (OUTPATIENT)
Age: 61
End: 2021-03-26

## 2021-03-29 ENCOUNTER — RX RENEWAL (OUTPATIENT)
Age: 61
End: 2021-03-29

## 2021-03-29 ENCOUNTER — NON-APPOINTMENT (OUTPATIENT)
Age: 61
End: 2021-03-29

## 2021-04-01 ENCOUNTER — NON-APPOINTMENT (OUTPATIENT)
Age: 61
End: 2021-04-01

## 2021-04-02 ENCOUNTER — NON-APPOINTMENT (OUTPATIENT)
Age: 61
End: 2021-04-02

## 2021-04-02 ENCOUNTER — TRANSCRIPTION ENCOUNTER (OUTPATIENT)
Age: 61
End: 2021-04-02

## 2021-04-05 ENCOUNTER — TRANSCRIPTION ENCOUNTER (OUTPATIENT)
Age: 61
End: 2021-04-05

## 2021-04-05 LAB
ALBUMIN SERPL ELPH-MCNC: 3.5 G/DL
ANION GAP SERPL CALC-SCNC: 9 MMOL/L
APPEARANCE: CLEAR
BACTERIA: NEGATIVE
BASOPHILS # BLD AUTO: 0 K/UL
BASOPHILS NFR BLD AUTO: 0 %
BILIRUBIN URINE: NEGATIVE
BLOOD URINE: NEGATIVE
BUN SERPL-MCNC: 31 MG/DL
CALCIUM SERPL-MCNC: 9 MG/DL
CHLORIDE SERPL-SCNC: 103 MMOL/L
CO2 SERPL-SCNC: 29 MMOL/L
COLOR: YELLOW
CREAT SERPL-MCNC: 0.72 MG/DL
CREAT SPEC-SCNC: 112 MG/DL
CREAT/PROT UR: 0.2 RATIO
EOSINOPHIL # BLD AUTO: 0 K/UL
EOSINOPHIL NFR BLD AUTO: 0 %
GLUCOSE QUALITATIVE U: NEGATIVE
GLUCOSE SERPL-MCNC: 95 MG/DL
HCT VFR BLD CALC: 38.6 %
HGB BLD-MCNC: 12.1 G/DL
HYALINE CASTS: 0 /LPF
IMM GRANULOCYTES NFR BLD AUTO: 1.9 %
KETONES URINE: NEGATIVE
LEUKOCYTE ESTERASE URINE: NEGATIVE
LYMPHOCYTES # BLD AUTO: 0.57 K/UL
LYMPHOCYTES NFR BLD AUTO: 5.6 %
MAN DIFF?: NORMAL
MCHC RBC-ENTMCNC: 29.4 PG
MCHC RBC-ENTMCNC: 31.3 GM/DL
MCV RBC AUTO: 93.9 FL
MICROSCOPIC-UA: NORMAL
MONOCYTES # BLD AUTO: 0.58 K/UL
MONOCYTES NFR BLD AUTO: 5.7 %
NEUTROPHILS # BLD AUTO: 8.87 K/UL
NEUTROPHILS NFR BLD AUTO: 86.8 %
NITRITE URINE: NEGATIVE
PH URINE: 6.5
PHOSPHATE SERPL-MCNC: 3.5 MG/DL
PLATELET # BLD AUTO: 153 K/UL
POTASSIUM SERPL-SCNC: 3.9 MMOL/L
PROT UR-MCNC: 22 MG/DL
PROTEIN URINE: ABNORMAL
RBC # BLD: 4.11 M/UL
RBC # FLD: 18.9 %
RED BLOOD CELLS URINE: 8 /HPF
SODIUM SERPL-SCNC: 141 MMOL/L
SPECIFIC GRAVITY URINE: 1.03
SQUAMOUS EPITHELIAL CELLS: 0 /HPF
UROBILINOGEN URINE: NORMAL
WBC # FLD AUTO: 10.21 K/UL
WHITE BLOOD CELLS URINE: 1 /HPF

## 2021-04-06 ENCOUNTER — TRANSCRIPTION ENCOUNTER (OUTPATIENT)
Age: 61
End: 2021-04-06

## 2021-04-08 ENCOUNTER — APPOINTMENT (OUTPATIENT)
Dept: NEPHROLOGY | Facility: CLINIC | Age: 61
End: 2021-04-08
Payer: COMMERCIAL

## 2021-04-08 PROCEDURE — 99214 OFFICE O/P EST MOD 30 MIN: CPT | Mod: 95

## 2021-04-08 RX ORDER — AMLODIPINE BESYLATE 5 MG/1
5 TABLET ORAL DAILY
Qty: 90 | Refills: 1 | Status: DISCONTINUED | COMMUNITY
Start: 2021-03-05 | End: 2021-04-08

## 2021-04-08 RX ORDER — PREDNISONE 20 MG/1
20 TABLET ORAL 3 TIMES DAILY
Qty: 90 | Refills: 0 | Status: DISCONTINUED | COMMUNITY
Start: 2021-01-22 | End: 2021-04-08

## 2021-04-08 NOTE — REASON FOR VISIT
[Initial Evaluation] : an initial evaluation [Home] : at home, [unfilled] , at the time of the visit. [Medical Office: ([facility])___] : at the medical office located in  [Verbal consent obtained from patient] : the patient, [unfilled]

## 2021-04-08 NOTE — HISTORY OF PRESENT ILLNESS
[FreeTextEntry1] : Pt. doing follow up for KENAN.\par \par Mr. Shabazz is a 60 year old male, works with [employer], with OCD, hypothyroidism s/p thyroid surgery for cancer, HTN, HLD, genitourinary TB (30 years ago), COVID-19 in July 2020, with NS 2/2 minimal change disease on kidney biopsy, doing follow up via about.me. \par \par He was initiated on prednisone on 1/15/21 by Dr. Stovall at [facility] and is currently taking prednisone 60mg daily. He also has long standing hx of microscopic hematuria, with negative  work up in the past, no e/o IgA nephropathy seen on kidney biopsy. \par \par Pt. started to develop bilateral swelling ~3 weeks ago. At that time, he was taken off of amlodipine and was started on lasix. He continues to complain of intermittent bilateral ankle swelling, with intermittent exertional SOB, and ?stomach swelling. He is not noticing any bubbles in his urine. He reports BP to be in 120/80 range, and states that his weight is stable at 175 lbs. [Home] : at home, [unfilled] , at the time of the visit. [Medical Office: ([facility])___] : at the medical office located in  [Verbal consent obtained from patient] : the patient, [unfilled]

## 2021-04-08 NOTE — PHYSICAL EXAM
[General Appearance - Alert] : alert [General Appearance - In No Acute Distress] : in no acute distress [General Appearance - Well Nourished] : well nourished [General Appearance - Well Developed] : well developed [General Appearance - Well-Appearing] : healthy appearing [Hearing Threshold Finger Rub Not [place]] : hearing was normal [Heart Sounds Pericardial Friction Rub] : no pericardial rub [No Focal Deficits] : no focal deficits [Oriented To Time, Place, And Person] : oriented to person, place, and time [FreeTextEntry1] : exam limited due to tele visit

## 2021-04-08 NOTE — ASSESSMENT
[FreeTextEntry1] : Nephrotic syndrome 2/2 KENAN (proven on kidney biopsy that was performed at [facility], pt. showed me results on his online patient portal, however unable to print report or get a hard copy of the report)\par -responding well clinically to prednisone therapy (started on prednisone on 1/15/21), now in remission based on normal serum albumin and negative proteinuria\par -continue prednisone 60mg  (until 4/14/21). Lower dose to 50mg po daily, starting on 4/15/21.\par -on statin for HLD\par -continue losartan for antiproteinuric effects\par -on famotidine for GERD. continue Calcium supplements, Vit D and bactrim for ppx for the total duration of steroid therapy\par -continue lasix\par -repeat labs in 2 weeks\par \par HTN: BP improved\par Likely had rebound HTN after discontinuation of clonidine\par being managed by PCP, on losartan and lasix at this time\par \par \par \par message sent to PCP\par repeat labs in 2-3 weeks and follow up with me in 4 weeks

## 2021-04-08 NOTE — REVIEW OF SYSTEMS
[Lower Ext Edema] : lower extremity edema [SOB on Exertion] : shortness of breath during exertion [Negative] : Heme/Lymph [de-identified] : muscle cramps and hand tremors

## 2021-04-12 LAB
ALBUMIN SERPL ELPH-MCNC: 4.1 G/DL
ALP BLD-CCNC: 62 U/L
ALT SERPL-CCNC: <5 U/L
ANION GAP SERPL CALC-SCNC: 14 MMOL/L
APPEARANCE: CLEAR
AST SERPL-CCNC: 33 U/L
BACTERIA: NEGATIVE
BASOPHILS # BLD AUTO: 0.02 K/UL
BASOPHILS NFR BLD AUTO: 0.2 %
BILIRUB SERPL-MCNC: 0.7 MG/DL
BILIRUBIN URINE: NEGATIVE
BLOOD URINE: NEGATIVE
BUN SERPL-MCNC: 35 MG/DL
CALCIUM SERPL-MCNC: 9.4 MG/DL
CHLORIDE SERPL-SCNC: 98 MMOL/L
CO2 SERPL-SCNC: 30 MMOL/L
COLOR: NORMAL
CREAT SERPL-MCNC: 0.84 MG/DL
EOSINOPHIL # BLD AUTO: 0.04 K/UL
EOSINOPHIL NFR BLD AUTO: 0.3 %
GLUCOSE QUALITATIVE U: NEGATIVE
GLUCOSE SERPL-MCNC: 73 MG/DL
HCT VFR BLD CALC: 45 %
HGB BLD-MCNC: 14.9 G/DL
HYALINE CASTS: 0 /LPF
IMM GRANULOCYTES NFR BLD AUTO: 4.4 %
KETONES URINE: NEGATIVE
LEUKOCYTE ESTERASE URINE: NEGATIVE
LYMPHOCYTES # BLD AUTO: 1.4 K/UL
LYMPHOCYTES NFR BLD AUTO: 11.5 %
MAN DIFF?: NORMAL
MCHC RBC-ENTMCNC: 30.5 PG
MCHC RBC-ENTMCNC: 33.1 GM/DL
MCV RBC AUTO: 92 FL
MICROSCOPIC-UA: NORMAL
MONOCYTES # BLD AUTO: 0.56 K/UL
MONOCYTES NFR BLD AUTO: 4.6 %
NEUTROPHILS # BLD AUTO: 9.61 K/UL
NEUTROPHILS NFR BLD AUTO: 79 %
NITRITE URINE: NEGATIVE
PH URINE: 7.5
PLATELET # BLD AUTO: 204 K/UL
POTASSIUM SERPL-SCNC: 3.5 MMOL/L
PROT SERPL-MCNC: 6.1 G/DL
PROTEIN URINE: NEGATIVE
RBC # BLD: 4.89 M/UL
RBC # FLD: 17.7 %
RED BLOOD CELLS URINE: 2 /HPF
SODIUM SERPL-SCNC: 141 MMOL/L
SPECIFIC GRAVITY URINE: 1.01
SQUAMOUS EPITHELIAL CELLS: 0 /HPF
UROBILINOGEN URINE: NORMAL
WBC # FLD AUTO: 12.17 K/UL
WHITE BLOOD CELLS URINE: 1 /HPF

## 2021-04-13 ENCOUNTER — APPOINTMENT (OUTPATIENT)
Dept: INTERNAL MEDICINE | Facility: CLINIC | Age: 61
End: 2021-04-13
Payer: COMMERCIAL

## 2021-04-13 VITALS
WEIGHT: 175 LBS | SYSTOLIC BLOOD PRESSURE: 108 MMHG | HEART RATE: 93 BPM | DIASTOLIC BLOOD PRESSURE: 69 MMHG | TEMPERATURE: 98 F | BODY MASS INDEX: 26.52 KG/M2 | OXYGEN SATURATION: 97 % | HEIGHT: 68 IN

## 2021-04-13 PROCEDURE — 36415 COLL VENOUS BLD VENIPUNCTURE: CPT

## 2021-04-13 PROCEDURE — 99072 ADDL SUPL MATRL&STAF TM PHE: CPT

## 2021-04-13 PROCEDURE — 99215 OFFICE O/P EST HI 40 MIN: CPT | Mod: 25

## 2021-04-13 RX ORDER — RIVAROXABAN 20 MG/1
20 TABLET, FILM COATED ORAL
Refills: 0 | Status: DISCONTINUED | COMMUNITY
End: 2021-04-13

## 2021-04-13 RX ORDER — ATOVAQUONE 750 MG/5ML
750 SUSPENSION ORAL TWICE DAILY
Qty: 900 | Refills: 0 | Status: DISCONTINUED | COMMUNITY
Start: 2021-01-25 | End: 2021-04-13

## 2021-04-14 ENCOUNTER — EMERGENCY (EMERGENCY)
Facility: HOSPITAL | Age: 61
LOS: 1 days | Discharge: HOME | End: 2021-04-14
Attending: EMERGENCY MEDICINE
Payer: COMMERCIAL

## 2021-04-14 VITALS
SYSTOLIC BLOOD PRESSURE: 126 MMHG | WEIGHT: 169.98 LBS | RESPIRATION RATE: 16 BRPM | DIASTOLIC BLOOD PRESSURE: 86 MMHG | OXYGEN SATURATION: 96 % | HEART RATE: 70 BPM | TEMPERATURE: 98 F | HEIGHT: 68 IN

## 2021-04-14 VITALS
DIASTOLIC BLOOD PRESSURE: 68 MMHG | HEART RATE: 88 BPM | RESPIRATION RATE: 20 BRPM | TEMPERATURE: 98 F | OXYGEN SATURATION: 99 % | SYSTOLIC BLOOD PRESSURE: 137 MMHG

## 2021-04-14 LAB
ALBUMIN SERPL ELPH-MCNC: 3.9 G/DL
ALP BLD-CCNC: 59 U/L
ALT SERPL-CCNC: <5 U/L
ANION GAP SERPL CALC-SCNC: 13 MMOL/L
APTT BLD: 25.1 SEC — LOW (ref 27.5–35.5)
AST SERPL-CCNC: 28 U/L
BASOPHILS # BLD AUTO: 0 K/UL — SIGNIFICANT CHANGE UP (ref 0–0.2)
BASOPHILS # BLD AUTO: 0.02 K/UL
BASOPHILS NFR BLD AUTO: 0 % — SIGNIFICANT CHANGE UP (ref 0–2)
BASOPHILS NFR BLD AUTO: 0.2 %
BILIRUB SERPL-MCNC: 0.4 MG/DL
BUN SERPL-MCNC: 44 MG/DL
CALCIUM SERPL-MCNC: 9.2 MG/DL
CHLORIDE SERPL-SCNC: 99 MMOL/L
CO2 SERPL-SCNC: 26 MMOL/L
CREAT SERPL-MCNC: 0.9 MG/DL
EOSINOPHIL # BLD AUTO: 0 K/UL — SIGNIFICANT CHANGE UP (ref 0–0.5)
EOSINOPHIL # BLD AUTO: 0.02 K/UL
EOSINOPHIL NFR BLD AUTO: 0 % — SIGNIFICANT CHANGE UP (ref 0–6)
EOSINOPHIL NFR BLD AUTO: 0.2 %
GLUCOSE SERPL-MCNC: 116 MG/DL
HCT VFR BLD CALC: 42.7 %
HCT VFR BLD CALC: 43.4 % — SIGNIFICANT CHANGE UP (ref 39–50)
HGB BLD-MCNC: 13.9 G/DL
HGB BLD-MCNC: 14 G/DL — SIGNIFICANT CHANGE UP (ref 13–17)
IMM GRANULOCYTES NFR BLD AUTO: 1.9 %
INR BLD: 0.95 RATIO — SIGNIFICANT CHANGE UP (ref 0.88–1.16)
LACTATE BLDV-MCNC: 1.6 MMOL/L — SIGNIFICANT CHANGE UP (ref 0.7–2)
LYMPHOCYTES # BLD AUTO: 0 % — LOW (ref 13–44)
LYMPHOCYTES # BLD AUTO: 0 K/UL — LOW (ref 1–3.3)
LYMPHOCYTES # BLD AUTO: 0.58 K/UL
LYMPHOCYTES NFR BLD AUTO: 4.5 %
MAGNESIUM SERPL-MCNC: 2.3 MG/DL
MAN DIFF?: NORMAL
MANUAL SMEAR VERIFICATION: SIGNIFICANT CHANGE UP
MCHC RBC-ENTMCNC: 29.1 PG
MCHC RBC-ENTMCNC: 29.2 PG — SIGNIFICANT CHANGE UP (ref 27–34)
MCHC RBC-ENTMCNC: 32.3 GM/DL — SIGNIFICANT CHANGE UP (ref 32–36)
MCHC RBC-ENTMCNC: 32.6 GM/DL
MCV RBC AUTO: 89.3 FL
MCV RBC AUTO: 90.6 FL — SIGNIFICANT CHANGE UP (ref 80–100)
MONOCYTES # BLD AUTO: 0.21 K/UL — SIGNIFICANT CHANGE UP (ref 0–0.9)
MONOCYTES # BLD AUTO: 0.53 K/UL
MONOCYTES NFR BLD AUTO: 1.8 % — LOW (ref 2–14)
MONOCYTES NFR BLD AUTO: 4.1 %
NEUTROPHILS # BLD AUTO: 11.56 K/UL
NEUTROPHILS # BLD AUTO: 11.69 K/UL — HIGH (ref 1.8–7.4)
NEUTROPHILS NFR BLD AUTO: 89.1 %
NEUTROPHILS NFR BLD AUTO: 98.2 % — HIGH (ref 43–77)
PLAT MORPH BLD: NORMAL — SIGNIFICANT CHANGE UP
PLATELET # BLD AUTO: 189 K/UL — SIGNIFICANT CHANGE UP (ref 150–400)
PLATELET # BLD AUTO: 203 K/UL
POLYCHROMASIA BLD QL SMEAR: SLIGHT — SIGNIFICANT CHANGE UP
POTASSIUM SERPL-SCNC: 4 MMOL/L
PROT SERPL-MCNC: 6.3 G/DL
PROTHROM AB SERPL-ACNC: 11.4 SEC — SIGNIFICANT CHANGE UP (ref 10.6–13.6)
RBC # BLD: 4.78 M/UL
RBC # BLD: 4.79 M/UL — SIGNIFICANT CHANGE UP (ref 4.2–5.8)
RBC # FLD: 16.7 %
RBC # FLD: 16.7 % — HIGH (ref 10.3–14.5)
RBC BLD AUTO: SIGNIFICANT CHANGE UP
SARS-COV-2 RNA SPEC QL NAA+PROBE: SIGNIFICANT CHANGE UP
SODIUM SERPL-SCNC: 137 MMOL/L
TROPONIN T, HIGH SENSITIVITY RESULT: 24 NG/L — SIGNIFICANT CHANGE UP (ref 0–51)
WBC # BLD: 11.9 K/UL — HIGH (ref 3.8–10.5)
WBC # FLD AUTO: 11.9 K/UL — HIGH (ref 3.8–10.5)
WBC # FLD AUTO: 12.95 K/UL

## 2021-04-14 PROCEDURE — 85610 PROTHROMBIN TIME: CPT

## 2021-04-14 PROCEDURE — 87040 BLOOD CULTURE FOR BACTERIA: CPT

## 2021-04-14 PROCEDURE — 83605 ASSAY OF LACTIC ACID: CPT

## 2021-04-14 PROCEDURE — 84484 ASSAY OF TROPONIN QUANT: CPT

## 2021-04-14 PROCEDURE — 93010 ELECTROCARDIOGRAM REPORT: CPT | Mod: NC

## 2021-04-14 PROCEDURE — 93005 ELECTROCARDIOGRAM TRACING: CPT

## 2021-04-14 PROCEDURE — 99285 EMERGENCY DEPT VISIT HI MDM: CPT

## 2021-04-14 PROCEDURE — 84443 ASSAY THYROID STIM HORMONE: CPT

## 2021-04-14 PROCEDURE — 99283 EMERGENCY DEPT VISIT LOW MDM: CPT | Mod: 25

## 2021-04-14 PROCEDURE — U0005: CPT

## 2021-04-14 PROCEDURE — 71046 X-RAY EXAM CHEST 2 VIEWS: CPT | Mod: 26

## 2021-04-14 PROCEDURE — 84480 ASSAY TRIIODOTHYRONINE (T3): CPT

## 2021-04-14 PROCEDURE — 84436 ASSAY OF TOTAL THYROXINE: CPT

## 2021-04-14 PROCEDURE — 85025 COMPLETE CBC W/AUTO DIFF WBC: CPT

## 2021-04-14 PROCEDURE — 85730 THROMBOPLASTIN TIME PARTIAL: CPT

## 2021-04-14 PROCEDURE — 80053 COMPREHEN METABOLIC PANEL: CPT

## 2021-04-14 PROCEDURE — U0003: CPT

## 2021-04-14 PROCEDURE — 71046 X-RAY EXAM CHEST 2 VIEWS: CPT

## 2021-04-14 RX ORDER — ONDANSETRON 8 MG/1
4 TABLET, FILM COATED ORAL ONCE
Refills: 0 | Status: DISCONTINUED | OUTPATIENT
Start: 2021-04-14 | End: 2021-04-14

## 2021-04-14 NOTE — ED PROVIDER NOTE - OBJECTIVE STATEMENT
60M hx of HTN, HLD, thyroid cancer s/p total thyroidectomy, diverticulitis, presenting with weakness, weight loss, burning in chest, and lightheadedness for around 3 months. Pt states that he was diagnosed with nephrotic syndrome in January and has been taking 60 mg prednisone daily since. Pt endorses difficulty walking for the past 3 months, states that he feels generalized weakness but mainly left sided leg weakness and feeling like he is going to lose his balance.  Pt states he his weight has fluctuated from 215-170 in the span of 3 months. Presented to his PCP for symptoms stated above, PCP indicated pt should present to ED due to elevated WBC count. Denies fevers, chills, nausea, vomiting, abdominal pain, change in bowel habits, and genitourinary symptoms.

## 2021-04-14 NOTE — ED PROVIDER NOTE - PATIENT PORTAL LINK FT
You can access the FollowMyHealth Patient Portal offered by [facility] by registering at the following website: [email or web address]. By joining [employer]’s FollowMyHealth portal, you will also be able to view your health information using other applications (apps) compatible with our system.

## 2021-04-14 NOTE — ED ADULT NURSE NOTE - OBJECTIVE STATEMENT
60 y male presents from home with difficulty walking and elevated WBC count from blood work yesterday. Patient reports to following with PCP yesterday for symptoms beginning in December 20th. Reports to weight loss due to poor PO intake, difficulty walking due to balance/ dizziness, weakness in left leg and generalized malaise, GONZALEZ, pruritic extremities, and swollen ankles. Denies HA, LOC, chest pain, N/V/D, fevers, chills. A&Ox4. Skin warm dry and intact. Breathing comfortably in bed- no distress. Abdomen soft nontender. Safety maintained- bed locked in lowest position. Patient received from [facility]; 20 G placed left AC.

## 2021-04-14 NOTE — ED PROVIDER NOTE - NSFOLLOWUPINSTRUCTIONS_ED_ALL_ED_FT
You presented to the hospital with generalized weakness, weight loss, loss of appetite, and burning in your chest.  Your blood work and You presented to the hospital with generalized weakness, leg weakness, weight loss, loss of appetite, and burning in your chest.  Your blood work and imaging in the hospital were found to be within normal limits. Please follow up with your primary care physician Dr. Shah within 1-3 days of discharge from the hospital. If you develop any worsening headaches, weakness, numbness, tingling, difficulty speaking, fevers, chills, chest pain, abdominal pain, or any additional emergent symptoms please return to the hospital.

## 2021-04-14 NOTE — ED PROVIDER NOTE - RAPID ASSESSMENT
60 M with PMHx of total thyroidectomy secondary to thyroid cancer presents with difficulty walking, and elevated wbc on blood work. Was referred by Dr. Shah for admission. Pt states has bee endorsing 7-8 weeks of weakness, weight loss, burning sensation in chest, itchiness, and lightheadedness.     Scribe Statement: Clarisa NEVAREZ Tiffany, attest that this documentation has been prepared under the direction and in the presence of Moose Holt (PA) 60 M with PMHx of total thyroidectomy secondary to thyroid cancer presents with difficulty walking, and elevated wbc on blood work. Was referred by Dr. Shah for admission. Pt states has bee endorsing 7-8 weeks of weakness, weight loss, burning sensation in chest, itchiness, and lightheadedness.     Scribe Statement: I, Luba Mckenna, attest that this documentation has been prepared under the direction and in the presence of Moose Holt (PA)  I, Moose RHODES, personally performed the service described in the documentation recorded by the scribe in my presence, and it accurately and completely records my words and actions.

## 2021-04-14 NOTE — ED PROVIDER NOTE - ATTENDING CONTRIBUTION TO CARE
Principal Discharge DX:	COVID-19  Secondary Diagnosis:	Pneumonia, viral  
Patient presenting with multiple progressive symptoms over time, weakness, malaise, rashes, itching - began after being diagnosed with nephrotic syndrome and starting on chronic steroids.  Symptoms ongoing and progressing for months.  PMD evaluated patient yesterday and sent patient to Emergency Department, did not contact Emergency Department regarding what goal of visit was.  Patient reporting he only presenting to Emergency Department because his doctor told him to, he does not appreciate a particular change in his symptoms on an acute basis that he would have sought emergent care for.    A 14 point review of systems is negative except as in HPI or otherwise documented.    Exam:  General: Patient well appearing, vital signs within normal limits  HEENT: airway patent with moist mucous membranes  Cardiac: RRR S1/S2 with strong peripheral pulses  Respiratory: lungs clear without respiratory distress  GI: abdomen soft, non tender, non distended  Neuro: no gross neurologic deficits  Skin: diffuse erythema/excoriation  Psych: normal mood and affect    Patient sent by PMD for progressive symptoms but no acute changes from his standpoint.  Screening labs noted elevated WBC count, could possibly be secondary to being on steroids?  No obvious emergent pathology noted.  Emergency Department staff tried to reach out to PMD to have conversation regarding her goal for Emergency Department visit but unable to get call back.  Resident contacted family who had concerns and further workup with neuro imaging, cultures and neurology consulted offered to family, patient requesting discharge at this point with no further workup to pursue chronic issues as outpatient.

## 2021-04-14 NOTE — ED PROVIDER NOTE - PROGRESS NOTE DETAILS
Spoke to step-daughter on the phone, she was very concerned regarding pts left leg weakness. Told daughter I tried to call Dr. Shah's answering service to discuss pts case further to no avail. Told daughter in law we could get a CT head, neurology consult, and obtain blood and urine cultures. Pt refused additional work-up and would like to be discharged home to follow up in outpatient setting with Dr. Shah.

## 2021-04-14 NOTE — ED PROVIDER NOTE - CLINICAL SUMMARY MEDICAL DECISION MAKING FREE TEXT BOX
60M hx of nephrotic syndrome, HTN, HLD, thyroid cancer s/p thyroidectomy, diverticulitis, p/w plethora of symptoms since January 2021. Pt states that main symptoms concern is generalized weakness and L leg weakness that has been causing his difficulty walking.  Pt states he also has weight loss 215-170 in 2-3 months. Pt presented to his PCP and was told to present to the ED because of elevated WBC. Will obtain CBC, CMP, blood gas, blood cultures, thyroid function tests, UA, CXR, and EKG.

## 2021-04-15 LAB
T3 SERPL-MCNC: 79 NG/DL — LOW (ref 80–200)
T4 AB SER-ACNC: 11.8 UG/DL — SIGNIFICANT CHANGE UP (ref 4.6–12)
TSH SERPL-MCNC: <0.01 UIU/ML — LOW (ref 0.27–4.2)

## 2021-04-16 NOTE — ED POST DISCHARGE NOTE - DETAILS
4/16/21: Left voicemail informing that I had results to discuss with the patient, gave call back number. -Yoni Go PA-C 4/16/21: Attempted #[id number] and #[id number], no answer. Left voicemail informing that I had results to discuss with the patient, gave call back number. -Yoni Go PA-C 4/16/21: Pt called back, results d/w pt, pt reports he will f/u with his endocrinologist Dr. Peralta next week, requests I fax over the results. Results faxed. -Yoni Go PA-C

## 2021-04-16 NOTE — ED POST DISCHARGE NOTE - REASON FOR FOLLOW-UP
Other T3- 79, TSH <0.01. Pt with h/o total thyroidectomy p/w weakness, fatigue, weight loss. Preferred DC for outpt w/u over admission.

## 2021-04-19 LAB
CULTURE RESULTS: SIGNIFICANT CHANGE UP
CULTURE RESULTS: SIGNIFICANT CHANGE UP
SPECIMEN SOURCE: SIGNIFICANT CHANGE UP
SPECIMEN SOURCE: SIGNIFICANT CHANGE UP

## 2021-04-20 ENCOUNTER — TRANSCRIPTION ENCOUNTER (OUTPATIENT)
Age: 61
End: 2021-04-20

## 2021-04-20 NOTE — HISTORY OF PRESENT ILLNESS
[FreeTextEntry1] : legs are weak, lightheaded and weakness [de-identified] : Pt reports several weeks of difficulty walking, feels like legs are "tight", gets winded, has no energy, no strength, cannot go up FOS. Also reports legs feel unstable, like he is going to fall.  Appetite is down. Feels like he can sleep anytime.  In addition he is itchy all over, without rash. Right shoulder still very tender, he is waiting to hear about his next surgery for this. Because of this he sleeps sitting in recliner, so cannot assess orthopnea, pt does not have PND. Does note since restart of furosemide by renal, legs, which were swollen, are better, although he also notes that when he just elevated them for 20 minutes or so the swelling also goes away.

## 2021-04-20 NOTE — PHYSICAL EXAM
[No Lymphadenopathy] : no lymphadenopathy [Supple] : supple [Thyroid Normal, No Nodules] : the thyroid was normal and there were no nodules present [No Edema] : there was no peripheral edema [Normal] : no CVA or spinal tenderness [de-identified] : anicteric [de-identified] : pt with significant spider veins along medial malleoli bilat, with additional spiders and varicosities of both calves bilat, no cord or tender areas [de-identified] : + gynecomastia bilat [de-identified] : markedly decreased muscle strength, 3/5 of LLE at hip flexors, knee flexors and extenders, dorsal and plantar flexion; however pt can get on/off exam table unassisted [de-identified] : gait is tentative; pt cannot Tandem gait, ?(+) Romberg to the left; +2 patella and Achilles reflexes bilat, perhaps a bit more brisk on the left [de-identified] : diffuse intense hyperemia of hands bilat, and abdomen and groin area; multiple excoriations of arms and legs bilat due to itching

## 2021-04-20 NOTE — ASSESSMENT
[FreeTextEntry1] : Given multitude of abnormalities, and need for significant additional testing, if nothing forthcoming on labs pt may need to go to hospital.

## 2021-04-21 ENCOUNTER — TRANSCRIPTION ENCOUNTER (OUTPATIENT)
Age: 61
End: 2021-04-21

## 2021-04-21 ENCOUNTER — NON-APPOINTMENT (OUTPATIENT)
Age: 61
End: 2021-04-21

## 2021-04-22 ENCOUNTER — TRANSCRIPTION ENCOUNTER (OUTPATIENT)
Age: 61
End: 2021-04-22

## 2021-04-23 ENCOUNTER — TRANSCRIPTION ENCOUNTER (OUTPATIENT)
Age: 61
End: 2021-04-23

## 2021-04-26 ENCOUNTER — TRANSCRIPTION ENCOUNTER (OUTPATIENT)
Age: 61
End: 2021-04-26

## 2021-04-28 ENCOUNTER — TRANSCRIPTION ENCOUNTER (OUTPATIENT)
Age: 61
End: 2021-04-28

## 2021-04-28 PROBLEM — K57.92 DIVERTICULITIS OF INTESTINE, PART UNSPECIFIED, WITHOUT PERFORATION OR ABSCESS WITHOUT BLEEDING: Chronic | Status: ACTIVE | Noted: 2021-04-14

## 2021-04-28 PROBLEM — C73 MALIGNANT NEOPLASM OF THYROID GLAND: Chronic | Status: ACTIVE | Noted: 2021-04-14

## 2021-04-30 ENCOUNTER — TRANSCRIPTION ENCOUNTER (OUTPATIENT)
Age: 61
End: 2021-04-30

## 2021-05-02 ENCOUNTER — TRANSCRIPTION ENCOUNTER (OUTPATIENT)
Age: 61
End: 2021-05-02

## 2021-05-02 LAB
ALBUMIN SERPL ELPH-MCNC: 4.1 G/DL
ANION GAP SERPL CALC-SCNC: 13 MMOL/L
APPEARANCE: CLEAR
BACTERIA: NEGATIVE
BASOPHILS # BLD AUTO: 0.02 K/UL
BASOPHILS NFR BLD AUTO: 0.2 %
BILIRUBIN URINE: NEGATIVE
BLOOD URINE: NEGATIVE
BUN SERPL-MCNC: 26 MG/DL
CALCIUM SERPL-MCNC: 9.4 MG/DL
CHLORIDE SERPL-SCNC: 102 MMOL/L
CHOLEST SERPL-MCNC: 171 MG/DL
CO2 SERPL-SCNC: 24 MMOL/L
COLOR: NORMAL
CREAT SERPL-MCNC: 0.77 MG/DL
CREAT SPEC-SCNC: 44 MG/DL
CREAT/PROT UR: 0.3 RATIO
EOSINOPHIL # BLD AUTO: 0.02 K/UL
EOSINOPHIL NFR BLD AUTO: 0.2 %
GLUCOSE QUALITATIVE U: NEGATIVE
GLUCOSE SERPL-MCNC: 80 MG/DL
HCT VFR BLD CALC: 40.2 %
HDLC SERPL-MCNC: 88 MG/DL
HGB BLD-MCNC: 12.9 G/DL
HYALINE CASTS: 0 /LPF
IMM GRANULOCYTES NFR BLD AUTO: 4.3 %
KETONES URINE: NEGATIVE
LDLC SERPL CALC-MCNC: 65 MG/DL
LEUKOCYTE ESTERASE URINE: NEGATIVE
LYMPHOCYTES # BLD AUTO: 1.15 K/UL
LYMPHOCYTES NFR BLD AUTO: 10.8 %
MAN DIFF?: NORMAL
MCHC RBC-ENTMCNC: 30.1 PG
MCHC RBC-ENTMCNC: 32.1 GM/DL
MCV RBC AUTO: 93.9 FL
MICROSCOPIC-UA: NORMAL
MONOCYTES # BLD AUTO: 0.67 K/UL
MONOCYTES NFR BLD AUTO: 6.3 %
NEUTROPHILS # BLD AUTO: 8.35 K/UL
NEUTROPHILS NFR BLD AUTO: 78.2 %
NITRITE URINE: NEGATIVE
NONHDLC SERPL-MCNC: 82 MG/DL
PH URINE: 6.5
PHOSPHATE SERPL-MCNC: 3.2 MG/DL
PLATELET # BLD AUTO: 212 K/UL
POTASSIUM SERPL-SCNC: 3.7 MMOL/L
PROT UR-MCNC: 15 MG/DL
PROTEIN URINE: NEGATIVE
RBC # BLD: 4.28 M/UL
RBC # FLD: 16.7 %
RED BLOOD CELLS URINE: 0 /HPF
SODIUM SERPL-SCNC: 139 MMOL/L
SPECIFIC GRAVITY URINE: 1.01
SQUAMOUS EPITHELIAL CELLS: 0 /HPF
TRIGL SERPL-MCNC: 85 MG/DL
UROBILINOGEN URINE: NORMAL
WBC # FLD AUTO: 10.67 K/UL
WHITE BLOOD CELLS URINE: 0 /HPF

## 2021-05-03 ENCOUNTER — TRANSCRIPTION ENCOUNTER (OUTPATIENT)
Age: 61
End: 2021-05-03

## 2021-05-04 ENCOUNTER — APPOINTMENT (OUTPATIENT)
Dept: CARDIOLOGY | Facility: CLINIC | Age: 61
End: 2021-05-04
Payer: COMMERCIAL

## 2021-05-04 ENCOUNTER — APPOINTMENT (OUTPATIENT)
Dept: INTERNAL MEDICINE | Facility: CLINIC | Age: 61
End: 2021-05-04

## 2021-05-04 ENCOUNTER — TRANSCRIPTION ENCOUNTER (OUTPATIENT)
Age: 61
End: 2021-05-04

## 2021-05-04 PROCEDURE — 93306 TTE W/DOPPLER COMPLETE: CPT

## 2021-05-04 PROCEDURE — 99072 ADDL SUPL MATRL&STAF TM PHE: CPT

## 2021-05-05 ENCOUNTER — NON-APPOINTMENT (OUTPATIENT)
Age: 61
End: 2021-05-05

## 2021-05-10 ENCOUNTER — TRANSCRIPTION ENCOUNTER (OUTPATIENT)
Age: 61
End: 2021-05-10

## 2021-05-11 ENCOUNTER — TRANSCRIPTION ENCOUNTER (OUTPATIENT)
Age: 61
End: 2021-05-11

## 2021-05-12 ENCOUNTER — TRANSCRIPTION ENCOUNTER (OUTPATIENT)
Age: 61
End: 2021-05-12

## 2021-05-12 LAB
ALBUMIN SERPL ELPH-MCNC: 3.9 G/DL
ANION GAP SERPL CALC-SCNC: 12 MMOL/L
BASOPHILS # BLD AUTO: 0.04 K/UL
BASOPHILS NFR BLD AUTO: 0.4 %
BUN SERPL-MCNC: 32 MG/DL
CALCIUM SERPL-MCNC: 9.1 MG/DL
CHLORIDE SERPL-SCNC: 106 MMOL/L
CO2 SERPL-SCNC: 23 MMOL/L
CREAT SERPL-MCNC: 0.73 MG/DL
EOSINOPHIL # BLD AUTO: 0.04 K/UL
EOSINOPHIL NFR BLD AUTO: 0.4 %
GLUCOSE SERPL-MCNC: 81 MG/DL
HCT VFR BLD CALC: 38.7 %
HGB BLD-MCNC: 12 G/DL
IMM GRANULOCYTES NFR BLD AUTO: 2.8 %
LYMPHOCYTES # BLD AUTO: 1.32 K/UL
LYMPHOCYTES NFR BLD AUTO: 14 %
MAN DIFF?: NORMAL
MCHC RBC-ENTMCNC: 30.5 PG
MCHC RBC-ENTMCNC: 31 GM/DL
MCV RBC AUTO: 98.5 FL
MONOCYTES # BLD AUTO: 0.73 K/UL
MONOCYTES NFR BLD AUTO: 7.7 %
NEUTROPHILS # BLD AUTO: 7.03 K/UL
NEUTROPHILS NFR BLD AUTO: 74.7 %
PHOSPHATE SERPL-MCNC: 3 MG/DL
PLATELET # BLD AUTO: 247 K/UL
POTASSIUM SERPL-SCNC: 4.2 MMOL/L
RBC # BLD: 3.93 M/UL
RBC # FLD: 15.5 %
SODIUM SERPL-SCNC: 141 MMOL/L
WBC # FLD AUTO: 9.42 K/UL

## 2021-05-13 ENCOUNTER — TRANSCRIPTION ENCOUNTER (OUTPATIENT)
Age: 61
End: 2021-05-13

## 2021-05-14 LAB
CREAT SPEC-SCNC: 74 MG/DL
CREAT/PROT UR: 0.1 RATIO
PROT UR-MCNC: 8 MG/DL

## 2021-05-17 ENCOUNTER — TRANSCRIPTION ENCOUNTER (OUTPATIENT)
Age: 61
End: 2021-05-17

## 2021-05-17 ENCOUNTER — OUTPATIENT (OUTPATIENT)
Dept: OUTPATIENT SERVICES | Facility: HOSPITAL | Age: 61
LOS: 1 days | Discharge: HOME | End: 2021-05-17

## 2021-05-17 DIAGNOSIS — D72.89 OTHER SPECIFIED DISORDERS OF WHITE BLOOD CELLS: ICD-10-CM

## 2021-05-18 ENCOUNTER — TRANSCRIPTION ENCOUNTER (OUTPATIENT)
Age: 61
End: 2021-05-18

## 2021-05-19 ENCOUNTER — RESULT REVIEW (OUTPATIENT)
Age: 61
End: 2021-05-19

## 2021-05-19 ENCOUNTER — TRANSCRIPTION ENCOUNTER (OUTPATIENT)
Age: 61
End: 2021-05-19

## 2021-05-19 ENCOUNTER — APPOINTMENT (OUTPATIENT)
Dept: HEMATOLOGY ONCOLOGY | Facility: CLINIC | Age: 61
End: 2021-05-19
Payer: COMMERCIAL

## 2021-05-19 VITALS
WEIGHT: 182.1 LBS | OXYGEN SATURATION: 97 % | TEMPERATURE: 97.5 F | BODY MASS INDEX: 28.58 KG/M2 | DIASTOLIC BLOOD PRESSURE: 75 MMHG | RESPIRATION RATE: 16 BRPM | SYSTOLIC BLOOD PRESSURE: 111 MMHG | HEART RATE: 73 BPM | HEIGHT: 66.93 IN

## 2021-05-19 DIAGNOSIS — I82.611 ACUTE EMBOLISM AND THROMBOSIS OF SUPERFICIAL VEINS OF RIGHT UPPER EXTREMITY: ICD-10-CM

## 2021-05-19 DIAGNOSIS — Z63.5 DISRUPTION OF FAMILY BY SEPARATION AND DIVORCE: ICD-10-CM

## 2021-05-19 DIAGNOSIS — Z78.9 OTHER SPECIFIED HEALTH STATUS: ICD-10-CM

## 2021-05-19 DIAGNOSIS — F42.9 OBSESSIVE-COMPULSIVE DISORDER, UNSPECIFIED: ICD-10-CM

## 2021-05-19 LAB
BASOPHILS # BLD AUTO: 0.03 K/UL — SIGNIFICANT CHANGE UP (ref 0–0.2)
BASOPHILS NFR BLD AUTO: 0.3 % — SIGNIFICANT CHANGE UP (ref 0–2)
EOSINOPHIL # BLD AUTO: 0.05 K/UL — SIGNIFICANT CHANGE UP (ref 0–0.5)
EOSINOPHIL NFR BLD AUTO: 0.6 % — SIGNIFICANT CHANGE UP (ref 0–6)
HCT VFR BLD CALC: 39.6 % — SIGNIFICANT CHANGE UP (ref 39–50)
HGB BLD-MCNC: 12.8 G/DL — LOW (ref 13–17)
IMM GRANULOCYTES NFR BLD AUTO: 1.9 % — HIGH (ref 0–1.5)
LYMPHOCYTES # BLD AUTO: 0.6 K/UL — LOW (ref 1–3.3)
LYMPHOCYTES # BLD AUTO: 6.8 % — LOW (ref 13–44)
MCHC RBC-ENTMCNC: 30.8 PG — SIGNIFICANT CHANGE UP (ref 27–34)
MCHC RBC-ENTMCNC: 32.3 G/DL — SIGNIFICANT CHANGE UP (ref 32–36)
MCV RBC AUTO: 95.2 FL — SIGNIFICANT CHANGE UP (ref 80–100)
MONOCYTES # BLD AUTO: 0.41 K/UL — SIGNIFICANT CHANGE UP (ref 0–0.9)
MONOCYTES NFR BLD AUTO: 4.6 % — SIGNIFICANT CHANGE UP (ref 2–14)
NEUTROPHILS # BLD AUTO: 7.62 K/UL — HIGH (ref 1.8–7.4)
NEUTROPHILS NFR BLD AUTO: 85.8 % — HIGH (ref 43–77)
NRBC # BLD: 0 /100 WBCS — SIGNIFICANT CHANGE UP (ref 0–0)
PLATELET # BLD AUTO: 275 K/UL — SIGNIFICANT CHANGE UP (ref 150–400)
RBC # BLD: 4.16 M/UL — LOW (ref 4.2–5.8)
RBC # FLD: 14.6 % — HIGH (ref 10.3–14.5)
WBC # BLD: 8.88 K/UL — SIGNIFICANT CHANGE UP (ref 3.8–10.5)
WBC # FLD AUTO: 8.88 K/UL — SIGNIFICANT CHANGE UP (ref 3.8–10.5)

## 2021-05-19 PROCEDURE — 99072 ADDL SUPL MATRL&STAF TM PHE: CPT

## 2021-05-19 PROCEDURE — 99203 OFFICE O/P NEW LOW 30 MIN: CPT

## 2021-05-19 SDOH — SOCIAL STABILITY - SOCIAL INSECURITY: DISRUPTION OF FAMILY BY SEPARATION AND DIVORCE: Z63.5

## 2021-05-21 ENCOUNTER — TRANSCRIPTION ENCOUNTER (OUTPATIENT)
Age: 61
End: 2021-05-21

## 2021-05-21 LAB
ALBUMIN SERPL ELPH-MCNC: 4.4 G/DL
ANION GAP SERPL CALC-SCNC: 15 MMOL/L
BUN SERPL-MCNC: 34 MG/DL
CALCIUM SERPL-MCNC: 9.7 MG/DL
CHLORIDE SERPL-SCNC: 97 MMOL/L
CO2 SERPL-SCNC: 27 MMOL/L
CREAT SERPL-MCNC: 0.82 MG/DL
CREAT SPEC-SCNC: 88 MG/DL
CREAT/PROT UR: 0.2 RATIO
GLUCOSE SERPL-MCNC: 79 MG/DL
PHOSPHATE SERPL-MCNC: 3.6 MG/DL
POTASSIUM SERPL-SCNC: 4.1 MMOL/L
PROT UR-MCNC: 16 MG/DL
SODIUM SERPL-SCNC: 139 MMOL/L

## 2021-05-24 ENCOUNTER — NON-APPOINTMENT (OUTPATIENT)
Age: 61
End: 2021-05-24

## 2021-05-24 ENCOUNTER — TRANSCRIPTION ENCOUNTER (OUTPATIENT)
Age: 61
End: 2021-05-24

## 2021-05-24 ENCOUNTER — RX RENEWAL (OUTPATIENT)
Age: 61
End: 2021-05-24

## 2021-05-25 ENCOUNTER — TRANSCRIPTION ENCOUNTER (OUTPATIENT)
Age: 61
End: 2021-05-25

## 2021-05-25 ENCOUNTER — RX RENEWAL (OUTPATIENT)
Age: 61
End: 2021-05-25

## 2021-05-25 ENCOUNTER — NON-APPOINTMENT (OUTPATIENT)
Age: 61
End: 2021-05-25

## 2021-05-25 LAB
T4 FREE SERPL-MCNC: 2.3 NG/DL
THYROGLOB AB SERPL-ACNC: <20 IU/ML
THYROGLOB SERPL-MCNC: <0.2 NG/ML
TSH SERPL-ACNC: 0.03 UIU/ML

## 2021-05-30 PROBLEM — Z63.5 DIVORCED: Status: ACTIVE | Noted: 2021-05-30

## 2021-05-30 PROBLEM — Z78.9 SOCIAL ALCOHOL USE: Status: ACTIVE | Noted: 2021-05-30

## 2021-05-30 PROBLEM — F42.9 OCD (OBSESSIVE COMPULSIVE DISORDER): Status: ACTIVE | Noted: 2021-05-30

## 2021-05-30 PROBLEM — I82.611 THROMBOSIS OF RIGHT CEPHALIC VEIN: Status: RESOLVED | Noted: 2021-05-30 | Resolved: 2021-05-30

## 2021-05-30 RX ORDER — FEXOFENADINE HYDROCHLORIDE 180 MG/1
180 TABLET ORAL DAILY
Qty: 30 | Refills: 2 | Status: DISCONTINUED | COMMUNITY
Start: 2021-04-13 | End: 2021-05-30

## 2021-05-30 RX ORDER — PREDNISONE 20 MG/1
20 TABLET ORAL
Qty: 60 | Refills: 1 | Status: DISCONTINUED | COMMUNITY
Start: 2021-02-16 | End: 2021-05-30

## 2021-05-30 RX ORDER — NORTRIPTYLINE HYDROCHLORIDE 10 MG/1
10 CAPSULE ORAL AT BEDTIME
Qty: 40 | Refills: 0 | Status: DISCONTINUED | COMMUNITY
Start: 2021-04-21 | End: 2021-05-30

## 2021-05-30 RX ORDER — LEVOTHYROXINE SODIUM 125 UG/1
125 CAPSULE ORAL DAILY
Qty: 60 | Refills: 0 | Status: DISCONTINUED | COMMUNITY
Start: 2021-01-08 | End: 2021-05-30

## 2021-05-30 RX ORDER — PREDNISONE 10 MG/1
10 TABLET ORAL
Qty: 30 | Refills: 0 | Status: DISCONTINUED | COMMUNITY
Start: 2021-02-03 | End: 2021-05-30

## 2021-05-30 NOTE — ASSESSMENT
[Palliative Care Plan] : not applicable at this time [FreeTextEntry1] : 60 year old male who developed leukocytosis in the setting of high dose steroid therapy. This is a common occurrence due to leukocyte demargination off vascular walls. With tapering of his steroid dose, his white cell count is now normal. This was explained to the patient and all questions answered.\par \par Plan:\par Continue to monitor CBC as steroids tapered. Please let me know if leukocytosis recurs.

## 2021-05-30 NOTE — ADDENDUM
[FreeTextEntry1] : I, David Heidy, acted solely as a scribe for Dr. Leon Lawson on 05/19/2021. All medical entries made by the Scribe were at my, Dr. Leon Lawson's, direction and personally dictated by me on 05/19/2021. I have reviewed the chart and agree that the record accurately reflects my personal performance of the history, physical exam, assessment and plan. I have also personally directed, reviewed, and agreed with the chart.

## 2021-05-30 NOTE — REVIEW OF SYSTEMS
[Easy Bruising] : a tendency for easy bruising [Patient Intake Form Reviewed] : Patient intake form was reviewed

## 2021-05-30 NOTE — HISTORY OF PRESENT ILLNESS
[Disease:__________________________] : Disease: [unfilled] [de-identified] : 1/21 Superficial venous thrombosis of RUE cephalic vein\par 2013 Papillary thyroid cancer - thyroidectomy\par Nephrotic syndrome KENAN\par Tuberculosis [de-identified] : 60 year old male referred in consultation regarding leukocytosis. He had undergone right shoulder replacement in November 2020 which had to be removed in February 2021 due to infection. At that time, he was found to have nephrotic syndrome with minimal change disease. Therapy with high dose prednisone was begun. In February 2021, white count was 13,000. In April 2021, white count was still 13,000. Differential was normal. His prednisone dose is now being tapered. It was decreased to 20 mg daily one week ago. He notes no fevers nor night sweats.

## 2021-05-30 NOTE — CONSULT LETTER
[Dear  ___] : Dear  [unfilled], [Consult Letter:] : I had the pleasure of evaluating your patient, [unfilled]. [Please see my note below.] : Please see my note below. [Consult Closing:] : Thank you very much for allowing me to participate in the care of this patient.  If you have any questions, please do not hesitate to contact me. [Sincerely,] : Sincerely, [FreeTextEntry2] : Dr. Pita Shah  [FreeTextEntry3] : Leon Lawson M.D., FACP\par Professor of Medicine\par [employer]\par Associate Chief, Division of Hematology\par [employer]\par [facility]\par [address] [place] [address]\[place] [address]\par [phone]

## 2021-06-01 LAB
ALBUMIN SERPL ELPH-MCNC: 4.1 G/DL
ANION GAP SERPL CALC-SCNC: 12 MMOL/L
BUN SERPL-MCNC: 17 MG/DL
CALCIUM SERPL-MCNC: 9.7 MG/DL
CHLORIDE SERPL-SCNC: 104 MMOL/L
CO2 SERPL-SCNC: 27 MMOL/L
CREAT SERPL-MCNC: 0.81 MG/DL
GLUCOSE SERPL-MCNC: 91 MG/DL
PHOSPHATE SERPL-MCNC: 4.1 MG/DL
POTASSIUM SERPL-SCNC: 3.8 MMOL/L
SODIUM SERPL-SCNC: 143 MMOL/L

## 2021-06-02 LAB
CREAT SPEC-SCNC: 163 MG/DL
CREAT/PROT UR: 0.1 RATIO
PROT UR-MCNC: 23 MG/DL

## 2021-06-03 ENCOUNTER — TRANSCRIPTION ENCOUNTER (OUTPATIENT)
Age: 61
End: 2021-06-03

## 2021-06-03 ENCOUNTER — NON-APPOINTMENT (OUTPATIENT)
Age: 61
End: 2021-06-03

## 2021-06-04 ENCOUNTER — NON-APPOINTMENT (OUTPATIENT)
Age: 61
End: 2021-06-04

## 2021-06-04 ENCOUNTER — APPOINTMENT (OUTPATIENT)
Dept: INTERNAL MEDICINE | Facility: CLINIC | Age: 61
End: 2021-06-04
Payer: COMMERCIAL

## 2021-06-04 VITALS
BODY MASS INDEX: 28.88 KG/M2 | HEART RATE: 71 BPM | WEIGHT: 184 LBS | OXYGEN SATURATION: 97 % | DIASTOLIC BLOOD PRESSURE: 64 MMHG | TEMPERATURE: 97.3 F | HEIGHT: 66.93 IN | SYSTOLIC BLOOD PRESSURE: 103 MMHG

## 2021-06-04 PROCEDURE — 93000 ELECTROCARDIOGRAM COMPLETE: CPT

## 2021-06-04 PROCEDURE — 99072 ADDL SUPL MATRL&STAF TM PHE: CPT

## 2021-06-04 PROCEDURE — 99215 OFFICE O/P EST HI 40 MIN: CPT | Mod: 25

## 2021-06-07 ENCOUNTER — TRANSCRIPTION ENCOUNTER (OUTPATIENT)
Age: 61
End: 2021-06-07

## 2021-06-07 LAB
ACTH SER-ACNC: 37.2 PG/ML
CORTIS SERPL-MCNC: 9.5 UG/DL
T4 FREE SERPL-MCNC: 2.4 NG/DL
TSH SERPL-ACNC: 0.01 UIU/ML

## 2021-06-07 NOTE — ASSESSMENT
[Patient Requires Further Testing] : Patient requires further testing [FreeTextEntry4] : Pt for preoperative evaluation for right shoulder re-replacement.\par Current issues are:\par 1. ?need for stress dose steroids perioperatively:\par - check  8 am Cortisol and ACTH tomorrow, Endo aware, pt aware\par 2. Hypothy- recent labs overcorrected w TSH .03, fT4 2.3, dose was adjusted- (on Levothy 250 mcg daily except 62.5 mcg Sundays.)\par -to d/w Endo, will need to hold\par P: recheck TSH f T4, may need to hold the Levothyrox next wk\par d/w Dr. Peralta and w pt.\par 3. Has Inspire for NINOSKA- on right sfde but  not in surgical area and team is aware, per pt.

## 2021-06-07 NOTE — ADDENDUM
[FreeTextEntry1] : Pt's am Cortisol level, ACTH are   normal. No stress dose steroids necessary, d/w Endo.\par TSH low as noted, will hold Levothyrox x 2 dd  then resume lower dose  post-op as instructed by Endo. Pt is aware.\par \par Pt may proceed to surgery on 6/9/21.

## 2021-06-07 NOTE — HISTORY OF PRESENT ILLNESS
[No Pertinent Cardiac History] : no history of aortic stenosis, atrial fibrillation, coronary artery disease, recent myocardial infarction, or implantable device/pacemaker [Sleep Apnea] : sleep apnea [No Adverse Anesthesia Reaction] : no adverse anesthesia reaction in self or family member [Family Member] : no family member with adverse anesthesia reaction/sudden death [Self] : no previous adverse anesthesia reaction [FreeTextEntry1] : right shoulder replacemt [FreeTextEntry2] : 6/9/21 [FreeTextEntry3] : Dr. LINDSEY Velazco [FreeTextEntry4] : 61 yo man w Htn, HLD, Thy Ca. s/p thyroidec '13/RADHA;  hypothy\par NINOSKA s/p Inspire\par Recent anasaraca--> Nephrotic sx, resolving on Pred \par Superf Venous thromb right arm after 11/20 shoulder surg, s/p Xarelto x 1 mo\par Post op  infection w removal of hardware and  placement of spacer/abx past sev mos\par Infection cleared and now ready for re-replacement of right shoulder.\par Had Covid 7/20, recovered. Covid vax Pfizer 3/1/21 , 3/22/21\par Echo 5/21 nl\par \par Pt has had numerous prior surg without reaction  to anesthesia or excessive bleeding.\par He is feeling well at this time other than his shoulder pain. [FreeTextEntry5] : PREDNISONE hi-dose since 1/21 now down to 5 mg/d

## 2021-06-11 ENCOUNTER — TRANSCRIPTION ENCOUNTER (OUTPATIENT)
Age: 61
End: 2021-06-11

## 2021-06-15 ENCOUNTER — NON-APPOINTMENT (OUTPATIENT)
Age: 61
End: 2021-06-15

## 2021-06-15 ENCOUNTER — TRANSCRIPTION ENCOUNTER (OUTPATIENT)
Age: 61
End: 2021-06-15

## 2021-06-18 ENCOUNTER — NON-APPOINTMENT (OUTPATIENT)
Age: 61
End: 2021-06-18

## 2021-06-18 LAB
ALBUMIN SERPL ELPH-MCNC: 3.8 G/DL
ANION GAP SERPL CALC-SCNC: 14 MMOL/L
BASOPHILS # BLD AUTO: 0.07 K/UL
BASOPHILS NFR BLD AUTO: 0.9 %
BUN SERPL-MCNC: 13 MG/DL
CALCIUM SERPL-MCNC: 9.6 MG/DL
CHLORIDE SERPL-SCNC: 104 MMOL/L
CO2 SERPL-SCNC: 26 MMOL/L
CREAT SERPL-MCNC: 0.74 MG/DL
CREAT SPEC-SCNC: 143 MG/DL
CREAT/PROT UR: 0.1 RATIO
EOSINOPHIL # BLD AUTO: 0.27 K/UL
EOSINOPHIL NFR BLD AUTO: 3.4 %
GLUCOSE SERPL-MCNC: 84 MG/DL
HCT VFR BLD CALC: 34.8 %
HGB BLD-MCNC: 10.8 G/DL
IMM GRANULOCYTES NFR BLD AUTO: 1.1 %
LYMPHOCYTES # BLD AUTO: 1.59 K/UL
LYMPHOCYTES NFR BLD AUTO: 20.1 %
MAN DIFF?: NORMAL
MCHC RBC-ENTMCNC: 30.4 PG
MCHC RBC-ENTMCNC: 31 GM/DL
MCV RBC AUTO: 98 FL
MONOCYTES # BLD AUTO: 0.96 K/UL
MONOCYTES NFR BLD AUTO: 12.1 %
NEUTROPHILS # BLD AUTO: 4.93 K/UL
NEUTROPHILS NFR BLD AUTO: 62.4 %
PHOSPHATE SERPL-MCNC: 4 MG/DL
PLATELET # BLD AUTO: 321 K/UL
POTASSIUM SERPL-SCNC: 4.1 MMOL/L
PROT UR-MCNC: 12 MG/DL
RBC # BLD: 3.55 M/UL
RBC # FLD: 13 %
SODIUM SERPL-SCNC: 143 MMOL/L
WBC # FLD AUTO: 7.91 K/UL

## 2021-06-25 ENCOUNTER — TRANSCRIPTION ENCOUNTER (OUTPATIENT)
Age: 61
End: 2021-06-25

## 2021-06-28 ENCOUNTER — TRANSCRIPTION ENCOUNTER (OUTPATIENT)
Age: 61
End: 2021-06-28

## 2021-06-29 ENCOUNTER — TRANSCRIPTION ENCOUNTER (OUTPATIENT)
Age: 61
End: 2021-06-29

## 2021-07-02 ENCOUNTER — NON-APPOINTMENT (OUTPATIENT)
Age: 61
End: 2021-07-02

## 2021-07-02 LAB
ALBUMIN SERPL ELPH-MCNC: 4.1 G/DL
ANION GAP SERPL CALC-SCNC: 14 MMOL/L
BASOPHILS # BLD AUTO: 0.08 K/UL
BASOPHILS NFR BLD AUTO: 1.3 %
BUN SERPL-MCNC: 17 MG/DL
CALCIUM SERPL-MCNC: 9.9 MG/DL
CHLORIDE SERPL-SCNC: 101 MMOL/L
CO2 SERPL-SCNC: 25 MMOL/L
CREAT SERPL-MCNC: 0.74 MG/DL
EOSINOPHIL # BLD AUTO: 0.22 K/UL
EOSINOPHIL NFR BLD AUTO: 3.4 %
GLUCOSE SERPL-MCNC: 87 MG/DL
HCT VFR BLD CALC: 38.2 %
HGB BLD-MCNC: 11.5 G/DL
IMM GRANULOCYTES NFR BLD AUTO: 0.5 %
LYMPHOCYTES # BLD AUTO: 1.53 K/UL
LYMPHOCYTES NFR BLD AUTO: 23.9 %
MAN DIFF?: NORMAL
MCHC RBC-ENTMCNC: 28.4 PG
MCHC RBC-ENTMCNC: 30.1 GM/DL
MCV RBC AUTO: 94.3 FL
MONOCYTES # BLD AUTO: 0.84 K/UL
MONOCYTES NFR BLD AUTO: 13.1 %
NEUTROPHILS # BLD AUTO: 3.69 K/UL
NEUTROPHILS NFR BLD AUTO: 57.8 %
PHOSPHATE SERPL-MCNC: 3.4 MG/DL
PLATELET # BLD AUTO: 328 K/UL
POTASSIUM SERPL-SCNC: 4.2 MMOL/L
RBC # BLD: 4.05 M/UL
RBC # FLD: 12.6 %
SODIUM SERPL-SCNC: 140 MMOL/L
WBC # FLD AUTO: 6.39 K/UL

## 2021-07-09 ENCOUNTER — TRANSCRIPTION ENCOUNTER (OUTPATIENT)
Age: 61
End: 2021-07-09

## 2021-07-09 LAB
CREAT SPEC-SCNC: 115 MG/DL
CREAT/PROT UR: 0.1 RATIO
PROT UR-MCNC: 12 MG/DL

## 2021-07-12 ENCOUNTER — TRANSCRIPTION ENCOUNTER (OUTPATIENT)
Age: 61
End: 2021-07-12

## 2021-07-13 ENCOUNTER — APPOINTMENT (OUTPATIENT)
Dept: INTERNAL MEDICINE | Facility: CLINIC | Age: 61
End: 2021-07-13
Payer: COMMERCIAL

## 2021-07-13 VITALS
WEIGHT: 184 LBS | BODY MASS INDEX: 29.57 KG/M2 | HEIGHT: 66 IN | DIASTOLIC BLOOD PRESSURE: 68 MMHG | HEART RATE: 72 BPM | SYSTOLIC BLOOD PRESSURE: 120 MMHG | TEMPERATURE: 97.8 F | OXYGEN SATURATION: 98 %

## 2021-07-13 DIAGNOSIS — Z86.2 PERSONAL HISTORY OF DISEASES OF THE BLOOD AND BLOOD-FORMING ORGANS AND CERTAIN DISORDERS INVOLVING THE IMMUNE MECHANISM: ICD-10-CM

## 2021-07-13 DIAGNOSIS — Z87.09 PERSONAL HISTORY OF OTHER DISEASES OF THE RESPIRATORY SYSTEM: ICD-10-CM

## 2021-07-13 DIAGNOSIS — E03.9 HYPOTHYROIDISM, UNSPECIFIED: ICD-10-CM

## 2021-07-13 DIAGNOSIS — Z87.898 PERSONAL HISTORY OF OTHER SPECIFIED CONDITIONS: ICD-10-CM

## 2021-07-13 DIAGNOSIS — N04.9 NEPHROTIC SYNDROME WITH UNSPECIFIED MORPHOLOGIC CHANGES: ICD-10-CM

## 2021-07-13 DIAGNOSIS — E11.9 TYPE 2 DIABETES MELLITUS W/OUT COMPLICATIONS: ICD-10-CM

## 2021-07-13 DIAGNOSIS — K57.32 DIVERTICULITIS OF LARGE INTESTINE W/OUT PERFORATION OR ABSCESS W/OUT BLEEDING: ICD-10-CM

## 2021-07-13 DIAGNOSIS — Z86.39 PERSONAL HISTORY OF OTHER ENDOCRINE, NUTRITIONAL AND METABOLIC DISEASE: ICD-10-CM

## 2021-07-13 DIAGNOSIS — R25.2 CRAMP AND SPASM: ICD-10-CM

## 2021-07-13 PROCEDURE — 99213 OFFICE O/P EST LOW 20 MIN: CPT | Mod: 25

## 2021-07-13 PROCEDURE — 36415 COLL VENOUS BLD VENIPUNCTURE: CPT

## 2021-07-13 PROCEDURE — 99072 ADDL SUPL MATRL&STAF TM PHE: CPT

## 2021-07-13 RX ORDER — PREDNISONE 5 MG/1
5 TABLET ORAL DAILY
Qty: 30 | Refills: 0 | Status: DISCONTINUED | COMMUNITY
Start: 2021-05-30 | End: 2021-07-13

## 2021-07-13 RX ORDER — IBUPROFEN 200 MG
600 CAPSULE ORAL
Refills: 0 | Status: DISCONTINUED | COMMUNITY
Start: 2021-05-30 | End: 2021-07-13

## 2021-07-13 RX ORDER — LEVOTHYROXINE SODIUM 200 UG/1
200 CAPSULE ORAL
Refills: 0 | Status: DISCONTINUED | COMMUNITY
Start: 2021-05-30 | End: 2021-07-13

## 2021-07-13 NOTE — HISTORY OF PRESENT ILLNESS
[FreeTextEntry1] : f/u [de-identified] : Pt here for f/u, no real complaints. Off prednisone, still on IV ATB and sees ID in 2 days to see if he can have surgical revision. Had severe pruritic reaction to 2 ATBs, now on oral doxy. Taking 4000 IU Vit D daily, told to do this since his thyroid surgery. Does not know if he still has his parathyroids, how many and where they are. No longer on calcium, was only on this when on prednisone. Still in pain, bears with it, sleeping in recliner. Trying to get more activity but right shoulder hurts too much. No SOB or CP, although feels like he is weaker, cant do as much. \par Reviewed his echo.

## 2021-07-14 ENCOUNTER — TRANSCRIPTION ENCOUNTER (OUTPATIENT)
Age: 61
End: 2021-07-14

## 2021-07-14 LAB — 25(OH)D3 SERPL-MCNC: 47.8 NG/ML

## 2021-07-15 ENCOUNTER — TRANSCRIPTION ENCOUNTER (OUTPATIENT)
Age: 61
End: 2021-07-15

## 2021-07-16 ENCOUNTER — APPOINTMENT (OUTPATIENT)
Dept: NEPHROLOGY | Facility: CLINIC | Age: 61
End: 2021-07-16
Payer: COMMERCIAL

## 2021-07-16 VITALS
SYSTOLIC BLOOD PRESSURE: 100 MMHG | WEIGHT: 194 LBS | HEART RATE: 70 BPM | OXYGEN SATURATION: 97 % | TEMPERATURE: 97.52 F | BODY MASS INDEX: 31.31 KG/M2 | DIASTOLIC BLOOD PRESSURE: 60 MMHG

## 2021-07-16 LAB
ALBUMIN SERPL ELPH-MCNC: 4 G/DL
ANION GAP SERPL CALC-SCNC: 12 MMOL/L
BUN SERPL-MCNC: 21 MG/DL
CALCIUM SERPL-MCNC: 10.1 MG/DL
CHLORIDE SERPL-SCNC: 103 MMOL/L
CO2 SERPL-SCNC: 27 MMOL/L
CREAT SERPL-MCNC: 0.78 MG/DL
GLUCOSE SERPL-MCNC: 94 MG/DL
PHOSPHATE SERPL-MCNC: 4.1 MG/DL
POTASSIUM SERPL-SCNC: 3.6 MMOL/L
SODIUM SERPL-SCNC: 142 MMOL/L

## 2021-07-16 PROCEDURE — 99072 ADDL SUPL MATRL&STAF TM PHE: CPT

## 2021-07-16 PROCEDURE — 99215 OFFICE O/P EST HI 40 MIN: CPT

## 2021-07-16 NOTE — PHYSICAL EXAM
[General Appearance - Alert] : alert [General Appearance - In No Acute Distress] : in no acute distress [General Appearance - Well Nourished] : well nourished [General Appearance - Well Developed] : well developed [Hearing Threshold Finger Rub Not [place]] : hearing was normal [Jugular Venous Distention Increased] : there was no jugular-venous distention [Exaggerated Use Of Accessory Muscles For Inspiration] : no accessory muscle use [Auscultation Breath Sounds / Voice Sounds] : lungs were clear to auscultation bilaterally [Heart Sounds] : normal S1 and S2 [Heart Sounds Pericardial Friction Rub] : no pericardial rub [Edema] : there was no peripheral edema [Abdomen Soft] : soft [No CVA Tenderness] : no ~M costovertebral angle tenderness [Abnormal Walk] : normal gait [] : no rash [No Focal Deficits] : no focal deficits [Oriented To Time, Place, And Person] : oriented to person, place, and time [Affect] : the affect was normal [Mood] : the mood was normal

## 2021-07-16 NOTE — HISTORY OF PRESENT ILLNESS
[FreeTextEntry1] : Pt. with minimal change disease, now in remission, completed treatment with prednisone, here for follow up. \par \par States that he feels well and offers new complaints. He has a spacer in his right shoulder, and is currently on doxycycline for infection. Denies any hematuria, foamy urine, dysuria. Last dose of prednisone was on 6/30/21. He is awaiting to have right shoulder replacement, after resolution of shoulder infection. He did develop acid reflux off of famotidine, so is now taking it only on as needed basis. Has been off of bactrim and Oscal since he completed steroid taper.

## 2021-07-16 NOTE — CONSULT LETTER
[Dear  ___] : Dear  [unfilled], [Courtesy Letter:] : I had the pleasure of seeing your patient, [unfilled], in my office today. [( Thank you for referring [unfilled] for consultation for _____ )] : Thank you for referring [unfilled] for consultation for [unfilled] [Please see my note below.] : Please see my note below.

## 2021-07-16 NOTE — ASSESSMENT
[FreeTextEntry1] : Nephrotic syndrome: 2/2 kidney biopsy proven minimal change disease. Treated with prednisone (from 1/15/21 to 6/30/21)\par Now in complete remission.\par Edema resolved.\par Proteinuria remains resolved. \par can lower dose of losartan if BP remains low.\par \par Low BP: Noted to have low BP readings in the office today. Asymptomatic\par will discuss with PCP regarding lowering dose of chlorthalidone/ losartan. \par \par \par Repeat labs in 6 weeks\par follow up with me in 3 months.

## 2021-07-19 ENCOUNTER — TRANSCRIPTION ENCOUNTER (OUTPATIENT)
Age: 61
End: 2021-07-19

## 2021-07-19 LAB
APPEARANCE: CLEAR
BACTERIA: NEGATIVE
BILIRUBIN URINE: NEGATIVE
BLOOD URINE: NORMAL
COLOR: YELLOW
CREAT SPEC-SCNC: 159 MG/DL
CREAT/PROT UR: 0.1 RATIO
GLUCOSE QUALITATIVE U: NEGATIVE
HYALINE CASTS: 0 /LPF
KETONES URINE: NEGATIVE
LEUKOCYTE ESTERASE URINE: NEGATIVE
MICROSCOPIC-UA: NORMAL
NITRITE URINE: NEGATIVE
PH URINE: 6
PROT UR-MCNC: 11 MG/DL
PROTEIN URINE: NORMAL
RED BLOOD CELLS URINE: 3 /HPF
SPECIFIC GRAVITY URINE: 1.02
SQUAMOUS EPITHELIAL CELLS: 0 /HPF
UROBILINOGEN URINE: NORMAL
WHITE BLOOD CELLS URINE: 1 /HPF

## 2021-07-28 ENCOUNTER — TRANSCRIPTION ENCOUNTER (OUTPATIENT)
Age: 61
End: 2021-07-28

## 2021-07-29 ENCOUNTER — TRANSCRIPTION ENCOUNTER (OUTPATIENT)
Age: 61
End: 2021-07-29

## 2021-07-29 ENCOUNTER — RX RENEWAL (OUTPATIENT)
Age: 61
End: 2021-07-29

## 2021-07-30 ENCOUNTER — TRANSCRIPTION ENCOUNTER (OUTPATIENT)
Age: 61
End: 2021-07-30

## 2021-08-02 ENCOUNTER — RX RENEWAL (OUTPATIENT)
Age: 61
End: 2021-08-02

## 2021-08-04 ENCOUNTER — TRANSCRIPTION ENCOUNTER (OUTPATIENT)
Age: 61
End: 2021-08-04

## 2021-08-23 ENCOUNTER — TRANSCRIPTION ENCOUNTER (OUTPATIENT)
Age: 61
End: 2021-08-23

## 2021-08-30 ENCOUNTER — TRANSCRIPTION ENCOUNTER (OUTPATIENT)
Age: 61
End: 2021-08-30

## 2021-09-02 ENCOUNTER — APPOINTMENT (OUTPATIENT)
Dept: INTERNAL MEDICINE | Facility: CLINIC | Age: 61
End: 2021-09-02
Payer: COMMERCIAL

## 2021-09-02 VITALS
SYSTOLIC BLOOD PRESSURE: 109 MMHG | DIASTOLIC BLOOD PRESSURE: 70 MMHG | WEIGHT: 195 LBS | HEART RATE: 65 BPM | HEIGHT: 66 IN | TEMPERATURE: 97.3 F | OXYGEN SATURATION: 97 % | BODY MASS INDEX: 31.34 KG/M2

## 2021-09-02 DIAGNOSIS — G47.33 OBSTRUCTIVE SLEEP APNEA (ADULT) (PEDIATRIC): ICD-10-CM

## 2021-09-02 LAB
ALBUMIN SERPL ELPH-MCNC: 4.4 G/DL
ANION GAP SERPL CALC-SCNC: 16 MMOL/L
APPEARANCE: CLEAR
BACTERIA: NEGATIVE
BILIRUBIN URINE: NEGATIVE
BLOOD URINE: NEGATIVE
BUN SERPL-MCNC: 26 MG/DL
CALCIUM SERPL-MCNC: 10.3 MG/DL
CHLORIDE SERPL-SCNC: 99 MMOL/L
CO2 SERPL-SCNC: 25 MMOL/L
COLOR: YELLOW
CREAT SERPL-MCNC: 0.85 MG/DL
CREAT SPEC-SCNC: 101 MG/DL
CREAT/PROT UR: 0.1 RATIO
GLUCOSE QUALITATIVE U: NEGATIVE
GLUCOSE SERPL-MCNC: 90 MG/DL
HYALINE CASTS: 0 /LPF
KETONES URINE: NEGATIVE
LEUKOCYTE ESTERASE URINE: NEGATIVE
MICROSCOPIC-UA: NORMAL
NITRITE URINE: NEGATIVE
PH URINE: 6
PHOSPHATE SERPL-MCNC: 4.2 MG/DL
POTASSIUM SERPL-SCNC: 3.8 MMOL/L
PROT UR-MCNC: 10 MG/DL
PROTEIN URINE: NEGATIVE
RED BLOOD CELLS URINE: 2 /HPF
SODIUM SERPL-SCNC: 139 MMOL/L
SPECIFIC GRAVITY URINE: 1.02
SQUAMOUS EPITHELIAL CELLS: 0 /HPF
UROBILINOGEN URINE: NORMAL
WHITE BLOOD CELLS URINE: 1 /HPF

## 2021-09-02 PROCEDURE — 99214 OFFICE O/P EST MOD 30 MIN: CPT

## 2021-09-02 NOTE — HISTORY OF PRESENT ILLNESS
[No Pertinent Cardiac History] : no history of aortic stenosis, atrial fibrillation, coronary artery disease, recent myocardial infarction, or implantable device/pacemaker [Sleep Apnea] : sleep apnea [No Adverse Anesthesia Reaction] : no adverse anesthesia reaction in self or family member [Family Member] : no family member with adverse anesthesia reaction/sudden death [Self] : no previous adverse anesthesia reaction [Diabetes] : no diabetes [FreeTextEntry1] : right shoulder replacemt [FreeTextEntry2] : 6/9/21 [FreeTextEntry3] : Dr. LINDSEY Velazco [FreeTextEntry4] : 60 yo man w Htn, HLD, Thy Ca. s/p thyroidec '13/RADHA;  hypothy\par NINOSKA s/p Inspire\par h/o  Nephrotic sx, resolved, last  on Pred 6/21.\par Superf Venous thromb right arm after 11/20 shoulder surg, s/p Xarelto x 1 mo\par  h/o Post- op  infection w removal of hardware and  placement of spacer/abx\par Infection cleared and now ready for re-replacement of right shoulder.\par Had Covid 7/20, recovered. Covid vax Pfizer 3/1/21 , 3/22/21; plans to get booster.\par Echo 5/21 nl\par \par Pt has had numerous prior surg without reaction  to anesthesia or excessive bleeding.\par He is feeling well at this time other than his shoulder pain. [FreeTextEntry5] : PREDNISONE hi-dose since 1/21 now down to 5 mg/d

## 2021-09-02 NOTE — ASSESSMENT
[Patient Optimized for Surgery] : Patient optimized for surgery [No Further Testing Recommended] : no further testing recommended [FreeTextEntry4] : No contraindication to surgery as planned.\par \par NOTE: pt has Inspire device on right side for NINOSKA, not in surgical area.\par

## 2021-09-09 ENCOUNTER — TRANSCRIPTION ENCOUNTER (OUTPATIENT)
Age: 61
End: 2021-09-09

## 2021-09-10 ENCOUNTER — TRANSCRIPTION ENCOUNTER (OUTPATIENT)
Age: 61
End: 2021-09-10

## 2021-09-13 ENCOUNTER — NON-APPOINTMENT (OUTPATIENT)
Age: 61
End: 2021-09-13

## 2021-09-14 ENCOUNTER — TRANSCRIPTION ENCOUNTER (OUTPATIENT)
Age: 61
End: 2021-09-14

## 2021-09-17 ENCOUNTER — TRANSCRIPTION ENCOUNTER (OUTPATIENT)
Age: 61
End: 2021-09-17

## 2021-09-19 ENCOUNTER — TRANSCRIPTION ENCOUNTER (OUTPATIENT)
Age: 61
End: 2021-09-19

## 2021-09-23 ENCOUNTER — APPOINTMENT (OUTPATIENT)
Dept: ENDOCRINOLOGY | Facility: CLINIC | Age: 61
End: 2021-09-23
Payer: COMMERCIAL

## 2021-09-23 VITALS
HEIGHT: 66 IN | SYSTOLIC BLOOD PRESSURE: 130 MMHG | OXYGEN SATURATION: 98 % | HEART RATE: 74 BPM | BODY MASS INDEX: 31.82 KG/M2 | WEIGHT: 198 LBS | DIASTOLIC BLOOD PRESSURE: 80 MMHG | TEMPERATURE: 98.1 F

## 2021-09-23 PROCEDURE — 99215 OFFICE O/P EST HI 40 MIN: CPT

## 2021-09-24 ENCOUNTER — APPOINTMENT (OUTPATIENT)
Dept: INTERNAL MEDICINE | Facility: CLINIC | Age: 61
End: 2021-09-24
Payer: COMMERCIAL

## 2021-09-24 DIAGNOSIS — Z87.441 PERSONAL HISTORY OF NEPHROTIC SYNDROME: ICD-10-CM

## 2021-09-24 DIAGNOSIS — N62 HYPERTROPHY OF BREAST: ICD-10-CM

## 2021-09-24 DIAGNOSIS — Z01.818 ENCOUNTER FOR OTHER PREPROCEDURAL EXAMINATION: ICD-10-CM

## 2021-09-24 DIAGNOSIS — Z87.2 PERSONAL HISTORY OF DISEASES OF THE SKIN AND SUBCUTANEOUS TISSUE: ICD-10-CM

## 2021-09-24 DIAGNOSIS — J34.89 OTHER SPECIFIED DISORDERS OF NOSE AND NASAL SINUSES: ICD-10-CM

## 2021-09-24 LAB — AFP-TM SERPL-MCNC: 2.4 NG/ML

## 2021-09-24 PROCEDURE — 99213 OFFICE O/P EST LOW 20 MIN: CPT

## 2021-09-24 RX ORDER — CHLORTHALIDONE 25 MG/1
25 TABLET ORAL DAILY
Qty: 30 | Refills: 2 | Status: DISCONTINUED | COMMUNITY
Start: 2021-04-30 | End: 2021-09-24

## 2021-09-24 NOTE — HISTORY OF PRESENT ILLNESS
[FreeTextEntry1] : painful gynecomastia [de-identified] : Pt reports painful breast swelling for past 1-2 months, we have been communicating via portal, no new meds or other potential causes. Pt with h/o this is the past, unclear why, although not painful like this time. Left breast is worse. Saw endo yesterday for routine f/u for thyroid, hormonal and other labs drawn to evaluate for this.\par Pt reports right shoulder pain is manageable, takes tylenol, still waiting final ok to start using this fully. \par Stopped the thiazide, notes no recurrence of leg swelling.

## 2021-09-24 NOTE — ASSESSMENT
[FreeTextEntry1] : 61 year old male with history of thyroid cancer (likely low risk of recurrence) with total thyroidectomy in 2013, here for evaluation. \par \par -Check baseline thyroid ultrasound \par -Check TFTs and thyroglobulin \par -Continue levothyroxine 250 mcg, will adjust accordingly\par \par Gynecomastia \par -Check LH, FSH, testosterone, AFP and HCG levels \par -Will order breast ultrasound \par \par \par -Follow up in 6 months

## 2021-09-24 NOTE — PHYSICAL EXAM
[No Edema] : there was no peripheral edema [No Extremity Clubbing/Cyanosis] : no extremity clubbing/cyanosis [Normal] : affect was normal and insight and judgment were intact [de-identified] : right shoulder in sling [de-identified] : obvious enlarged breasts bilat, mild palpable retroareola tenderness on left, no dominant masses bilat, no axillary LN bilat

## 2021-09-24 NOTE — REVIEW OF SYSTEMS
[Fatigue] : no fatigue [Decreased Appetite] : appetite not decreased [Recent Weight Gain (___ Lbs)] : no recent weight gain [Recent Weight Loss (___ Lbs)] : no recent weight loss [Visual Field Defect] : no visual field defect [Dry Eyes] : no dryness [Dysphagia] : no dysphagia [Neck Pain] : no neck pain [Dysphonia] : no dysphonia [Nasal Congestion] : no nasal congestion [Chest Pain] : no chest pain [Slow Heart Rate] : heart rate is not slow [Palpitations] : no palpitations [Fast Heart Rate] : heart rate is not fast [Shortness Of Breath] : no shortness of breath [Nausea] : no nausea [Cough] : no cough [Constipation] : no constipation [Vomiting] : no vomiting [Diarrhea] : no diarrhea [Polyuria] : no polyuria [Hesistancy] : no hesitancy [Joint Pain] : no joint pain [Muscle Weakness] : no muscle weakness [Acanthosis] : no acanthosis  [Acne] : no acne [Headaches] : no headaches [Dizziness] : no dizziness [Tremors] : no tremors [Pain/Numbness of Digits] : no pain/numbness of digits [Depression] : no depression [Polydipsia] : no polydipsia [Cold Intolerance] : no cold intolerance [Easy Bleeding] : no ~M tendency for easy bleeding [Easy Bruising] : no tendency for easy bruising

## 2021-09-24 NOTE — HISTORY OF PRESENT ILLNESS
[FreeTextEntry1] : 60 year old male with history of nephrotic syndrome, thyroid cancer here for evaluation \par \par 2013 total thyroidectomy at [facility], did not receive ECKERT \par Last neck ultrasound in the last 3 months ( [facility]) \par Currently on levothyroxine to 250 mcg ( Changed on 01/07/2020) - previous dose of 162.5 mcg \par No recurrence of thyroid cancer \par \par He has had a shoulder operation on 09/2021 \par \par \par On prednisone 60 mg for 7 months \par \par \par Symptoms: \par -Increased HP or tremors \par -Weight loss of 50 lbs \par -No hair loss \par -Increased bowel movements \par -Increased heat intolerance \par -Low energy levels \par \par Recently got diagnosed with nephrotic syndrome\par On prednisone 50 mg daily \par Lost 50 lbs \par \par

## 2021-09-27 ENCOUNTER — TRANSCRIPTION ENCOUNTER (OUTPATIENT)
Age: 61
End: 2021-09-27

## 2021-09-28 ENCOUNTER — TRANSCRIPTION ENCOUNTER (OUTPATIENT)
Age: 61
End: 2021-09-28

## 2021-10-03 LAB
FSH SERPL-MCNC: 3.7 IU/L
HCG SERPL-MCNC: <1 MIU/ML
LH SERPL-ACNC: 3.2 IU/L
PROLACTIN SERPL-MCNC: 10.8 NG/ML
T4 FREE SERPL-MCNC: 1.8 NG/DL
TESTOST BND SERPL-MCNC: 6.9 PG/ML
TESTOSTERONE TOTAL S: 366 NG/DL
THYROGLOB AB SERPL-ACNC: <20 IU/ML
THYROGLOB SERPL-MCNC: <0.2 NG/ML
TSH SERPL-ACNC: 0.02 UIU/ML

## 2021-10-05 ENCOUNTER — TRANSCRIPTION ENCOUNTER (OUTPATIENT)
Age: 61
End: 2021-10-05

## 2021-10-08 ENCOUNTER — TRANSCRIPTION ENCOUNTER (OUTPATIENT)
Age: 61
End: 2021-10-08

## 2021-10-10 ENCOUNTER — TRANSCRIPTION ENCOUNTER (OUTPATIENT)
Age: 61
End: 2021-10-10

## 2021-10-12 ENCOUNTER — TRANSCRIPTION ENCOUNTER (OUTPATIENT)
Age: 61
End: 2021-10-12

## 2021-10-12 ENCOUNTER — APPOINTMENT (OUTPATIENT)
Dept: NEPHROLOGY | Facility: CLINIC | Age: 61
End: 2021-10-12
Payer: COMMERCIAL

## 2021-10-12 ENCOUNTER — RX RENEWAL (OUTPATIENT)
Age: 61
End: 2021-10-12

## 2021-10-12 VITALS
DIASTOLIC BLOOD PRESSURE: 78 MMHG | SYSTOLIC BLOOD PRESSURE: 152 MMHG | TEMPERATURE: 97.7 F | WEIGHT: 203.92 LBS | BODY MASS INDEX: 32.77 KG/M2 | OXYGEN SATURATION: 97 % | HEIGHT: 66 IN | HEART RATE: 60 BPM

## 2021-10-12 DIAGNOSIS — G47.01 INSOMNIA DUE TO MEDICAL CONDITION: ICD-10-CM

## 2021-10-12 DIAGNOSIS — K21.9 GASTRO-ESOPHAGEAL REFLUX DISEASE W/OUT ESOPHAGITIS: ICD-10-CM

## 2021-10-12 LAB
ALBUMIN SERPL ELPH-MCNC: 3 G/DL
ANION GAP SERPL CALC-SCNC: 10 MMOL/L
APPEARANCE: CLEAR
BACTERIA: NEGATIVE
BASOPHILS # BLD AUTO: 0.06 K/UL
BASOPHILS NFR BLD AUTO: 1.2 %
BILIRUBIN URINE: NEGATIVE
BLOOD URINE: ABNORMAL
BUN SERPL-MCNC: 16 MG/DL
CALCIUM SERPL-MCNC: 8.9 MG/DL
CHLORIDE SERPL-SCNC: 109 MMOL/L
CO2 SERPL-SCNC: 23 MMOL/L
COLOR: YELLOW
CREAT SERPL-MCNC: 0.83 MG/DL
CREAT SPEC-SCNC: 130 MG/DL
CREAT/PROT UR: 10.6 RATIO
EOSINOPHIL # BLD AUTO: 0.25 K/UL
EOSINOPHIL NFR BLD AUTO: 5 %
GLUCOSE QUALITATIVE U: NEGATIVE
GLUCOSE SERPL-MCNC: 96 MG/DL
HCT VFR BLD CALC: 38.3 %
HGB BLD-MCNC: 11.6 G/DL
HYALINE CASTS: 0 /LPF
IMM GRANULOCYTES NFR BLD AUTO: 0.2 %
KETONES URINE: NEGATIVE
LEUKOCYTE ESTERASE URINE: NEGATIVE
LYMPHOCYTES # BLD AUTO: 1.14 K/UL
LYMPHOCYTES NFR BLD AUTO: 22.7 %
MAN DIFF?: NORMAL
MCHC RBC-ENTMCNC: 25.5 PG
MCHC RBC-ENTMCNC: 30.3 GM/DL
MCV RBC AUTO: 84.2 FL
MICROSCOPIC-UA: NORMAL
MONOCYTES # BLD AUTO: 0.68 K/UL
MONOCYTES NFR BLD AUTO: 13.5 %
NEUTROPHILS # BLD AUTO: 2.88 K/UL
NEUTROPHILS NFR BLD AUTO: 57.4 %
NITRITE URINE: NEGATIVE
PH URINE: 6.5
PHOSPHATE SERPL-MCNC: 4.6 MG/DL
PLATELET # BLD AUTO: 300 K/UL
POTASSIUM SERPL-SCNC: 4.4 MMOL/L
PROT UR-MCNC: 1380 MG/DL
PROTEIN URINE: ABNORMAL
RBC # BLD: 4.55 M/UL
RBC # FLD: 15.5 %
RED BLOOD CELLS URINE: 2 /HPF
SODIUM SERPL-SCNC: 142 MMOL/L
SPECIFIC GRAVITY URINE: 1.02
SQUAMOUS EPITHELIAL CELLS: 4 /HPF
UROBILINOGEN URINE: NORMAL
WBC # FLD AUTO: 5.02 K/UL
WHITE BLOOD CELLS URINE: 4 /HPF

## 2021-10-12 PROCEDURE — 99215 OFFICE O/P EST HI 40 MIN: CPT

## 2021-10-12 NOTE — H&P ADULT - HISTORY OF PRESENT ILLNESS
60 y M PMHx HTN, HLD, hypothyroidism s/p thyroidectomy  2/2 thyroid cancer, diverticulitis (2010), UTI/prostatitis/cystitis (2002) p/w fevers and progressively worsening LLQ pain. Pt noted dysuria and BRBPR associated with LLQ pain that started 30 days ago. He saw his GI at [facility] and was diagnosed with segmental colitis associated with diverticulitis (SCAD) and started on mesalamine and started on an unknown steroid that started with a "b." The LLQ pain worsened and 3 days ago he noted a fever up to 102.5 associated with the LLQ pain, GONZALEZ, and b/l back pain. In the ED, he was noted to be in new atrial flutter with rate 100-150, HOTN (70/60). Given 3L NS, 2L LR and started on levo .05.     ED course:  pertinent labs: WBC 13.33, Trop 202, BNP 3299, lactate 2.5, COVID negative 60 y M PMHx HTN, HLD, hypothyroidism s/p total thyroidectomy (6/4/13) 2/2 thyroid cancer, diverticulitis (2010), abdominal TB ('93) s/p INH, p/w fevers and progressively worsening LLQ pain. Pt notes intermittent BRBPR associated with LLQ pain that started 30 days ago. He got a colonoscopy from his GI at [facility] 6/17/20 and was diagnosed with segmental colitis associated with diverticulitis (SCAD) and started on mesalamine and budesonide, no Abx. 5 days ago notes dysuria that was followed by fevers to 102.5 and worsening LLQ pain 3 days ago. This was c/b b/l LBP and GONZALEZ. Last BM 7/13- noted to be very dark.     ED course:  In the ED, he was noted to be in new atrial flutter with rate 100-150, HOTN (70/60). Given 3L NS, 2L LR and started on levo .05.   pertinent labs: WBC 13.33, Trop 202, BNP 3299, lactate 2.5, COVID negative Br[place]ian speaking only/language

## 2021-10-12 NOTE — PHYSICAL EXAM
[General Appearance - In No Acute Distress] : in no acute distress [General Appearance - Well Nourished] : well nourished [General Appearance - Well-Appearing] : healthy appearing [Sclera] : the sclera and conjunctiva were normal [Outer Ear] : the ears and nose were normal in appearance [Extraocular Movements] : extraocular movements were intact [Hearing Threshold Finger Rub Not [place]] : hearing was normal [Neck Appearance] : the appearance of the neck was normal [] : no respiratory distress [Exaggerated Use Of Accessory Muscles For Inspiration] : no accessory muscle use [Abdomen Soft] : soft [No Focal Deficits] : no focal deficits [Oriented To Time, Place, And Person] : oriented to person, place, and time

## 2021-10-13 ENCOUNTER — TRANSCRIPTION ENCOUNTER (OUTPATIENT)
Age: 61
End: 2021-10-13

## 2021-10-13 LAB
COVID-19 SPIKE DOMAIN ANTIBODY INTERPRETATION: POSITIVE
DSDNA AB SER-ACNC: <12 IU/ML
FREE KAPPA URINE: 151.81 MG/L
FREE KAPPA/LAMDA RATIO: 1.56 RATIO
FREE LAMDA URINE: 97.03 MG/L
MPO AB + PR3 PNL SER: NORMAL
SARS-COV-2 AB SERPL IA-ACNC: 21.1 U/ML

## 2021-10-13 NOTE — HISTORY OF PRESENT ILLNESS
[FreeTextEntry1] : Pt. is a 61 y.o. M w/ PMHx. of HTN, HLD, Hx. of Thyroid CA (s/p resection), GERD, NINOSKA and Minimal Change Nephropathy presents for follow up after recent Protein/Cr. ratio showing 10.6 grams of protein. Pt. states that he noticed increased fatigue, swelling of his fingers and "sudsy" urine since Sunday. Pt. states 2-3 weeks ago he finished Doxycycline, 3 weeks ago received the Flu vaccine and 2 weeks ago he received the COVID Booster. Pt. states that a couple days ago he noticed a rash that resolved by today associated with pruritus. Pt. denies any shortness of breath, lower extremity edema blood in the urine or chest pain. Pt. states that he would like to travel in a few weeks an is concerned about ongoing diagnosis .

## 2021-10-13 NOTE — ASSESSMENT
[FreeTextEntry1] : Pt. is a 61 y.o. M w/ PMHx. of HTN, HLD, Hx. of Thyroid CA (s/p resection), GERD, NINOSKA and Minimal Change Nephropathy presents for follow up after recent Protein/Cr. ratio showing 10.6 grams of protein.\par \par #HTN\par #Minimal Change Nephropathy \par #Hypoalbuminuria\par #HLD\par Will start Prednisone 60mg (20mg TID) Daily for 4 weeks\par Pr/Cr. Ratio showing 10.6 grams \par UA showing moderate hematuria without RBCs will continue to monitor\par Will start Pt. on empiric Bactrim while on steroids and Pt. will continue with Famotidine for GI Ppx.\par Suspect that recent COVID and Flu vaccines precipitated this flare of likely KENAN vs. recent R. shoulder infection( however less likely as the sole factor as has been ongoing for well over a month but may have also contributed). Pt. will need to have repeat UA and Pr/Cr. ratio to evaluates resolution of ongoing protein loss. \par Will follow up GN serological workup to rule out any new process that may have developed. \par Will monitor for tolerability of Prednisone side effects as Pt. has had insomnia, rashes, 20lb. weight loss!! and nose bleeds when last treated in January. \par Will obtain COVID Antibodies\par \par f/u in 1-2 weeks \par \par

## 2021-10-13 NOTE — REVIEW OF SYSTEMS
[Feeling Poorly] : feeling poorly [Feeling Tired] : feeling tired [Recent Weight Gain (___ Lbs)] : recent [unfilled] ~Ulb weight gain [Fever] : no fever [Chills] : no chills [Eye Pain] : no eye pain [Chest Pain] : no chest pain [Lower Ext Edema] : no extremity edema [Shortness Of Breath] : no shortness of breath [Cough] : no cough [SOB on Exertion] : no shortness of breath during exertion [Orthopnea] : no orthopnea [Abdominal Pain] : no abdominal pain [Vomiting] : no vomiting [Constipation] : no constipation [Diarrhea] : no diarrhea [Confused] : no confusion

## 2021-10-14 ENCOUNTER — TRANSCRIPTION ENCOUNTER (OUTPATIENT)
Age: 61
End: 2021-10-14

## 2021-10-14 ENCOUNTER — NON-APPOINTMENT (OUTPATIENT)
Age: 61
End: 2021-10-14

## 2021-10-14 LAB
ANA SER IF-ACNC: NEGATIVE
C3 SERPL-MCNC: 124 MG/DL
C4 SERPL-MCNC: 19 MG/DL
DEPRECATED KAPPA LC FREE/LAMBDA SER: 1.15 RATIO
KAPPA LC CSF-MCNC: 3.59 MG/DL
KAPPA LC SERPL-MCNC: 4.13 MG/DL

## 2021-10-14 RX ORDER — SULFAMETHOXAZOLE AND TRIMETHOPRIM 800; 160 MG/1; MG/1
800-160 TABLET ORAL DAILY
Qty: 30 | Refills: 0 | Status: DISCONTINUED | COMMUNITY
Start: 2021-10-13 | End: 2021-10-14

## 2021-10-15 ENCOUNTER — TRANSCRIPTION ENCOUNTER (OUTPATIENT)
Age: 61
End: 2021-10-15

## 2021-10-15 ENCOUNTER — APPOINTMENT (OUTPATIENT)
Dept: ULTRASOUND IMAGING | Facility: CLINIC | Age: 61
End: 2021-10-15
Payer: COMMERCIAL

## 2021-10-15 ENCOUNTER — NON-APPOINTMENT (OUTPATIENT)
Age: 61
End: 2021-10-15

## 2021-10-15 ENCOUNTER — APPOINTMENT (OUTPATIENT)
Dept: MAMMOGRAPHY | Facility: CLINIC | Age: 61
End: 2021-10-15
Payer: COMMERCIAL

## 2021-10-15 ENCOUNTER — RESULT REVIEW (OUTPATIENT)
Age: 61
End: 2021-10-15

## 2021-10-15 ENCOUNTER — OUTPATIENT (OUTPATIENT)
Dept: OUTPATIENT SERVICES | Facility: HOSPITAL | Age: 61
LOS: 1 days | End: 2021-10-15
Payer: COMMERCIAL

## 2021-10-15 DIAGNOSIS — Z00.8 ENCOUNTER FOR OTHER GENERAL EXAMINATION: ICD-10-CM

## 2021-10-15 DIAGNOSIS — N62 HYPERTROPHY OF BREAST: ICD-10-CM

## 2021-10-15 DIAGNOSIS — C73 MALIGNANT NEOPLASM OF THYROID GLAND: ICD-10-CM

## 2021-10-15 LAB
ANCA AB SER-IMP: NEGATIVE
C-ANCA SER-ACNC: NEGATIVE
CHOLEST SERPL-MCNC: 193 MG/DL
CK SERPL-CCNC: 165 U/L
GBM AB TITR SER IF: 5
HDLC SERPL-MCNC: 54 MG/DL
LDLC SERPL CALC-MCNC: 87 MG/DL
NONHDLC SERPL-MCNC: 139 MG/DL
P-ANCA TITR SER IF: NEGATIVE
TRIGL SERPL-MCNC: 263 MG/DL

## 2021-10-15 PROCEDURE — 76641 ULTRASOUND BREAST COMPLETE: CPT | Mod: 26,50

## 2021-10-15 PROCEDURE — 76536 US EXAM OF HEAD AND NECK: CPT | Mod: 26

## 2021-10-15 PROCEDURE — 77066 DX MAMMO INCL CAD BI: CPT

## 2021-10-15 PROCEDURE — 77066 DX MAMMO INCL CAD BI: CPT | Mod: 26

## 2021-10-15 PROCEDURE — 77062 BREAST TOMOSYNTHESIS BI: CPT | Mod: 26

## 2021-10-15 PROCEDURE — G0279: CPT

## 2021-10-15 PROCEDURE — 76641 ULTRASOUND BREAST COMPLETE: CPT

## 2021-10-15 PROCEDURE — 76536 US EXAM OF HEAD AND NECK: CPT

## 2021-10-18 ENCOUNTER — TRANSCRIPTION ENCOUNTER (OUTPATIENT)
Age: 61
End: 2021-10-18

## 2021-10-19 ENCOUNTER — TRANSCRIPTION ENCOUNTER (OUTPATIENT)
Age: 61
End: 2021-10-19

## 2021-10-19 LAB
BASOPHILS # BLD AUTO: 0.01 K/UL
BASOPHILS NFR BLD AUTO: 0.1 %
EOSINOPHIL # BLD AUTO: 0.02 K/UL
EOSINOPHIL NFR BLD AUTO: 0.2 %
HCT VFR BLD CALC: 43.4 %
HGB BLD-MCNC: 13.4 G/DL
IMM GRANULOCYTES NFR BLD AUTO: 0.8 %
LYMPHOCYTES # BLD AUTO: 1.76 K/UL
LYMPHOCYTES NFR BLD AUTO: 15.3 %
M PROTEIN SPEC IFE-MCNC: NORMAL
MAN DIFF?: NORMAL
MCHC RBC-ENTMCNC: 25.4 PG
MCHC RBC-ENTMCNC: 30.9 GM/DL
MCV RBC AUTO: 82.2 FL
MONOCYTES # BLD AUTO: 1.23 K/UL
MONOCYTES NFR BLD AUTO: 10.7 %
NEUTROPHILS # BLD AUTO: 8.38 K/UL
NEUTROPHILS NFR BLD AUTO: 72.9 %
PLATELET # BLD AUTO: 406 K/UL
RBC # BLD: 5.28 M/UL
RBC # FLD: 16.5 %
WBC # FLD AUTO: 11.49 K/UL

## 2021-10-20 LAB
ALBUMIN SERPL ELPH-MCNC: 3.3 G/DL
ANION GAP SERPL CALC-SCNC: 11 MMOL/L
APPEARANCE: CLEAR
BACTERIA: NEGATIVE
BILIRUBIN URINE: NEGATIVE
BLOOD URINE: ABNORMAL
BUN SERPL-MCNC: 24 MG/DL
CALCIUM SERPL-MCNC: 9.3 MG/DL
CHLORIDE SERPL-SCNC: 102 MMOL/L
CO2 SERPL-SCNC: 27 MMOL/L
COLOR: YELLOW
CREAT SERPL-MCNC: 1 MG/DL
CREAT SPEC-SCNC: 76 MG/DL
CREAT/PROT UR: 5.4 RATIO
GLUCOSE QUALITATIVE U: NEGATIVE
GLUCOSE SERPL-MCNC: 81 MG/DL
HYALINE CASTS: 0 /LPF
KETONES URINE: NEGATIVE
LEUKOCYTE ESTERASE URINE: NEGATIVE
MAGNESIUM SERPL-MCNC: 2.2 MG/DL
MICROSCOPIC-UA: NORMAL
NITRITE URINE: NEGATIVE
PH URINE: 7
PHOSPHATE SERPL-MCNC: 3.8 MG/DL
PHOSPHOLIPASE A2 RECEPTOR ELISA: <1.8 RU/ML
POTASSIUM SERPL-SCNC: 4.2 MMOL/L
PROT UR-MCNC: 407 MG/DL
PROTEIN URINE: ABNORMAL
RED BLOOD CELLS URINE: 5 /HPF
SODIUM SERPL-SCNC: 140 MMOL/L
SPECIFIC GRAVITY URINE: 1.02
SQUAMOUS EPITHELIAL CELLS: 6 /HPF
URINE COMMENTS: NORMAL
UROBILINOGEN URINE: NORMAL
WHITE BLOOD CELLS URINE: 2 /HPF

## 2021-10-25 ENCOUNTER — TRANSCRIPTION ENCOUNTER (OUTPATIENT)
Age: 61
End: 2021-10-25

## 2021-10-29 NOTE — ED PROVIDER NOTE - CLINICAL SUMMARY MEDICAL DECISION MAKING FREE TEXT BOX
57 y/o Male here for a mechanical slip and fall. Sustained lacerations to the left lateral eye and cheek. Will repair wound, update tetanus, head CT negative, and instruct follow-up instructions for wound care/suture removal. High Dose Vitamin A Pregnancy And Lactation Text: High dose vitamin A therapy is contraindicated during pregnancy and breast feeding.

## 2021-11-01 ENCOUNTER — LABORATORY RESULT (OUTPATIENT)
Age: 61
End: 2021-11-01

## 2021-11-02 LAB
ALBUMIN SERPL ELPH-MCNC: 4 G/DL
ANION GAP SERPL CALC-SCNC: 13 MMOL/L
APPEARANCE: CLEAR
BACTERIA: NEGATIVE
BASOPHILS # BLD AUTO: 0.02 K/UL
BASOPHILS NFR BLD AUTO: 0.1 %
BILIRUBIN URINE: NEGATIVE
BLOOD URINE: ABNORMAL
BUN SERPL-MCNC: 32 MG/DL
CALCIUM SERPL-MCNC: 9.5 MG/DL
CHLORIDE SERPL-SCNC: 93 MMOL/L
CO2 SERPL-SCNC: 28 MMOL/L
COLOR: YELLOW
CREAT SERPL-MCNC: 1.19 MG/DL
CREAT SPEC-SCNC: 145 MG/DL
CREAT/PROT UR: 1 RATIO
EOSINOPHIL # BLD AUTO: 0.04 K/UL
EOSINOPHIL NFR BLD AUTO: 0.3 %
GLUCOSE QUALITATIVE U: NEGATIVE
GLUCOSE SERPL-MCNC: 90 MG/DL
GRANULAR CASTS: 5 /LPF
HCT VFR BLD CALC: 48.7 %
HGB BLD-MCNC: 15 G/DL
HYALINE CASTS: 0 /LPF
IMM GRANULOCYTES NFR BLD AUTO: 1.1 %
KETONES URINE: NEGATIVE
LEUKOCYTE ESTERASE URINE: NEGATIVE
LYMPHOCYTES # BLD AUTO: 1.86 K/UL
LYMPHOCYTES NFR BLD AUTO: 12.2 %
MAN DIFF?: NORMAL
MCHC RBC-ENTMCNC: 25.6 PG
MCHC RBC-ENTMCNC: 30.8 GM/DL
MCV RBC AUTO: 83.1 FL
MICROSCOPIC-UA: NORMAL
MONOCYTES # BLD AUTO: 1.51 K/UL
MONOCYTES NFR BLD AUTO: 9.9 %
NEUTROPHILS # BLD AUTO: 11.67 K/UL
NEUTROPHILS NFR BLD AUTO: 76.4 %
NITRITE URINE: NEGATIVE
PH URINE: 6.5
PHOSPHATE SERPL-MCNC: 2.9 MG/DL
PLATELET # BLD AUTO: 286 K/UL
POTASSIUM SERPL-SCNC: 4.2 MMOL/L
PROT UR-MCNC: 142 MG/DL
PROTEIN URINE: ABNORMAL
RBC # BLD: 5.86 M/UL
RBC # FLD: 19 %
RED BLOOD CELLS URINE: 10 /HPF
SODIUM SERPL-SCNC: 133 MMOL/L
SPECIFIC GRAVITY URINE: 1.02
SQUAMOUS EPITHELIAL CELLS: 7 /HPF
UROBILINOGEN URINE: NORMAL
WBC # FLD AUTO: 15.27 K/UL
WHITE BLOOD CELLS URINE: 9 /HPF

## 2021-11-03 ENCOUNTER — APPOINTMENT (OUTPATIENT)
Dept: NEPHROLOGY | Facility: CLINIC | Age: 61
End: 2021-11-03
Payer: COMMERCIAL

## 2021-11-03 VITALS
BODY MASS INDEX: 29.41 KG/M2 | WEIGHT: 182.98 LBS | HEART RATE: 60 BPM | DIASTOLIC BLOOD PRESSURE: 77 MMHG | HEIGHT: 66 IN | OXYGEN SATURATION: 98 % | SYSTOLIC BLOOD PRESSURE: 113 MMHG | TEMPERATURE: 36.5 F

## 2021-11-03 PROCEDURE — 99215 OFFICE O/P EST HI 40 MIN: CPT

## 2021-11-03 NOTE — CONSULT LETTER
[Dear  ___] : Dear  [unfilled], [Courtesy Letter:] : I had the pleasure of seeing your patient, [unfilled], in my office today. [Please see my note below.] : Please see my note below. [Consult Closing:] : Thank you very much for allowing me to participate in the care of this patient.  If you have any questions, please do not hesitate to contact me. [Sincerely,] : Sincerely, [FreeTextEntry3] : Patricia Hay MD

## 2021-11-03 NOTE — PHYSICAL EXAM
[General Appearance - In No Acute Distress] : in no acute distress [General Appearance - Well Nourished] : well nourished [General Appearance - Well-Appearing] : healthy appearing [Sclera] : the sclera and conjunctiva were normal [Extraocular Movements] : extraocular movements were intact [Outer Ear] : the ears and nose were normal in appearance [Hearing Threshold Finger Rub Not [place]] : hearing was normal [Neck Appearance] : the appearance of the neck was normal [] : no respiratory distress [Exaggerated Use Of Accessory Muscles For Inspiration] : no accessory muscle use [Abdomen Soft] : soft [No Focal Deficits] : no focal deficits [Oriented To Time, Place, And Person] : oriented to person, place, and time

## 2021-11-03 NOTE — DISCUSSION/SUMMARY
[FreeTextEntry1] : labs with no proteinuria and renal function WNL. follow up as scheduled. Discussed with patient over phone.

## 2021-11-03 NOTE — ASSESSMENT
[FreeTextEntry1] : KENAN relapse: in the setting of receipt of Pfizer COVID-19 booster vaccine. \par Pt. has s/s of relapse within a week of receiving the booster and was noted to have nephrotic range proteinuria, hypoalbuminemia, and edema on labs done 2 weeks after he received the booster dose. \par Initiated on prednisone 20mg TID on 10/12/21. \par Recent labs with decrease in proteinuria to 1 grams, and serum albumin has normalized to 4.0.\par Continue prednisone 20mg po TID. \par continue mepron (allergic to sulfa so unable to take bactrim), famotidine and Oscal for the duration of steroid use. \par Avoid chronic NSAIDs. \par \par HTN: resumed chlorthalidone\par now with BP readings WNL\par continue on current dose of chlorthalidone\par \par follow up in 2 weeks. \par \par

## 2021-11-03 NOTE — REVIEW OF SYSTEMS
[Negative] : Cardiovascular [Fever] : no fever [Chills] : no chills [Feeling Poorly] : not feeling poorly [Feeling Tired] : not feeling tired [Eye Pain] : no eye pain [Chest Pain] : no chest pain [Lower Ext Edema] : no extremity edema [Shortness Of Breath] : no shortness of breath [Cough] : no cough [SOB on Exertion] : no shortness of breath during exertion [Orthopnea] : no orthopnea [Abdominal Pain] : no abdominal pain [Vomiting] : no vomiting [Constipation] : no constipation [Diarrhea] : no diarrhea [Confused] : no confusion

## 2021-11-03 NOTE — HISTORY OF PRESENT ILLNESS
[FreeTextEntry1] : Pt. is a 61 y.o. M w/ PMHx. of HTN, HLD, Hx. of Thyroid CA (s/p resection), GERD, NINOSKA and Minimal Change Nephropathy, here for follow up of relapsed KENAN. \par \par He was seen in the office on 10/12 due to concern for relapse on labs, and was initiated on prednisone the same day. He has been tolerating prednisone well. \par \par Overall, feels well and offers no complaints. Pt. denies any shortness of breath, lower extremity edema, blood in the urine or chest pain.

## 2021-11-04 ENCOUNTER — NON-APPOINTMENT (OUTPATIENT)
Age: 61
End: 2021-11-04

## 2021-11-04 ENCOUNTER — TRANSCRIPTION ENCOUNTER (OUTPATIENT)
Age: 61
End: 2021-11-04

## 2021-11-10 ENCOUNTER — NON-APPOINTMENT (OUTPATIENT)
Age: 61
End: 2021-11-10

## 2021-11-10 LAB
ALBUMIN SERPL ELPH-MCNC: 4.1 G/DL
ANION GAP SERPL CALC-SCNC: 17 MMOL/L
APPEARANCE: CLEAR
BACTERIA: NEGATIVE
BASOPHILS # BLD AUTO: 0.01 K/UL
BASOPHILS NFR BLD AUTO: 0.1 %
BILIRUBIN URINE: NEGATIVE
BLOOD URINE: ABNORMAL
BUN SERPL-MCNC: 43 MG/DL
CALCIUM SERPL-MCNC: 9.3 MG/DL
CHLORIDE SERPL-SCNC: 87 MMOL/L
CHOLEST SERPL-MCNC: 237 MG/DL
CO2 SERPL-SCNC: 26 MMOL/L
COLOR: NORMAL
CREAT SERPL-MCNC: 1.2 MG/DL
CREAT SPEC-SCNC: 53 MG/DL
CREAT/PROT UR: 0.8 RATIO
EOSINOPHIL # BLD AUTO: 0.09 K/UL
EOSINOPHIL NFR BLD AUTO: 0.7 %
GLUCOSE QUALITATIVE U: NEGATIVE
GLUCOSE SERPL-MCNC: 72 MG/DL
GRANULAR CASTS: 3 /LPF
HCT VFR BLD CALC: 47 %
HDLC SERPL-MCNC: 95 MG/DL
HGB BLD-MCNC: 14.6 G/DL
HYALINE CASTS: 2 /LPF
IMM GRANULOCYTES NFR BLD AUTO: 0.9 %
KETONES URINE: NEGATIVE
LDLC SERPL CALC-MCNC: 120 MG/DL
LEUKOCYTE ESTERASE URINE: NEGATIVE
LYMPHOCYTES # BLD AUTO: 1.55 K/UL
LYMPHOCYTES NFR BLD AUTO: 12.1 %
MAN DIFF?: NORMAL
MCHC RBC-ENTMCNC: 25.7 PG
MCHC RBC-ENTMCNC: 31.1 GM/DL
MCV RBC AUTO: 82.6 FL
MICROSCOPIC-UA: NORMAL
MONOCYTES # BLD AUTO: 1.15 K/UL
MONOCYTES NFR BLD AUTO: 9 %
NEUTROPHILS # BLD AUTO: 9.87 K/UL
NEUTROPHILS NFR BLD AUTO: 77.2 %
NITRITE URINE: NEGATIVE
NONHDLC SERPL-MCNC: 142 MG/DL
PH URINE: 6.5
PHOSPHATE SERPL-MCNC: 2.2 MG/DL
PLATELET # BLD AUTO: 220 K/UL
POTASSIUM SERPL-SCNC: 3.9 MMOL/L
PROT UR-MCNC: 43 MG/DL
PROTEIN URINE: ABNORMAL
RBC # BLD: 5.69 M/UL
RBC # FLD: 19.1 %
RED BLOOD CELLS URINE: 4 /HPF
SODIUM SERPL-SCNC: 129 MMOL/L
SPECIFIC GRAVITY URINE: 1.01
SQUAMOUS EPITHELIAL CELLS: 7 /HPF
TRIGL SERPL-MCNC: 107 MG/DL
UROBILINOGEN URINE: NORMAL
WBC # FLD AUTO: 12.78 K/UL
WHITE BLOOD CELLS URINE: 4 /HPF

## 2021-11-10 RX ORDER — TORSEMIDE 20 MG/1
20 TABLET ORAL DAILY
Qty: 30 | Refills: 0 | Status: DISCONTINUED | COMMUNITY
Start: 2021-10-14 | End: 2021-11-10

## 2021-11-29 LAB
T4 FREE SERPL-MCNC: 1.8 NG/DL
TSH SERPL-ACNC: 0.18 UIU/ML

## 2021-12-02 ENCOUNTER — TRANSCRIPTION ENCOUNTER (OUTPATIENT)
Age: 61
End: 2021-12-02

## 2021-12-02 ENCOUNTER — NON-APPOINTMENT (OUTPATIENT)
Age: 61
End: 2021-12-02

## 2021-12-03 ENCOUNTER — TRANSCRIPTION ENCOUNTER (OUTPATIENT)
Age: 61
End: 2021-12-03

## 2021-12-15 ENCOUNTER — TRANSCRIPTION ENCOUNTER (OUTPATIENT)
Age: 61
End: 2021-12-15

## 2021-12-15 ENCOUNTER — NON-APPOINTMENT (OUTPATIENT)
Age: 61
End: 2021-12-15

## 2021-12-21 ENCOUNTER — RX RENEWAL (OUTPATIENT)
Age: 61
End: 2021-12-21

## 2021-12-27 ENCOUNTER — TRANSCRIPTION ENCOUNTER (OUTPATIENT)
Age: 61
End: 2021-12-27

## 2021-12-29 ENCOUNTER — TRANSCRIPTION ENCOUNTER (OUTPATIENT)
Age: 61
End: 2021-12-29

## 2022-01-14 ENCOUNTER — LABORATORY RESULT (OUTPATIENT)
Age: 62
End: 2022-01-14

## 2022-01-19 ENCOUNTER — APPOINTMENT (OUTPATIENT)
Dept: NEPHROLOGY | Facility: CLINIC | Age: 62
End: 2022-01-19
Payer: COMMERCIAL

## 2022-01-19 LAB
ALBUMIN SERPL ELPH-MCNC: 3.9 G/DL
ANION GAP SERPL CALC-SCNC: 14 MMOL/L
APPEARANCE: CLEAR
BACTERIA: NEGATIVE
BASOPHILS # BLD AUTO: 0 K/UL
BASOPHILS NFR BLD AUTO: 0 %
BILIRUBIN URINE: NEGATIVE
BLOOD URINE: NEGATIVE
BUN SERPL-MCNC: 36 MG/DL
CALCIUM SERPL-MCNC: 9.3 MG/DL
CHLORIDE SERPL-SCNC: 97 MMOL/L
CO2 SERPL-SCNC: 25 MMOL/L
COLOR: YELLOW
CREAT SERPL-MCNC: 0.72 MG/DL
CREAT SPEC-SCNC: 45 MG/DL
CREAT/PROT UR: 0.2 RATIO
EOSINOPHIL # BLD AUTO: 0 K/UL
EOSINOPHIL NFR BLD AUTO: 0 %
GLUCOSE QUALITATIVE U: NEGATIVE
GLUCOSE SERPL-MCNC: 75 MG/DL
HCT VFR BLD CALC: 43.2 %
HGB BLD-MCNC: 13.9 G/DL
HYALINE CASTS: 0 /LPF
KETONES URINE: NEGATIVE
LEUKOCYTE ESTERASE URINE: NEGATIVE
LYMPHOCYTES # BLD AUTO: 1.42 K/UL
LYMPHOCYTES NFR BLD AUTO: 12.7 %
MAN DIFF?: NORMAL
MCHC RBC-ENTMCNC: 29.4 PG
MCHC RBC-ENTMCNC: 32.2 GM/DL
MCV RBC AUTO: 91.3 FL
MICROSCOPIC-UA: NORMAL
MONOCYTES # BLD AUTO: 0.57 K/UL
MONOCYTES NFR BLD AUTO: 5.1 %
NEUTROPHILS # BLD AUTO: 8.62 K/UL
NEUTROPHILS NFR BLD AUTO: 77.1 %
NITRITE URINE: NEGATIVE
PH URINE: 6.5
PHOSPHATE SERPL-MCNC: 2.7 MG/DL
PLATELET # BLD AUTO: 205 K/UL
POTASSIUM SERPL-SCNC: 3.7 MMOL/L
PROT UR-MCNC: 7 MG/DL
PROTEIN URINE: NEGATIVE
RBC # BLD: 4.73 M/UL
RBC # FLD: 21.3 %
RED BLOOD CELLS URINE: 3 /HPF
SODIUM SERPL-SCNC: 137 MMOL/L
SPECIFIC GRAVITY URINE: 1.02
SQUAMOUS EPITHELIAL CELLS: 0 /HPF
UROBILINOGEN URINE: NORMAL
WBC # FLD AUTO: 11.18 K/UL
WHITE BLOOD CELLS URINE: 1 /HPF

## 2022-01-19 PROCEDURE — 99442: CPT

## 2022-01-21 ENCOUNTER — TRANSCRIPTION ENCOUNTER (OUTPATIENT)
Age: 62
End: 2022-01-21

## 2022-02-14 ENCOUNTER — RX RENEWAL (OUTPATIENT)
Age: 62
End: 2022-02-14

## 2022-02-14 ENCOUNTER — TRANSCRIPTION ENCOUNTER (OUTPATIENT)
Age: 62
End: 2022-02-14

## 2022-02-15 ENCOUNTER — TRANSCRIPTION ENCOUNTER (OUTPATIENT)
Age: 62
End: 2022-02-15

## 2022-02-15 ENCOUNTER — RX RENEWAL (OUTPATIENT)
Age: 62
End: 2022-02-15

## 2022-02-17 ENCOUNTER — TRANSCRIPTION ENCOUNTER (OUTPATIENT)
Age: 62
End: 2022-02-17

## 2022-02-18 ENCOUNTER — TRANSCRIPTION ENCOUNTER (OUTPATIENT)
Age: 62
End: 2022-02-18

## 2022-02-28 ENCOUNTER — TRANSCRIPTION ENCOUNTER (OUTPATIENT)
Age: 62
End: 2022-02-28

## 2022-04-20 ENCOUNTER — APPOINTMENT (OUTPATIENT)
Dept: ENDOCRINOLOGY | Facility: CLINIC | Age: 62
End: 2022-04-20

## 2022-09-06 ENCOUNTER — NON-APPOINTMENT (OUTPATIENT)
Age: 62
End: 2022-09-06

## 2022-09-06 ENCOUNTER — APPOINTMENT (OUTPATIENT)
Dept: INTERNAL MEDICINE | Facility: CLINIC | Age: 62
End: 2022-09-06

## 2022-09-06 VITALS
WEIGHT: 205 LBS | SYSTOLIC BLOOD PRESSURE: 103 MMHG | HEART RATE: 69 BPM | TEMPERATURE: 97.8 F | OXYGEN SATURATION: 97 % | HEIGHT: 66 IN | BODY MASS INDEX: 32.95 KG/M2 | DIASTOLIC BLOOD PRESSURE: 70 MMHG

## 2022-09-06 DIAGNOSIS — R60.0 LOCALIZED EDEMA: ICD-10-CM

## 2022-09-06 DIAGNOSIS — R80.9 PROTEINURIA, UNSPECIFIED: ICD-10-CM

## 2022-09-06 PROCEDURE — 99214 OFFICE O/P EST MOD 30 MIN: CPT | Mod: 25

## 2022-09-06 PROCEDURE — 93000 ELECTROCARDIOGRAM COMPLETE: CPT

## 2022-09-06 RX ORDER — LEVOTHYROXINE SODIUM 0.2 MG/1
200 TABLET ORAL
Qty: 90 | Refills: 0 | Status: DISCONTINUED | COMMUNITY
Start: 2021-10-12 | End: 2022-09-06

## 2022-09-06 RX ORDER — LEVOTHYROXINE SODIUM 0.03 MG/1
25 TABLET ORAL
Qty: 1 | Refills: 2 | Status: DISCONTINUED | COMMUNITY
Start: 2021-10-03 | End: 2022-09-06

## 2022-09-06 RX ORDER — ATOVAQUONE 750 MG/5ML
750 SUSPENSION ORAL TWICE DAILY
Qty: 900 | Refills: 3 | Status: DISCONTINUED | COMMUNITY
Start: 2021-10-14 | End: 2022-09-06

## 2022-09-06 RX ORDER — MUPIROCIN 20 MG/G
2 OINTMENT TOPICAL
Qty: 1 | Refills: 0 | Status: DISCONTINUED | COMMUNITY
Start: 2021-07-14 | End: 2022-09-06

## 2022-09-06 RX ORDER — FAMOTIDINE 20 MG/1
20 TABLET, FILM COATED ORAL TWICE DAILY
Qty: 180 | Refills: 2 | Status: DISCONTINUED | COMMUNITY
Start: 2021-01-13 | End: 2022-09-06

## 2022-09-06 RX ORDER — UMECLIDINIUM BROMIDE AND VILANTEROL TRIFENATATE 62.5; 25 UG/1; UG/1
62.5-25 POWDER RESPIRATORY (INHALATION)
Qty: 2 | Refills: 2 | Status: DISCONTINUED | COMMUNITY
Start: 2021-01-11 | End: 2022-09-06

## 2022-09-06 RX ORDER — PREDNISONE 50 MG/1
50 TABLET ORAL DAILY
Qty: 30 | Refills: 0 | Status: DISCONTINUED | COMMUNITY
Start: 2021-10-12 | End: 2022-09-06

## 2022-09-06 RX ORDER — CHLORTHALIDONE 25 MG/1
25 TABLET ORAL DAILY
Qty: 30 | Refills: 2 | Status: DISCONTINUED | COMMUNITY
Start: 2021-10-14 | End: 2022-09-06

## 2022-09-06 RX ORDER — PREDNISONE 20 MG/1
20 TABLET ORAL
Qty: 30 | Refills: 1 | Status: DISCONTINUED | COMMUNITY
Start: 2021-11-03 | End: 2022-09-06

## 2022-09-06 RX ORDER — FAMOTIDINE 40 MG/1
40 TABLET, FILM COATED ORAL TWICE DAILY
Qty: 180 | Refills: 3 | Status: DISCONTINUED | COMMUNITY
Start: 2021-05-30 | End: 2022-09-06

## 2022-09-06 NOTE — ASSESSMENT
[High Risk Surgery - Intraperitoneal, Intrathoracic or Supringuinal Vascular Procedures] : High Risk Surgery - Intraperitoneal, Intrathoracic or Supringuinal Vascular Procedures - No (0) [Ischemic Heart Disease] : Ischemic Heart Disease - No (0) [Congestive Heart Failure] : Congestive Heart Failure - No (0) [Prior Cerebrovascular Accident or TIA] : Prior Cerebrovascular Accident or TIA - No (0) [Creatinine >= 2mg/dL (1 Point)] : Creatinine >= 2mg/dL - No (0) [Insulin-dependent Diabetic (1 Point)] : Insulin-dependent Diabetic - No (0) [0] : 0 , RCRI Class: I, Risk of Post-Op Cardiac Complications: 3.9%, 95% CI for Risk Estimate: 2.8% - 5.4% [Patient Optimized for Surgery] : Patient optimized for surgery [No Further Testing Recommended] : no further testing recommended [Modify medications prior to procedure] : Modify medications prior to procedure [As per surgery] : as per surgery [FreeTextEntry4] : Pt low risk for low risk surgery, has chronic ongoing renal issue which has not resulted in abnormal creatinine. Given short duration and low risk of surgery, no indication for stress dose steroids. Pt to hold torsemide on morning of surgery and then can resume this later that same day. Pt can take all other meds on morning of surgery.  [FreeTextEntry7] : Hold Torsemide morning of surgery, may take all other meds as prescribed

## 2022-09-06 NOTE — HISTORY OF PRESENT ILLNESS
[No Pertinent Cardiac History] : no history of aortic stenosis, atrial fibrillation, coronary artery disease, recent myocardial infarction, or implantable device/pacemaker [No Pertinent Pulmonary History] : no history of asthma, COPD, sleep apnea, or smoking [No Adverse Anesthesia Reaction] : no adverse anesthesia reaction in self or family member [Chronic Kidney Disease] : chronic kidney disease [(Patient denies any chest pain, claudication, dyspnea on exertion, orthopnea, palpitations or syncope)] : Patient denies any chest pain, claudication, dyspnea on exertion, orthopnea, palpitations or syncope [Unable to assess] : unable to assess [Chronic Anticoagulation] : no chronic anticoagulation [Diabetes] : no diabetes [FreeTextEntry1] : cataract [FreeTextEntry2] : 09/19/2022 [FreeTextEntry3] : [place] [FreeTextEntry4] : 63 yo M, h/o minimal change disease with recent relapse, on prednisone, also h/o HTN, no cardiac or pulmonary disease, here for pre-op. Has been living in [facility], and getting care at [facility] there. Mycophenolate added, pt doing ok, unable to completely taper off prednisone yet. Has had over 20 previous surgeries, no issues with GA, VTE or bleeding, no FHx same. \par  [FreeTextEntry7] : Echo June 2021 - WNL [FreeTextEntry8] : Pt's current ET limited by knee pain. Pt planning for TKR after cataract surgery.

## 2022-09-06 NOTE — PHYSICAL EXAM
[Normal Sclera/Conjunctiva] : normal sclera/conjunctiva [Normal TMs] : both tympanic membranes were normal [No Varicosities] : no varicosities [No Edema] : there was no peripheral edema [No Extremity Clubbing/Cyanosis] : no extremity clubbing/cyanosis [No Joint Swelling] : no joint swelling [Grossly Normal Strength/Tone] : grossly normal strength/tone [No Rash] : no rash [No Focal Deficits] : no focal deficits [Normal Gait] : normal gait [Normal] : affect was normal and insight and judgment were intact [de-identified] : Mallampati Class II

## 2022-09-27 ENCOUNTER — TRANSCRIPTION ENCOUNTER (OUTPATIENT)
Age: 62
End: 2022-09-27

## 2022-10-05 ENCOUNTER — RX RENEWAL (OUTPATIENT)
Age: 62
End: 2022-10-05

## 2022-10-24 ENCOUNTER — TRANSCRIPTION ENCOUNTER (OUTPATIENT)
Age: 62
End: 2022-10-24

## 2022-11-11 ENCOUNTER — TRANSCRIPTION ENCOUNTER (OUTPATIENT)
Age: 62
End: 2022-11-11

## 2022-11-14 ENCOUNTER — TRANSCRIPTION ENCOUNTER (OUTPATIENT)
Age: 62
End: 2022-11-14

## 2022-11-28 ENCOUNTER — NON-APPOINTMENT (OUTPATIENT)
Age: 62
End: 2022-11-28

## 2022-11-28 ENCOUNTER — TRANSCRIPTION ENCOUNTER (OUTPATIENT)
Age: 62
End: 2022-11-28

## 2022-11-29 ENCOUNTER — APPOINTMENT (OUTPATIENT)
Dept: ENDOCRINOLOGY | Facility: CLINIC | Age: 62
End: 2022-11-29

## 2022-12-05 ENCOUNTER — TRANSCRIPTION ENCOUNTER (OUTPATIENT)
Age: 62
End: 2022-12-05

## 2022-12-08 ENCOUNTER — TRANSCRIPTION ENCOUNTER (OUTPATIENT)
Age: 62
End: 2022-12-08

## 2022-12-09 ENCOUNTER — TRANSCRIPTION ENCOUNTER (OUTPATIENT)
Age: 62
End: 2022-12-09

## 2022-12-13 ENCOUNTER — TRANSCRIPTION ENCOUNTER (OUTPATIENT)
Age: 62
End: 2022-12-13

## 2022-12-15 LAB
ALBUMIN SERPL ELPH-MCNC: 3.9 G/DL
ANION GAP SERPL CALC-SCNC: 11 MMOL/L
APPEARANCE: CLEAR
BACTERIA: NEGATIVE
BASOPHILS # BLD AUTO: 0.1 K/UL
BASOPHILS NFR BLD AUTO: 1.3 %
BILIRUBIN URINE: NEGATIVE
BLOOD URINE: ABNORMAL
BUN SERPL-MCNC: 18 MG/DL
CALCIUM SERPL-MCNC: 9.6 MG/DL
CHLORIDE SERPL-SCNC: 102 MMOL/L
CO2 SERPL-SCNC: 27 MMOL/L
COLOR: YELLOW
CREAT SERPL-MCNC: 0.9 MG/DL
CREAT SPEC-SCNC: 141 MG/DL
CREAT/PROT UR: 0.1 RATIO
EGFR: 95 ML/MIN/1.73M2
EOSINOPHIL # BLD AUTO: 0.35 K/UL
EOSINOPHIL NFR BLD AUTO: 4.7 %
GLUCOSE QUALITATIVE U: NEGATIVE
GLUCOSE SERPL-MCNC: 91 MG/DL
HCT VFR BLD CALC: 40.6 %
HGB BLD-MCNC: 12.8 G/DL
HYALINE CASTS: 0 /LPF
IMM GRANULOCYTES NFR BLD AUTO: 0.3 %
KETONES URINE: NEGATIVE
LEUKOCYTE ESTERASE URINE: NEGATIVE
LYMPHOCYTES # BLD AUTO: 1.58 K/UL
LYMPHOCYTES NFR BLD AUTO: 21.2 %
MAN DIFF?: NORMAL
MCHC RBC-ENTMCNC: 28.8 PG
MCHC RBC-ENTMCNC: 31.5 GM/DL
MCV RBC AUTO: 91.2 FL
MICROSCOPIC-UA: NORMAL
MONOCYTES # BLD AUTO: 0.93 K/UL
MONOCYTES NFR BLD AUTO: 12.5 %
NEUTROPHILS # BLD AUTO: 4.48 K/UL
NEUTROPHILS NFR BLD AUTO: 60 %
NITRITE URINE: NEGATIVE
PH URINE: 6
PHOSPHATE SERPL-MCNC: 4.1 MG/DL
PLATELET # BLD AUTO: 278 K/UL
POTASSIUM SERPL-SCNC: 4 MMOL/L
PROT UR-MCNC: 10 MG/DL
PROTEIN URINE: NORMAL
RBC # BLD: 4.45 M/UL
RBC # FLD: 15.2 %
RED BLOOD CELLS URINE: 6 /HPF
SODIUM SERPL-SCNC: 140 MMOL/L
SPECIFIC GRAVITY URINE: 1.02
SQUAMOUS EPITHELIAL CELLS: 0 /HPF
UROBILINOGEN URINE: NORMAL
WBC # FLD AUTO: 7.46 K/UL
WHITE BLOOD CELLS URINE: 1 /HPF

## 2022-12-20 NOTE — DISCUSSION/SUMMARY
Addended by: RAFAELA MATHEW on: 12/20/2022 04:28 PM     Modules accepted: Orders    
[FreeTextEntry1] : labs with no proteinuria and renal function WNL. follow up as scheduled. Discussed with patient over phone.

## 2022-12-23 ENCOUNTER — APPOINTMENT (OUTPATIENT)
Dept: NEPHROLOGY | Facility: CLINIC | Age: 62
End: 2022-12-23

## 2022-12-23 VITALS
SYSTOLIC BLOOD PRESSURE: 145 MMHG | BODY MASS INDEX: 38.97 KG/M2 | WEIGHT: 242.5 LBS | DIASTOLIC BLOOD PRESSURE: 96 MMHG | OXYGEN SATURATION: 97 % | TEMPERATURE: 97.9 F | HEIGHT: 66 IN | HEART RATE: 83 BPM

## 2022-12-23 PROCEDURE — 99215 OFFICE O/P EST HI 40 MIN: CPT

## 2022-12-23 NOTE — HISTORY OF PRESENT ILLNESS
[FreeTextEntry1] : Pt. is a 62 y.o. M w/ PMHx. of HTN, HLD, Hx. of Thyroid CA (s/p resection), GERD, NINOSKA and Minimal Change Nephropathy with relapse after COVID-19 vaccination, here for follow up. \par \par He was last evaluated by me in Jan 2022, at that time was advised to start to taper prednisone as proteinuria had resolved. Pt. moved to [place] since, and was under the care of a nephrologist at [facility] there. His dose of prednisone was continuously tapered and now he is on prednisone 5mg po daily. He was initiated on cellcept in May 2022 and has remained on cellcept 1000mg po daily and prednisone 5mg po daily since. \par \par He underwent right total knee replacement at [facility] in early Nov 2022, complicated by urinary retention, knee swelling and DVT, and also caught COVID-19 around the same time for which he was treated with mAB. \par \par Overall, feels well and offers no complaints. Pt. denies any shortness of breath,  blood or foam in the urine or chest pain. He does report intermittent swelling over the right knee and leg.

## 2022-12-23 NOTE — PHYSICAL EXAM
[General Appearance - In No Acute Distress] : in no acute distress [General Appearance - Well Nourished] : well nourished [General Appearance - Well-Appearing] : healthy appearing [Sclera] : the sclera and conjunctiva were normal [Extraocular Movements] : extraocular movements were intact [Outer Ear] : the ears and nose were normal in appearance [Hearing Threshold Finger Rub Not [place]] : hearing was normal [Neck Appearance] : the appearance of the neck was normal [Exaggerated Use Of Accessory Muscles For Inspiration] : no accessory muscle use [Abdomen Soft] : soft [No Focal Deficits] : no focal deficits [Oriented To Time, Place, And Person] : oriented to person, place, and time [] : no rash [FreeTextEntry1] : RLE swelling +, walks with a cane since right knee surgery

## 2022-12-23 NOTE — DISCUSSION/SUMMARY
15-Jul-2020 19:24 [FreeTextEntry1] : labs with no proteinuria and renal function WNL. follow up as scheduled. Discussed with patient over phone.

## 2022-12-23 NOTE — ASSESSMENT
[FreeTextEntry1] : KENAN relapse: in the setting of receipt of Pfizer COVID-19 booster vaccine. \par Pt. has s/s of relapse within a week of receiving the booster and was noted to have nephrotic range proteinuria, hypoalbuminemia, and edema on labs done 2 weeks after he received the booster dose. \par Initiated on prednisone 20mg TID on 10/12/21, that was gradually tapered, and is currently on prednisone 5mg po daily and cellcept 1000mg po daily (started in [place] 2022 by the nephrologist in [place])\par Recent labs with no proteinuria and normal serum albumin.\par Advised to lower dose of prednisone to 5mg po every other day.  \par Hold torsemide (LE swelling and nephrotic syndrome in remission)\par continue cellcept.\par Avoid chronic NSAIDs. \par \par HTN:now with BP readings WNL, not on any antihypertensives. \par Monitor BP. \par \par Hypocalcemia: in the setting of torsemide use\par Currently on Ca supplements\par recent Ca level WNL\par \par Hypokalemia: in the setting of torsemide use\par currently on potassium supplements. \par Recent potassium level WNL. \par \par \par repeat labs in 2 weeks and follow up in 1 month. \par \par

## 2022-12-27 ENCOUNTER — NON-APPOINTMENT (OUTPATIENT)
Age: 62
End: 2022-12-27

## 2022-12-27 ENCOUNTER — TRANSCRIPTION ENCOUNTER (OUTPATIENT)
Age: 62
End: 2022-12-27

## 2022-12-28 ENCOUNTER — TRANSCRIPTION ENCOUNTER (OUTPATIENT)
Age: 62
End: 2022-12-28

## 2022-12-31 ENCOUNTER — RX RENEWAL (OUTPATIENT)
Age: 62
End: 2022-12-31

## 2023-01-05 ENCOUNTER — APPOINTMENT (OUTPATIENT)
Dept: UROLOGY | Facility: CLINIC | Age: 63
End: 2023-01-05
Payer: COMMERCIAL

## 2023-01-05 VITALS
WEIGHT: 242 LBS | RESPIRATION RATE: 18 BRPM | OXYGEN SATURATION: 98 % | DIASTOLIC BLOOD PRESSURE: 87 MMHG | HEIGHT: 66 IN | SYSTOLIC BLOOD PRESSURE: 133 MMHG | HEART RATE: 86 BPM | BODY MASS INDEX: 38.89 KG/M2

## 2023-01-05 DIAGNOSIS — Z87.898 PERSONAL HISTORY OF OTHER SPECIFIED CONDITIONS: ICD-10-CM

## 2023-01-05 PROCEDURE — 99204 OFFICE O/P NEW MOD 45 MIN: CPT

## 2023-01-09 NOTE — PHYSICAL EXAM
[General Appearance - Well Developed] : well developed [General Appearance - Well Nourished] : well nourished [Normal Appearance] : normal appearance [Well Groomed] : well groomed [General Appearance - In No Acute Distress] : no acute distress [Edema] : no peripheral edema [Respiration, Rhythm And Depth] : normal respiratory rhythm and effort [Exaggerated Use Of Accessory Muscles For Inspiration] : no accessory muscle use [Abdomen Soft] : soft [Abdomen Tenderness] : non-tender [Costovertebral Angle Tenderness] : no ~M costovertebral angle tenderness [Urethral Meatus] : meatus normal [Penis Abnormality] : normal circumcised penis [Urinary Bladder Findings] : the bladder was normal on palpation [Scrotum] : the scrotum was normal [Testes Tenderness] : no tenderness of the testes [Testes Mass (___cm)] : there were no testicular masses [Prostate Tenderness] : the prostate was not tender [No Prostate Nodules] : no prostate nodules [] : no rash [Oriented To Time, Place, And Person] : oriented to person, place, and time [Affect] : the affect was normal [Mood] : the mood was normal [Not Anxious] : not anxious [Inguinal Lymph Nodes Enlarged Bilaterally] : inguinal [FreeTextEntry1] : Using a cane

## 2023-01-09 NOTE — REVIEW OF SYSTEMS
[Chills] : chills [Feeling Tired] : feeling tired [Wait a long time to urinate] : waits a long time to urinate [Joint Pain] : joint pain [Joint Swelling] : joint swelling [Limb Swelling] : limb swelling [Difficulty Walking] : difficulty walking [Hot Flashes] : hot flashes [Muscle Weakness] : muscle weakness [Feelings Of Weakness] : feelings of weakness [Negative] : Heme/Lymph [FreeTextEntry2] : HBP

## 2023-01-09 NOTE — ASSESSMENT
[FreeTextEntry1] : His urinary symptoms are at his baseline.  Nocturia is twice.  Residual urine volume was discussed.  He will continue to monitor.  He is off of tamsulosin and finasteride at this time.  He will send us a copy of recent PSA level.  Apparently he has undergone many cystoscopies for microscopic hematuria and he was told they were normal.  CT scan results regarding complex renal cyst was reviewed.  Simple renal cysts are sacs filled with fluid. The exact cause of renal cysts is unknown but cysts are more common with advancing age. Up to one third of patients over age 70 have renal cysts. Having many renal cysts is different than polycystic renal disease. Simple renal cysts are usually found incidentally with imaging studies (US, CT scan or MRI), are usually asymptomatic and do not require any treatment. Very large renal cysts can cause dull pain or discomfort. Cysts can rarely become infected, develop bleeding, rupture or impair renal function. Surgical removal or drainage and sclerotherapy of renal cysts can be performed in specific clinical settings. Bosniak classification of renal cysts into 5 categories was reviewed: simple (category I), minimally complex (category II and IIF), indeterminate (category III) and solid (category IV). Malignancy risk, treatment and follow-up of renal cysts based on category were discussed. Patient's questions were answered.  He should consider repeat imaging with ultrasound.  He can follow-up in 6 months to 1 year pending results and his symptoms.

## 2023-01-09 NOTE — HISTORY OF PRESENT ILLNESS
[FreeTextEntry1] : He is a 62-year-old man who is seen today for initial visit.  Patient states that in November 2, 2022 he underwent right knee replacement.  Postop he could not urinate and straight catheterization was done.  The next day a Paulson catheter was placed and he was started on tamsulosin and finasteride.  In early December his Paulson catheter was removed.  Nocturia is twice.  Urinary flow is normal or average for him.  He stopped taking tamsulosin and finasteride.  He has history of microscopic hematuria and has undergone many cystoscopies in [facility].  He was told they were normal.  Also recently he had a PSA level done at [facility] and he was told it was normal.  He will send us a copy.  He developed a DVT after surgery and was placed on Eliquis.  Urinalysis showed 6 red blood cells.  BUN was 18 and creatinine 0.9.  Testosterone was 366.  Patient states that he has history of nephrotic syndrome.  CT scan in 2020 showed mildly enlarged prostate, left renal cyst and most likely benign right-sided septated cyst 1.4 cm.  He is also aware of the cyst for a long time.  Residual urine volume today was about 150-180 mL.\par Addendum: Patient sent us his PSA levels: 0.52 in 2013, 0.46 in 2016, 0.5 in 2019 and 1.18 in October 2022.

## 2023-01-11 ENCOUNTER — TRANSCRIPTION ENCOUNTER (OUTPATIENT)
Age: 63
End: 2023-01-11

## 2023-01-13 ENCOUNTER — TRANSCRIPTION ENCOUNTER (OUTPATIENT)
Age: 63
End: 2023-01-13

## 2023-01-13 ENCOUNTER — APPOINTMENT (OUTPATIENT)
Dept: ENDOCRINOLOGY | Facility: CLINIC | Age: 63
End: 2023-01-13
Payer: COMMERCIAL

## 2023-01-13 VITALS
BODY MASS INDEX: 32.78 KG/M2 | HEIGHT: 66 IN | DIASTOLIC BLOOD PRESSURE: 82 MMHG | HEART RATE: 74 BPM | WEIGHT: 204 LBS | OXYGEN SATURATION: 98 % | SYSTOLIC BLOOD PRESSURE: 130 MMHG

## 2023-01-13 LAB
ALBUMIN SERPL ELPH-MCNC: 4 G/DL
ANION GAP SERPL CALC-SCNC: 13 MMOL/L
APPEARANCE: CLEAR
BACTERIA: NEGATIVE
BASOPHILS # BLD AUTO: 0.05 K/UL
BASOPHILS NFR BLD AUTO: 1 %
BILIRUBIN URINE: NEGATIVE
BLOOD URINE: NEGATIVE
BUN SERPL-MCNC: 13 MG/DL
CALCIUM SERPL-MCNC: 9.5 MG/DL
CHLORIDE SERPL-SCNC: 101 MMOL/L
CO2 SERPL-SCNC: 25 MMOL/L
COLOR: YELLOW
CREAT SERPL-MCNC: 0.84 MG/DL
CREAT SPEC-SCNC: 160 MG/DL
CREAT/PROT UR: 0.1 RATIO
EGFR: 97 ML/MIN/1.73M2
EOSINOPHIL # BLD AUTO: 0.34 K/UL
EOSINOPHIL NFR BLD AUTO: 7 %
GLUCOSE QUALITATIVE U: NEGATIVE
GLUCOSE SERPL-MCNC: 87 MG/DL
HCT VFR BLD CALC: 41.5 %
HGB BLD-MCNC: 12.9 G/DL
HYALINE CASTS: 0 /LPF
IMM GRANULOCYTES NFR BLD AUTO: 0.2 %
KETONES URINE: NEGATIVE
LEUKOCYTE ESTERASE URINE: NEGATIVE
LYMPHOCYTES # BLD AUTO: 1.49 K/UL
LYMPHOCYTES NFR BLD AUTO: 30.7 %
MAN DIFF?: NORMAL
MCHC RBC-ENTMCNC: 28.7 PG
MCHC RBC-ENTMCNC: 31.1 GM/DL
MCV RBC AUTO: 92.4 FL
MICROSCOPIC-UA: NORMAL
MONOCYTES # BLD AUTO: 0.65 K/UL
MONOCYTES NFR BLD AUTO: 13.4 %
NEUTROPHILS # BLD AUTO: 2.32 K/UL
NEUTROPHILS NFR BLD AUTO: 47.7 %
NITRITE URINE: NEGATIVE
PH URINE: 6
PHOSPHATE SERPL-MCNC: 3.4 MG/DL
PLATELET # BLD AUTO: 275 K/UL
POTASSIUM SERPL-SCNC: 4.1 MMOL/L
PROT UR-MCNC: 13 MG/DL
PROTEIN URINE: NORMAL
RBC # BLD: 4.49 M/UL
RBC # FLD: 15.4 %
RED BLOOD CELLS URINE: 2 /HPF
SODIUM SERPL-SCNC: 138 MMOL/L
SPECIFIC GRAVITY URINE: 1.02
SQUAMOUS EPITHELIAL CELLS: 0 /HPF
UROBILINOGEN URINE: NORMAL
WBC # FLD AUTO: 4.86 K/UL
WHITE BLOOD CELLS URINE: 1 /HPF

## 2023-01-13 PROCEDURE — 99215 OFFICE O/P EST HI 40 MIN: CPT

## 2023-01-13 NOTE — REVIEW OF SYSTEMS
[Fatigue] : no fatigue [Decreased Appetite] : appetite not decreased [Recent Weight Gain (___ Lbs)] : no recent weight gain [Recent Weight Loss (___ Lbs)] : no recent weight loss [Visual Field Defect] : no visual field defect [Dry Eyes] : no dryness [Dysphagia] : no dysphagia [Neck Pain] : no neck pain [Dysphonia] : no dysphonia [Nasal Congestion] : no nasal congestion [Chest Pain] : no chest pain [Palpitations] : no palpitations [Shortness Of Breath] : no shortness of breath [Nausea] : no nausea [Vomiting] : no vomiting [Polyuria] : no polyuria [Hesistancy] : no hesitancy [Joint Pain] : no joint pain [Muscle Weakness] : no muscle weakness [Acanthosis] : no acanthosis  [Acne] : no acne [Headaches] : no headaches [Dizziness] : no dizziness [Tremors] : no tremors [Pain/Numbness of Digits] : no pain/numbness of digits [Depression] : no depression [Polydipsia] : no polydipsia [Easy Bleeding] : no ~M tendency for easy bleeding [Easy Bruising] : no tendency for easy bruising

## 2023-01-13 NOTE — ASSESSMENT
[FreeTextEntry1] : 62 year old male with history of thyroid cancer (likely low risk of recurrence) with total thyroidectomy in 2013, here for f/u. Last appt in 09/2021\par \par -Check baseline thyroid ultrasound \par -Reported that in [place] ?noted to have lymph nodes that were abnormal on their scan\par -Check TFTs and thyroglobulin \par \par Hypothyroidism \par -Continue levothyroxine 175 mcg, will adjust accordingly\par -Goal TSH of 0.5-2.0\par \par -Follow up in 6 months

## 2023-01-13 NOTE — HISTORY OF PRESENT ILLNESS
[FreeTextEntry1] : 62 year old male with history of nephrotic syndrome, thyroid cancer here for evaluation \par \par 2013 total thyroide[place] [facility] [place], did not receive ECKERT \par Last neck ultrasound in the last 3 months ( [facility]) \par Currently on levothyroxine to 250 mcg ( Changed on 01/07/2020) - previous dose of 162.5 mcg \par No recurrence of thyroid cancer \par \par He has had a shoulder operation on 09/2021 \par \par \par On prednisone 60 mg for 7 months \par Levothyroxine 175 mcg is the most recent dose \par \par Symptoms: \par -Increased fatigue \par -No hair loss \par \par -No HP \par + tremors \par -Weight loss recently but major weight fluctuations \par -No hair loss \par -Increased bowel movements \par -Increased cold intolerance \par -Low energy levels \par \par Recently got diagnosed with nephrotic syndrome\par On prednisone 50 mg daily \par Lost 50 lbs \par \par

## 2023-01-13 NOTE — PHYSICAL EXAM
[Alert] : alert [Well Nourished] : well nourished [No Acute Distress] : no acute distress [Normal Sclera/Conjunctiva] : normal sclera/conjunctiva [EOMI] : extra ocular movement intact [PERRL] : pupils equal, round and reactive to light [Normal Outer Ear/Nose] : the ears and nose were normal in appearance [Normal TMs] : both tympanic membranes were normal [No Neck Mass] : no neck mass was observed [No Respiratory Distress] : no respiratory distress [Clear to Auscultation] : lungs were clear to auscultation bilaterally [Normal S1, S2] : normal S1 and S2 [Normal Rate] : heart rate was normal [Regular Rhythm] : with a regular rhythm [Normal Bowel Sounds] : normal bowel sounds [Not Tender] : non-tender [Soft] : abdomen soft [Normal Gait] : normal gait [No Clubbing, Cyanosis] : no clubbing  or cyanosis of the fingernails [No Joint Swelling] : no joint swelling seen [No Rash] : no rash [No Skin Lesions] : no skin lesions [No Motor Deficits] : the motor exam was normal [Normal Reflexes] : deep tendon reflexes were 2+ and symmetric [No Tremors] : no tremors [Oriented x3] : oriented to person, place, and time [Normal Affect] : the affect was normal [Normal Insight/Judgement] : insight and judgment were intact [Normal Mood] : the mood was normal

## 2023-01-16 ENCOUNTER — APPOINTMENT (OUTPATIENT)
Dept: ULTRASOUND IMAGING | Facility: CLINIC | Age: 63
End: 2023-01-16
Payer: COMMERCIAL

## 2023-01-16 PROCEDURE — 76775 US EXAM ABDO BACK WALL LIM: CPT

## 2023-01-16 PROCEDURE — 76536 US EXAM OF HEAD AND NECK: CPT

## 2023-01-17 ENCOUNTER — TRANSCRIPTION ENCOUNTER (OUTPATIENT)
Age: 63
End: 2023-01-17

## 2023-01-20 LAB
T4 FREE SERPL-MCNC: 1.5 NG/DL
THYROGLOB AB SERPL-ACNC: <20 IU/ML
THYROGLOB SERPL-MCNC: <0.2 NG/ML
TSH SERPL-ACNC: 2.03 UIU/ML

## 2023-01-23 ENCOUNTER — RESULT REVIEW (OUTPATIENT)
Age: 63
End: 2023-01-23

## 2023-01-23 ENCOUNTER — TRANSCRIPTION ENCOUNTER (OUTPATIENT)
Age: 63
End: 2023-01-23

## 2023-01-26 ENCOUNTER — TRANSCRIPTION ENCOUNTER (OUTPATIENT)
Age: 63
End: 2023-01-26

## 2023-01-26 DIAGNOSIS — Z01.818 ENCOUNTER FOR OTHER PREPROCEDURAL EXAMINATION: ICD-10-CM

## 2023-01-27 ENCOUNTER — TRANSCRIPTION ENCOUNTER (OUTPATIENT)
Age: 63
End: 2023-01-27

## 2023-01-31 ENCOUNTER — TRANSCRIPTION ENCOUNTER (OUTPATIENT)
Age: 63
End: 2023-01-31

## 2023-01-31 DIAGNOSIS — M21.969 UNSPECIFIED ACQUIRED DEFORMITY OF UNSPECIFIED LOWER LEG: ICD-10-CM

## 2023-02-02 LAB
ALBUMIN SERPL ELPH-MCNC: 4.1 G/DL
ANION GAP SERPL CALC-SCNC: 12 MMOL/L
APPEARANCE: CLEAR
BACTERIA: NEGATIVE
BASOPHILS # BLD AUTO: 0.06 K/UL
BASOPHILS NFR BLD AUTO: 1 %
BILIRUBIN URINE: NEGATIVE
BLOOD URINE: ABNORMAL
BUN SERPL-MCNC: 13 MG/DL
CALCIUM SERPL-MCNC: 9.9 MG/DL
CHLORIDE SERPL-SCNC: 101 MMOL/L
CO2 SERPL-SCNC: 25 MMOL/L
COLOR: YELLOW
CREAT SERPL-MCNC: 0.82 MG/DL
EGFR: 98 ML/MIN/1.73M2
EOSINOPHIL # BLD AUTO: 0.3 K/UL
EOSINOPHIL NFR BLD AUTO: 4.8 %
GLUCOSE QUALITATIVE U: NEGATIVE
GLUCOSE SERPL-MCNC: 86 MG/DL
HCT VFR BLD CALC: 41.6 %
HGB BLD-MCNC: 13.2 G/DL
HYALINE CASTS: 0 /LPF
IMM GRANULOCYTES NFR BLD AUTO: 0.2 %
KETONES URINE: NEGATIVE
LEUKOCYTE ESTERASE URINE: NEGATIVE
LYMPHOCYTES # BLD AUTO: 1.41 K/UL
LYMPHOCYTES NFR BLD AUTO: 22.5 %
MAN DIFF?: NORMAL
MCHC RBC-ENTMCNC: 28.8 PG
MCHC RBC-ENTMCNC: 31.7 GM/DL
MCV RBC AUTO: 90.8 FL
MICROSCOPIC-UA: NORMAL
MONOCYTES # BLD AUTO: 0.68 K/UL
MONOCYTES NFR BLD AUTO: 10.9 %
NEUTROPHILS # BLD AUTO: 3.8 K/UL
NEUTROPHILS NFR BLD AUTO: 60.6 %
NITRITE URINE: POSITIVE
PH URINE: 6
PHOSPHATE SERPL-MCNC: 4.2 MG/DL
PLATELET # BLD AUTO: 240 K/UL
POTASSIUM SERPL-SCNC: 4.3 MMOL/L
PROTEIN URINE: NEGATIVE
RBC # BLD: 4.58 M/UL
RBC # FLD: 14.6 %
RED BLOOD CELLS URINE: 6 /HPF
SODIUM SERPL-SCNC: 138 MMOL/L
SPECIFIC GRAVITY URINE: 1.02
SQUAMOUS EPITHELIAL CELLS: 0 /HPF
UROBILINOGEN URINE: NORMAL
WBC # FLD AUTO: 6.26 K/UL
WHITE BLOOD CELLS URINE: 0 /HPF

## 2023-02-06 ENCOUNTER — TRANSCRIPTION ENCOUNTER (OUTPATIENT)
Age: 63
End: 2023-02-06

## 2023-02-07 RX ORDER — PREDNISONE 5 MG/1
5 TABLET ORAL DAILY
Qty: 30 | Refills: 0 | Status: DISCONTINUED | COMMUNITY
Start: 2022-02-28 | End: 2023-02-07

## 2023-02-08 ENCOUNTER — APPOINTMENT (OUTPATIENT)
Dept: NEPHROLOGY | Facility: CLINIC | Age: 63
End: 2023-02-08
Payer: COMMERCIAL

## 2023-02-08 PROCEDURE — 99443: CPT

## 2023-02-08 NOTE — REVIEW OF SYSTEMS
[Fever] : no fever [Chills] : no chills [Feeling Poorly] : not feeling poorly [Feeling Tired] : not feeling tired [Eye Pain] : no eye pain [Chest Pain] : no chest pain [Lower Ext Edema] : no extremity edema [Shortness Of Breath] : no shortness of breath [Cough] : no cough [SOB on Exertion] : no shortness of breath during exertion [Orthopnea] : no orthopnea [Abdominal Pain] : no abdominal pain [Vomiting] : no vomiting [Constipation] : no constipation [Diarrhea] : no diarrhea [Confused] : no confusion [Negative] : Cardiovascular

## 2023-02-08 NOTE — HISTORY OF PRESENT ILLNESS
[Home] : at home, [unfilled] , at the time of the visit. [Medical Office: ([facility])___] : at the medical office located in  [Verbal consent obtained from patient] : the patient, [unfilled] [FreeTextEntry1] : Pt. is a 62 y.o. M w/ PMHx. of HTN, HLD, Hx. 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 He was initiated on cellcept by a nephrologist that he was following while in [place]. He underwent right total knee replacement at [facility] in early Nov 2022, complicated by urinary retention, knee swelling and DVT, and also caught COVID-19 around the same time for which he was treated with mAB. \par \par He was last evaluated by me in December 2022, at that time was advised to continue to taper prednisone. Nephrotic syndrome remained in remission and he was advised to discontinue steroids on 1/13/23. \par \par Overall, feels well and offers no complaints. Pt. denies any shortness of breath, LE edema, blood or foam in the urine or chest pain.

## 2023-02-08 NOTE — ASSESSMENT
[FreeTextEntry1] : KENAN relapse: in the setting of receipt of Pfizer COVID-19 booster vaccine. \par Pt. has s/s of relapse within a week of receiving the booster and was noted to have nephrotic range proteinuria, hypoalbuminemia, and edema on labs done 2 weeks after he received the booster dose. \par Initiated on prednisone 20mg TID on 10/12/21, that was gradually tapered, and has been off of prednisone since 1/13/23. Pt. currently is on cellcept 1000mg po daily (started in May 2022 by nephrologist in [place])\par Recent labs with no proteinuria and normal serum albumin.\par Advised to lower dose of cellcept to 500mg po daily.  \par Avoid chronic NSAIDs. \par \par HTN:now with BP readings reported to be WNL, not on any antihypertensives. \par Monitor BP. \par \par Hypocalcemia: in the setting of torsemide use, now off diuretic and on Ca supplements\par recent Ca level WNL\par continue Ca supplements.\par \par Hypokalemia: in the setting of torsemide use, now off diuretic and on potassium supplements. \par Recent potassium level WNL. \par continue potassium supplements.\par \par \par repeat labs in 2 weeks and follow up in 1 month. \par \par

## 2023-02-09 ENCOUNTER — APPOINTMENT (OUTPATIENT)
Dept: ORTHOPEDIC SURGERY | Facility: CLINIC | Age: 63
End: 2023-02-09

## 2023-02-10 ENCOUNTER — TRANSCRIPTION ENCOUNTER (OUTPATIENT)
Age: 63
End: 2023-02-10

## 2023-02-23 ENCOUNTER — APPOINTMENT (OUTPATIENT)
Dept: NEPHROLOGY | Facility: CLINIC | Age: 63
End: 2023-02-23
Payer: COMMERCIAL

## 2023-02-23 DIAGNOSIS — R31.21 ASYMPTOMATIC MICROSCOPIC HEMATURIA: ICD-10-CM

## 2023-02-23 DIAGNOSIS — E83.51 HYPOCALCEMIA: ICD-10-CM

## 2023-02-23 LAB
ALBUMIN SERPL ELPH-MCNC: 4.1 G/DL
ANION GAP SERPL CALC-SCNC: 13 MMOL/L
APPEARANCE: CLEAR
BACTERIA: NEGATIVE
BASOPHILS # BLD AUTO: 0.06 K/UL
BASOPHILS NFR BLD AUTO: 1.1 %
BILIRUBIN URINE: NEGATIVE
BLOOD URINE: ABNORMAL
BUN SERPL-MCNC: 14 MG/DL
CALCIUM SERPL-MCNC: 9.3 MG/DL
CHLORIDE SERPL-SCNC: 105 MMOL/L
CO2 SERPL-SCNC: 24 MMOL/L
COLOR: YELLOW
CREAT SERPL-MCNC: 0.82 MG/DL
CREAT SPEC-SCNC: 169 MG/DL
CREAT/PROT UR: 0.1 RATIO
EGFR: 98 ML/MIN/1.73M2
EOSINOPHIL # BLD AUTO: 0.3 K/UL
EOSINOPHIL NFR BLD AUTO: 5.3 %
GLUCOSE QUALITATIVE U: NEGATIVE
GLUCOSE SERPL-MCNC: 92 MG/DL
HCT VFR BLD CALC: 43.4 %
HGB BLD-MCNC: 13.3 G/DL
HYALINE CASTS: 0 /LPF
IMM GRANULOCYTES NFR BLD AUTO: 0.2 %
KETONES URINE: NEGATIVE
LEUKOCYTE ESTERASE URINE: NEGATIVE
LYMPHOCYTES # BLD AUTO: 1.4 K/UL
LYMPHOCYTES NFR BLD AUTO: 24.6 %
MAN DIFF?: NORMAL
MCHC RBC-ENTMCNC: 27.9 PG
MCHC RBC-ENTMCNC: 30.6 GM/DL
MCV RBC AUTO: 91 FL
MICROSCOPIC-UA: NORMAL
MONOCYTES # BLD AUTO: 0.74 K/UL
MONOCYTES NFR BLD AUTO: 13 %
NEUTROPHILS # BLD AUTO: 3.19 K/UL
NEUTROPHILS NFR BLD AUTO: 55.8 %
NITRITE URINE: NEGATIVE
PH URINE: 6.5
PHOSPHATE SERPL-MCNC: 4.1 MG/DL
PLATELET # BLD AUTO: 222 K/UL
POTASSIUM SERPL-SCNC: 4.2 MMOL/L
PROT UR-MCNC: 13 MG/DL
PROTEIN URINE: NORMAL
RBC # BLD: 4.77 M/UL
RBC # FLD: 13.3 %
RED BLOOD CELLS URINE: 8 /HPF
SODIUM SERPL-SCNC: 142 MMOL/L
SPECIFIC GRAVITY URINE: 1.02
SQUAMOUS EPITHELIAL CELLS: 0 /HPF
UROBILINOGEN URINE: NORMAL
WBC # FLD AUTO: 5.7 K/UL
WHITE BLOOD CELLS URINE: 1 /HPF

## 2023-02-23 PROCEDURE — 99443: CPT

## 2023-02-23 RX ORDER — MYCOPHENOLATE MOFETIL 500 MG/1
500 TABLET ORAL
Qty: 60 | Refills: 0 | Status: DISCONTINUED | COMMUNITY
Start: 2022-07-20 | End: 2023-02-23

## 2023-02-23 RX ORDER — TORSEMIDE 20 MG/1
20 TABLET ORAL
Refills: 0 | Status: DISCONTINUED | COMMUNITY
Start: 2022-12-23 | End: 2023-02-23

## 2023-02-23 NOTE — HISTORY OF PRESENT ILLNESS
[Home] : at home, [unfilled] , at the time of the visit. [Medical Office: ([facility])___] : at the medical office located in  [Verbal consent obtained from patient] : the patient, [unfilled] [FreeTextEntry1] : Pt. is a 62 y.o. M w/ PMHx. of HTN, HLD, Hx. 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 He was initiated on cellcept by a nephrologist that he was following while in [place]. He underwent right total knee replacement at [facility] in early Nov 2022, complicated by urinary retention, knee swelling and DVT, and also caught COVID-19 around the same time for which he was treated with mAB. Nephrotic syndrome remained in remission and he has been off of steroids since 1/13/23. Dose of cellcept was reduced 2 weeks ago to 500mg po daily. Overall, pt. feels well and offers no complaints. Pt. denies any shortness of breath, LE edema, blood or foam in the urine or chest pain.

## 2023-02-23 NOTE — CONSULT LETTER
[Dear  ___] : Dear  [unfilled], [Courtesy Letter:] : I had the pleasure of seeing your patient, [unfilled], in my office today. [Please see my note below.] : Please see my note below. [Consult Closing:] : Thank you very much for allowing me to participate in the care of this patient.  If you have any questions, please do not hesitate to contact me. [Sincerely,] : Sincerely, [FreeTextEntry3] : Patricia Hay MD [DrLenora  ___] : Dr. LOYD

## 2023-02-23 NOTE — ASSESSMENT
[FreeTextEntry1] : KENAN relapse: in the setting of receipt of Pfizer COVID-19 booster vaccine. \par Pt. has s/s of relapse within a week of receiving the booster and was noted to have nephrotic range proteinuria, hypoalbuminemia, and edema on labs done 2 weeks after he received the booster dose. \par Initiated on prednisone 20mg TID on 10/12/21, that was gradually tapered, and has been off of prednisone since 1/13/23. Pt. currently is on cellcept 500mg po daily (started in May 2022 by nephrologist in [place])\par Recent labs with no proteinuria and normal serum albumin.\par Advised to discontinue cellcept.  \par continue losartan.\par Avoid chronic NSAIDs. \par \par HTN:now with BP readings reported to be WNL. \par Monitor BP. \par continue losartan (on 50mg po daily)\par \par Hypocalcemia: in the setting of torsemide use, now off diuretic and on Ca supplements\par recent Ca level WNL\par continue Ca supplements.\par \par Hypokalemia: in the setting of torsemide use, now off diuretic and on potassium supplements. \par Recent potassium level WNL. \par continue potassium supplements.\par \par Microscopic hematuria, complex kidney cysts, BPH: asymptomatic for UTI and denies any gross hematuria.\par Being evaluated by Urologist.\par \par \par repeat labs in 2 weeks and follow up in 1 month. \par \par

## 2023-03-03 ENCOUNTER — TRANSCRIPTION ENCOUNTER (OUTPATIENT)
Age: 63
End: 2023-03-03

## 2023-03-12 ENCOUNTER — RX RENEWAL (OUTPATIENT)
Age: 63
End: 2023-03-12

## 2023-03-13 DIAGNOSIS — I10 ESSENTIAL (PRIMARY) HYPERTENSION: ICD-10-CM

## 2023-03-14 DIAGNOSIS — E87.6 HYPOKALEMIA: ICD-10-CM

## 2023-03-15 LAB
25(OH)D3 SERPL-MCNC: 51.4 NG/ML
ALBUMIN SERPL ELPH-MCNC: 4.2 G/DL
ANION GAP SERPL CALC-SCNC: 11 MMOL/L
APPEARANCE: CLEAR
BACTERIA: NEGATIVE
BASOPHILS # BLD AUTO: 0.05 K/UL
BASOPHILS NFR BLD AUTO: 1 %
BILIRUBIN URINE: NEGATIVE
BLOOD URINE: NEGATIVE
BUN SERPL-MCNC: 9 MG/DL
CALCIUM SERPL-MCNC: 9.7 MG/DL
CALCIUM SERPL-MCNC: 9.7 MG/DL
CHLORIDE SERPL-SCNC: 105 MMOL/L
CO2 SERPL-SCNC: 26 MMOL/L
COLOR: NORMAL
CREAT SERPL-MCNC: 0.76 MG/DL
CREAT SPEC-SCNC: 40 MG/DL
CREAT/PROT UR: 0.1 RATIO
EGFR: 100 ML/MIN/1.73M2
EOSINOPHIL # BLD AUTO: 0.25 K/UL
EOSINOPHIL NFR BLD AUTO: 4.9 %
GLUCOSE QUALITATIVE U: NEGATIVE
GLUCOSE SERPL-MCNC: 97 MG/DL
HCT VFR BLD CALC: 46 %
HGB BLD-MCNC: 14.3 G/DL
HYALINE CASTS: 0 /LPF
IMM GRANULOCYTES NFR BLD AUTO: 0 %
KETONES URINE: NEGATIVE
LEUKOCYTE ESTERASE URINE: NEGATIVE
LYMPHOCYTES # BLD AUTO: 1.23 K/UL
LYMPHOCYTES NFR BLD AUTO: 24.3 %
MAGNESIUM SERPL-MCNC: 1.8 MG/DL
MAN DIFF?: NORMAL
MCHC RBC-ENTMCNC: 28.4 PG
MCHC RBC-ENTMCNC: 31.1 GM/DL
MCV RBC AUTO: 91.3 FL
MICROSCOPIC-UA: NORMAL
MONOCYTES # BLD AUTO: 0.57 K/UL
MONOCYTES NFR BLD AUTO: 11.2 %
NEUTROPHILS # BLD AUTO: 2.97 K/UL
NEUTROPHILS NFR BLD AUTO: 58.6 %
NITRITE URINE: NEGATIVE
PARATHYROID HORMONE INTACT: 26 PG/ML
PH URINE: 6.5
PHOSPHATE SERPL-MCNC: 3.8 MG/DL
PLATELET # BLD AUTO: 245 K/UL
POTASSIUM SERPL-SCNC: 4.6 MMOL/L
PROT UR-MCNC: 6 MG/DL
PROTEIN URINE: NEGATIVE
RBC # BLD: 5.04 M/UL
RBC # FLD: 12.6 %
RED BLOOD CELLS URINE: 1 /HPF
SODIUM SERPL-SCNC: 141 MMOL/L
SPECIFIC GRAVITY URINE: 1.01
SQUAMOUS EPITHELIAL CELLS: 0 /HPF
UROBILINOGEN URINE: NORMAL
WBC # FLD AUTO: 5.07 K/UL
WHITE BLOOD CELLS URINE: 0 /HPF

## 2023-03-21 ENCOUNTER — TRANSCRIPTION ENCOUNTER (OUTPATIENT)
Age: 63
End: 2023-03-21

## 2023-03-23 ENCOUNTER — TRANSCRIPTION ENCOUNTER (OUTPATIENT)
Age: 63
End: 2023-03-23

## 2023-03-24 ENCOUNTER — APPOINTMENT (OUTPATIENT)
Dept: INTERNAL MEDICINE | Facility: CLINIC | Age: 63
End: 2023-03-24
Payer: COMMERCIAL

## 2023-03-24 VITALS
BODY MASS INDEX: 31.18 KG/M2 | WEIGHT: 194 LBS | HEIGHT: 66 IN | SYSTOLIC BLOOD PRESSURE: 126 MMHG | TEMPERATURE: 97.4 F | HEART RATE: 56 BPM | OXYGEN SATURATION: 98 % | DIASTOLIC BLOOD PRESSURE: 74 MMHG

## 2023-03-24 DIAGNOSIS — Z23 ENCOUNTER FOR IMMUNIZATION: ICD-10-CM

## 2023-03-24 PROCEDURE — 90677 PCV20 VACCINE IM: CPT

## 2023-03-24 PROCEDURE — 99213 OFFICE O/P EST LOW 20 MIN: CPT | Mod: 25

## 2023-03-24 PROCEDURE — G0009: CPT

## 2023-03-24 RX ORDER — LOSARTAN POTASSIUM 50 MG/1
50 TABLET, FILM COATED ORAL
Qty: 90 | Refills: 2 | Status: ACTIVE | COMMUNITY
Start: 2021-01-08 | End: 1900-01-01

## 2023-03-24 RX ORDER — LEVOTHYROXINE SODIUM 0.03 MG/1
25 TABLET ORAL
Qty: 90 | Refills: 0 | Status: DISCONTINUED | COMMUNITY
Start: 2021-10-03 | End: 2023-03-24

## 2023-03-24 RX ORDER — DIAPER,BRIEF,INFANT-TODD,DISP
600-400 EACH MISCELLANEOUS
Qty: 30 | Refills: 0 | Status: DISCONTINUED | COMMUNITY
Start: 2021-10-14 | End: 2023-03-24

## 2023-03-24 RX ORDER — MULTIVITAMIN
CAPSULE ORAL
Refills: 0 | Status: DISCONTINUED | COMMUNITY
Start: 2021-05-30 | End: 2023-03-24

## 2023-03-24 NOTE — ASSESSMENT
[FreeTextEntry1] : HM - Prevnar-20 today, pt coming back in one month for CPE, will discuss meds at that time.

## 2023-03-24 NOTE — PHYSICAL EXAM
[Normal] : no acute distress, well nourished, well developed and well-appearing [No Joint Swelling] : no joint swelling [de-identified] : right hand with full ROM, 5/5 strength of hand and pinch , no thenar eminence wasting [de-identified] : (+) Tinel's on right

## 2023-03-24 NOTE — HISTORY OF PRESENT ILLNESS
[FreeTextEntry8] : 63 yo M, here for several weeks of numbness in his right hand. Worse at night, cannot feel his middle three fingers. Does a lot of computer work, is right-handed. Denies trauma, no weakness (dropping things, etc) although sometimes feels like hand is stiff. \par Recently off all meds from his [facility] - prednisone, calcium, etc. Still on Eliquis bid, not sure how long to stay on this, been on since Nov, had DVT associated with right TKR, and then prior with right shoulder surgery. Pt reports no issues with this, no bruising or bleeding. Also wants to know if he should stay on the statin and the ARB.

## 2023-04-01 ENCOUNTER — APPOINTMENT (OUTPATIENT)
Dept: ULTRASOUND IMAGING | Facility: CLINIC | Age: 63
End: 2023-04-01
Payer: COMMERCIAL

## 2023-04-01 PROCEDURE — 76536 US EXAM OF HEAD AND NECK: CPT

## 2023-04-10 ENCOUNTER — NON-APPOINTMENT (OUTPATIENT)
Age: 63
End: 2023-04-10

## 2023-04-10 LAB
ALBUMIN SERPL ELPH-MCNC: 4.2 G/DL
ANION GAP SERPL CALC-SCNC: 13 MMOL/L
BUN SERPL-MCNC: 13 MG/DL
CALCIUM SERPL-MCNC: 9.5 MG/DL
CHLORIDE SERPL-SCNC: 106 MMOL/L
CO2 SERPL-SCNC: 23 MMOL/L
CREAT SERPL-MCNC: 0.73 MG/DL
EGFR: 102 ML/MIN/1.73M2
GLUCOSE SERPL-MCNC: 86 MG/DL
PHOSPHATE SERPL-MCNC: 4 MG/DL
POTASSIUM SERPL-SCNC: 4.1 MMOL/L
SODIUM SERPL-SCNC: 141 MMOL/L

## 2023-04-12 ENCOUNTER — APPOINTMENT (OUTPATIENT)
Dept: INTERNAL MEDICINE | Facility: CLINIC | Age: 63
End: 2023-04-12
Payer: COMMERCIAL

## 2023-04-12 VITALS
SYSTOLIC BLOOD PRESSURE: 105 MMHG | WEIGHT: 195 LBS | DIASTOLIC BLOOD PRESSURE: 66 MMHG | OXYGEN SATURATION: 98 % | BODY MASS INDEX: 31.34 KG/M2 | HEART RATE: 63 BPM | HEIGHT: 66 IN | TEMPERATURE: 98.1 F

## 2023-04-12 DIAGNOSIS — Z00.00 ENCOUNTER FOR GENERAL ADULT MEDICAL EXAMINATION W/OUT ABNORMAL FINDINGS: ICD-10-CM

## 2023-04-12 DIAGNOSIS — G56.01 CARPAL TUNNEL SYNDROME, RIGHT UPPER LIMB: ICD-10-CM

## 2023-04-12 PROCEDURE — 90471 IMMUNIZATION ADMIN: CPT

## 2023-04-12 PROCEDURE — 90715 TDAP VACCINE 7 YRS/> IM: CPT

## 2023-04-12 PROCEDURE — 99396 PREV VISIT EST AGE 40-64: CPT | Mod: 25

## 2023-04-12 RX ORDER — CHOLECALCIFEROL (VITAMIN D3) 250 MCG
250 MCG CAPSULE ORAL DAILY
Qty: 60 | Refills: 0 | Status: ACTIVE | COMMUNITY
Start: 2021-05-30

## 2023-04-12 NOTE — ASSESSMENT
[FreeTextEntry1] : HM - pt just had recent labs, these were reviewed. Tdap today. Pt would be candidate for early DEXA screening.

## 2023-04-12 NOTE — HISTORY OF PRESENT ILLNESS
[FreeTextEntry1] : annual [de-identified] : 61 yo M, here for check up. Reports severe CTS, bilat, wrists hurt and fingers are numb. Uses splints but not helpful. Wants to\par Also c/o nosebleeds, was told in the past to have this cauterized but he never did. Started over 2 years ago, told it was from the prednisone and not the DOAC.\par Last visit asked about staying on Eliquis for ?? duration, would like to minimize pills. Pt at high risk since multiple DVTs although all related to surgeries.

## 2023-04-12 NOTE — PHYSICAL EXAM
[No Respiratory Distress] : no respiratory distress  [No Edema] : there was no peripheral edema [Normal Gait] : normal gait [Normal] : affect was normal and insight and judgment were intact [de-identified] : (+) Tinels on the right, no thenar eminence wasting, 5/5 motor

## 2023-04-12 NOTE — HEALTH RISK ASSESSMENT
[No] : No [No falls in past year] : Patient reported no falls in the past year [0] : 2) Feeling down, depressed, or hopeless: Not at all (0) [PHQ-2 Negative - No further assessment needed] : PHQ-2 Negative - No further assessment needed [[id number]] : 0 [None] : None [Employed] : employed [Single] : single [Fully functional (bathing, dressing, toileting, transferring, walking, feeding)] : Fully functional (bathing, dressing, toileting, transferring, walking, feeding) [Fully functional (using the telephone, shopping, preparing meals, housekeeping, doing laundry, using] : Fully functional and needs no help or supervision to perform IADLs (using the telephone, shopping, preparing meals, housekeeping, doing laundry, using transportation, managing medications and managing finances) [Never] : Never

## 2023-04-13 ENCOUNTER — TRANSCRIPTION ENCOUNTER (OUTPATIENT)
Age: 63
End: 2023-04-13

## 2023-04-14 ENCOUNTER — TRANSCRIPTION ENCOUNTER (OUTPATIENT)
Age: 63
End: 2023-04-14

## 2023-04-17 ENCOUNTER — APPOINTMENT (OUTPATIENT)
Dept: ORTHOPEDIC SURGERY | Facility: CLINIC | Age: 63
End: 2023-04-17
Payer: COMMERCIAL

## 2023-04-17 VITALS
OXYGEN SATURATION: 97 % | SYSTOLIC BLOOD PRESSURE: 145 MMHG | DIASTOLIC BLOOD PRESSURE: 92 MMHG | WEIGHT: 192 LBS | HEIGHT: 68 IN | BODY MASS INDEX: 29.1 KG/M2 | HEART RATE: 57 BPM

## 2023-04-17 DIAGNOSIS — R20.2 ANESTHESIA OF SKIN: ICD-10-CM

## 2023-04-17 DIAGNOSIS — R20.0 ANESTHESIA OF SKIN: ICD-10-CM

## 2023-04-17 PROCEDURE — 99204 OFFICE O/P NEW MOD 45 MIN: CPT | Mod: 25

## 2023-04-17 PROCEDURE — 20526 THER INJECTION CARP TUNNEL: CPT | Mod: 50

## 2023-04-17 PROCEDURE — 76942 ECHO GUIDE FOR BIOPSY: CPT | Mod: 59

## 2023-04-17 PROCEDURE — 73110 X-RAY EXAM OF WRIST: CPT | Mod: 50

## 2023-04-19 ENCOUNTER — TRANSCRIPTION ENCOUNTER (OUTPATIENT)
Age: 63
End: 2023-04-19

## 2023-04-21 ENCOUNTER — TRANSCRIPTION ENCOUNTER (OUTPATIENT)
Age: 63
End: 2023-04-21

## 2023-05-10 ENCOUNTER — TRANSCRIPTION ENCOUNTER (OUTPATIENT)
Age: 63
End: 2023-05-10

## 2023-05-15 ENCOUNTER — TRANSCRIPTION ENCOUNTER (OUTPATIENT)
Age: 63
End: 2023-05-15

## 2023-05-31 ENCOUNTER — TRANSCRIPTION ENCOUNTER (OUTPATIENT)
Age: 63
End: 2023-05-31

## 2023-06-01 ENCOUNTER — TRANSCRIPTION ENCOUNTER (OUTPATIENT)
Age: 63
End: 2023-06-01

## 2023-07-05 ENCOUNTER — APPOINTMENT (OUTPATIENT)
Dept: ENDOCRINOLOGY | Facility: CLINIC | Age: 63
End: 2023-07-05
Payer: MEDICARE

## 2023-07-05 PROCEDURE — 99215 OFFICE O/P EST HI 40 MIN: CPT

## 2023-07-05 RX ORDER — APIXABAN 2.5 MG/1
2.5 TABLET, FILM COATED ORAL TWICE DAILY
Qty: 180 | Refills: 3 | Status: DISCONTINUED | COMMUNITY
Start: 2023-03-24 | End: 2023-07-05

## 2023-07-05 NOTE — ASSESSMENT
[FreeTextEntry1] : 63 year old male with history of thyroid cancer (likely low risk of recurrence) with total thyroidectomy in 2013, here for f/u. Last appt in 09/2021\par \par Thyroid Cancer: \par -Check baseline thyroid ultrasound \par -Reported that in [place] ?noted to have lymph nodes that were abnormal on their scan\par -Check TFTs and thyroglobulin \par \par Hypothyroidism \par -Continue levothyroxine 175 mcg, will adjust accordingly\par -Goal TSH of 0.5-2.0\par \par -Follow up in 6 months

## 2023-07-05 NOTE — HISTORY OF PRESENT ILLNESS
[FreeTextEntry1] : 62 year old male with history of nephrotic syndrome, thyroid cancer here for evaluation \par \par 2013 total thyroidectomy at [facility] [place], did not receive ECKERT \par Last neck ultrasound in the last 3 months ( [facility]) \par Currently on levothyroxine to 250 mcg ( Changed on 01/07/2020) - previous dose of 162.5 mcg \par No recurrence of thyroid cancer \par \par He has had a shoulder operation on 09/2021 \par \par \par On prednisone 60 mg for 7 months \par Levothyroxine 175 mcg is the most recent dose \par \par Symptoms: \par -Increased fatigue \par -No hair loss \par \par -No HP \par + tremors \par -Weight loss recently but major weight fluctuations \par -No hair loss \par -Increased bowel movements \par -Increased cold intolerance \par -Low energy levels \par \par Recently got diagnosed with nephrotic syndrome\par Currently off of prednisone 50 mg daily and cellcept \par \par \par

## 2023-07-05 NOTE — PHYSICAL EXAM
[Alert] : alert [Well Nourished] : well nourished [No Acute Distress] : no acute distress [Normal Sclera/Conjunctiva] : normal sclera/conjunctiva [EOMI] : extra ocular movement intact [PERRL] : pupils equal, round and reactive to light [Normal Outer Ear/Nose] : the ears and nose were normal in appearance [Normal Hearing] : hearing was normal [Normal TMs] : both tympanic membranes were normal [No Neck Mass] : no neck mass was observed [Thyroid Not Enlarged] : the thyroid was not enlarged [No Respiratory Distress] : no respiratory distress [Clear to Auscultation] : lungs were clear to auscultation bilaterally [Normal S1, S2] : normal S1 and S2 [Normal Rate] : heart rate was normal [Regular Rhythm] : with a regular rhythm [Normal Bowel Sounds] : normal bowel sounds [Not Tender] : non-tender [Soft] : abdomen soft [Normal Gait] : normal gait [No Clubbing, Cyanosis] : no clubbing  or cyanosis of the fingernails [No Joint Swelling] : no joint swelling seen [No Rash] : no rash [No Skin Lesions] : no skin lesions [No Motor Deficits] : the motor exam was normal [Normal Reflexes] : deep tendon reflexes were 2+ and symmetric [Oriented x3] : oriented to person, place, and time [Normal Affect] : the affect was normal [Normal Insight/Judgement] : insight and judgment were intact [Normal Mood] : the mood was normal

## 2023-07-05 NOTE — REVIEW OF SYSTEMS
[Fatigue] : no fatigue [Decreased Appetite] : appetite not decreased [Recent Weight Gain (___ Lbs)] : no recent weight gain [Recent Weight Loss (___ Lbs)] : no recent weight loss [Visual Field Defect] : no visual field defect [Dry Eyes] : no dryness [Dysphagia] : no dysphagia [Neck Pain] : no neck pain [Dysphonia] : no dysphonia [Nasal Congestion] : no nasal congestion [Chest Pain] : no chest pain [Slow Heart Rate] : heart rate is not slow [Palpitations] : no palpitations [Fast Heart Rate] : heart rate is not fast [Shortness Of Breath] : no shortness of breath [Cough] : no cough [Nausea] : no nausea [Constipation] : no constipation [Vomiting] : no vomiting [Diarrhea] : no diarrhea [Polyuria] : no polyuria [Dysuria] : no dysuria [Hesistancy] : no hesitancy [Joint Pain] : no joint pain [Muscle Weakness] : no muscle weakness [Acanthosis] : no acanthosis  [Acne] : no acne [Headaches] : no headaches [Dizziness] : no dizziness [Tremors] : no tremors [Pain/Numbness of Digits] : no pain/numbness of digits [Depression] : no depression [Polydipsia] : no polydipsia [Cold Intolerance] : no cold intolerance [Easy Bleeding] : no ~M tendency for easy bleeding [Easy Bruising] : no tendency for easy bruising

## 2023-07-10 ENCOUNTER — APPOINTMENT (OUTPATIENT)
Dept: ENDOCRINOLOGY | Facility: CLINIC | Age: 63
End: 2023-07-10

## 2023-07-10 ENCOUNTER — TRANSCRIPTION ENCOUNTER (OUTPATIENT)
Age: 63
End: 2023-07-10

## 2023-07-10 LAB
25(OH)D3 SERPL-MCNC: 38.9 NG/ML
T4 FREE SERPL-MCNC: 1.7 NG/DL
THYROGLOB AB SERPL-ACNC: <20 IU/ML
THYROGLOB SERPL-MCNC: <0.2 NG/ML
TSH SERPL-ACNC: 0.59 UIU/ML

## 2023-07-14 ENCOUNTER — TRANSCRIPTION ENCOUNTER (OUTPATIENT)
Age: 63
End: 2023-07-14

## 2023-07-28 LAB
ALBUMIN SERPL ELPH-MCNC: 4.1 G/DL
ANION GAP SERPL CALC-SCNC: 11 MMOL/L
APPEARANCE: CLEAR
BACTERIA: NEGATIVE /HPF
BILIRUBIN URINE: NEGATIVE
BLOOD URINE: ABNORMAL
BUN SERPL-MCNC: 26 MG/DL
CALCIUM SERPL-MCNC: 9.4 MG/DL
CAST: 0 /LPF
CHLORIDE SERPL-SCNC: 106 MMOL/L
CO2 SERPL-SCNC: 25 MMOL/L
COLOR: YELLOW
CREAT SERPL-MCNC: 0.86 MG/DL
CREAT SPEC-SCNC: 128 MG/DL
CREAT/PROT UR: 0.1 RATIO
EGFR: 96 ML/MIN/1.73M2
EPITHELIAL CELLS: 0 /HPF
GLUCOSE QUALITATIVE U: NEGATIVE MG/DL
GLUCOSE SERPL-MCNC: 86 MG/DL
KETONES URINE: NEGATIVE MG/DL
LEUKOCYTE ESTERASE URINE: NEGATIVE
MICROSCOPIC-UA: NORMAL
NITRITE URINE: NEGATIVE
PH URINE: 6
PHOSPHATE SERPL-MCNC: 3.8 MG/DL
POTASSIUM SERPL-SCNC: 4.5 MMOL/L
PROT UR-MCNC: 11 MG/DL
PROTEIN URINE: NEGATIVE MG/DL
RED BLOOD CELLS URINE: 4 /HPF
SODIUM SERPL-SCNC: 143 MMOL/L
SPECIFIC GRAVITY URINE: 1.02
UROBILINOGEN URINE: 0.2 MG/DL
WHITE BLOOD CELLS URINE: 2 /HPF

## 2023-08-01 ENCOUNTER — TRANSCRIPTION ENCOUNTER (OUTPATIENT)
Age: 63
End: 2023-08-01

## 2023-08-02 ENCOUNTER — TRANSCRIPTION ENCOUNTER (OUTPATIENT)
Age: 63
End: 2023-08-02

## 2023-08-07 ENCOUNTER — TRANSCRIPTION ENCOUNTER (OUTPATIENT)
Age: 63
End: 2023-08-07

## 2023-08-08 ENCOUNTER — TRANSCRIPTION ENCOUNTER (OUTPATIENT)
Age: 63
End: 2023-08-08

## 2023-08-09 ENCOUNTER — APPOINTMENT (OUTPATIENT)
Dept: INTERNAL MEDICINE | Facility: CLINIC | Age: 63
End: 2023-08-09
Payer: MEDICARE

## 2023-08-09 ENCOUNTER — TRANSCRIPTION ENCOUNTER (OUTPATIENT)
Age: 63
End: 2023-08-09

## 2023-08-09 VITALS
DIASTOLIC BLOOD PRESSURE: 82 MMHG | WEIGHT: 194 LBS | TEMPERATURE: 98.7 F | HEIGHT: 68 IN | HEART RATE: 56 BPM | BODY MASS INDEX: 29.4 KG/M2 | OXYGEN SATURATION: 98 % | SYSTOLIC BLOOD PRESSURE: 133 MMHG

## 2023-08-09 DIAGNOSIS — E78.5 HYPERLIPIDEMIA, UNSPECIFIED: ICD-10-CM

## 2023-08-09 PROCEDURE — 36415 COLL VENOUS BLD VENIPUNCTURE: CPT

## 2023-08-09 PROCEDURE — 99214 OFFICE O/P EST MOD 30 MIN: CPT | Mod: 25

## 2023-08-09 NOTE — PHYSICAL EXAM
[Normal] : no acute distress, well nourished, well developed and well-appearing [de-identified] : diffuse redness of upper anterior chest, and less so mid to upper back, blanches with pressure, no discrete rash or lesion, no other areas affected.

## 2023-08-09 NOTE — HISTORY OF PRESENT ILLNESS
[FreeTextEntry8] : 64 yo M, here for rash. reports it started about 2 weeks ago, went to the beach but sat under an umbrella, did not use sunscreen. Put some lotion on his legs when he got home, legs got red so he stopped. Then noted about 10 days ago rash on chest, then back, got darker, not really itchy, not spreading further but not going away.  Seen by cariologist at [facility], had LE doppler, told to stop his AC although might need to restart, plans on having his left knee done, since this is panful, and he has difficulty walking. otherwise recently seen by renal and endocrine, had most labs there, all ok.

## 2023-08-10 ENCOUNTER — TRANSCRIPTION ENCOUNTER (OUTPATIENT)
Age: 63
End: 2023-08-10

## 2023-08-10 LAB
CHOLEST SERPL-MCNC: 150 MG/DL
HDLC SERPL-MCNC: 48 MG/DL
LDLC SERPL CALC-MCNC: 81 MG/DL
NONHDLC SERPL-MCNC: 102 MG/DL
TRIGL SERPL-MCNC: 113 MG/DL

## 2023-08-11 ENCOUNTER — TRANSCRIPTION ENCOUNTER (OUTPATIENT)
Age: 63
End: 2023-08-11

## 2023-08-14 ENCOUNTER — TRANSCRIPTION ENCOUNTER (OUTPATIENT)
Age: 63
End: 2023-08-14

## 2023-08-17 ENCOUNTER — APPOINTMENT (OUTPATIENT)
Dept: OTOLARYNGOLOGY | Facility: CLINIC | Age: 63
End: 2023-08-17
Payer: MEDICARE

## 2023-08-17 VITALS
SYSTOLIC BLOOD PRESSURE: 141 MMHG | WEIGHT: 194 LBS | BODY MASS INDEX: 29.4 KG/M2 | TEMPERATURE: 97.4 F | DIASTOLIC BLOOD PRESSURE: 89 MMHG | HEART RATE: 64 BPM | HEIGHT: 68 IN

## 2023-08-17 DIAGNOSIS — Z87.898 PERSONAL HISTORY OF OTHER SPECIFIED CONDITIONS: ICD-10-CM

## 2023-08-17 DIAGNOSIS — R04.0 EPISTAXIS: ICD-10-CM

## 2023-08-17 DIAGNOSIS — J34.89 OTHER SPECIFIED DISORDERS OF NOSE AND NASAL SINUSES: ICD-10-CM

## 2023-08-17 PROCEDURE — 99204 OFFICE O/P NEW MOD 45 MIN: CPT | Mod: 25

## 2023-08-17 PROCEDURE — 92567 TYMPANOMETRY: CPT

## 2023-08-17 PROCEDURE — 92557 COMPREHENSIVE HEARING TEST: CPT

## 2023-08-17 PROCEDURE — 31231 NASAL ENDOSCOPY DX: CPT

## 2023-08-17 NOTE — CONSULT LETTER
[Dear  ___] : Dear  [unfilled], [Consult Letter:] : I had the pleasure of evaluating your patient, [unfilled]. [Please see my note below.] : Please see my note below. [Consult Closing:] : Thank you very much for allowing me to participate in the care of this patient.  If you have any questions, please do not hesitate to contact me. [Sincerely,] : Sincerely, [FreeTextEntry3] : Pancho Bah MD [facility] Associated Professor, [place]

## 2023-08-17 NOTE — REVIEW OF SYSTEMS
[As Noted in HPI] : as noted in HPI [Hearing Loss] : hearing loss [Ear Noises] : ear noises [Nasal Congestion] : nasal congestion [Negative] : Heme/Lymph

## 2023-08-17 NOTE — ASSESSMENT
[FreeTextEntry1] : Patient 63-year-old gentleman long history of chronic renal disease had an episode several years ago of a sudden sensorineural hearing loss never returned follows up for hearing test.  Also has a history of some crusting in his nasal cavity had apparently a septoplasty back in 2009.  On examination endoscopically has a perforated anterior septum fairly large edges are clear no evidence of any acute crusting current crusting at this time was told to keep this humidified audiogram was performed confirmed his asymmetrical hearing loss we will continue to monitor him.  We talked about the pros and cons of considering a hearing aid but he seems to be compensating well.  I do recommend he follow-up with us annually.

## 2023-08-17 NOTE — HISTORY OF PRESENT ILLNESS
[de-identified] : Patient states that about 2 years ago he lost hearing in the right ear. He was told that there was nothing to be done about the right ear because he was already on steroids for kidney issues. He did not have any imaging or further workup for the hearing loss.  He has mild ringing in the right ear  that comes and goes, but he does not have any dizziness. He admits he almost has static in the right ear as well.    He had bloody noses and nasal congestion with discharge all the time and he states its due to medications he was on for his kidneys. He does not use any medicated nasal sprays but will occasionally use saline  He was last cauterized as a child

## 2023-08-18 ENCOUNTER — TRANSCRIPTION ENCOUNTER (OUTPATIENT)
Age: 63
End: 2023-08-18

## 2023-08-21 ENCOUNTER — TRANSCRIPTION ENCOUNTER (OUTPATIENT)
Age: 63
End: 2023-08-21

## 2023-08-21 ENCOUNTER — APPOINTMENT (OUTPATIENT)
Dept: HEMATOLOGY ONCOLOGY | Facility: CLINIC | Age: 63
End: 2023-08-21
Payer: MEDICARE

## 2023-08-21 VITALS
SYSTOLIC BLOOD PRESSURE: 128 MMHG | DIASTOLIC BLOOD PRESSURE: 72 MMHG | HEART RATE: 72 BPM | TEMPERATURE: 98.2 F | WEIGHT: 193 LBS | OXYGEN SATURATION: 98 % | HEIGHT: 68 IN | BODY MASS INDEX: 29.25 KG/M2

## 2023-08-21 DIAGNOSIS — I82.409 ACUTE EMBOLISM AND THROMBOSIS OF UNSPECIFIED DEEP VEINS OF UNSPECIFIED LOWER EXTREMITY: ICD-10-CM

## 2023-08-21 PROCEDURE — 99205 OFFICE O/P NEW HI 60 MIN: CPT

## 2023-08-22 ENCOUNTER — TRANSCRIPTION ENCOUNTER (OUTPATIENT)
Age: 63
End: 2023-08-22

## 2023-08-28 ENCOUNTER — TRANSCRIPTION ENCOUNTER (OUTPATIENT)
Age: 63
End: 2023-08-28

## 2023-09-05 ENCOUNTER — NON-APPOINTMENT (OUTPATIENT)
Age: 63
End: 2023-09-05

## 2023-09-06 ENCOUNTER — LABORATORY RESULT (OUTPATIENT)
Age: 63
End: 2023-09-06

## 2023-09-08 ENCOUNTER — TRANSCRIPTION ENCOUNTER (OUTPATIENT)
Age: 63
End: 2023-09-08

## 2023-09-08 ENCOUNTER — APPOINTMENT (OUTPATIENT)
Dept: NEPHROLOGY | Facility: CLINIC | Age: 63
End: 2023-09-08
Payer: MEDICARE

## 2023-09-08 VITALS
OXYGEN SATURATION: 96 % | DIASTOLIC BLOOD PRESSURE: 89 MMHG | SYSTOLIC BLOOD PRESSURE: 138 MMHG | HEART RATE: 58 BPM | TEMPERATURE: 98 F | HEIGHT: 68 IN | WEIGHT: 192 LBS | BODY MASS INDEX: 29.1 KG/M2

## 2023-09-08 DIAGNOSIS — N05.0 UNSPECIFIED NEPHRITIC SYNDROME WITH MINOR GLOMERULAR ABNORMALITY: ICD-10-CM

## 2023-09-08 LAB
ALBUMIN SERPL ELPH-MCNC: 4 G/DL
ALP BLD-CCNC: 97 U/L
ALT SERPL-CCNC: <5 U/L
ANA PAT FLD IF-IMP: ABNORMAL
ANA SER IF-ACNC: ABNORMAL
ANION GAP SERPL CALC-SCNC: 13 MMOL/L
AST SERPL-CCNC: 15 U/L
BASOPHILS # BLD AUTO: 0.02 K/UL
BASOPHILS NFR BLD AUTO: 0.4 %
BILIRUB SERPL-MCNC: 0.4 MG/DL
BUN SERPL-MCNC: 15 MG/DL
C3 SERPL-MCNC: 51 MG/DL
C4 SERPL-MCNC: 4 MG/DL
CALCIUM SERPL-MCNC: 9.4 MG/DL
CHLORIDE SERPL-SCNC: 103 MMOL/L
CO2 SERPL-SCNC: 24 MMOL/L
CREAT SERPL-MCNC: 0.9 MG/DL
CREAT SPEC-SCNC: 137 MG/DL
CREAT/PROT UR: 0.1 RATIO
CRP SERPL-MCNC: 20 MG/L
EGFR: 95 ML/MIN/1.73M2
EOSINOPHIL # BLD AUTO: 0.14 K/UL
EOSINOPHIL NFR BLD AUTO: 2.5 %
GLUCOSE SERPL-MCNC: 89 MG/DL
HBV SURFACE AB SER QL: NONREACTIVE
HBV SURFACE AG SER QL: NONREACTIVE
HCT VFR BLD CALC: 44.9 %
HGB BLD-MCNC: 14.2 G/DL
HIV1+2 AB SPEC QL IA.RAPID: NONREACTIVE
IMM GRANULOCYTES NFR BLD AUTO: 0.2 %
LYMPHOCYTES # BLD AUTO: 0.95 K/UL
LYMPHOCYTES NFR BLD AUTO: 16.9 %
MAN DIFF?: NORMAL
MCHC RBC-ENTMCNC: 28.8 PG
MCHC RBC-ENTMCNC: 31.6 GM/DL
MCV RBC AUTO: 91.1 FL
MONOCYTES # BLD AUTO: 0.29 K/UL
MONOCYTES NFR BLD AUTO: 5.2 %
NEUTROPHILS # BLD AUTO: 4.21 K/UL
NEUTROPHILS NFR BLD AUTO: 74.8 %
PLATELET # BLD AUTO: 266 K/UL
POTASSIUM SERPL-SCNC: 4.4 MMOL/L
PROT SERPL-MCNC: 6.9 G/DL
PROT UR-MCNC: 18 MG/DL
RBC # BLD: 4.93 M/UL
RBC # FLD: 12.6 %
SODIUM SERPL-SCNC: 140 MMOL/L
WBC # FLD AUTO: 5.62 K/UL

## 2023-09-08 PROCEDURE — 99214 OFFICE O/P EST MOD 30 MIN: CPT

## 2023-09-08 NOTE — PHYSICAL EXAM
[General Appearance - Alert] : alert [General Appearance - In No Acute Distress] : in no acute distress [General Appearance - Well Nourished] : well nourished [General Appearance - Well Developed] : well developed [Sclera] : the sclera and conjunctiva were normal [Outer Ear] : the ears and nose were normal in appearance [Hearing Threshold Finger Rub Not McDonald] : hearing was normal [Examination Of The Oral Cavity] : the lips and gums were normal [Neck Appearance] : the appearance of the neck was normal [] : the neck was supple [Neck Cervical Mass (___cm)] : no neck mass was observed [Jugular Venous Distention Increased] : there was no jugular-venous distention [Heart Rate And Rhythm] : heart rate was normal and rhythm regular [Heart Sounds] : normal S1 and S2 [Heart Sounds Gallop] : no gallops [Edema] : there was no peripheral edema [Bowel Sounds] : normal bowel sounds [Abdomen Soft] : soft [Abdomen Tenderness] : non-tender [No CVA Tenderness] : no ~M costovertebral angle tenderness [No Spinal Tenderness] : no spinal tenderness [Abnormal Walk] : normal gait [Nail Clubbing] : no clubbing  or cyanosis of the fingernails [Musculoskeletal - Swelling] : no joint swelling seen [Skin Color & Pigmentation] : normal skin color and pigmentation [FreeTextEntry1] : papular rash on trunk.

## 2023-09-08 NOTE — ASSESSMENT
[FreeTextEntry1] : KENAN  had 2 flares treated with steroids. now in remission since 1/13/23.  recent labs with normal albumin  check urinalysis and urine protein/creatinine ratio today    HTN: continue losartan (on 50mg po daily  Microscopic hematuria, complex kidney cysts, BPH: asymptomatic for UTI and denies any gross hematuria. Being evaluated by Urologist.  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 biopsy is pending. creat is normal. anca is negative. complements can be low in a variety of inflammatory skin rashes as well. I will check a urinalysis but at this time unlikely that there is kidney involvement. would leave decision to treat ( steroids etc) to dermatology

## 2023-09-08 NOTE — HISTORY OF PRESENT ILLNESS
[FreeTextEntry1] : Follow up Minimal Change Disease   Pt. is a 63 y.o. M w/ PMHx. of HTN, HLD, Hx. 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

## 2023-09-10 LAB
APPEARANCE: CLEAR
BACTERIA: NEGATIVE /HPF
BILIRUBIN URINE: NEGATIVE
BLOOD URINE: ABNORMAL
COLOR: YELLOW
GLUCOSE QUALITATIVE U: NEGATIVE MG/DL
HYALINE CASTS: NORMAL
KETONES URINE: NEGATIVE MG/DL
LEUKOCYTE ESTERASE URINE: NEGATIVE
MICROSCOPIC-UA: NORMAL
NITRITE URINE: NEGATIVE
PH URINE: 6
PROTEIN URINE: NORMAL MG/DL
RED BLOOD CELLS URINE: 13 /HPF
SPECIFIC GRAVITY URINE: 1.02
UROBILINOGEN URINE: 0.2 MG/DL
WHITE BLOOD CELLS URINE: 2 /HPF

## 2023-09-11 LAB — CRYOGLOB SERPL-MCNC: NEGATIVE

## 2023-09-12 ENCOUNTER — TRANSCRIPTION ENCOUNTER (OUTPATIENT)
Age: 63
End: 2023-09-12

## 2023-09-13 ENCOUNTER — TRANSCRIPTION ENCOUNTER (OUTPATIENT)
Age: 63
End: 2023-09-13

## 2023-09-19 ENCOUNTER — TRANSCRIPTION ENCOUNTER (OUTPATIENT)
Age: 63
End: 2023-09-19

## 2023-09-20 ENCOUNTER — TRANSCRIPTION ENCOUNTER (OUTPATIENT)
Age: 63
End: 2023-09-20

## 2023-09-21 ENCOUNTER — NON-APPOINTMENT (OUTPATIENT)
Age: 63
End: 2023-09-21

## 2023-09-21 ENCOUNTER — TRANSCRIPTION ENCOUNTER (OUTPATIENT)
Age: 63
End: 2023-09-21

## 2023-09-22 ENCOUNTER — TRANSCRIPTION ENCOUNTER (OUTPATIENT)
Age: 63
End: 2023-09-22

## 2023-09-25 ENCOUNTER — TRANSCRIPTION ENCOUNTER (OUTPATIENT)
Age: 63
End: 2023-09-25

## 2023-09-25 ENCOUNTER — NON-APPOINTMENT (OUTPATIENT)
Age: 63
End: 2023-09-25

## 2023-09-28 ENCOUNTER — TRANSCRIPTION ENCOUNTER (OUTPATIENT)
Age: 63
End: 2023-09-28

## 2023-09-28 ENCOUNTER — APPOINTMENT (OUTPATIENT)
Dept: DERMATOLOGY | Facility: CLINIC | Age: 63
End: 2023-09-28
Payer: MEDICARE

## 2023-09-28 ENCOUNTER — NON-APPOINTMENT (OUTPATIENT)
Age: 63
End: 2023-09-28

## 2023-09-28 ENCOUNTER — APPOINTMENT (OUTPATIENT)
Dept: ALLERGY | Facility: CLINIC | Age: 63
End: 2023-09-28
Payer: MEDICARE

## 2023-09-28 VITALS
OXYGEN SATURATION: 95 % | BODY MASS INDEX: 28.19 KG/M2 | HEART RATE: 95 BPM | WEIGHT: 186 LBS | SYSTOLIC BLOOD PRESSURE: 136 MMHG | TEMPERATURE: 97.8 F | HEIGHT: 68 IN | DIASTOLIC BLOOD PRESSURE: 78 MMHG

## 2023-09-28 PROCEDURE — 99204 OFFICE O/P NEW MOD 45 MIN: CPT

## 2023-09-28 RX ORDER — TRIAMCINOLONE ACETONIDE 1 MG/G
0.1 OINTMENT TOPICAL
Qty: 1 | Refills: 1 | Status: ACTIVE | COMMUNITY
Start: 2023-09-28 | End: 1900-01-01

## 2023-09-29 ENCOUNTER — TRANSCRIPTION ENCOUNTER (OUTPATIENT)
Age: 63
End: 2023-09-29

## 2023-10-03 ENCOUNTER — APPOINTMENT (OUTPATIENT)
Dept: DERMATOLOGY | Facility: CLINIC | Age: 63
End: 2023-10-03
Payer: MEDICARE

## 2023-10-03 DIAGNOSIS — D48.9 NEOPLASM OF UNCERTAIN BEHAVIOR, UNSPECIFIED: ICD-10-CM

## 2023-10-03 PROCEDURE — 99214 OFFICE O/P EST MOD 30 MIN: CPT | Mod: 25

## 2023-10-03 PROCEDURE — 11104 PUNCH BX SKIN SINGLE LESION: CPT

## 2023-10-16 ENCOUNTER — TRANSCRIPTION ENCOUNTER (OUTPATIENT)
Age: 63
End: 2023-10-16

## 2023-10-16 ENCOUNTER — NON-APPOINTMENT (OUTPATIENT)
Age: 63
End: 2023-10-16

## 2023-10-16 DIAGNOSIS — I77.6 ARTERITIS, UNSPECIFIED: ICD-10-CM

## 2023-10-16 DIAGNOSIS — L50.9 URTICARIA, UNSPECIFIED: ICD-10-CM

## 2023-10-16 DIAGNOSIS — R21 RASH AND OTHER NONSPECIFIC SKIN ERUPTION: ICD-10-CM

## 2023-10-16 RX ORDER — FAMOTIDINE 20 MG/1
20 TABLET, FILM COATED ORAL
Qty: 90 | Refills: 3 | Status: ACTIVE | COMMUNITY
Start: 2023-08-09

## 2023-10-17 ENCOUNTER — APPOINTMENT (OUTPATIENT)
Dept: DERMATOLOGY | Facility: CLINIC | Age: 63
End: 2023-10-17

## 2023-10-17 LAB — DERMATOLOGY BIOPSY: NORMAL

## 2023-10-23 ENCOUNTER — TRANSCRIPTION ENCOUNTER (OUTPATIENT)
Age: 63
End: 2023-10-23

## 2023-10-24 NOTE — ED PROVIDER NOTE - NS HIV RISK FACTOR YES
10/24 Keppra level 20 within range child know sz  in er increased dose and pt has f/u with neuro 10/26 Declined

## 2023-11-21 ENCOUNTER — TRANSCRIPTION ENCOUNTER (OUTPATIENT)
Age: 63
End: 2023-11-21

## 2023-11-27 ENCOUNTER — TRANSCRIPTION ENCOUNTER (OUTPATIENT)
Age: 63
End: 2023-11-27

## 2023-12-06 ENCOUNTER — APPOINTMENT (OUTPATIENT)
Dept: RHEUMATOLOGY | Facility: CLINIC | Age: 63
End: 2023-12-06
Payer: MEDICARE

## 2023-12-06 VITALS
OXYGEN SATURATION: 97 % | HEART RATE: 75 BPM | DIASTOLIC BLOOD PRESSURE: 98 MMHG | HEIGHT: 68 IN | BODY MASS INDEX: 27.58 KG/M2 | SYSTOLIC BLOOD PRESSURE: 151 MMHG | WEIGHT: 182 LBS

## 2023-12-06 PROCEDURE — 99205 OFFICE O/P NEW HI 60 MIN: CPT

## 2023-12-06 RX ORDER — FLUTICASONE PROPIONATE AND SALMETEROL 100; 50 UG/1; UG/1
100-50 POWDER RESPIRATORY (INHALATION)
Qty: 1 | Refills: 2 | Status: COMPLETED | COMMUNITY
Start: 2023-10-16 | End: 2023-12-06

## 2023-12-07 ENCOUNTER — TRANSCRIPTION ENCOUNTER (OUTPATIENT)
Age: 63
End: 2023-12-07

## 2023-12-07 LAB
APPEARANCE: CLEAR
BACTERIA: NEGATIVE /HPF
BILIRUBIN URINE: NEGATIVE
BLOOD URINE: ABNORMAL
C3 SERPL-MCNC: 54 MG/DL
C4 SERPL-MCNC: 4 MG/DL
CAST: 0 /LPF
COLOR: YELLOW
CREAT SPEC-SCNC: 150 MG/DL
CREAT/PROT UR: 0.4 RATIO
CRP SERPL-MCNC: 44 MG/L
DSDNA AB SER-ACNC: <12 IU/ML
ENA RNP AB SER IA-ACNC: 0.3 AL
ENA SM AB SER IA-ACNC: <0.2 AL
ENA SS-A AB SER IA-ACNC: <0.2 AL
ENA SS-B AB SER IA-ACNC: <0.2 AL
EPITHELIAL CELLS: 1 /HPF
ERYTHROCYTE [SEDIMENTATION RATE] IN BLOOD BY WESTERGREN METHOD: 33 MM/HR
GLUCOSE QUALITATIVE U: NEGATIVE MG/DL
HCT VFR BLD CALC: 36.3 %
HGB BLD-MCNC: 11 G/DL
KETONES URINE: NEGATIVE MG/DL
LEUKOCYTE ESTERASE URINE: NEGATIVE
MCHC RBC-ENTMCNC: 26 PG
MCHC RBC-ENTMCNC: 30.3 GM/DL
MCV RBC AUTO: 85.8 FL
MICROSCOPIC-UA: NORMAL
NITRITE URINE: NEGATIVE
PH URINE: 5.5
PLATELET # BLD AUTO: 361 K/UL
PROT UR-MCNC: 61 MG/DL
PROTEIN URINE: 30 MG/DL
RBC # BLD: 4.23 M/UL
RBC # FLD: 15.2 %
RED BLOOD CELLS URINE: 31 /HPF
SPECIFIC GRAVITY URINE: 1.02
THYROGLOB AB SERPL-ACNC: <20 IU/ML
THYROPEROXIDASE AB SERPL IA-ACNC: 38.5 IU/ML
UROBILINOGEN URINE: 0.2 MG/DL
WBC # FLD AUTO: 5.47 K/UL
WHITE BLOOD CELLS URINE: 2 /HPF

## 2023-12-08 ENCOUNTER — TRANSCRIPTION ENCOUNTER (OUTPATIENT)
Age: 63
End: 2023-12-08

## 2023-12-08 LAB — CH50 SERPL-MCNC: <14 U/ML

## 2023-12-11 ENCOUNTER — TRANSCRIPTION ENCOUNTER (OUTPATIENT)
Age: 63
End: 2023-12-11

## 2023-12-12 ENCOUNTER — TRANSCRIPTION ENCOUNTER (OUTPATIENT)
Age: 63
End: 2023-12-12

## 2023-12-13 ENCOUNTER — APPOINTMENT (OUTPATIENT)
Dept: RHEUMATOLOGY | Facility: CLINIC | Age: 63
End: 2023-12-13
Payer: MEDICARE

## 2023-12-13 VITALS
BODY MASS INDEX: 27.28 KG/M2 | OXYGEN SATURATION: 98 % | WEIGHT: 180 LBS | HEIGHT: 68 IN | SYSTOLIC BLOOD PRESSURE: 151 MMHG | HEART RATE: 80 BPM | DIASTOLIC BLOOD PRESSURE: 94 MMHG

## 2023-12-13 LAB
BASOPHILS # BLD AUTO: 0.01 K/UL
BASOPHILS NFR BLD AUTO: 0.1 %
C1Q SERPL-MCNC: 4.7 MG/DL
EOSINOPHIL # BLD AUTO: 0.01 K/UL
EOSINOPHIL NFR BLD AUTO: 0.1 %
HCT VFR BLD CALC: 33.9 %
HGB BLD-MCNC: 10.2 G/DL
IMM GRANULOCYTES NFR BLD AUTO: 1.2 %
LYMPHOCYTES # BLD AUTO: 1.3 K/UL
LYMPHOCYTES NFR BLD AUTO: 17.9 %
MAN DIFF?: NORMAL
MCHC RBC-ENTMCNC: 26.4 PG
MCHC RBC-ENTMCNC: 30.1 GM/DL
MCV RBC AUTO: 87.8 FL
MONOCYTES # BLD AUTO: 0.46 K/UL
MONOCYTES NFR BLD AUTO: 6.3 %
NEUTROPHILS # BLD AUTO: 5.39 K/UL
NEUTROPHILS NFR BLD AUTO: 74.4 %
PLATELET # BLD AUTO: 410 K/UL
RBC # BLD: 3.86 M/UL
RBC # FLD: 15.4 %
WBC # FLD AUTO: 7.26 K/UL

## 2023-12-13 PROCEDURE — 99215 OFFICE O/P EST HI 40 MIN: CPT

## 2023-12-13 NOTE — PHYSICAL EXAM
[General Appearance - Alert] : alert [General Appearance - In No Acute Distress] : in no acute distress [Outer Ear] : the ears and nose were normal in appearance [Oropharynx] : the oropharynx was normal [Neck Appearance] : the appearance of the neck was normal [Neck Cervical Mass (___cm)] : no neck mass was observed [Jugular Venous Distention Increased] : there was no jugular-venous distention [Thyroid Diffuse Enlargement] : the thyroid was not enlarged [Thyroid Nodule] : there were no palpable thyroid nodules [Auscultation Breath Sounds / Voice Sounds] : lungs were clear to auscultation bilaterally [Heart Rate And Rhythm] : heart rate was normal and rhythm regular [Heart Sounds] : normal S1 and S2 [Heart Sounds Gallop] : no gallops [Murmurs] : no murmurs [Heart Sounds Pericardial Friction Rub] : no pericardial rub [Edema] : there was no peripheral edema [Bowel Sounds] : normal bowel sounds [Abdomen Soft] : soft [Abdomen Tenderness] : non-tender [] : no hepato-splenomegaly [Abdomen Mass (___ Cm)] : no abdominal mass palpated [Cervical Lymph Nodes Enlarged Anterior Bilaterally] : anterior cervical [Cervical Lymph Nodes Enlarged Posterior Bilaterally] : posterior cervical [Supraclavicular Lymph Nodes Enlarged Bilaterally] : supraclavicular [Skin Color & Pigmentation] : normal skin color and pigmentation [Skin Turgor] : normal skin turgor [No Focal Deficits] : no focal deficits [Oriented To Time, Place, And Person] : oriented to person, place, and time [Impaired Insight] : insight and judgment were intact [Affect] : the affect was normal [FreeTextEntry1] : multiple urticarial-appearing lesions on neck, back, chest, abdomen, and  UE's

## 2023-12-13 NOTE — HISTORY OF PRESENT ILLNESS
[Skin Lesions] : skin lesions [Visual Changes] : visual changes [Eye Pain] : eye pain [Eye Redness] : eye redness [FreeTextEntry1] : Feeling much better since starting prednisone.  Previous lesions have resolved and no new lesions over the past several days.  Eye pain, erythema, blurry vision have all resolved as well.  He c/o pain in his right lateral upper thigh, s/p a C-S injection by orthopedics yesterday.  Also still w/ B/L knee pain (pt s/p prior B/L TKR).  No other complaints. [Anorexia] : no anorexia [Weight Loss] : no weight loss [Malaise] : no malaise [Fever] : no fever [Chills] : no chills [Fatigue] : no fatigue [Malar Facial Rash] : no malar facial rash [Skin Nodules] : no skin nodules [Oral Ulcers] : no oral ulcers [Cough] : no cough [Dry Mouth] : no dry mouth [Dysphonia] : no dysphonia [Dysphagia] : no dysphagia [Shortness of Breath] : no shortness of breath [Chest Pain] : no chest pain [Arthralgias] : no arthralgias [Joint Swelling] : no joint swelling [Joint Warmth] : no joint warmth [Joint Deformity] : no joint deformity [Decreased ROM] : no decreased range of motion [Morning Stiffness] : no morning stiffness [Falls] : no falls [Difficulty Standing] : no difficulty standing [Difficulty Walking] : no difficulty walking [Dyspnea] : no dyspnea [Myalgias] : no myalgias [Muscle Weakness] : no muscle weakness [Muscle Spasms] : no muscle spasms [Muscle Cramping] : no muscle cramping [Dry Eyes] : no dry eyes

## 2023-12-13 NOTE — ASSESSMENT
[FreeTextEntry1] : 63 year old male with: 1)  Recurring diffuse urticarial lesions.  Symptoms have resolved on prednisone 60mg daily.  Presentation most c/w urticarial vasculitis, lucy given hypocomplementemia.  I spoke w/ pathology who said she saw signs of leukocytoclastia.  DDx includes Schnitzler Syndrome though hypocomplementemia not typical for this.   - awaiting results of SPEP/IPEP, immunoglobulins panel   -.Cont prednisone 60mg daily for 2 weeks then taper by 10mg/day qweekly.   - If urticarial vasculitis, will consider adding dapsone.   - If w/u more c/w Schnitzler Syndrome, would likely start IL1 inhibition. 2)  Right trochanteric bursitis: pt s/p corticosteroid injecion by orthopedics yesterday. 3)  B/L knee pain, s/p B/L TKR:   - f/u w/ orthopedics

## 2023-12-14 ENCOUNTER — TRANSCRIPTION ENCOUNTER (OUTPATIENT)
Age: 63
End: 2023-12-14

## 2023-12-14 LAB — C1Q IMMUNE COMPLEX: >100 UG EQ/ML

## 2023-12-19 ENCOUNTER — TRANSCRIPTION ENCOUNTER (OUTPATIENT)
Age: 63
End: 2023-12-19

## 2023-12-19 LAB
ALBUMIN MFR SERPL ELPH: 49.8 %
ALBUMIN SERPL-MCNC: 3.5 G/DL
ALBUMIN/GLOB SERPL: 1 RATIO
ALPHA1 GLOB MFR SERPL ELPH: 5.1 %
ALPHA1 GLOB SERPL ELPH-MCNC: 0.4 G/DL
ALPHA2 GLOB MFR SERPL ELPH: 11.9 %
ALPHA2 GLOB SERPL ELPH-MCNC: 0.8 G/DL
B-GLOBULIN MFR SERPL ELPH: 10 %
B-GLOBULIN SERPL ELPH-MCNC: 0.7 G/DL
DEPRECATED KAPPA LC FREE/LAMBDA SER: 1.61 RATIO
GAMMA GLOB FLD ELPH-MCNC: 1.6 G/DL
GAMMA GLOB MFR SERPL ELPH: 23.2 %
IGA SER QL IEP: 238 MG/DL
IGG SER QL IEP: 1800 MG/DL
IGM SER QL IEP: 80 MG/DL
INTERPRETATION SERPL IEP-IMP: NORMAL
KAPPA LC CSF-MCNC: 1.97 MG/DL
KAPPA LC SERPL-MCNC: 3.17 MG/DL
M PROTEIN SPEC IFE-MCNC: NORMAL
PROT SERPL-MCNC: 7 G/DL
PROT SERPL-MCNC: 7 G/DL

## 2023-12-20 ENCOUNTER — TRANSCRIPTION ENCOUNTER (OUTPATIENT)
Age: 63
End: 2023-12-20

## 2023-12-21 ENCOUNTER — TRANSCRIPTION ENCOUNTER (OUTPATIENT)
Age: 63
End: 2023-12-21

## 2023-12-26 ENCOUNTER — TRANSCRIPTION ENCOUNTER (OUTPATIENT)
Age: 63
End: 2023-12-26

## 2023-12-27 ENCOUNTER — TRANSCRIPTION ENCOUNTER (OUTPATIENT)
Age: 63
End: 2023-12-27

## 2023-12-27 ENCOUNTER — NON-APPOINTMENT (OUTPATIENT)
Age: 63
End: 2023-12-27

## 2023-12-28 ENCOUNTER — TRANSCRIPTION ENCOUNTER (OUTPATIENT)
Age: 63
End: 2023-12-28

## 2023-12-29 ENCOUNTER — TRANSCRIPTION ENCOUNTER (OUTPATIENT)
Age: 63
End: 2023-12-29

## 2024-01-02 ENCOUNTER — TRANSCRIPTION ENCOUNTER (OUTPATIENT)
Age: 64
End: 2024-01-02

## 2024-01-03 ENCOUNTER — TRANSCRIPTION ENCOUNTER (OUTPATIENT)
Age: 64
End: 2024-01-03

## 2024-01-03 LAB
ALBUMIN SERPL ELPH-MCNC: 4 G/DL
ALP BLD-CCNC: 72 U/L
ALT SERPL-CCNC: <5 U/L
ANION GAP SERPL CALC-SCNC: 13 MMOL/L
AST SERPL-CCNC: 15 U/L
BILIRUB SERPL-MCNC: 0.5 MG/DL
BUN SERPL-MCNC: 28 MG/DL
C3 SERPL-MCNC: 56 MG/DL
C4 SERPL-MCNC: 8 MG/DL
CALCIUM SERPL-MCNC: 9.7 MG/DL
CH50 SERPL-MCNC: 25 U/ML
CHLORIDE SERPL-SCNC: 100 MMOL/L
CO2 SERPL-SCNC: 25 MMOL/L
CREAT SERPL-MCNC: 0.7 MG/DL
CRP SERPL-MCNC: 10 MG/L
EGFR: 102 ML/MIN/1.73M2
ERYTHROCYTE [SEDIMENTATION RATE] IN BLOOD BY WESTERGREN METHOD: 38 MM/HR
GLUCOSE SERPL-MCNC: 153 MG/DL
HCT VFR BLD CALC: 41.6 %
HGB BLD-MCNC: 12.6 G/DL
MCHC RBC-ENTMCNC: 27 PG
MCHC RBC-ENTMCNC: 30.3 GM/DL
MCV RBC AUTO: 89.3 FL
PLATELET # BLD AUTO: 234 K/UL
POTASSIUM SERPL-SCNC: 4.3 MMOL/L
PROT SERPL-MCNC: 7 G/DL
RBC # BLD: 4.66 M/UL
RBC # FLD: 19.1 %
SODIUM SERPL-SCNC: 138 MMOL/L
WBC # FLD AUTO: 8.38 K/UL

## 2024-01-04 ENCOUNTER — TRANSCRIPTION ENCOUNTER (OUTPATIENT)
Age: 64
End: 2024-01-04

## 2024-01-08 ENCOUNTER — APPOINTMENT (OUTPATIENT)
Dept: UROLOGY | Facility: CLINIC | Age: 64
End: 2024-01-08
Payer: MEDICARE

## 2024-01-08 ENCOUNTER — TRANSCRIPTION ENCOUNTER (OUTPATIENT)
Age: 64
End: 2024-01-08

## 2024-01-08 VITALS
SYSTOLIC BLOOD PRESSURE: 151 MMHG | DIASTOLIC BLOOD PRESSURE: 87 MMHG | HEIGHT: 68 IN | OXYGEN SATURATION: 97 % | HEART RATE: 60 BPM

## 2024-01-08 DIAGNOSIS — N40.1 BENIGN PROSTATIC HYPERPLASIA WITH LOWER URINARY TRACT SYMPMS: ICD-10-CM

## 2024-01-08 DIAGNOSIS — R35.1 BENIGN PROSTATIC HYPERPLASIA WITH LOWER URINARY TRACT SYMPMS: ICD-10-CM

## 2024-01-08 DIAGNOSIS — H90.3 SENSORINEURAL HEARING LOSS, BILATERAL: ICD-10-CM

## 2024-01-08 DIAGNOSIS — N28.1 CYST OF KIDNEY, ACQUIRED: ICD-10-CM

## 2024-01-08 LAB — G6PD SER-CCNC: 13.7 U/G HGB

## 2024-01-08 PROCEDURE — 99214 OFFICE O/P EST MOD 30 MIN: CPT

## 2024-01-08 NOTE — HISTORY OF PRESENT ILLNESS
[FreeTextEntry1] : He is a 63-year-old man who is seen today for follow-up for microscopic hematuria renal cysts and urinary symptoms.  He is not on any medications for urinary symptoms at this time.  Nocturia is once or twice.  Residual urine volume today was less than 100 mL.  Ultrasound in January 2023 showed a right-sided 1.4 cm septated renal cyst and other cysts in the left kidney up to 2 cm.  In December 2023, urinalysis showed 30 red blood cells.  Creatinine was 0.7.  He is not on tamsulosin or finasteride at this time.  Previous notes: Patient states that in November 2022 he underwent right knee replacement.  Postop he could not urinate and straight catheterization was done.  The next day a Paulson catheter was placed and he was started on tamsulosin and finasteride.  In early December his Paulson catheter was removed.  He has history of microscopic hematuria and has undergone many cystoscopies in [facility].  He was told they were normal.   Testosterone was 366.  Patient states that he has history of nephrotic syndrome.  CT scan in 2020 showed mildly enlarged prostate, left renal cyst and most likely benign right-sided septated cyst 1.4 cm.  He is also aware of the cyst for a long time.   Addendum: Patient sent us his PSA levels: 0.52 in 2013, 0.46 in 2016, 0.5 in 2019 and 1.18 in October 2022.

## 2024-01-08 NOTE — ASSESSMENT
[FreeTextEntry1] : His exam is unchanged.  He is emptying his bladder well.  He has undergone many cystoscopies before for microscopic hematuria and not interested in further cystoscopies.  PSA level will be checked.  In follow-up to complex renal cyst he will undergo renal ultrasound.  We will discuss the results on the next up on the phone.  He can follow-up in 1 year pending results.

## 2024-01-08 NOTE — PHYSICAL EXAM
[Urethral Meatus] : meatus normal [Penis Abnormality] : normal circumcised penis [Urinary Bladder Findings] : the bladder was normal on palpation [Scrotum] : the scrotum was normal [Testes Tenderness] : no tenderness of the testes [Testes Mass (___cm)] : there were no testicular masses [Prostate Tenderness] : the prostate was not tender [No Prostate Nodules] : no prostate nodules [FreeTextEntry1] : Penile vitiligo, prostate mildly enlarged.  Prostate examined in 2023 [General Appearance - Well Developed] : well developed [General Appearance - Well Nourished] : well nourished [Normal Appearance] : normal appearance [Well Groomed] : well groomed [] : no respiratory distress [Exaggerated Use Of Accessory Muscles For Inspiration] : no accessory muscle use [Normal Station and Gait] : the gait and station were normal for the patient's age [Oriented To Time, Place, And Person] : oriented to person, place, and time [Affect] : the affect was normal [Mood] : the mood was normal [Not Anxious] : not anxious

## 2024-01-09 ENCOUNTER — TRANSCRIPTION ENCOUNTER (OUTPATIENT)
Age: 64
End: 2024-01-09

## 2024-01-09 LAB — PSA SERPL-MCNC: 0.77 NG/ML

## 2024-01-10 NOTE — ED ADULT TRIAGE NOTE - WEIGHT IN KG
87.1 Treatment Goal Explanation (Does Not Render In The Note): Stable for the purposes of categorizing medical decision making is defined by the specific treatment goals for an individual patient. A patient that is not at their treatment goal is not stable, even if the condition has not changed and there is no short- term threat to life or function.

## 2024-01-16 ENCOUNTER — TRANSCRIPTION ENCOUNTER (OUTPATIENT)
Age: 64
End: 2024-01-16

## 2024-01-16 ENCOUNTER — APPOINTMENT (OUTPATIENT)
Dept: ULTRASOUND IMAGING | Facility: CLINIC | Age: 64
End: 2024-01-16
Payer: MEDICARE

## 2024-01-16 ENCOUNTER — OUTPATIENT (OUTPATIENT)
Dept: OUTPATIENT SERVICES | Facility: HOSPITAL | Age: 64
LOS: 1 days | End: 2024-01-16
Payer: MEDICARE

## 2024-01-16 DIAGNOSIS — Z00.8 ENCOUNTER FOR OTHER GENERAL EXAMINATION: ICD-10-CM

## 2024-01-16 LAB
ESTIMATED AVERAGE GLUCOSE: 100 MG/DL
HBA1C MFR BLD HPLC: 5.1 %
T4 FREE SERPL-MCNC: 1.7 NG/DL
TSH SERPL-ACNC: 4.56 UIU/ML

## 2024-01-16 PROCEDURE — 76775 US EXAM ABDO BACK WALL LIM: CPT | Mod: 26

## 2024-01-16 PROCEDURE — 76775 US EXAM ABDO BACK WALL LIM: CPT

## 2024-01-17 ENCOUNTER — APPOINTMENT (OUTPATIENT)
Dept: RHEUMATOLOGY | Facility: CLINIC | Age: 64
End: 2024-01-17
Payer: MEDICARE

## 2024-01-17 ENCOUNTER — TRANSCRIPTION ENCOUNTER (OUTPATIENT)
Age: 64
End: 2024-01-17

## 2024-01-17 ENCOUNTER — APPOINTMENT (OUTPATIENT)
Dept: ENDOCRINOLOGY | Facility: CLINIC | Age: 64
End: 2024-01-17

## 2024-01-17 VITALS
OXYGEN SATURATION: 96 % | SYSTOLIC BLOOD PRESSURE: 108 MMHG | HEART RATE: 91 BPM | WEIGHT: 177 LBS | BODY MASS INDEX: 26.83 KG/M2 | DIASTOLIC BLOOD PRESSURE: 76 MMHG | HEIGHT: 68 IN

## 2024-01-17 DIAGNOSIS — L30.9 DERMATITIS, UNSPECIFIED: ICD-10-CM

## 2024-01-17 DIAGNOSIS — I73.00 RAYNAUD'S SYNDROME W/OUT GANGRENE: ICD-10-CM

## 2024-01-17 DIAGNOSIS — R76.8 OTHER SPECIFIED ABNORMAL IMMUNOLOGICAL FINDINGS IN SERUM: ICD-10-CM

## 2024-01-17 LAB
THYROGLOB AB SERPL-ACNC: <20 IU/ML
THYROGLOB SERPL-MCNC: <0.2 NG/ML

## 2024-01-17 PROCEDURE — 99214 OFFICE O/P EST MOD 30 MIN: CPT

## 2024-01-17 RX ORDER — PREDNISONE 20 MG/1
20 TABLET ORAL
Qty: 90 | Refills: 1 | Status: COMPLETED | COMMUNITY
Start: 2023-12-08 | End: 2024-01-17

## 2024-01-17 NOTE — HISTORY OF PRESENT ILLNESS
[Skin Lesions] : skin lesions [Visual Changes] : visual changes [Eye Pain] : eye pain [Eye Redness] : eye redness [FreeTextEntry1] : Feeling much better since starting dapsone.  Lesions have virtually resolved.  No recurrence of eye pain, erythema, blurry vision.  Still w/ B/L knee pain (pt s/p prior B/L TKR), unchanged.  No new complaints. [Anorexia] : no anorexia [Weight Loss] : no weight loss [Malaise] : no malaise [Fever] : no fever [Chills] : no chills [Fatigue] : no fatigue [Malar Facial Rash] : no malar facial rash [Skin Nodules] : no skin nodules [Oral Ulcers] : no oral ulcers [Cough] : no cough [Dry Mouth] : no dry mouth [Dysphonia] : no dysphonia [Dysphagia] : no dysphagia [Shortness of Breath] : no shortness of breath [Chest Pain] : no chest pain [Arthralgias] : no arthralgias [Joint Swelling] : no joint swelling [Joint Warmth] : no joint warmth [Joint Deformity] : no joint deformity [Decreased ROM] : no decreased range of motion [Morning Stiffness] : no morning stiffness [Falls] : no falls [Difficulty Standing] : no difficulty standing [Difficulty Walking] : no difficulty walking [Dyspnea] : no dyspnea [Myalgias] : no myalgias [Muscle Weakness] : no muscle weakness [Muscle Spasms] : no muscle spasms [Muscle Cramping] : no muscle cramping [Dry Eyes] : no dry eyes

## 2024-01-17 NOTE — ASSESSMENT
[FreeTextEntry1] : 63 year old male with: 1)  Recurring diffuse urticarial lesions.  Presentation most c/w urticarial vasculitis, lucy given hypocomplementemia.  I spoke w/ pathology who said she saw signs of leukocytoclastia.  Much improved o ndapsone.   - Cont dapsone 50mg/day.   -.Taper off prednisone.   - Check labs. 2)  Right trochanteric bursitis: resolved s/p corticosteroid injection by orthopedics. 3)  B/L knee pain, s/p B/L TKR:   - f/u w/ orthopedics

## 2024-01-17 NOTE — PHYSICAL EXAM
[General Appearance - Alert] : alert [General Appearance - In No Acute Distress] : in no acute distress [Outer Ear] : the ears and nose were normal in appearance [Oropharynx] : the oropharynx was normal [Neck Appearance] : the appearance of the neck was normal [Neck Cervical Mass (___cm)] : no neck mass was observed [Jugular Venous Distention Increased] : there was no jugular-venous distention [Thyroid Diffuse Enlargement] : the thyroid was not enlarged [Thyroid Nodule] : there were no palpable thyroid nodules [Auscultation Breath Sounds / Voice Sounds] : lungs were clear to auscultation bilaterally [Heart Rate And Rhythm] : heart rate was normal and rhythm regular [Heart Sounds] : normal S1 and S2 [Heart Sounds Gallop] : no gallops [Murmurs] : no murmurs [Heart Sounds Pericardial Friction Rub] : no pericardial rub [Edema] : there was no peripheral edema [Bowel Sounds] : normal bowel sounds [Abdomen Soft] : soft [Abdomen Tenderness] : non-tender [Abdomen Mass (___ Cm)] : no abdominal mass palpated [Cervical Lymph Nodes Enlarged Posterior Bilaterally] : posterior cervical [Cervical Lymph Nodes Enlarged Anterior Bilaterally] : anterior cervical [Supraclavicular Lymph Nodes Enlarged Bilaterally] : supraclavicular [Skin Color & Pigmentation] : normal skin color and pigmentation [Skin Turgor] : normal skin turgor [No Focal Deficits] : no focal deficits [Oriented To Time, Place, And Person] : oriented to person, place, and time [Impaired Insight] : insight and judgment were intact [Affect] : the affect was normal [Sclera] : the sclera and conjunctiva were normal [FreeTextEntry1] : No synovitis, full ROM in all joints  [] : no rash

## 2024-01-18 LAB
ALBUMIN SERPL ELPH-MCNC: 4.2 G/DL
ALP BLD-CCNC: 80 U/L
ALT SERPL-CCNC: <5 U/L
ANION GAP SERPL CALC-SCNC: 11 MMOL/L
AST SERPL-CCNC: 14 U/L
BILIRUB SERPL-MCNC: 0.2 MG/DL
BUN SERPL-MCNC: 18 MG/DL
C3 SERPL-MCNC: 63 MG/DL
C4 SERPL-MCNC: 5 MG/DL
CALCIUM SERPL-MCNC: 9.7 MG/DL
CH50 SERPL-MCNC: <14 U/ML
CHLORIDE SERPL-SCNC: 104 MMOL/L
CO2 SERPL-SCNC: 27 MMOL/L
CREAT SERPL-MCNC: 0.82 MG/DL
CRP SERPL-MCNC: 13 MG/L
EGFR: 97 ML/MIN/1.73M2
ERYTHROCYTE [SEDIMENTATION RATE] IN BLOOD BY WESTERGREN METHOD: 19 MM/HR
GLUCOSE SERPL-MCNC: 95 MG/DL
HCT VFR BLD CALC: 43 %
HGB BLD-MCNC: 12.8 G/DL
MCHC RBC-ENTMCNC: 28.3 PG
MCHC RBC-ENTMCNC: 29.8 GM/DL
MCV RBC AUTO: 94.9 FL
PLATELET # BLD AUTO: 238 K/UL
POTASSIUM SERPL-SCNC: 4.2 MMOL/L
PROT SERPL-MCNC: 6.6 G/DL
RBC # BLD: 4.53 M/UL
RBC # FLD: 18.6 %
SODIUM SERPL-SCNC: 142 MMOL/L
WBC # FLD AUTO: 6.29 K/UL

## 2024-01-22 ENCOUNTER — TRANSCRIPTION ENCOUNTER (OUTPATIENT)
Age: 64
End: 2024-01-22

## 2024-01-23 ENCOUNTER — TRANSCRIPTION ENCOUNTER (OUTPATIENT)
Age: 64
End: 2024-01-23

## 2024-01-23 ENCOUNTER — APPOINTMENT (OUTPATIENT)
Dept: ENDOCRINOLOGY | Facility: CLINIC | Age: 64
End: 2024-01-23
Payer: MEDICARE

## 2024-01-23 VITALS
SYSTOLIC BLOOD PRESSURE: 108 MMHG | WEIGHT: 175 LBS | HEART RATE: 84 BPM | OXYGEN SATURATION: 96 % | BODY MASS INDEX: 26.52 KG/M2 | DIASTOLIC BLOOD PRESSURE: 70 MMHG | HEIGHT: 68 IN

## 2024-01-23 DIAGNOSIS — E27.49 OTHER ADRENOCORTICAL INSUFFICIENCY: ICD-10-CM

## 2024-01-23 DIAGNOSIS — Z79.52 LONG TERM (CURRENT) USE OF SYSTEMIC STEROIDS: ICD-10-CM

## 2024-01-23 PROCEDURE — G2211 COMPLEX E/M VISIT ADD ON: CPT

## 2024-01-23 PROCEDURE — 99214 OFFICE O/P EST MOD 30 MIN: CPT

## 2024-01-24 ENCOUNTER — TRANSCRIPTION ENCOUNTER (OUTPATIENT)
Age: 64
End: 2024-01-24

## 2024-01-24 PROBLEM — E27.49 SECONDARY ADRENAL INSUFFICIENCY: Status: ACTIVE | Noted: 2024-01-24

## 2024-01-24 PROBLEM — Z79.52 CURRENT CHRONIC USE OF SYSTEMIC STEROIDS: Status: ACTIVE | Noted: 2024-01-24

## 2024-01-24 NOTE — ASSESSMENT
[FreeTextEntry1] : 63 year old male with history of thyroid cancer (likely low risk of recurrence) with total thyroidectomy in 2013, no ECKERT here for follow up.   #Thyroid Cancer  #s/p Total thyroidectomy  #Post Surgical Hypothyroidism  path not seen  TG/TG ab undetectable  TSH above goal - 4.56 in Jan 2024  TSH goal 0.5-2.0  - LT4 175mcg x 8 tabs weekly  - repeat tfts in 6 weeks  - repeat US thyroid   #Current chronic systemic steroid use  #At risk for Secondary Adrenal Insufficiency & SIHG/SIDM patient on chronic steroids > 1 year, varying doses - hx of minimal change nephrotic syndrome,dx 2020 now back on Prednisone 60mg daily for urticarial vasculitis, unclear duration of tx per pt.  - continue follow up with Rheumatology  - patient also reporting ~ 40lbs weight loss, night sweats & chills - discussed with patient that these are concerning symptoms, encouraged him to follow up with PCP Dr. Shah & hematology - reports he was previously referred and will follow up/schedule this asap. Recent CBC from 1/18/24 with wbc wnl, hb 12.8 mildly low, platelets wnl - glucose 95 on afternoon CMP 1/18/24 - A1c 5.1% 1/16/24 - Extensive discussion with patient regarding risk for secondary AI iso chronic steroid use. Patient counseled to NOT Discontinue Steroids Abruptly as this may precipitate an Adrenal Crisis. Reviewed signs/symptoms of adrenal insufficiency/adrenal crisis as well as sick day rules (may require 2-3x current dose of steroids with illness; IV Steroids prior to procedures/surgery; ED if unable to tolerate PO steroids or if persistent N/V/hypotension despite PO steroid.)  - repeat a1c at next visit

## 2024-01-24 NOTE — REVIEW OF SYSTEMS
[Nasal Congestion] : no nasal congestion [Hesistancy] : no hesitancy [Fatigue] : fatigue [Decreased Appetite] : appetite not decreased [Recent Weight Gain (___ Lbs)] : no recent weight gain [Recent Weight Loss (___ Lbs)] : recent weight loss: [unfilled] lbs [Fever] : fever [Chills] : chills [Visual Field Defect] : no visual field defect [Dry Eyes] : no dryness [Dysphagia] : no dysphagia [Neck Pain] : no neck pain [Dysphonia] : no dysphonia [Chest Pain] : no chest pain [Slow Heart Rate] : heart rate is not slow [Palpitations] : no palpitations [Fast Heart Rate] : heart rate is not fast [Lower Ext Edema] : no lower extremity edema [Cough] : no cough [Shortness Of Breath] : no shortness of breath [Constipation] : no constipation [Nausea] : no nausea [Vomiting] : no vomiting [Polyuria] : no polyuria [Diarrhea] : diarrhea [Dysuria] : no dysuria [Joint Pain] : joint pain [Muscle Weakness] : no muscle weakness [Acanthosis] : no acanthosis  [Acne] : no acne [Ulcer] : no ulcer [Hair Loss] : no hair loss [Headaches] : no headaches [Dizziness] : no dizziness [Tremors] : no tremors [Pain/Numbness of Digits] : no pain/numbness of digits [Depression] : no depression [Polydipsia] : no polydipsia [Cold Intolerance] : cold intolerance [Heat Intolerance] : heat intolerance [Hot Flashes] : hot flashes [Easy Bleeding] : no ~M tendency for easy bleeding [Easy Bruising] : no tendency for easy bruising [Swelling] : no swelling [Lymphadenopathy] : no lymphadenopathy [FreeTextEntry9] : pain in palms of hand [de-identified] : +rash

## 2024-01-26 ENCOUNTER — APPOINTMENT (OUTPATIENT)
Dept: INTERNAL MEDICINE | Facility: CLINIC | Age: 64
End: 2024-01-26
Payer: MEDICARE

## 2024-01-26 VITALS
HEART RATE: 53 BPM | WEIGHT: 174 LBS | TEMPERATURE: 97.9 F | DIASTOLIC BLOOD PRESSURE: 76 MMHG | BODY MASS INDEX: 26.37 KG/M2 | SYSTOLIC BLOOD PRESSURE: 137 MMHG | HEIGHT: 68 IN | OXYGEN SATURATION: 97 %

## 2024-01-26 DIAGNOSIS — L95.8 OTHER VASCULITIS LIMITED TO THE SKIN: ICD-10-CM

## 2024-01-26 DIAGNOSIS — M25.562 PAIN IN LEFT KNEE: ICD-10-CM

## 2024-01-26 DIAGNOSIS — R61 GENERALIZED HYPERHIDROSIS: ICD-10-CM

## 2024-01-26 DIAGNOSIS — U07.1 COVID-19: ICD-10-CM

## 2024-01-26 PROCEDURE — 99214 OFFICE O/P EST MOD 30 MIN: CPT

## 2024-01-26 PROCEDURE — G2211 COMPLEX E/M VISIT ADD ON: CPT

## 2024-01-26 RX ORDER — DAPSONE 25 MG/1
25 TABLET ORAL DAILY
Qty: 60 | Refills: 2 | Status: DISCONTINUED | COMMUNITY
Start: 2024-01-08 | End: 2024-01-26

## 2024-01-26 RX ORDER — PREDNISONE 20 MG/1
20 TABLET ORAL DAILY
Qty: 90 | Refills: 0 | Status: ACTIVE | COMMUNITY
Start: 2024-01-17

## 2024-01-26 RX ORDER — DICLOFENAC SODIUM 1% 10 MG/G
1 GEL TOPICAL
Qty: 100 | Refills: 3 | Status: ACTIVE | COMMUNITY
Start: 2023-08-09 | End: 1900-01-01

## 2024-01-26 RX ORDER — NIRMATRELVIR AND RITONAVIR 300-100 MG
20 X 150 MG & KIT ORAL
Qty: 1 | Refills: 0 | Status: DISCONTINUED | COMMUNITY
Start: 2023-12-26 | End: 2024-01-26

## 2024-01-26 NOTE — REVIEW OF SYSTEMS
[Chills] : chills [Night Sweats] : night sweats [Recent Change In Weight] : ~T recent weight change [FreeTextEntry2] : poor sleep [FreeTextEntry9] : left knee pain

## 2024-01-26 NOTE — HISTORY OF PRESENT ILLNESS
[de-identified] : 61 yo M, here for evaluation of symptoms of continued weight loss and night sweats.  Pt had a rash which started in July, which worsened and spread, was biopsied and was c/w urticarial vasculitis. Over past several months pt has seen derm and rheum, s/p course of steroids x 2, somewhat helpful, then rash returns when d/c. Pt was recently on dapsone, had continued night sweats s/p switched back to steroids. Pt reports having night sweats since that time in July, never really goes away, now with decreased appetite and weight loss despite being on prednisone. No clear underlying etiology forthcoming. Yesterday pt was seen by a different rheumatologist, who did alot of labs and ordered CT chest/abd/pelvis.  Pt also recently seen by julissa (followed regularly for thyroid) and has upcoming surveillance US of left cervical LN that is chronic, not new.  Pt with all vaccines UTD except RSV, did not get recent COVID vaccine as he had COVID in Dec, and told not to since there was a suggestion that these symptoms all started after getting one of the boosters. Pt with h/o TB 1993, s/p INH for 13 months, but only single drug regimen, states it was in his  tract. Last c-scope 2019 or 2020. Stools are different but pt not sure if this is related to meds.  Also set up for DEXA given multiple courses of high-dose steroids. Pt is s/p bilat TKR, right total shoulder, and an Inspire for NINOSKA.  Never a smoker.

## 2024-01-26 NOTE — PHYSICAL EXAM
[No Acute Distress] : no acute distress [Normal Sclera/Conjunctiva] : normal sclera/conjunctiva [Normal] : affect was normal and insight and judgment were intact [de-identified] : thin but not emaciated looking [de-identified] : left cervical LN, soft, mobile [de-identified] : favors left leg

## 2024-01-29 ENCOUNTER — OUTPATIENT (OUTPATIENT)
Dept: OUTPATIENT SERVICES | Facility: HOSPITAL | Age: 64
LOS: 1 days | End: 2024-01-29
Payer: MEDICARE

## 2024-01-29 ENCOUNTER — APPOINTMENT (OUTPATIENT)
Dept: ULTRASOUND IMAGING | Facility: CLINIC | Age: 64
End: 2024-01-29
Payer: MEDICARE

## 2024-01-29 ENCOUNTER — TRANSCRIPTION ENCOUNTER (OUTPATIENT)
Age: 64
End: 2024-01-29

## 2024-01-29 DIAGNOSIS — C73 MALIGNANT NEOPLASM OF THYROID GLAND: ICD-10-CM

## 2024-01-29 PROCEDURE — 76536 US EXAM OF HEAD AND NECK: CPT

## 2024-01-29 PROCEDURE — 76536 US EXAM OF HEAD AND NECK: CPT | Mod: 26

## 2024-02-02 ENCOUNTER — TRANSCRIPTION ENCOUNTER (OUTPATIENT)
Age: 64
End: 2024-02-02

## 2024-02-05 ENCOUNTER — TRANSCRIPTION ENCOUNTER (OUTPATIENT)
Age: 64
End: 2024-02-05

## 2024-02-05 LAB
C1Q IMMUNE COMPLEX: >100 UG EQ/ML
C1Q SERPL-MCNC: 7.3 MG/DL

## 2024-02-12 ENCOUNTER — TRANSCRIPTION ENCOUNTER (OUTPATIENT)
Age: 64
End: 2024-02-12

## 2024-02-12 ENCOUNTER — APPOINTMENT (OUTPATIENT)
Dept: PLASTIC SURGERY | Facility: CLINIC | Age: 64
End: 2024-02-12
Payer: MEDICARE

## 2024-02-12 VITALS
DIASTOLIC BLOOD PRESSURE: 86 MMHG | HEIGHT: 68 IN | RESPIRATION RATE: 16 BRPM | BODY MASS INDEX: 26.52 KG/M2 | WEIGHT: 175 LBS | SYSTOLIC BLOOD PRESSURE: 156 MMHG | HEART RATE: 64 BPM | OXYGEN SATURATION: 97 %

## 2024-02-12 PROCEDURE — 99202 OFFICE O/P NEW SF 15 MIN: CPT

## 2024-02-14 ENCOUNTER — APPOINTMENT (OUTPATIENT)
Dept: RHEUMATOLOGY | Facility: CLINIC | Age: 64
End: 2024-02-14

## 2024-02-15 ENCOUNTER — TRANSCRIPTION ENCOUNTER (OUTPATIENT)
Age: 64
End: 2024-02-15

## 2024-02-20 ENCOUNTER — OUTPATIENT (OUTPATIENT)
Dept: OUTPATIENT SERVICES | Facility: HOSPITAL | Age: 64
LOS: 1 days | End: 2024-02-20
Payer: MEDICARE

## 2024-02-20 ENCOUNTER — APPOINTMENT (OUTPATIENT)
Dept: MRI IMAGING | Facility: CLINIC | Age: 64
End: 2024-02-20
Payer: MEDICARE

## 2024-02-20 DIAGNOSIS — R22.1 LOCALIZED SWELLING, MASS AND LUMP, NECK: ICD-10-CM

## 2024-02-20 PROCEDURE — 70543 MRI ORBT/FAC/NCK W/O &W/DYE: CPT | Mod: 26,MH

## 2024-02-20 PROCEDURE — A9585: CPT

## 2024-02-20 PROCEDURE — 70543 MRI ORBT/FAC/NCK W/O &W/DYE: CPT

## 2024-02-22 ENCOUNTER — TRANSCRIPTION ENCOUNTER (OUTPATIENT)
Age: 64
End: 2024-02-22

## 2024-02-27 ENCOUNTER — TRANSCRIPTION ENCOUNTER (OUTPATIENT)
Age: 64
End: 2024-02-27

## 2024-03-05 ENCOUNTER — APPOINTMENT (OUTPATIENT)
Dept: SURGERY | Facility: CLINIC | Age: 64
End: 2024-03-05
Payer: MEDICARE

## 2024-03-05 ENCOUNTER — TRANSCRIPTION ENCOUNTER (OUTPATIENT)
Age: 64
End: 2024-03-05

## 2024-03-05 VITALS
BODY MASS INDEX: 27.28 KG/M2 | DIASTOLIC BLOOD PRESSURE: 83 MMHG | HEIGHT: 68 IN | HEART RATE: 80 BPM | WEIGHT: 180 LBS | SYSTOLIC BLOOD PRESSURE: 144 MMHG

## 2024-03-05 DIAGNOSIS — R22.1 LOCALIZED SWELLING, MASS AND LUMP, NECK: ICD-10-CM

## 2024-03-05 LAB
T3 SERPL-MCNC: 75 NG/DL
T4 FREE SERPL-MCNC: 1.7 NG/DL
TSH SERPL-ACNC: 2.56 UIU/ML

## 2024-03-05 PROCEDURE — 99203 OFFICE O/P NEW LOW 30 MIN: CPT

## 2024-03-05 NOTE — PHYSICAL EXAM
[de-identified] : 7 cm left posterior triangle mass, well circumscribed, soft and mobile [de-identified] : well healed scar anteriorly [Midline] : located in midline position [Normal] : orientation to person, place, and time: normal

## 2024-03-05 NOTE — HISTORY OF PRESENT ILLNESS
[de-identified] : Pt c/o lipoma of  neck, increased in size over the last several years.  denies pain or infection, drainage or history of trauma. s/p total thyroidectomy 10 years ago for malignancy at [facility].  right hand dominant MRI: Left posterior neck lipoma 7 cm sonogram: s/p thyroidectomy 3.4 cm lipoma posterior neck I have reviewed all old and new data and available images.

## 2024-03-05 NOTE — ASSESSMENT
[FreeTextEntry1] : discussed options for management including observation vs excision. risks, benefits and alternatives discussed at length.  I have discussed with the patient the anatomy of the area, the pathophysiology of the disease process and the rationale for surgery.  The attendant risks, possible complications and expected postoperative course have been discussed in detail.  I have given the patient the opportunity to ask questions, and all questions have been answered to the patient's satisfaction, and they wish to proceed with the planned procedure.  to be scheduled ambulatory at [facility]. potential for scarring, recurrence, shoulder weakness discussed.  ([id number])

## 2024-03-05 NOTE — CONSULT LETTER
[Dear  ___] : Dear  [unfilled], [Please see my note below.] : Please see my note below. [Consult Letter:] : I had the pleasure of evaluating your patient, [unfilled]. [Sincerely,] : Sincerely, [Consult Closing:] : Thank you very much for allowing me to participate in the care of this patient.  If you have any questions, please do not hesitate to contact me. [FreeTextEntry2] : Dr. Jim Shah, Dr. Kristofer Rodarte [DrLenora  ___] : Dr. LOYD [FreeTextEntry3] : Ry Stallings MD, FACS System Director, Endocrine Surgery [facility] Associate  Professor of Surgery [facility] [employer] at [facility]

## 2024-03-06 ENCOUNTER — TRANSCRIPTION ENCOUNTER (OUTPATIENT)
Age: 64
End: 2024-03-06

## 2024-03-06 DIAGNOSIS — E03.9 HYPOTHYROIDISM, UNSPECIFIED: ICD-10-CM

## 2024-03-06 DIAGNOSIS — C73 MALIGNANT NEOPLASM OF THYROID GLAND: ICD-10-CM

## 2024-03-11 ENCOUNTER — TRANSCRIPTION ENCOUNTER (OUTPATIENT)
Age: 64
End: 2024-03-11

## 2024-03-14 ENCOUNTER — TRANSCRIPTION ENCOUNTER (OUTPATIENT)
Age: 64
End: 2024-03-14

## 2024-03-14 DIAGNOSIS — R39.15 URGENCY OF URINATION: ICD-10-CM

## 2024-03-15 ENCOUNTER — LABORATORY RESULT (OUTPATIENT)
Age: 64
End: 2024-03-15

## 2024-03-17 LAB — BACTERIA UR CULT: NORMAL

## 2024-03-18 ENCOUNTER — TRANSCRIPTION ENCOUNTER (OUTPATIENT)
Age: 64
End: 2024-03-18

## 2024-03-18 LAB
APPEARANCE: CLEAR
BILIRUBIN URINE: NEGATIVE
BLOOD URINE: ABNORMAL
COLOR: YELLOW
GLUCOSE QUALITATIVE U: NEGATIVE MG/DL
KETONES URINE: NEGATIVE MG/DL
LEUKOCYTE ESTERASE URINE: NEGATIVE
NITRITE URINE: NEGATIVE
PH URINE: 7
PROTEIN URINE: NORMAL MG/DL
SPECIFIC GRAVITY URINE: 1.02
T4 FREE SERPL-MCNC: 1.9 NG/DL
UROBILINOGEN URINE: 0.2 MG/DL

## 2024-04-26 ENCOUNTER — APPOINTMENT (OUTPATIENT)
Dept: SURGERY | Facility: HOSPITAL | Age: 64
End: 2024-04-26

## 2024-05-01 ENCOUNTER — TRANSCRIPTION ENCOUNTER (OUTPATIENT)
Age: 64
End: 2024-05-01

## 2024-05-07 ENCOUNTER — TRANSCRIPTION ENCOUNTER (OUTPATIENT)
Age: 64
End: 2024-05-07

## 2024-05-07 LAB — TSH SERPL-ACNC: 0.17 UIU/ML

## 2024-05-08 ENCOUNTER — TRANSCRIPTION ENCOUNTER (OUTPATIENT)
Age: 64
End: 2024-05-08

## 2024-05-09 ENCOUNTER — APPOINTMENT (OUTPATIENT)
Dept: SURGERY | Facility: CLINIC | Age: 64
End: 2024-05-09

## 2024-05-13 ENCOUNTER — TRANSCRIPTION ENCOUNTER (OUTPATIENT)
Age: 64
End: 2024-05-13

## 2024-05-13 RX ORDER — LEVOTHYROXINE SODIUM 0.17 MG/1
175 TABLET ORAL
Qty: 100 | Refills: 1 | Status: ACTIVE | COMMUNITY
Start: 2021-06-07 | End: 1900-01-01

## 2024-05-15 ENCOUNTER — NON-APPOINTMENT (OUTPATIENT)
Age: 64
End: 2024-05-15

## 2024-05-16 RX ORDER — ROSUVASTATIN CALCIUM 20 MG/1
20 TABLET, FILM COATED ORAL
Qty: 90 | Refills: 0 | Status: ACTIVE | COMMUNITY
Start: 2024-05-16 | End: 1900-01-01

## 2024-06-05 ENCOUNTER — TRANSCRIPTION ENCOUNTER (OUTPATIENT)
Age: 64
End: 2024-06-05

## 2024-06-06 ENCOUNTER — TRANSCRIPTION ENCOUNTER (OUTPATIENT)
Age: 64
End: 2024-06-06

## 2024-07-23 ENCOUNTER — APPOINTMENT (OUTPATIENT)
Dept: ENDOCRINOLOGY | Facility: CLINIC | Age: 64
End: 2024-07-23

## 2024-08-18 ENCOUNTER — INPATIENT (INPATIENT)
Facility: HOSPITAL | Age: 64
LOS: 1 days | Discharge: HOME | DRG: 310 | End: 2024-08-20
Attending: STUDENT IN AN ORGANIZED HEALTH CARE EDUCATION/TRAINING PROGRAM | Admitting: STUDENT IN AN ORGANIZED HEALTH CARE EDUCATION/TRAINING PROGRAM
Payer: MEDICARE

## 2024-08-18 VITALS
DIASTOLIC BLOOD PRESSURE: 77 MMHG | HEART RATE: 162 BPM | WEIGHT: 190.04 LBS | TEMPERATURE: 98 F | SYSTOLIC BLOOD PRESSURE: 108 MMHG | OXYGEN SATURATION: 95 % | RESPIRATION RATE: 22 BRPM | HEIGHT: 66 IN

## 2024-08-18 LAB
ALBUMIN SERPL ELPH-MCNC: 3.5 G/DL — SIGNIFICANT CHANGE UP (ref 3.3–5)
ALP SERPL-CCNC: 95 U/L — SIGNIFICANT CHANGE UP (ref 40–120)
ALT FLD-CCNC: <5 U/L — LOW (ref 10–45)
ANION GAP SERPL CALC-SCNC: 17 MMOL/L — SIGNIFICANT CHANGE UP (ref 5–17)
AST SERPL-CCNC: 37 U/L — SIGNIFICANT CHANGE UP (ref 10–40)
BASOPHILS # BLD AUTO: 0.08 K/UL — SIGNIFICANT CHANGE UP (ref 0–0.2)
BASOPHILS NFR BLD AUTO: 0.6 % — SIGNIFICANT CHANGE UP (ref 0–2)
BILIRUB SERPL-MCNC: 1.3 MG/DL — HIGH (ref 0.2–1.2)
BUN SERPL-MCNC: 13 MG/DL — SIGNIFICANT CHANGE UP (ref 7–23)
CALCIUM SERPL-MCNC: 9.6 MG/DL — SIGNIFICANT CHANGE UP (ref 8.4–10.5)
CHLORIDE SERPL-SCNC: 96 MMOL/L — SIGNIFICANT CHANGE UP (ref 96–108)
CO2 SERPL-SCNC: 23 MMOL/L — SIGNIFICANT CHANGE UP (ref 22–31)
CREAT SERPL-MCNC: 0.76 MG/DL — SIGNIFICANT CHANGE UP (ref 0.5–1.3)
EGFR: 99 ML/MIN/1.73M2 — SIGNIFICANT CHANGE UP
EOSINOPHIL # BLD AUTO: 0.37 K/UL — SIGNIFICANT CHANGE UP (ref 0–0.5)
EOSINOPHIL NFR BLD AUTO: 2.8 % — SIGNIFICANT CHANGE UP (ref 0–6)
GAS PNL BLDV: SIGNIFICANT CHANGE UP
GLUCOSE SERPL-MCNC: 103 MG/DL — HIGH (ref 70–99)
HCT VFR BLD CALC: 34.6 % — LOW (ref 39–50)
HGB BLD-MCNC: 10.8 G/DL — LOW (ref 13–17)
IMM GRANULOCYTES NFR BLD AUTO: 0.6 % — SIGNIFICANT CHANGE UP (ref 0–0.9)
LYMPHOCYTES # BLD AUTO: 16.7 % — SIGNIFICANT CHANGE UP (ref 13–44)
LYMPHOCYTES # BLD AUTO: 2.17 K/UL — SIGNIFICANT CHANGE UP (ref 1–3.3)
MCHC RBC-ENTMCNC: 27.3 PG — SIGNIFICANT CHANGE UP (ref 27–34)
MCHC RBC-ENTMCNC: 31.2 GM/DL — LOW (ref 32–36)
MCV RBC AUTO: 87.6 FL — SIGNIFICANT CHANGE UP (ref 80–100)
MONOCYTES # BLD AUTO: 1.04 K/UL — HIGH (ref 0–0.9)
MONOCYTES NFR BLD AUTO: 8 % — SIGNIFICANT CHANGE UP (ref 2–14)
NEUTROPHILS # BLD AUTO: 9.28 K/UL — HIGH (ref 1.8–7.4)
NEUTROPHILS NFR BLD AUTO: 71.3 % — SIGNIFICANT CHANGE UP (ref 43–77)
NRBC # BLD: 0 /100 WBCS — SIGNIFICANT CHANGE UP (ref 0–0)
PLATELET # BLD AUTO: 330 K/UL — SIGNIFICANT CHANGE UP (ref 150–400)
POTASSIUM SERPL-MCNC: 3.3 MMOL/L — LOW (ref 3.5–5.3)
POTASSIUM SERPL-SCNC: 3.3 MMOL/L — LOW (ref 3.5–5.3)
PROT SERPL-MCNC: 7.6 G/DL — SIGNIFICANT CHANGE UP (ref 6–8.3)
RBC # BLD: 3.95 M/UL — LOW (ref 4.2–5.8)
RBC # FLD: 16.1 % — HIGH (ref 10.3–14.5)
SODIUM SERPL-SCNC: 136 MMOL/L — SIGNIFICANT CHANGE UP (ref 135–145)
TROPONIN T, HIGH SENSITIVITY RESULT: 20 NG/L — SIGNIFICANT CHANGE UP (ref 0–51)
WBC # BLD: 13.02 K/UL — HIGH (ref 3.8–10.5)
WBC # FLD AUTO: 13.02 K/UL — HIGH (ref 3.8–10.5)

## 2024-08-18 PROCEDURE — 99291 CRITICAL CARE FIRST HOUR: CPT

## 2024-08-18 PROCEDURE — 71045 X-RAY EXAM CHEST 1 VIEW: CPT | Mod: 26

## 2024-08-18 PROCEDURE — 71275 CT ANGIOGRAPHY CHEST: CPT | Mod: 26,MC

## 2024-08-18 RX ORDER — METOPROLOL TARTRATE 100 MG/1
5 TABLET ORAL ONCE
Refills: 0 | Status: COMPLETED | OUTPATIENT
Start: 2024-08-18 | End: 2024-08-18

## 2024-08-18 RX ORDER — DILTIAZEM HYDROCHLORIDE 5 MG/ML
10 INJECTION INTRAVENOUS ONCE
Refills: 0 | Status: COMPLETED | OUTPATIENT
Start: 2024-08-18 | End: 2024-08-18

## 2024-08-18 RX ORDER — DILTIAZEM HYDROCHLORIDE 5 MG/ML
30 INJECTION INTRAVENOUS ONCE
Refills: 0 | Status: COMPLETED | OUTPATIENT
Start: 2024-08-18 | End: 2024-08-18

## 2024-08-18 RX ORDER — POTASSIUM CHLORIDE 10 MEQ
40 TABLET, EXT RELEASE, PARTICLES/CRYSTALS ORAL ONCE
Refills: 0 | Status: COMPLETED | OUTPATIENT
Start: 2024-08-18 | End: 2024-08-18

## 2024-08-18 RX ORDER — METOPROLOL TARTRATE 100 MG/1
25 TABLET ORAL ONCE
Refills: 0 | Status: COMPLETED | OUTPATIENT
Start: 2024-08-18 | End: 2024-08-18

## 2024-08-18 RX ORDER — METOPROLOL TARTRATE 100 MG/1
25 TABLET ORAL THREE TIMES A DAY
Refills: 0 | Status: DISCONTINUED | OUTPATIENT
Start: 2024-08-19 | End: 2024-08-19

## 2024-08-18 RX ADMIN — DILTIAZEM HYDROCHLORIDE 10 MILLIGRAM(S): 5 INJECTION INTRAVENOUS at 18:27

## 2024-08-18 RX ADMIN — METOPROLOL TARTRATE 5 MILLIGRAM(S): 100 TABLET ORAL at 23:00

## 2024-08-18 RX ADMIN — METOPROLOL TARTRATE 5 MILLIGRAM(S): 100 TABLET ORAL at 19:51

## 2024-08-18 RX ADMIN — DILTIAZEM HYDROCHLORIDE 10 MILLIGRAM(S): 5 INJECTION INTRAVENOUS at 16:52

## 2024-08-18 RX ADMIN — Medication 125 MILLILITER(S): at 19:52

## 2024-08-18 RX ADMIN — METOPROLOL TARTRATE 25 MILLIGRAM(S): 100 TABLET ORAL at 21:34

## 2024-08-18 RX ADMIN — Medication 40 MILLIEQUIVALENT(S): at 19:02

## 2024-08-18 RX ADMIN — DILTIAZEM HYDROCHLORIDE 30 MILLIGRAM(S): 5 INJECTION INTRAVENOUS at 17:15

## 2024-08-18 NOTE — CONSULT NOTE ADULT - SUBJECTIVE AND OBJECTIVE BOX
Patient seen and evaluated at bedside    Chief Complaint: SOB    HPI:  64M PMH HTN, minimal change disease, thyroid ca s/p thyroidectomy, urticarial vasculitis, multiple joint replacements (most recently R hip replacement at [facility] on 8/14/24), reported hx of DVT following prior surgeries, ?hx of pAF, presents with progressive GONZALEZ, fatigue, and light-headedness. Symptoms began within the past 2 days prompting presentation to the ED. He has been on Eliquis ppx since the time of his surgery. He was discharged following surgery with a alexei, followed up at [facility] on 8/16 where he was told his O2 was low with borderline low BP but HR was normal and was d/c home at that time.   In the ED, he was found to be in AF/AFl with RVR to 160s. He was reporting light-headedness but denying CP, or SOB. He was given cardizem x3 in ED without change in HR response.   EP was consulted, patient was administered IV lopressor 5 mg with improvement in HR to 100s. CTA PE performed and negative for PE but with cardiomegaly   He reports a hx of AF following COVID that did not recur. He follows with a cardiologist at [facility] for HTN.     PMHx:   HTN (hypertension)    Diverticulitis    Thyroid cancer        Allergies:  sulfa drugs (Anaphylaxis)  Zosyn (Rash)  sulfa drugs (Seizure)      Home Meds:    Current Medications:   lactated ringers. 1000 milliLiter(s) IV Continuous <Continuous>      FAMILY HISTORY:    REVIEW OF SYSTEMS:  All other review of systems is negative unless indicated above.    Physical Exam:  T(F): 98.2 (08-18), Max: 98.6 (08-18)  HR: 108 (08-18) (108 - 162)  BP: 121/81 (08-18) (102/75 - 121/81)  RR: 18 (08-18)  SpO2: 100% (08-18)  Appearance: No acute distress  Eyes:  EOMI  HEENT: Normal oral mucosa  Cardiovascular: IRR, S1, S2, no murmurs, rubs, or gallops; no edema  Respiratory: Clear to auscultation bilaterally  Gastrointestinal: soft, non-tender, non-distended  Extremities: no lower extremity edema   Neurologic: Non-focal  Psychiatry: AAOx3, mood & affect appropriate    Cardiovascular Diagnostic Testing:    ECG:   AFl RVR      Labs: Personally reviewed                        10.8   13.02 )-----------( 330      ( 18 Aug 2024 17:13 )             34.6     08-18    136  |  96  |  13  ----------------------------<  103<H>  3.3<L>   |  23  |  0.76    Ca    9.6      18 Aug 2024 17:13    TPro  7.6  /  Alb  3.5  /  TBili  1.3<H>  /  DBili  x   /  AST  37  /  ALT  <5<L>  /  AlkPhos  95  08-18        CARDIAC MARKERS ( 18 Aug 2024 17:13 )  20 ng/L / x     / x     / x     / x     / x

## 2024-08-18 NOTE — ED PROVIDER NOTE - PROGRESS NOTE DETAILS
Landry PGY3: Patient s/p dilt 10IV push x2, 30mg PO, now hypotensive 80s systolic, still tachy 150s a.fib/a flutter pattern, requiring oxygen. Mentating well. Cardiology consulted and will come evaluate patient Landry PGY3: Patient evaluated at bedside by cards fellow while I performed POCUS echo. Patient still tachy, cards recommending lopressor 5IV with goal of -130. Can try that up to 3 times. Cards to reassess, dispo pending, patient agreeable to plan.

## 2024-08-18 NOTE — ED PROVIDER NOTE - CLINICAL SUMMARY MEDICAL DECISION MAKING FREE TEXT BOX
64-year-old male, history of hypertension, hyperlipidemia, hypothyroidism, underwent left hip replacement last Wednesday at outside hospital and started on Eliquis, on dapsone for "autoimmune disease", presents with shortness of breath for several days. Tachycardic and hypoxic on vitals. EKG consistent with A. fib/A. flutter,  concern for PE given tachy and hypoxia and recent operation vs primary cardiac etiology vs heart failure, will obtain labs, CTPE, CXR, reassess, give meds to treat symptoms 64-year-old male, history of hypertension, hyperlipidemia, hypothyroidism, underwent left hip replacement last Wednesday at outside hospital and started on Eliquis, on dapsone for "autoimmune disease", presents with shortness of breath for several days. Tachycardic and hypoxic on vitals. EKG consistent with A. fib/A. flutter,  concern for PE given tachy and hypoxia and recent operation vs primary cardiac etiology vs heart failure, will obtain labs, CTPE, CXR, reassess, give meds to treat symptoms    see attending attestation authored by me, Armin Tolbert MD, for further MDM details.

## 2024-08-18 NOTE — ED ADULT NURSE REASSESSMENT NOTE - NS ED NURSE REASSESS COMMENT FT1
Report received from MARGOTH Recio (Preceptor Dilia). Pt received A&Ox4, vitals retaken and documented. Bed locked and in lowest position, side rails raised, call bell within reach. Pt received 5 mg of Lopressor.

## 2024-08-18 NOTE — ED PROVIDER NOTE - OBJECTIVE STATEMENT
64-year-old male, history of hypertension, hyperlipidemia, hypothyroidism, underwent left hip replacement last Wednesday at outside hospital and started on Eliquis, on dapsone for "autoimmune disease", presents with shortness of breath for several days.  Reports that he initially became short of breath at least 2 days ago and went to an outside hospital for evaluation, where he was found to be hypoxic, hypotensive, tachycardic but was cleared for discharge.  Patient's shortness of breath continued and he could not handle it today bring him to the ED today.  Denies any actual chest pain, nausea or vomiting, fevers.  Not on home oxygen.

## 2024-08-18 NOTE — ED ADULT NURSE NOTE - NSFALLHARMRISKINTERV_ED_ALL_ED

## 2024-08-18 NOTE — ED ADULT NURSE NOTE - OBJECTIVE STATEMENT
Pt is a 63 y/o male PMH of HTN, R hip replacement (8/14) and L shoulder replacement (May 2023) presents to the ED Pt is a 63 y/o male PMH of HTN, R hip replacement (8/14) and L shoulder replacement (May 2023) presents to the ED c/o fast heart rate and SOB. Pt states he had his R hip replacement surgery on Wednesday 8/14 and was d/c on Friday 8/16. Pt was endorsing R hip pain, SOB, lightheadedness and weakness on Friday 8/16 afternoon - home nurse came to assess pt linda catheter. Pt states these symptoms worsened on Saturday Pt is a 65 y/o male PMH of HTN, R hip replacement (8/14) and L shoulder replacement (May 2023) presents to the ED c/o fast heart rate and SOB. Pt states he had his R hip replacement surgery on Wednesday 8/14 and was d/c on Friday 8/16. Pt was endorsing R hip pain, SOB, lightheadedness and weakness on Friday 8/16 afternoon. Home nurse came to assess pt linda catheter on Friday as well to see if there was a possible blockage. Pt states these symptoms worsened on Saturday and today. Pt was advised by his neighbor who is an NP to come to ED for a fast heart rate. Pt denies any chest pain, SOB, palpitations, abdominal pain, HA, N/V/D and dizziness.

## 2024-08-18 NOTE — ED PROVIDER NOTE - ATTENDING CONTRIBUTION TO CARE
65 yo male hx of recent hip replacement p/w generalized weakness, GONZALEZ.  EMS and family @ bedside for collateral.    noted to be in a.fib here, rapid response to 150s on my assessment @ bedside.  EKG independently reviewed by me at the bedside, no evidence of STEMI, confirmed to be in atrial fibrillation with rapid response versus atrial flutter.  IV diltiazem 10 mg pushed @ bedside, minimal effect.  repeat x 1 for a total of 20 mg, and given 30 mg PO.      still with poor rate control.  seen by cards here, got metoprolol with improvement in HR.  will trend cardiac enzymes, admit for further management.

## 2024-08-18 NOTE — ED ADULT TRIAGE NOTE - CHIEF COMPLAINT QUOTE
shortness of breath and lightheadedness that started on 8/16. Pt had a recent right hip replacement on 8/14.  Hx HTN, HLD, nephrotic syndrome

## 2024-08-18 NOTE — ED ADULT TRIAGE NOTE - WEIGHT IN LBS
[facility] Emergency Department    [place]  [address] [id number]    Phone:  [phone]    Fax:  [address]   MRN: [id number]    Department:  [facility] Emergency Department   Date of Visit:  4/2 IF THERE IS ANY CHANGE OR WORSENING OF YOUR CONDITION, CALL YOUR PRIMARY CARE PHYSICIAN AT ONCE OR RETURN IMMEDIATELY TO THE EMERGENCY DEPARTMENT.     If you have been prescribed any medication(s), please fill your prescription right away and begin taking t 190

## 2024-08-19 ENCOUNTER — RESULT REVIEW (OUTPATIENT)
Age: 64
End: 2024-08-19

## 2024-08-19 ENCOUNTER — NON-APPOINTMENT (OUTPATIENT)
Age: 64
End: 2024-08-19

## 2024-08-19 ENCOUNTER — TRANSCRIPTION ENCOUNTER (OUTPATIENT)
Age: 64
End: 2024-08-19

## 2024-08-19 DIAGNOSIS — R09.89 OTHER SPECIFIED SYMPTOMS AND SIGNS INVOLVING THE CIRCULATORY AND RESPIRATORY SYSTEMS: ICD-10-CM

## 2024-08-19 DIAGNOSIS — J96.01 ACUTE RESPIRATORY FAILURE WITH HYPOXIA: ICD-10-CM

## 2024-08-19 DIAGNOSIS — Z00.00 ENCOUNTER FOR GENERAL ADULT MEDICAL EXAMINATION WITHOUT ABNORMAL FINDINGS: ICD-10-CM

## 2024-08-19 DIAGNOSIS — I10 ESSENTIAL (PRIMARY) HYPERTENSION: ICD-10-CM

## 2024-08-19 DIAGNOSIS — E03.9 HYPOTHYROIDISM, UNSPECIFIED: ICD-10-CM

## 2024-08-19 DIAGNOSIS — R33.9 RETENTION OF URINE, UNSPECIFIED: ICD-10-CM

## 2024-08-19 DIAGNOSIS — I48.92 UNSPECIFIED ATRIAL FLUTTER: ICD-10-CM

## 2024-08-19 DIAGNOSIS — K59.00 CONSTIPATION, UNSPECIFIED: ICD-10-CM

## 2024-08-19 DIAGNOSIS — L95.8 OTHER VASCULITIS LIMITED TO THE SKIN: ICD-10-CM

## 2024-08-19 DIAGNOSIS — Z96.642 PRESENCE OF LEFT ARTIFICIAL HIP JOINT: ICD-10-CM

## 2024-08-19 DIAGNOSIS — I50.30 UNSPECIFIED DIASTOLIC (CONGESTIVE) HEART FAILURE: ICD-10-CM

## 2024-08-19 DIAGNOSIS — E78.5 HYPERLIPIDEMIA, UNSPECIFIED: ICD-10-CM

## 2024-08-19 LAB
A1C WITH ESTIMATED AVERAGE GLUCOSE RESULT: 4.6 % — SIGNIFICANT CHANGE UP (ref 4–5.6)
ALBUMIN SERPL ELPH-MCNC: 2.8 G/DL — LOW (ref 3.3–5)
ALP SERPL-CCNC: 72 U/L — SIGNIFICANT CHANGE UP (ref 40–120)
ALT FLD-CCNC: <5 U/L — LOW (ref 10–45)
ANION GAP SERPL CALC-SCNC: 10 MMOL/L — SIGNIFICANT CHANGE UP (ref 5–17)
APPEARANCE UR: CLEAR — SIGNIFICANT CHANGE UP
APTT BLD: 26.7 SEC — SIGNIFICANT CHANGE UP (ref 24.5–35.6)
APTT BLD: 95.5 SEC — HIGH (ref 24.5–35.6)
AST SERPL-CCNC: 24 U/L — SIGNIFICANT CHANGE UP (ref 10–40)
BACTERIA # UR AUTO: NEGATIVE /HPF — SIGNIFICANT CHANGE UP
BASOPHILS # BLD AUTO: 0.07 K/UL — SIGNIFICANT CHANGE UP (ref 0–0.2)
BASOPHILS NFR BLD AUTO: 0.6 % — SIGNIFICANT CHANGE UP (ref 0–2)
BILIRUB SERPL-MCNC: 1 MG/DL — SIGNIFICANT CHANGE UP (ref 0.2–1.2)
BILIRUB UR-MCNC: NEGATIVE — SIGNIFICANT CHANGE UP
BUN SERPL-MCNC: 11 MG/DL — SIGNIFICANT CHANGE UP (ref 7–23)
CALCIUM SERPL-MCNC: 8.4 MG/DL — SIGNIFICANT CHANGE UP (ref 8.4–10.5)
CAST: 1 /LPF — SIGNIFICANT CHANGE UP (ref 0–4)
CHLORIDE SERPL-SCNC: 101 MMOL/L — SIGNIFICANT CHANGE UP (ref 96–108)
CHOLEST SERPL-MCNC: 97 MG/DL — SIGNIFICANT CHANGE UP
CO2 SERPL-SCNC: 24 MMOL/L — SIGNIFICANT CHANGE UP (ref 22–31)
COLOR SPEC: YELLOW — SIGNIFICANT CHANGE UP
CREAT SERPL-MCNC: 0.75 MG/DL — SIGNIFICANT CHANGE UP (ref 0.5–1.3)
DIFF PNL FLD: ABNORMAL
EGFR: 100 ML/MIN/1.73M2 — SIGNIFICANT CHANGE UP
EOSINOPHIL # BLD AUTO: 0.44 K/UL — SIGNIFICANT CHANGE UP (ref 0–0.5)
EOSINOPHIL NFR BLD AUTO: 3.8 % — SIGNIFICANT CHANGE UP (ref 0–6)
ESTIMATED AVERAGE GLUCOSE: 85 MG/DL — SIGNIFICANT CHANGE UP (ref 68–114)
GLUCOSE SERPL-MCNC: 102 MG/DL — HIGH (ref 70–99)
GLUCOSE UR QL: NEGATIVE MG/DL — SIGNIFICANT CHANGE UP
HCT VFR BLD CALC: 29.2 % — LOW (ref 39–50)
HCT VFR BLD CALC: 29.2 % — LOW (ref 39–50)
HDLC SERPL-MCNC: 26 MG/DL — LOW
HGB BLD-MCNC: 9 G/DL — LOW (ref 13–17)
HGB BLD-MCNC: 9 G/DL — LOW (ref 13–17)
IMM GRANULOCYTES NFR BLD AUTO: 1 % — HIGH (ref 0–0.9)
INR BLD: 1.27 RATIO — HIGH (ref 0.85–1.18)
INR BLD: 1.29 RATIO — HIGH (ref 0.85–1.18)
KETONES UR-MCNC: 40 MG/DL
LEUKOCYTE ESTERASE UR-ACNC: ABNORMAL
LIPID PNL WITH DIRECT LDL SERPL: 53 MG/DL — SIGNIFICANT CHANGE UP
LYMPHOCYTES # BLD AUTO: 1.82 K/UL — SIGNIFICANT CHANGE UP (ref 1–3.3)
LYMPHOCYTES # BLD AUTO: 15.8 % — SIGNIFICANT CHANGE UP (ref 13–44)
MCHC RBC-ENTMCNC: 27.8 PG — SIGNIFICANT CHANGE UP (ref 27–34)
MCHC RBC-ENTMCNC: 27.9 PG — SIGNIFICANT CHANGE UP (ref 27–34)
MCHC RBC-ENTMCNC: 30.8 GM/DL — LOW (ref 32–36)
MCHC RBC-ENTMCNC: 30.8 GM/DL — LOW (ref 32–36)
MCV RBC AUTO: 90.1 FL — SIGNIFICANT CHANGE UP (ref 80–100)
MCV RBC AUTO: 90.4 FL — SIGNIFICANT CHANGE UP (ref 80–100)
MONOCYTES # BLD AUTO: 1.05 K/UL — HIGH (ref 0–0.9)
MONOCYTES NFR BLD AUTO: 9.1 % — SIGNIFICANT CHANGE UP (ref 2–14)
NEUTROPHILS # BLD AUTO: 8 K/UL — HIGH (ref 1.8–7.4)
NEUTROPHILS NFR BLD AUTO: 69.7 % — SIGNIFICANT CHANGE UP (ref 43–77)
NITRITE UR-MCNC: NEGATIVE — SIGNIFICANT CHANGE UP
NON HDL CHOLESTEROL: 71 MG/DL — SIGNIFICANT CHANGE UP
NRBC # BLD: 0 /100 WBCS — SIGNIFICANT CHANGE UP (ref 0–0)
NRBC # BLD: 0 /100 WBCS — SIGNIFICANT CHANGE UP (ref 0–0)
PH UR: 7 — SIGNIFICANT CHANGE UP (ref 5–8)
PLATELET # BLD AUTO: 305 K/UL — SIGNIFICANT CHANGE UP (ref 150–400)
PLATELET # BLD AUTO: 328 K/UL — SIGNIFICANT CHANGE UP (ref 150–400)
POTASSIUM SERPL-MCNC: 3.8 MMOL/L — SIGNIFICANT CHANGE UP (ref 3.5–5.3)
POTASSIUM SERPL-SCNC: 3.8 MMOL/L — SIGNIFICANT CHANGE UP (ref 3.5–5.3)
PROT SERPL-MCNC: 6 G/DL — SIGNIFICANT CHANGE UP (ref 6–8.3)
PROT UR-MCNC: NEGATIVE MG/DL — SIGNIFICANT CHANGE UP
PROTHROM AB SERPL-ACNC: 13.4 SEC — HIGH (ref 9.5–13)
PROTHROM AB SERPL-ACNC: 13.9 SEC — HIGH (ref 9.5–13)
RBC # BLD: 3.23 M/UL — LOW (ref 4.2–5.8)
RBC # BLD: 3.24 M/UL — LOW (ref 4.2–5.8)
RBC # FLD: 16.3 % — HIGH (ref 10.3–14.5)
RBC # FLD: 16.4 % — HIGH (ref 10.3–14.5)
RBC CASTS # UR COMP ASSIST: 59 /HPF — HIGH (ref 0–4)
SODIUM SERPL-SCNC: 135 MMOL/L — SIGNIFICANT CHANGE UP (ref 135–145)
SP GR SPEC: 1.01 — SIGNIFICANT CHANGE UP (ref 1–1.03)
SQUAMOUS # UR AUTO: 0 /HPF — SIGNIFICANT CHANGE UP (ref 0–5)
T4 AB SER-ACNC: 5.1 UG/DL — SIGNIFICANT CHANGE UP (ref 4.6–12)
TRIGL SERPL-MCNC: 85 MG/DL — SIGNIFICANT CHANGE UP
TSH SERPL-MCNC: 6.81 UIU/ML — HIGH (ref 0.27–4.2)
UROBILINOGEN FLD QL: 1 MG/DL — SIGNIFICANT CHANGE UP (ref 0.2–1)
WBC # BLD: 11.39 K/UL — HIGH (ref 3.8–10.5)
WBC # BLD: 11.49 K/UL — HIGH (ref 3.8–10.5)
WBC # FLD AUTO: 11.39 K/UL — HIGH (ref 3.8–10.5)
WBC # FLD AUTO: 11.49 K/UL — HIGH (ref 3.8–10.5)
WBC UR QL: 1 /HPF — SIGNIFICANT CHANGE UP (ref 0–5)

## 2024-08-19 PROCEDURE — 99223 1ST HOSP IP/OBS HIGH 75: CPT | Mod: GC

## 2024-08-19 PROCEDURE — 93010 ELECTROCARDIOGRAM REPORT: CPT

## 2024-08-19 PROCEDURE — 93312 ECHO TRANSESOPHAGEAL: CPT | Mod: 26

## 2024-08-19 PROCEDURE — 99223 1ST HOSP IP/OBS HIGH 75: CPT

## 2024-08-19 PROCEDURE — 93325 DOPPLER ECHO COLOR FLOW MAPG: CPT | Mod: 26

## 2024-08-19 PROCEDURE — 93306 TTE W/DOPPLER COMPLETE: CPT | Mod: 26

## 2024-08-19 PROCEDURE — 92960 CARDIOVERSION ELECTRIC EXT: CPT

## 2024-08-19 PROCEDURE — 99223 1ST HOSP IP/OBS HIGH 75: CPT | Mod: AI

## 2024-08-19 PROCEDURE — 93320 DOPPLER ECHO COMPLETE: CPT | Mod: 26

## 2024-08-19 RX ORDER — DAPSONE 25 MG/1
100 TABLET ORAL DAILY
Refills: 0 | Status: DISCONTINUED | OUTPATIENT
Start: 2024-08-19 | End: 2024-08-20

## 2024-08-19 RX ORDER — ONDANSETRON 2 MG/ML
4 INJECTION, SOLUTION INTRAMUSCULAR; INTRAVENOUS EVERY 8 HOURS
Refills: 0 | Status: DISCONTINUED | OUTPATIENT
Start: 2024-08-19 | End: 2024-08-20

## 2024-08-19 RX ORDER — DAPSONE 25 MG/1
1 TABLET ORAL
Refills: 0 | DISCHARGE

## 2024-08-19 RX ORDER — ACETAMINOPHEN 325 MG/1
650 TABLET ORAL EVERY 6 HOURS
Refills: 0 | Status: DISCONTINUED | OUTPATIENT
Start: 2024-08-19 | End: 2024-08-20

## 2024-08-19 RX ORDER — SENNA 187 MG
2 TABLET ORAL AT BEDTIME
Refills: 0 | Status: DISCONTINUED | OUTPATIENT
Start: 2024-08-19 | End: 2024-08-20

## 2024-08-19 RX ORDER — FAMOTIDINE 10 MG/ML
1 INJECTION INTRAVENOUS
Refills: 0 | DISCHARGE

## 2024-08-19 RX ORDER — METOPROLOL TARTRATE 100 MG/1
25 TABLET ORAL THREE TIMES A DAY
Refills: 0 | Status: DISCONTINUED | OUTPATIENT
Start: 2024-08-19 | End: 2024-08-20

## 2024-08-19 RX ORDER — HEPARIN SODIUM,BOVINE 1000/ML
3500 VIAL (ML) INJECTION EVERY 6 HOURS
Refills: 0 | Status: DISCONTINUED | OUTPATIENT
Start: 2024-08-19 | End: 2024-08-19

## 2024-08-19 RX ORDER — SODIUM CHLORIDE 9 MG/ML
1000 INJECTION INTRAMUSCULAR; INTRAVENOUS; SUBCUTANEOUS ONCE
Refills: 0 | Status: COMPLETED | OUTPATIENT
Start: 2024-08-19 | End: 2024-08-19

## 2024-08-19 RX ORDER — MAGNESIUM, ALUMINUM HYDROXIDE 200-225/5
30 SUSPENSION, ORAL (FINAL DOSE FORM) ORAL EVERY 4 HOURS
Refills: 0 | Status: DISCONTINUED | OUTPATIENT
Start: 2024-08-19 | End: 2024-08-20

## 2024-08-19 RX ORDER — HEPARIN SODIUM,BOVINE 1000/ML
7500 VIAL (ML) INJECTION EVERY 6 HOURS
Refills: 0 | Status: DISCONTINUED | OUTPATIENT
Start: 2024-08-19 | End: 2024-08-19

## 2024-08-19 RX ORDER — HEPARIN SODIUM,BOVINE 1000/ML
VIAL (ML) INJECTION
Qty: 25000 | Refills: 0 | Status: DISCONTINUED | OUTPATIENT
Start: 2024-08-19 | End: 2024-08-19

## 2024-08-19 RX ORDER — METOPROLOL TARTRATE 100 MG/1
5 TABLET ORAL ONCE
Refills: 0 | Status: COMPLETED | OUTPATIENT
Start: 2024-08-19 | End: 2024-08-19

## 2024-08-19 RX ORDER — HEPARIN SODIUM,BOVINE 1000/ML
7500 VIAL (ML) INJECTION ONCE
Refills: 0 | Status: COMPLETED | OUTPATIENT
Start: 2024-08-19 | End: 2024-08-19

## 2024-08-19 RX ORDER — APIXABAN 5 MG/1
5 TABLET, FILM COATED ORAL EVERY 12 HOURS
Refills: 0 | Status: DISCONTINUED | OUTPATIENT
Start: 2024-08-19 | End: 2024-08-20

## 2024-08-19 RX ORDER — LEVOTHYROXINE SODIUM 100 MCG
175 TABLET ORAL DAILY
Refills: 0 | Status: DISCONTINUED | OUTPATIENT
Start: 2024-08-19 | End: 2024-08-20

## 2024-08-19 RX ORDER — POLYETHYLENE GLYCOL 3350 17 G/17G
17 POWDER, FOR SOLUTION ORAL DAILY
Refills: 0 | Status: DISCONTINUED | OUTPATIENT
Start: 2024-08-19 | End: 2024-08-20

## 2024-08-19 RX ORDER — METOPROLOL TARTRATE 100 MG/1
50 TABLET ORAL ONCE
Refills: 0 | Status: DISCONTINUED | OUTPATIENT
Start: 2024-08-19 | End: 2024-08-19

## 2024-08-19 RX ORDER — FAMOTIDINE 10 MG/ML
20 INJECTION INTRAVENOUS DAILY
Refills: 0 | Status: DISCONTINUED | OUTPATIENT
Start: 2024-08-19 | End: 2024-08-20

## 2024-08-19 RX ADMIN — METOPROLOL TARTRATE 5 MILLIGRAM(S): 100 TABLET ORAL at 01:06

## 2024-08-19 RX ADMIN — APIXABAN 5 MILLIGRAM(S): 5 TABLET, FILM COATED ORAL at 23:01

## 2024-08-19 RX ADMIN — APIXABAN 5 MILLIGRAM(S): 5 TABLET, FILM COATED ORAL at 12:10

## 2024-08-19 RX ADMIN — METOPROLOL TARTRATE 25 MILLIGRAM(S): 100 TABLET ORAL at 05:21

## 2024-08-19 RX ADMIN — Medication 1700 UNIT(S)/HR: at 11:01

## 2024-08-19 RX ADMIN — Medication 7500 UNIT(S): at 05:18

## 2024-08-19 RX ADMIN — Medication 1000 MILLILITER(S): at 06:24

## 2024-08-19 RX ADMIN — Medication 1700 UNIT(S)/HR: at 05:19

## 2024-08-19 RX ADMIN — METOPROLOL TARTRATE 25 MILLIGRAM(S): 100 TABLET ORAL at 23:01

## 2024-08-19 RX ADMIN — Medication 175 MICROGRAM(S): at 17:34

## 2024-08-19 RX ADMIN — DAPSONE 100 MILLIGRAM(S): 25 TABLET ORAL at 17:33

## 2024-08-19 RX ADMIN — SODIUM CHLORIDE 1000 MILLILITER(S): 9 INJECTION INTRAMUSCULAR; INTRAVENOUS; SUBCUTANEOUS at 01:55

## 2024-08-19 RX ADMIN — Medication 80 MILLIGRAM(S): at 23:01

## 2024-08-19 RX ADMIN — POLYETHYLENE GLYCOL 3350 17 GRAM(S): 17 POWDER, FOR SOLUTION ORAL at 23:00

## 2024-08-19 RX ADMIN — FAMOTIDINE 20 MILLIGRAM(S): 10 INJECTION INTRAVENOUS at 17:33

## 2024-08-19 RX ADMIN — Medication 3 MILLIGRAM(S): at 23:01

## 2024-08-19 RX ADMIN — Medication 2 TABLET(S): at 23:01

## 2024-08-19 RX ADMIN — ACETAMINOPHEN 650 MILLIGRAM(S): 325 TABLET ORAL at 05:24

## 2024-08-19 NOTE — H&P ADULT - HISTORY OF PRESENT ILLNESS
64-year-old male, history of hypertension, hyperlipidemia, hypothyroidism, underwent left hip replacement last Wednesday at outside hospital and started on Eliquis, on dapsone for "autoimmune disease", presents with shortness of breath for several days.  Reports that he initially became short of breath at least 2 days ago and went to an outside hospital for evaluation, where he was found to be hypoxic, hypotensive, tachycardic but was cleared for discharge.  Patient's shortness of breath continued and he could not handle it today bring him to the ED today.  Denies any actual chest pain, nausea or vomiting, fevers.  Not on home oxygen. Mr. Shabazz is a 64-year-old male, history of hypertension, hyperlipidemia, hypothyroidism, urticarial vasculitis, presenting with SOB for several days.   Underwent left hip replacement last Wednesday at OSH, started on Eliquis upon d/c. Since the procedure notes decreased urine output, requiring a linda. Admits to decreased oral intake during that time. Two days prior to his arrival, visited an OSH for his shortness of breath. He was found to be hypoxic, hypotensive, tachycardic but was cleared for discharge. Patient continued to endorse shortness of breath and lethargy prompting visit to the ED.   Seen at bedside. Endorsing mild HA and SOB at rest. Denies fever, chills, n/v/d, dysuria, abdominal pain, cough, congestion, rhinorrhea.

## 2024-08-19 NOTE — DISCHARGE NOTE PROVIDER - HOSPITAL COURSE
HPI:  Mr. Shabazz is a 64-year-old male, history of hypertension, hyperlipidemia, hypothyroidism, urticarial vasculitis, presenting with SOB for several days.   Underwent left hip replacement last Wednesday at OSH, started on Eliquis upon d/c. Since the procedure notes decreased urine output, requiring a Paulson catheter. Admits to decreased oral intake during that time. Two days prior to his arrival, visited an OSH for his shortness of breath. He was found to be hypoxic, hypotensive, tachycardic but was cleared for discharge. Patient continued to endorse shortness of breath and lethargy prompting visit to the ED.   Seen at bedside. Endorsing mild HA and SOB at rest. Denies fever, chills, n/v/d, dysuria, abdominal pain, cough, congestion, rhinorrhea.  (19 Aug 2024 03:47).    8/19 s/p ANA with DCCV, now in sinus rhythm.    HPI:  Mr. Shabazz is a 64-year-old male, history of hypertension, hyperlipidemia, hypothyroidism, urticarial vasculitis, presenting with SOB for several days.   Underwent left hip replacement last Wednesday 8/14 at OSH, started on Eliquis 2.5mg BID upon d/c. Since the procedure notes decreased urine output, requiring a Linda catheter. Admits to decreased oral intake during that time. Two days prior to his arrival, visited an OSH for his shortness of breath. He was found to be hypoxic, hypotensive, tachycardic but was cleared for discharge. Patient continued to endorse shortness of breath and lethargy prompting visit to the ED.   Seen at bedside. Endorsing mild HA and SOB at rest. Denies fever, chills, n/v/d, dysuria, abdominal pain, cough, congestion, rhinorrhea.    Hospital Course: In ED, patient given Lopressor/Dilt/MTP, and 3L IVF bolus. On 8/19 patient was admitted for Afib/flutter. TTE showed EF 62%. TSH was 6.81. He was seen by EP and had ANA with DCCV, now in sinus rhythm. Patient reports significant clinical improvement following cardioversion.     Follow-up:  -EP follow up for ablation - [phone]  -Urology for linda catheter  -Cardiology and PCP for chronic medical problems and Afib treatment  -Endo for hypothyroidism    Medication changes:  -Start eliquis 5 mg BID  -Start metoprolol XL 25mg daily  -Stop losartan    Discharge diagnosis: Afib/flutter   HPI:  Mr. Shabazz is a 64-year-old male, history of hypertension, hyperlipidemia, hypothyroidism, urticarial vasculitis, presenting with SOB for several days.   Underwent left hip replacement last Wednesday 8/14 at OSH, started on Eliquis 2.5mg BID upon d/c. Since the procedure notes decreased urine output, requiring a Linda catheter. Admits to decreased oral intake during that time. Two days prior to his arrival, visited an OSH for his shortness of breath. He was found to be hypoxic, hypotensive, tachycardic but was cleared for discharge. Patient continued to endorse shortness of breath and lethargy prompting visit to the ED.   Seen at bedside. Endorsing mild HA and SOB at rest. Denies fever, chills, n/v/d, dysuria, abdominal pain, cough, congestion, rhinorrhea.    Hospital Course: In ED, patient given both oral and IV lopressor and diltiazem, along with 3L IVF bolus. On 8/19 patient was admitted for Afib/flutter with RVR, with systolic BP in high 80s-100s. TTE showed EF 62%. TSH was 6.81.     He was seen by EP and had ANA with DCCV, now in sinus rhythm. Patient reports significant clinical improvement following cardioversion. Patient was also evaluated by PT who recommended ***    At this time patient is hemodynamically stable and feeling significantly improved, with energy levels back at baseline. He was evaluated by the attending physician with no medical concerns for discharge.     Follow-up:  - EP follow up for ablation - [phone]  - Urology for linda catheter removal/trial of void  - Cardiology and PCP for chronic medical problems and Afib treatment  - Endo for hypothyroidism    Medication changes:  - Start eliquis 5 mg BID  - Start metoprolol XL 25mg daily  - Stop losartan    Discharge diagnosis: Afib/flutter   HPI:  Mr. Shabazz is a 64-year-old male, history of hypertension, hyperlipidemia, hypothyroidism, urticarial vasculitis, presenting with SOB for several days.   Underwent left hip replacement last Wednesday 8/14 at OSH, started on Eliquis 2.5mg BID upon d/c. Since the procedure notes decreased urine output, requiring a Linda catheter. Admits to decreased oral intake during that time. Two days prior to his arrival, visited an OSH for his shortness of breath. He was found to be hypoxic, hypotensive, tachycardic but was cleared for discharge. Patient continued to endorse shortness of breath and lethargy prompting visit to the ED.   Seen at bedside. Endorsing mild HA and SOB at rest. Denies fever, chills, n/v/d, dysuria, abdominal pain, cough, congestion, rhinorrhea.    Hospital Course: In ED, patient given both oral and IV lopressor and diltiazem, along with 3L IVF bolus. On 8/19 patient was admitted for Afib/flutter with RVR, with systolic BP in high 80s-100s. TTE showed EF 62%. TSH was 6.81.     He was seen by EP and had ANA with DCCV, now in sinus rhythm. Patient reports significant clinical improvement following cardioversion. Patient was also evaluated by PT who recommended home PT.    At this time patient is hemodynamically stable and feeling significantly improved, with energy levels back at baseline. He was evaluated by the attending physician with no medical concerns for discharge.     Follow-up:  - EP follow up for ablation - [phone]  - Urology for linda catheter removal/trial of void  - Cardiology and PCP for chronic medical problems and Afib treatment  - Endo for hypothyroidism    Medication changes:  - Start eliquis 5 mg BID  - Start metoprolol XL 25mg daily  - Stop losartan    Discharge diagnosis: Afib/flutter

## 2024-08-19 NOTE — H&P ADULT - PROBLEM SELECTOR PLAN 2
TTE done 7/15 showing severe reversible diastolic dysfunction Stage III.   Plan:   - c/w MTPS 25mg q8h titrate to rates < 110  - Losartan on hold for low Bps  - c/w Lipitor   - monitor for volume overload given recent IVF boluses

## 2024-08-19 NOTE — H&P ADULT - PROBLEM SELECTOR PLAN 3
Continute w/ home regimen Wound care per nursing. Ambulate with assistance with fall precautions. Consider ortho CS in AM   Consult PT/OT  Pain well controlled POD 6 on arrival

## 2024-08-19 NOTE — H&P ADULT - NSHPLABSRESULTS_GEN_ALL_CORE
LABS:                          10.8   13.02 )-----------( 330      ( 18 Aug 2024 17:13 )             34.6     08-18    136  |  96  |  13  ----------------------------<  103<H>  3.3<L>   |  23  |  0.76    Ca    9.6      18 Aug 2024 17:13    TPro  7.6  /  Alb  3.5  /  TBili  1.3<H>  /  DBili  x   /  AST  37  /  ALT  <5<L>  /  AlkPhos  95  08-18    LIVER FUNCTIONS - ( 18 Aug 2024 17:13 )  Alb: 3.5 g/dL / Pro: 7.6 g/dL / ALK PHOS: 95 U/L / ALT: <5 U/L / AST: 37 U/L / GGT: x             Urinalysis Basic - ( 18 Aug 2024 17:13 )    Color: x / Appearance: x / SG: x / pH: x  Gluc: 103 mg/dL / Ketone: x  / Bili: x / Urobili: x   Blood: x / Protein: x / Nitrite: x   Leuk Esterase: x / RBC: x / WBC x   Sq Epi: x / Non Sq Epi: x / Bacteria: x

## 2024-08-19 NOTE — PHYSICAL THERAPY INITIAL EVALUATION ADULT - ACTIVE RANGE OF MOTION EXAMINATION, REHAB EVAL
RLE hip ROM limited 2/2 hip precautions/bilateral upper extremity Active ROM was WFL (within functional limits)/bilateral  lower extremity Active ROM was WFL (within functional limits)

## 2024-08-19 NOTE — DISCHARGE NOTE PROVIDER - CARE PROVIDERS DIRECT ADDRESSES
[email or web address]

## 2024-08-19 NOTE — PHYSICAL THERAPY INITIAL EVALUATION ADULT - PERTINENT HX OF CURRENT PROBLEM, REHAB EVAL
Mr. Shabazz is a 64-year-old male, history of hypertension, hyperlipidemia, hypothyroidism, urticarial vasculitis, presenting with SOB for several days. Underwent left hip replacement last Wednesday at OSH, started on Eliquis upon d/c. Since the procedure notes decreased urine output, requiring a linda. Admits to decreased oral intake during that time. Two days prior to his arrival, visited an OSH for his shortness of breath. He was found to be hypoxic, hypotensive, tachycardic but was cleared for discharge. Patient continued to endorse shortness of breath and lethargy prompting visit to the ED.   Seen at bedside. Endorsing mild HA and SOB at rest. Denies fever, chills, n/v/d, dysuria, abdominal pain, cough, congestion, rhinorrhea.   HC: CXR No focal consolidation. EPS consult: NPO for ANA guided DCCV today (pt has been on Eliquis 2.5mg BID post R hip replacement 8/14/24. As this is subtherapeutic dosing, will need ANA prior to DCCV to r/o [place] thrombus as he has likely been in AFL>48 hours based on his symptoms)

## 2024-08-19 NOTE — DISCHARGE NOTE PROVIDER - NSDCCPTREATMENT_GEN_ALL_CORE_FT
PRINCIPAL PROCEDURE  Procedure: Cardioversion, with ANA if indicated  Findings and Treatment: ANA with DCCV     PRINCIPAL PROCEDURE  Procedure: Cardioversion, with ANA if indicated  Findings and Treatment:   CONCLUSIONS:      1. Left ventricular cavity is normal in size. Left ventricular systolic function is normal. There are no regional wall motion abnormalities seen.   2. No evidence of left atrial or left atrial appendage thrombus.  < from: ANA W or WO Ultrasound Enhancing Agent (08.19.24 @ 14:49) >

## 2024-08-19 NOTE — H&P ADULT - NSHPPHYSICALEXAM_GEN_ALL_CORE
T(C): 36.7 (08-19-24 @ 03:31), Max: 37.2 (08-18-24 @ 21:30)  HR: 150 (08-19-24 @ 03:46) (92 - 162)  BP: 95/66 (08-19-24 @ 03:46) (83/63 - 121/81)  RR: 21 (08-19-24 @ 03:46) (14 - 26)  SpO2: 98% (08-19-24 @ 03:46) (95% - 100%)    CONSTITUTIONAL: Well groomed, no apparent distress  EYES: PERRLA and symmetric, EOMI, No conjunctival or scleral injection, non-icteric  ENMT: Oral mucosa with moist membranes. Normal dentition; no pharyngeal injection or exudates             NECK: Supple, symmetric and without tracheal deviation   RESP: No respiratory distress, no use of accessory muscles; CTA b/l, no WRR  CV: RRR, +S1S2, no MRG; no JVD; no peripheral edema  GI: Soft, NT, ND, no rebound, no guarding; no palpable masses; no hepatosplenomegaly; no hernia palpated  LYMPH: No cervical LAD or tenderness; no axillary LAD or tenderness; no inguinal LAD or tenderness  MSK: Normal gait; No digital clubbing or cyanosis; examination of the (head/neck/spine/ribs/pelvis, RUE, LUE, RLE, LLE) without misalignment,            Normal ROM without pain, no spinal tenderness, normal muscle strength/tone  SKIN: No rashes or ulcers noted; no subcutaneous nodules or induration palpable  NEURO: CN II-XII intact; normal reflexes in upper and lower extremities, sensation intact in upper and lower extremities b/l to light touch   PSYCH: Appropriate insight/judgment; A+O x 3, mood and affect appropriate, recent/remote memory intact T(C): 36.7 (08-19-24 @ 03:31), Max: 37.2 (08-18-24 @ 21:30)  HR: 150 (08-19-24 @ 03:46) (92 - 162)  BP: 95/66 (08-19-24 @ 03:46) (83/63 - 121/81)  RR: 21 (08-19-24 @ 03:46) (14 - 26)  SpO2: 98% (08-19-24 @ 03:46) (95% - 100%)    CONSTITUTIONAL: Well groomed, no apparent distress  EYES: PERRLA and symmetric, EOMI, No conjunctival or scleral injection, non-icteric  ENMT: Oral mucosa with moist membranes. Normal dentition; no pharyngeal injection or exudates  NECK: Supple, symmetric and without tracheal deviation   RESP: No respiratory distress, no use of accessory muscles; CTA b/l, no WRR  CV: Tachycardic. +S1S2, no MRG; no JVD; no peripheral edema  GI: Soft, NT, ND, no rebound, no guarding; no palpable masses; no hepatosplenomegaly  LYMPH: No cervical LAD or tenderness; no axillary LAD or tenderness; no inguinal LAD or tenderness  MSK: Normal ROM without pain, no spinal tenderness, normal muscle strength/tone. RLE in cast.   SKIN: Healing R lateral thigh incision; no subcutaneous nodules or induration palpable  NEURO: CN II-XII grossly intact  PSYCH: Appropriate insight/judgment; A+O x 3, mood and affect appropriate, recent/remote memory intact

## 2024-08-19 NOTE — PHYSICAL THERAPY INITIAL EVALUATION ADULT - MANUAL MUSCLE TESTING RESULTS, REHAB EVAL
3+/5 BUE/LLE, RLE hip limited 2/2 hip precautions 3-/5 assessed via functional mobility, symmetrical throughout/grossly assessed due to

## 2024-08-19 NOTE — H&P ADULT - TIME BILLING
Chart review , case discussion with other providers , obtain history examination of patient , answering questions and concerns , ordering labs and medications , and documentation

## 2024-08-19 NOTE — PROGRESS NOTE ADULT - ASSESSMENT
64M PMH HTN, minimal change disease, thyroid ca s/p thyroidectomy, urticarial vasculitis, multiple joint replacements (most recently R hip replacement at [facility] on 8/14/24), reported hx of DVT following prior surgeries, ?hx of paroxysmal AFL, presents with progressive GONZALEZ, fatigue, and light-headedness found to be in AFl w/ RVR. PE was r/o with CTA. Patient has a history of possible post-op/acute-illness AF, and this current episode may represent post-op AFl. His marked GONZALEZ and light-headedness are likely in s/o arrythmia, but CM must also be ruled out.    CHADSVASC 2    Rec  - NPO for ANA guided DCCV today (pt has been on Eliquis 2.5mg BID post R hip replacement 8/14/24. As this is subtherapeutic dosing, will need ANA prior to DCCV to r/o CAROL[place] thrombus as he has likely been in AFL>48 hours based on his symptoms)  - Increase Eliquis from 2.5 to 5 mg BID   - TTE  - Will plan to arrange for outpatient follow up to discuss for typical AFL ablation 64M PMH HTN, minimal change disease, thyroid ca s/p thyroidectomy, urticarial vasculitis, multiple joint replacements (most recently R hip replacement at [facility] on 8/14/24), reported hx of DVT following prior surgeries, ?hx of paroxysmal AFL, presents with progressive GONZALEZ, fatigue, and light-headedness found to be in AFl w/ RVR. PE was r/o with CTA. Patient has a history of possible post-op/acute-illness AF, and this current episode may represent post-op AFl. His marked GONZALEZ and light-headedness are likely in s/o arrythmia, but CM must also be ruled out.    CHADSVASC 2    Rec  - NPO for ANA guided DCCV today (pt has been on Eliquis 2.5mg BID post R hip replacement 8/14/24. As this is subtherapeutic dosing, will need ANA prior to DCCV to r/o CAROL[place] thrombus as he has likely been in AFL>48 hours based on his symptoms)  - Increase Eliquis from 2.5 to 5 mg BID   - TTE  - Will plan to arrange for outpatient follow up to discuss for typical AFL ablation    Addendum:  Pt is s/p successful ANA guided DCCV to NSR 60-70s. Will arrange OP follow up to discuss typical atrial flutter ablation.   Likely discharge in AM

## 2024-08-19 NOTE — PROGRESS NOTE ADULT - PROBLEM SELECTOR PLAN 10
Fluids: LR bolus, mIVF   Diet: NPO   DVT ppx: Hep GTT     GoC: full code  PT: Pending, ambulate with assistance   Dispo: Pending clinical course

## 2024-08-19 NOTE — H&P ADULT - NSHPREVIEWOFSYSTEMS_GEN_ALL_CORE
REVIEW OF SYSTEMS:  CONSTITUTIONAL: (-) weakness, (-) fevers (-) chills  EYES/ENT: (- ) visual changes;  (-) vertigo (-) throat pain   NECK: (- )pain (-) stiffness  RESPIRATORY: (-)cough,  (-) wheezing, (-) hemoptysis; (-) shortness of breath  CARDIOVASCULAR: (-) chest pain (-) palpitations  GASTROINTESTINAL: (-) abdominal (-) epigastric pain. (-) nausea, vomiting, or hematemesis; (-) diarrhea or constipation.   GENITOURINARY: (-) dysuria, frequency or hematuria  NEUROLOGICAL: (-) numbness or weakness  SKIN: (-) itching, rashes REVIEW OF SYSTEMS:  CONSTITUTIONAL: (+) weakness, (-) fevers (-) chills  EYES/ENT: (- ) visual changes;  (-) vertigo (-) throat pain   NECK: (- )pain (-) stiffness  RESPIRATORY: (-)cough,  (-) wheezing, (-) hemoptysis; (-) shortness of breath  CARDIOVASCULAR: (-) chest pain (-) palpitations  GASTROINTESTINAL: (-) abdominal (-) epigastric pain. (-) nausea, vomiting, or hematemesis; (-) diarrhea or constipation.   GENITOURINARY: (-) dysuria, frequency or hematuria  NEUROLOGICAL: (-) numbness or weakness  SKIN: (-) itching, rashes

## 2024-08-19 NOTE — PROGRESS NOTE ADULT - ASSESSMENT
64M PMH HTN, minimal change disease, thyroid ca s/p thyroidectomy, urticarial vasculitis, multiple joint replacements (most recently R hip replacement at [facility] on 8/14/24), reported hx of DVT following prior surgeries, ?hx of pAF, presents with progressive GONZALEZ, fatigue, and light-headedness found to be in Aflutter/Afib w/ RVR.

## 2024-08-19 NOTE — DISCHARGE NOTE PROVIDER - NSDCMRMEDTOKEN_GEN_ALL_CORE_FT
acetaminophen 325 mg oral tablet: 2 tab(s) orally every 6 hours, As needed, Mild Pain (1 - 3)  apixaban 5 mg oral tablet: 1 tab(s) orally 2 times a day  dapsone 100 mg oral tablet: 1 tab(s) orally once a day  famotidine 20 mg oral tablet: 1 tab(s) orally once a day  levothyroxine 175 mcg (0.175 mg) oral tablet: 1 tab(s) orally once a day on Mondays, Tuesdays, Wednesdays, Thursdays, Fridays, and Saturdays. Please take levothyroxine 88mcg once daily on Sundays.   losartan 50 mg oral tablet: 1 tab(s) orally once a day  rosuvastatin 40 mg oral tablet: 1 tab(s) orally once a day   acetaminophen 325 mg oral tablet: 2 tab(s) orally every 6 hours, As needed, Mild Pain (1 - 3)  aluminum hydroxide-magnesium hydroxide 200 mg-200 mg/5 mL oral suspension: 30 milliliter(s) orally every 4 hours As needed Dyspepsia  apixaban 5 mg oral tablet: 1 tab(s) orally every 12 hours  dapsone 100 mg oral tablet: 1 tab(s) orally once a day  famotidine 20 mg oral tablet: 1 tab(s) orally once a day  levothyroxine 175 mcg (0.175 mg) oral tablet: 1 tab(s) orally once a day on Mondays, Tuesdays, Wednesdays, Thursdays, Fridays, and Saturdays. Please take levothyroxine 88mcg once daily on Sundays.   losartan 50 mg oral tablet: 1 tab(s) orally once a day  metoprolol tartrate 25 mg oral tablet: 1 tab(s) orally 3 times a day  rosuvastatin 40 mg oral tablet: 1 tab(s) orally once a day   acetaminophen 325 mg oral tablet: 2 tab(s) orally every 6 hours, As needed, Mild Pain (1 - 3)  aluminum hydroxide-magnesium hydroxide 200 mg-200 mg/5 mL oral suspension: 30 milliliter(s) orally every 4 hours As needed Dyspepsia  apixaban 5 mg oral tablet: 1 tab(s) orally every 12 hours  dapsone 100 mg oral tablet: 1 tab(s) orally once a day  famotidine 20 mg oral tablet: 1 tab(s) orally once a day  levothyroxine 175 mcg (0.175 mg) oral tablet: 1 tab(s) orally once a day on Mondays, Tuesdays, Wednesdays, Thursdays, Fridays, and Saturdays. Please take levothyroxine 88mcg once daily on Sundays.   rosuvastatin 40 mg oral tablet: 1 tab(s) orally once a day  Toprol-XL 25 mg oral tablet, extended release: 1 tab(s) orally once a day

## 2024-08-19 NOTE — PROGRESS NOTE ADULT - PROBLEM SELECTOR PLAN 2
TTE done 7/15 showing severe reversible diastolic dysfunction Stage III.     Plan:   -C/w MTPS 25mg q8h titrate to rates < 110  -Losartan on hold for low Bps  -C/w Lipitor   -Monitor for volume overload given recent IVF boluses TTE done 7/15 showing severe reversible diastolic dysfunction Stage III. Pro BNP 1957 on admission.  ANA on 8/19 showing mildly dilated IVC, EF 62%, otherwise wnl.     Plan:   -C/w MTPS 25mg q8h titrate to rates < 110  -Losartan on hold for low Bps  -C/w Lipitor   -Monitor for volume overload given recent IVF boluses TTE done 7/15 showing severe reversible diastolic dysfunction Stage III. Pro BNP 1957 on admission.  TTE on 8/19 showing mildly dilated IVC, EF 62%, otherwise wnl.     Plan:   -C/w MTPS 25mg q8h titrate to rates < 110  -Losartan on hold for low Bps  -C/w Lipitor   -Monitor for volume overload given recent IVF boluses

## 2024-08-19 NOTE — DISCHARGE NOTE PROVIDER - PROVIDER TOKENS
PROVIDER:[TOKEN:[[id number]]] PROVIDER:[TOKEN:[[id number]]],FREE:[LAST:[Your Urologist],PHONE:[(   )    -],FAX:[(   )    -]]

## 2024-08-19 NOTE — PROGRESS NOTE ADULT - PROBLEM SELECTOR PLAN 7
Continue with home regimen Rosuvastatin 20mg qd.     -F/u lipid panel Patient reports no BM since surgery on Wednesday 8/14. Also remote history of diverticulosis s/p mesalamine therapy.    -Start miralax and senna

## 2024-08-19 NOTE — PROGRESS NOTE ADULT - PROBLEM SELECTOR PLAN 6
Hold home regimen Losartan 100mg qd iso hypotension. Hold home regimen Losartan 100mg qd iso hypotension.  Hold tamsulosin for hypotentsion.

## 2024-08-19 NOTE — PROGRESS NOTE ADULT - PROBLEM SELECTOR PLAN 1
P/w Afib w/ RVR to 160s following recent R hip replacement refractory to oral tx. Presents with lightheadedness but no CP or SOB. Notable leukocytosis without systemic signs of infection iso recent procedure.  -S/p cardizem x3 in ED without change in HR response. EP was consulted, patient was administered IV lopressor 5 mg with improvement in HR to 100s. CTA PE performed and negative for PE but with cardiomegaly.    Plan:   -EP recs appreciated  -Monitor on tele   -C/w Hep GTT   -C/w MTPS 25mg q8h titrate to rates < 110  -Obtain TTE once rates controlled   -F/u thyroid function  -Keep NPO @ [place] pending possible DCCV  -Bolus PRN for hypotension, ensure adequate hydration and pain control P/w Afib w/ RVR to 160s and hypotension following recent R hip replacement refractory to oral tx. Started on eliquis 2.5mg BID after surgery. Presents with lightheadedness but no CP or SOB. Notable leukocytosis without systemic signs of infection iso recent procedure.  -S/p cardizem x3 in ED without change in HR response. EP was consulted, patient was administered IV lopressor 5 mg with improvement in HR to 100s. CTA PE performed and negative for PE but with cardiomegaly.    Plan:   -EP recs appreciated  -Monitor on tele   -Switch Hep GTT to eliquis 5mg BID, confirmed to be ok with patient's [facility] ortho surgeon Dr. Moss.  -C/w MTPS 25mg q8h titrate to rates < 110  -F/u ANA prior to cardioversion  -NPO pending DCCV  -LR bolus x2 for hypotension, hold off on further bolus pending cardioversion P/w Afib w/ RVR to 160s and hypotension following recent R hip replacement refractory to oral tx. Started on eliquis 2.5mg BID after surgery. Presents with lightheadedness but no CP or SOB. Notable leukocytosis without systemic signs of infection iso recent procedure.  -S/p cardizem x3 in ED without change in HR response. EP was consulted, patient was administered IV lopressor 5 mg with improvement in HR to 100s. CTA PE performed and negative for PE but with cardiomegaly.    Plan:   -EP recs appreciated  -Monitor on tele   -Switch Hep GTT to eliquis 5mg BID, confirmed to be ok with patient's [facility] ortho surgeon Dr. Moss.  -C/w MTPS 25mg q8h titrate to rates < 110  -F/u ANA prior to cardioversion to r/o CAROLINA thrombus  -NPO pending DCCV  -LR bolus x2 for hypotension, hold off on further bolus pending cardioversion

## 2024-08-19 NOTE — DISCHARGE NOTE PROVIDER - NSDCACTIVITY_GEN_ALL_CORE
Follow Instructions Provided by your Surgical Team Follow Instructions Provided by your Surgical Team/Activity as tolerated

## 2024-08-19 NOTE — DISCHARGE NOTE PROVIDER - NSDCCPCAREPLAN_GEN_ALL_CORE_FT
PRINCIPAL DISCHARGE DIAGNOSIS  Diagnosis: Atrial flutter  Assessment and Plan of Treatment: Continue with your cardiologist and primary care MD. Continue your current medications. Call your physician for palpitations, feelings of rapid heart beat, lightheadedness, or dizziness. Continue Eliquis as prescribed.      SECONDARY DISCHARGE DIAGNOSES  Diagnosis: HTN (hypertension)  Assessment and Plan of Treatment: Continue with your blood pressure medications; eat a heart healthy diet with low salt diet; exercise regularly (consult with your physician or cardiologist first); maintain a heart healthy weight; if you smoke - quit (A resource to help you stop smoking is the [facility] [employer] for Tobacco Control – phone number [phone].); include healthy ways to manage stress. Continue to follow with your primary care physician or cardiologist.    Diagnosis: HLD (hyperlipidemia)  Assessment and Plan of Treatment: Continue with your cholesterol medications. Eat a heart healthy diet that is low in saturated fats and salt, and includes whole grains, fruits, vegetables and lean protein; exercise regularly (consult with your physician or cardiologist first); maintain a heart healthy weight; if you smoke - quit (A resource to help you stop smoking is the [facility] [employer] for Tobacco Control – phone number [phone].). Continue to follow with your primary physician or cardiologist.     PRINCIPAL DISCHARGE DIAGNOSIS  Diagnosis: Atrial flutter  Assessment and Plan of Treatment: You came to the hospital for shortness of breath and lightheadedness. You were found to have atrial fibrilation (Afib) / flutter with a low blood pressure. You were treated with medications including metoproll and diltiazem with little improvement. Cardiology saw you and recommended cardioversion, which was completed successfully. Now your heart rhythm is back in sinus rhythm. Please continue with your cardiologist and primary care MD. Continue your current medications including metoprolol 25mg XL daily and eliquis 5mg twice daily. Call your physician for palpitations, feelings of rapid heart beat, lightheadedness, or dizziness.      SECONDARY DISCHARGE DIAGNOSES  Diagnosis: HTN (hypertension)  Assessment and Plan of Treatment: Continue withmetoprolol but hold losartan as your blood pressure was running low. Re-evalaute for restarting losartan at your next PCP or cardiology appointment. Eat a heart healthy diet with low salt diet; exercise regularly (consult with your physician or cardiologist first); maintain a heart healthy weight; if you smoke - quit (A resource to help you stop smoking is the [employer] for Tobacco Control – phone number [phone].); include healthy ways to manage stress. Continue to follow with your primary care physician or cardiologist.    Diagnosis: HLD (hyperlipidemia)  Assessment and Plan of Treatment: Continue with your cholesterol medications. Eat a heart healthy diet that is low in saturated fats and salt, and includes whole grains, fruits, vegetables and lean protein; exercise regularly (consult with your physician or cardiologist first); maintain a heart healthy weight; if you smoke - quit (A resource to help you stop smoking is the [facility] [employer] for Tobacco Control – phone number [phone].). Continue to follow with your primary physician or cardiologist.

## 2024-08-19 NOTE — H&P ADULT - PROBLEM SELECTOR PLAN 1
P/w Afib w/ RVR following recent R hip replacement refractory to oral tx. No evidence of PE on CTA.   Plan:   C/w Eliquis 5mg BID  Obtain TTE  Keep NPO @ MN pending possible DCCV P/w Afib w/ RVR following recent R hip replacement refractory to oral tx. No evidence of PE on CTA. Notable leukocytosis without systemic signs of infection iso recent procedure. EP following.     Plan:   Monitor on tele   C/w Eliquis 5mg BID  C/w MTPS 25mg q8h titrate to rates < 110  Obtain TTE once rates controlled   Keep NPO @ MN pending possible DCCV  Bolus PRN for hypotension P/w Afib w/ RVR following recent R hip replacement refractory to oral tx. No evidence of PE on CTA. Notable leukocytosis without systemic signs of infection iso recent procedure. Will clinically monitor. EP following.     Plan:   Monitor on tele   C/w Hep GTT   C/w MTPS 25mg q8h titrate to rates < 110  Obtain TTE once rates controlled   Keep NPO @ MN pending possible DCCV  Bolus PRN for hypotension

## 2024-08-19 NOTE — ED ADULT NURSE REASSESSMENT NOTE - NS ED NURSE REASSESS COMMENT FT1
Heparin d/c as ordered, eliquis to be administered as ordered. pt a&ox3, breathing spontaneous and unlabored, khan and following commands. Pt denies symptoms or pain at this time, pending bed Heparin d/c as ordered, eliquis to be administered as ordered. pt a&ox3, breathing spontaneous and unlabored, khan and following commands. Pt denies symptoms or pain at this time, pending bed. admitting team aware of HR/BP, no further RN interventions needed at this time

## 2024-08-19 NOTE — DISCHARGE NOTE PROVIDER - CARE PROVIDER_API CALL
Mike Bobby.  Cardiac Electrophysiology  [address] [place] [id number]  Phone: [phone]  Fax: [phone]  Follow Up Time:    Mike Bobby.  Cardiac Electrophysiology  [address] [place] [id number]  Phone: [phone]  Fax: [phone]  Follow Up Time:     Your Urologist,   Phone: (   )    -  Fax: (   )    -  Follow Up Time:

## 2024-08-19 NOTE — ED ADULT NURSE REASSESSMENT NOTE - NS ED NURSE REASSESS COMMENT FT1
Pt coag labs returned. Per full anticoagulation nomogram pt does no need titration and rate will remain the same. Verified w/ another RN. Pt is aox4, no spontaneous bleeding. Pt coag labs returned. Per full anticoagulation nomogram pt does no need titration and rate will remain the same. Verified w/ another RN. Pt is aox4, neuro intact, no spontaneous bleeding. Pt denies pain or symptoms. Pt on CM, pending bed

## 2024-08-19 NOTE — PROGRESS NOTE ADULT - PROBLEM SELECTOR PLAN 3
Hx of multiple joint replacements, most recently R hip replacement at [facility] on 8/14/24). Pain well controlled on arrival.     Plan:  -Wound care per nursing. Ambulate with assistance with fall precautions.  -Consider ortho CS  -Consult PT/OT Hx of multiple joint replacements, most recently R hip replacement at [facility] on 8/14/24). Pain well controlled with oxycodone on arrival.     Plan:  -Wound care per nursing. Ambulate with assistance with fall precautions.  -Consider ortho CS  -Consult PT/OT

## 2024-08-19 NOTE — PROGRESS NOTE ADULT - SUBJECTIVE AND OBJECTIVE BOX
24H hour events:     MEDICATIONS:  apixaban 5 milliGRAM(s) Oral every 12 hours  metoprolol tartrate 25 milliGRAM(s) Oral three times a day    dapsone 100 milliGRAM(s) Oral daily      acetaminophen     Tablet .. 650 milliGRAM(s) Oral every 6 hours PRN  melatonin 3 milliGRAM(s) Oral at bedtime PRN  ondansetron Injectable 4 milliGRAM(s) IV Push every 8 hours PRN    aluminum hydroxide/magnesium hydroxide/simethicone Suspension 30 milliLiter(s) Oral every 4 hours PRN  famotidine    Tablet 20 milliGRAM(s) Oral daily  polyethylene glycol 3350 17 Gram(s) Oral daily  senna 2 Tablet(s) Oral at bedtime    atorvastatin 80 milliGRAM(s) Oral at bedtime  levothyroxine 175 MICROGram(s) Oral daily    lactated ringers. 1000 milliLiter(s) IV Continuous <Continuous>      REVIEW OF SYSTEMS:  See HPI, otherwise ROS negative.    PHYSICAL EXAM:  T(C): 37.1 (08-19-24 @ 13:15), Max: 37.2 (08-18-24 @ 21:30)  HR: 155 (08-19-24 @ 14:01) (92 - 162)  BP: 107/85 (08-19-24 @ 14:01) (81/54 - 121/81)  RR: 22 (08-19-24 @ 14:01) (14 - 26)  SpO2: 93% (08-19-24 @ 14:01) (93% - 100%)  Wt(kg): --  I&O's Summary      Appearance: Alert. NAD	  HEENT:   NC/AT	  Cardiovascular: +S1S2 RRR no m/g/r  Respiratory: CTA B/L	  Psychiatry: A & O x 3, Mood & affect appropriate  Gastrointestinal:  Soft, NT.ND., + BS	  Skin: No rashes	  Neurologic: Non-focal  Extremities: No edema BLE  Vascular: Peripheral pulses palpable 2+ bilaterally      LABS:	 	    CBC Full  -  ( 19 Aug 2024 12:35 )  WBC Count : 11.39 K/uL  Hemoglobin : 9.0 g/dL  Hematocrit : 29.2 %  Platelet Count - Automated : 305 K/uL  Mean Cell Volume : 90.1 fl  Mean Cell Hemoglobin : 27.8 pg  Mean Cell Hemoglobin Concentration : 30.8 gm/dL  Auto Neutrophil # : x  Auto Lymphocyte # : x  Auto Monocyte # : x  Auto Eosinophil # : x  Auto Basophil # : x  Auto Neutrophil % : x  Auto Lymphocyte % : x  Auto Monocyte % : x  Auto Eosinophil % : x  Auto Basophil % : x    08-19    135  |  101  |  11  ----------------------------<  102<H>  3.8   |  24  |  0.75  08-18    136  |  96  |  13  ----------------------------<  103<H>  3.3<L>   |  23  |  0.76    Ca    8.4      19 Aug 2024 06:23  Ca    9.6      18 Aug 2024 17:13    TPro  6.0  /  Alb  2.8<L>  /  TBili  1.0  /  DBili  x   /  AST  24  /  ALT  <5<L>  /  AlkPhos  72  08-19  TPro  7.6  /  Alb  3.5  /  TBili  1.3<H>  /  DBili  x   /  AST  37  /  ALT  <5<L>  /  AlkPhos  95  08-18      proBNP:   Lipid Profile:   HgA1c:   TSH: Thyroid Stimulating Hormone, Serum (08.19.24 @ 06:23)    Thyroid Stimulating Hormone, Serum: 6.81 uIU/mL        CARDIAC MARKERS:          TELEMETRY: AFL 100s-150s  	    ECG:  	Typical atrial flutter, VHR 158bpm    RADIOLOGY:    	  ASSESSMENT/PLAN: 	     24H hour events: Pt seen in ED this AM, reports he was lightheaded and dyspneic with exertion. Asymptomatic and well appearing at rest this AM    MEDICATIONS:  apixaban 5 milliGRAM(s) Oral every 12 hours  metoprolol tartrate 25 milliGRAM(s) Oral three times a day    dapsone 100 milliGRAM(s) Oral daily      acetaminophen     Tablet .. 650 milliGRAM(s) Oral every 6 hours PRN  melatonin 3 milliGRAM(s) Oral at bedtime PRN  ondansetron Injectable 4 milliGRAM(s) IV Push every 8 hours PRN    aluminum hydroxide/magnesium hydroxide/simethicone Suspension 30 milliLiter(s) Oral every 4 hours PRN  famotidine    Tablet 20 milliGRAM(s) Oral daily  polyethylene glycol 3350 17 Gram(s) Oral daily  senna 2 Tablet(s) Oral at bedtime    atorvastatin 80 milliGRAM(s) Oral at bedtime  levothyroxine 175 MICROGram(s) Oral daily    lactated ringers. 1000 milliLiter(s) IV Continuous <Continuous>      REVIEW OF SYSTEMS:  See HPI, otherwise ROS negative.    PHYSICAL EXAM:  T(C): 37.1 (08-19-24 @ 13:15), Max: 37.2 (08-18-24 @ 21:30)  HR: 155 (08-19-24 @ 14:01) (92 - 162)  BP: 107/85 (08-19-24 @ 14:01) (81/54 - 121/81)  RR: 22 (08-19-24 @ 14:01) (14 - 26)  SpO2: 93% (08-19-24 @ 14:01) (93% - 100%)  Wt(kg): --  I&O's Summary      Appearance: Alert. NAD	  HEENT:   NC/AT	  Cardiovascular: +S1S2 tachycardic  Respiratory: CTA B/L	  Psychiatry: A & O x 3, Mood & affect appropriate  Gastrointestinal:  Soft  Skin: No rashes	  Neurologic: Non-focal  Extremities: No edema BLE      LABS:	 	    CBC Full  -  ( 19 Aug 2024 12:35 )  WBC Count : 11.39 K/uL  Hemoglobin : 9.0 g/dL  Hematocrit : 29.2 %  Platelet Count - Automated : 305 K/uL  Mean Cell Volume : 90.1 fl  Mean Cell Hemoglobin : 27.8 pg  Mean Cell Hemoglobin Concentration : 30.8 gm/dL  Auto Neutrophil # : x  Auto Lymphocyte # : x  Auto Monocyte # : x  Auto Eosinophil # : x  Auto Basophil # : x  Auto Neutrophil % : x  Auto Lymphocyte % : x  Auto Monocyte % : x  Auto Eosinophil % : x  Auto Basophil % : x    08-19    135  |  101  |  11  ----------------------------<  102<H>  3.8   |  24  |  0.75  08-18    136  |  96  |  13  ----------------------------<  103<H>  3.3<L>   |  23  |  0.76    Ca    8.4      19 Aug 2024 06:23  Ca    9.6      18 Aug 2024 17:13    TPro  6.0  /  Alb  2.8<L>  /  TBili  1.0  /  DBili  x   /  AST  24  /  ALT  <5<L>  /  AlkPhos  72  08-19  TPro  7.6  /  Alb  3.5  /  TBili  1.3<H>  /  DBili  x   /  AST  37  /  ALT  <5<L>  /  AlkPhos  95  08-18      TSH: Thyroid Stimulating Hormone, Serum (08.19.24 @ 06:23)    Thyroid Stimulating Hormone, Serum: 6.81 uIU/mL    T4, Serum (08.19.24 @ 06:23)    T4, Serum: 5.1 ug/dL      TELEMETRY: AFL 100s-150s  	    ECG:  	Typical atrial flutter, VHR 158bpm    RADIOLOGY:  < from: TTE W or WO Ultrasound Enhancing Agent (08.19.24 @ 10:58) >   1. Left ventricular cavity is small. Left ventricular wall thickness is normal. Left ventricular systolic function is normal with an ejection fraction of 62 % by Harris's method of disks. There are no regional wall motion abnormalities seen.   2. Normal left ventricular diastolic function, with normal left ventricular filling pressure.   3. Normal right ventricular cavity size, with normal wall thickness, and normal right ventricular systolic function.   4. Normal left and right atrial size.   5. No pericardial effusion seen.   6. Estimated pulmonary artery systolic pressure is 30 mmHg.   7. Compared to the transthoracic echocardiogram performed on 5/4/2021, there have been no significant interval changes.    < end of copied text >    	  ASSESSMENT/PLAN:

## 2024-08-19 NOTE — PROGRESS NOTE ADULT - PROBLEM SELECTOR PLAN 4
S/p thyroidectomy for thyroid cancer.    Plan:  -Continue with home regimen Levothyroxine 150mcg qAM  -F/u TSH/T4 in AM S/p thyroidectomy for thyroid cancer. TSH now 6.8 and T4 5.1.    Plan:  -Continue with home regimen Levothyroxine 150mcg qAM  -Can consider Endo consult once cardiac rhythm stabilized

## 2024-08-19 NOTE — PHYSICAL THERAPY INITIAL EVALUATION ADULT - ADDITIONAL COMMENTS
Pt lives in an apt, w/ partner, 0steps to enter, +elevator access. Pt reports being independent w/ most ADLs (req assist w/ dressing-lower body) and IADLs. Pt reports ambulating independently w/ RW post-op. Prior to hip replacement, pt reports being independent w/ all ADLs/IADLs and ambulating w/o AD. Pt denies history of falls in past 12 months.

## 2024-08-19 NOTE — H&P ADULT - ASSESSMENT
64M PMH HTN, minimal change disease, thyroid ca s/p thyroidectomy, urticarial vasculitis, multiple joint replacements (most recently R hip replacement at [facility] on 8/14/24), reported hx of DVT following prior surgeries, ?hx of pAF, presents with progressive GONZALEZ, fatigue, and light-headedness.  64M PMH HTN, minimal change disease, thyroid ca s/p thyroidectomy, urticarial vasculitis, multiple joint replacements (most recently R hip replacement at [facility] on 8/14/24), reported hx of DVT following prior surgeries, ?hx of pAF, presents with progressive GONZALEZ, fatigue, and light-headedness found to be in Aflutter/Afib w/ RVR.

## 2024-08-19 NOTE — ED ADULT NURSE REASSESSMENT NOTE - NS ED NURSE REASSESS COMMENT FT1
Report received from MARGOTH Wilkinson. Pt is aox4, airway clear and patent, lung sounds clear w/ bilateral chest rise and fall, normal s1 and s2, a flutter on cardiac monitor, sensory skills intact, IVs intact and dry, heparin infusion ongoing, skill cool and dry. Pt denies LOC, SOB, chest pain, D/N/V, or any current pain. Report received from MARGOTH Wilkinson. Pt is aox4, airway clear and patent, lung sounds clear w/ bilateral chest rise and fall, normal s1 and s2, a flutter on cardiac monitor, sensory skills intact, bilateral IVs intact and dry, heparin infusion ongoing, skill cool and dry. Pt denies LOC, SOB, chest pain, D/N/V, or any current pain.

## 2024-08-19 NOTE — H&P ADULT - ATTENDING COMMENTS
64M w/PMH HTN, minimal change disease, thyroid ca s/p thyroidectomy, urticarial vasculitis on Dapsone,  recently R hip replacement (at [facility] on 8/14/24) post op c/b urinary retention s/p indwelling linda,  hx of DVT  on prophylactic Eliquis since surgery , hx of pAF , p/w SOB , found to be in afib w/ rvr not responding to medications , currently tachy in 150's , afebrile, BP soft but stable , labs w/ wbc 13 , bnp 1957 , k 303 , CTA chest no PE . Seen by cards/ EP, will likely require cardioversion , will continue on heparin infusion , keep NPO pending procedure , check echo , tsh level , and folllow up EP reccs , low suspicion for infection , can continue dapsone, maintain linda , ensure adequate pain control and hydration , PT eval

## 2024-08-19 NOTE — PROGRESS NOTE ADULT - SUBJECTIVE AND OBJECTIVE BOX
Patient is a 64y old  Male who presents with a chief complaint of SOB (19 Aug 2024 03:47)      INTERVAL HPI/OVERNIGHT EVENTS: NAEON. Endorses lightheadedness this morning, with soft BP. He is AOX3.       REVIEW OF SYSTEMS:  CONSTITUTIONAL: No fever, weight loss, or fatigue  EYES: No eye pain, visual disturbances, or discharge  ENMT:  No difficulty hearing, tinnitus, vertigo; No sinus or throat pain  NECK: No pain or stiffness  BREASTS: No pain, masses, or nipple discharge  RESPIRATORY: No cough, wheezing, chills or hemoptysis; No shortness of breath  CARDIOVASCULAR: No chest pain, palpitations, dizziness, or leg swelling  GASTROINTESTINAL: No abdominal or epigastric pain. No nausea, vomiting, or hematemesis; No diarrhea or constipation. No melena or hematochezia.  GENITOURINARY: No dysuria, frequency, hematuria, or incontinence  NEUROLOGICAL: No headaches, memory loss, loss of strength, numbness, or tremors  SKIN: No itching, burning, rashes, or lesions   LYMPH NODES: No enlarged glands  ENDOCRINE: No heat or cold intolerance; No hair loss  MUSCULOSKELETAL: No joint pain or swelling; No muscle, back, or extremity pain  PSYCHIATRIC: No depression, anxiety, mood swings, or difficulty sleeping  HEME/LYMPH: No easy bruising, or bleeding gums  ALLERGY AND IMMUNOLOGIC: No hives or eczema    Vital Signs Last 24 Hrs  T(C): 36.8 (19 Aug 2024 07:18), Max: 37.2 (18 Aug 2024 21:30)  T(F): 98.2 (19 Aug 2024 07:18), Max: 98.9 (18 Aug 2024 21:30)  HR: 150 (19 Aug 2024 07:30) (92 - 162)  BP: 90/73 (19 Aug 2024 07:30) (81/54 - 121/81)  BP(mean): 77 (19 Aug 2024 07:30) (61 - 86)  RR: 14 (19 Aug 2024 07:30) (14 - 26)  SpO2: 96% (19 Aug 2024 07:30) (95% - 100%)    Parameters below as of 19 Aug 2024 07:30  Patient On (Oxygen Delivery Method): nasal cannula  O2 Flow (L/min): 2      sulfa drugs (Anaphylaxis)  Zosyn (Rash)  sulfa drugs (Seizure)      PHYSICAL EXAM:  GENERAL: NAD, well-groomed, well-developed  HEAD:  Atraumatic, Normocephalic  EYES: EOMI, PERRLA, conjunctiva and sclera clear  ENMT: No tonsillar erythema, exudates, or enlargement; Moist mucous membranes, Good dentition, No lesions  NECK: Supple, No JVD, Normal thyroid  NERVOUS SYSTEM:  Alert & Oriented X3, Good concentration; Motor Strength 5/5 B/L upper and lower extremities; DTRs 2+ intact and symmetric  CHEST/LUNG: Clear to percussion bilaterally; No rales, rhonchi, wheezing, or rubs  HEART: Regular rate and rhythm; No murmurs, rubs, or gallops  ABDOMEN: Soft, Nontender, Nondistended; Bowel sounds present  EXTREMITIES:  2+ Peripheral Pulses, No clubbing, cyanosis, or edema  LYMPH: No lymphadenopathy noted  SKIN: No rashes or lesions    Consultant(s) Notes Reviewed:  [x ] YES  [ ] NO  Care Discussed with Consultants/Other Providers [ x] YES  [ ] NO    LABS:                          9.0    11.49 )-----------( 328      ( 19 Aug 2024 06:23 )             29.2       RADIOLOGY & ADDITIONAL TESTS:    Imaging Personally Reviewed:  [ ] YES  [ ] NO    08-19    135  |  101  |  11  ----------------------------<  102<H>  3.8   |  24  |  0.75    Ca    8.4      19 Aug 2024 06:23    TPro  6.0  /  Alb  2.8<L>  /  TBili  1.0  /  DBili  x   /  AST  24  /  ALT  <5<L>  /  AlkPhos  72  08-19    PT/INR - ( 19 Aug 2024 03:53 )   PT: 13.4 sec;   INR: 1.29 ratio          acetaminophen     Tablet .. 650 milliGRAM(s) Oral every 6 hours PRN  aluminum hydroxide/magnesium hydroxide/simethicone Suspension 30 milliLiter(s) Oral every 4 hours PRN  atorvastatin 80 milliGRAM(s) Oral at bedtime  dapsone 100 milliGRAM(s) Oral daily  famotidine    Tablet 20 milliGRAM(s) Oral daily  heparin   Injectable 7500 Unit(s) IV Push every 6 hours PRN  heparin   Injectable 3500 Unit(s) IV Push every 6 hours PRN  heparin  Infusion.  Unit(s)/Hr IV Continuous <Continuous>  lactated ringers. 1000 milliLiter(s) IV Continuous <Continuous>  levothyroxine 175 MICROGram(s) Oral daily  melatonin 3 milliGRAM(s) Oral at bedtime PRN  metoprolol tartrate 25 milliGRAM(s) Oral three times a day  ondansetron Injectable 4 milliGRAM(s) IV Push every 8 hours PRN      HEALTH ISSUES - PROBLEM Dx:  Atrial fibrillation and flutter    Acute hypoxemic respiratory failure    HTN (hypertension)    HLD (hyperlipidemia)    Status post left hip replacement    Preventative health care    Suspected deep vein thrombosis (DVT)    Suspected pulmonary embolism    Diastolic dysfunction with heart failure    Hypothyroidism             Patient is a 64y old  Male who presents with a chief complaint of SOB (19 Aug 2024 03:47)      INTERVAL HPI/OVERNIGHT EVENTS: NAEON. Endorses lightheadedness this morning, with soft BP around 90/73. He is AOX3. Endorses fatigue and mild trouble breathing. Apetite decreased. No BM since surgery. Denies chest pain or fevers.      REVIEW OF SYSTEMS: Otherwise negative.    Vital Signs Last 24 Hrs  T(C): 36.8 (19 Aug 2024 07:18), Max: 37.2 (18 Aug 2024 21:30)  T(F): 98.2 (19 Aug 2024 07:18), Max: 98.9 (18 Aug 2024 21:30)  HR: 150 (19 Aug 2024 07:30) (92 - 162)  BP: 90/73 (19 Aug 2024 07:30) (81/54 - 121/81)  BP(mean): 77 (19 Aug 2024 07:30) (61 - 86)  RR: 14 (19 Aug 2024 07:30) (14 - 26)  SpO2: 96% (19 Aug 2024 07:30) (95% - 100%)    Parameters below as of 19 Aug 2024 07:30  Patient On (Oxygen Delivery Method): nasal cannula  O2 Flow (L/min): 2      sulfa drugs (Anaphylaxis)  Zosyn (Rash)  sulfa drugs (Seizure)      PHYSICAL EXAM:  GENERAL: NAD, well-groomed, well-developed. Sweaty.  HEAD:  Atraumatic, Normocephalic  EYES: EOMI, PERRLA, conjunctiva and sclera clear  NECK: Supple, No JVD, Normal thyroid  NERVOUS SYSTEM:  Alert & Oriented X3, Good concentration; Motor Strength 5/5 B/L upper and lower extremities; DTRs 2+ intact and symmetric  CHEST/LUNG: Clear to percussion bilaterally; No rales, rhonchi, wheezing, or rubs  HEART: Regular rate and rhythm; No murmurs, rubs, or gallops  ABDOMEN: Soft, Nontender, Nondistended; Bowel sounds present  EXTREMITIES:  2+ Peripheral Pulses, No clubbing, cyanosis, or edema. Right posterior thigh with surgical dressing, no surrounding redness or warmth.  LYMPH: No lymphadenopathy noted  SKIN: No rashes or lesions. Chest erythema    Consultant(s) Notes Reviewed:  [x ] YES  [ ] NO  Care Discussed with Consultants/Other Providers [ x] YES  [ ] NO    LABS:                          9.0    11.49 )-----------( 328      ( 19 Aug 2024 06:23 )             29.2   08-19    135  |  101  |  11  ----------------------------<  102<H>  3.8   |  24  |  0.75    Ca    8.4      19 Aug 2024 06:23    TPro  6.0  /  Alb  2.8<L>  /  TBili  1.0  /  DBili  x   /  AST  24  /  ALT  <5<L>  /  AlkPhos  72  08-19    Activated Partial Thromboplastin Time (08.19.24 @ 10:14)    Activated Partial Thromboplastin Time: 95.5: The recommended therapeutic heparin range (full dose) is 58-99 seconds.  Argatroban range is 1.5 to 3.0 times of the baseline APTT value, not to  exceed 100 seconds.  Routine coagulation results should be interpreted with caution when  taking Factor Xa inhibitors or direct thrombin inhibitors; blood sampling  prior to drug intake is recommended. sec        RADIOLOGY & ADDITIONAL TESTS:    Imaging Personally Reviewed:  [X] YES  [ ] NO    < from: TTE W or WO Ultrasound Enhancing Agent (08.19.24 @ 10:58) >     CONCLUSIONS:      1. Left ventricular cavity is small. Left ventricular wall thickness is normal. Left ventricular systolic function is normal with an ejection fraction of 62 % by Harris's method of disks. There are no regional wall motion abnormalities seen.   2. Normal left ventricular diastolic function, with normal left ventricular filling pressure.   3. Normal right ventricular cavity size, with normal wall thickness, and normal right ventricular systolic function.   4. Normal left and right atrial size.   5. No pericardial effusion seen.   6. Estimated pulmonary artery systolic pressure is 30 mmHg.   7. Compared to the transthoracic echocardiogram performed on 5/4/2021, there have been no significant interval changes.    < end of copied text >      acetaminophen     Tablet .. 650 milliGRAM(s) Oral every 6 hours PRN  aluminum hydroxide/magnesium hydroxide/simethicone Suspension 30 milliLiter(s) Oral every 4 hours PRN  atorvastatin 80 milliGRAM(s) Oral at bedtime  dapsone 100 milliGRAM(s) Oral daily  famotidine    Tablet 20 milliGRAM(s) Oral daily  heparin   Injectable 7500 Unit(s) IV Push every 6 hours PRN  heparin   Injectable 3500 Unit(s) IV Push every 6 hours PRN  heparin  Infusion.  Unit(s)/Hr IV Continuous <Continuous>  lactated ringers. 1000 milliLiter(s) IV Continuous <Continuous>  levothyroxine 175 MICROGram(s) Oral daily  melatonin 3 milliGRAM(s) Oral at bedtime PRN  metoprolol tartrate 25 milliGRAM(s) Oral three times a day  ondansetron Injectable 4 milliGRAM(s) IV Push every 8 hours PRN      HEALTH ISSUES - PROBLEM Dx:  Atrial fibrillation and flutter    Acute hypoxemic respiratory failure    HTN (hypertension)    HLD (hyperlipidemia)    Status post left hip replacement    Preventative health care    Suspected deep vein thrombosis (DVT)    Suspected pulmonary embolism    Diastolic dysfunction with heart failure    Hypothyroidism

## 2024-08-19 NOTE — PROGRESS NOTE ADULT - PROBLEM SELECTOR PLAN 8
Fluids: LR bolus, mIVF   Diet: NPO   DVT ppx: Hep GTT     GoC: full code  PT: Pending, ambulate with assistance   Dispo: Pending clinical course Patient could not urinate following Wed 8/14 procedure. Linda placed since then.  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  -Linda draining dark orange/red urine    -C/w linda  -Monitor urine output and color

## 2024-08-19 NOTE — PROGRESS NOTE ADULT - PROBLEM SELECTOR PLAN 5
History of urticarial vasculitis.    Plan:  -C/w dapsone 100 mg daily History of urticarial vasculitis. Follows with rheumatology outpatient.    Plan:  -C/w dapsone 100 mg daily

## 2024-08-19 NOTE — H&P ADULT - PROBLEM SELECTOR PLAN 7
Fluids: LR bolus, mIVF   Diet: DASH diet   DVT ppx: Hep GTT     GoC: full code  PT: Pending, ambulate with assistance   Dispo: Pending clinical course Fluids: LR bolus, mIVF   Diet: NPO   DVT ppx: Hep GTT     GoC: full code  PT: Pending, ambulate with assistance   Dispo: Pending clinical course

## 2024-08-20 ENCOUNTER — NON-APPOINTMENT (OUTPATIENT)
Age: 64
End: 2024-08-20

## 2024-08-20 ENCOUNTER — TRANSCRIPTION ENCOUNTER (OUTPATIENT)
Age: 64
End: 2024-08-20

## 2024-08-20 VITALS
DIASTOLIC BLOOD PRESSURE: 74 MMHG | HEART RATE: 71 BPM | TEMPERATURE: 99 F | RESPIRATION RATE: 18 BRPM | SYSTOLIC BLOOD PRESSURE: 119 MMHG | OXYGEN SATURATION: 96 %

## 2024-08-20 PROCEDURE — 99232 SBSQ HOSP IP/OBS MODERATE 35: CPT

## 2024-08-20 PROCEDURE — 99239 HOSP IP/OBS DSCHRG MGMT >30: CPT

## 2024-08-20 RX ORDER — MAGNESIUM, ALUMINUM HYDROXIDE 200-225/5
30 SUSPENSION, ORAL (FINAL DOSE FORM) ORAL
Qty: 0 | Refills: 0 | DISCHARGE
Start: 2024-08-20

## 2024-08-20 RX ORDER — LOSARTAN POTASSIUM 50 MG/1
1 TABLET ORAL
Refills: 0 | DISCHARGE

## 2024-08-20 RX ORDER — METOPROLOL TARTRATE 100 MG/1
1 TABLET ORAL
Qty: 90 | Refills: 0
Start: 2024-08-20 | End: 2024-09-18

## 2024-08-20 RX ORDER — METOPROLOL TARTRATE 100 MG/1
1 TABLET ORAL
Qty: 30 | Refills: 2
Start: 2024-08-20 | End: 2024-11-17

## 2024-08-20 RX ORDER — APIXABAN 5 MG/1
1 TABLET, FILM COATED ORAL
Qty: 60 | Refills: 0
Start: 2024-08-20 | End: 2024-09-18

## 2024-08-20 RX ADMIN — FAMOTIDINE 20 MILLIGRAM(S): 10 INJECTION INTRAVENOUS at 11:36

## 2024-08-20 RX ADMIN — METOPROLOL TARTRATE 25 MILLIGRAM(S): 100 TABLET ORAL at 05:15

## 2024-08-20 RX ADMIN — ACETAMINOPHEN 650 MILLIGRAM(S): 325 TABLET ORAL at 07:24

## 2024-08-20 RX ADMIN — APIXABAN 5 MILLIGRAM(S): 5 TABLET, FILM COATED ORAL at 11:36

## 2024-08-20 RX ADMIN — METOPROLOL TARTRATE 25 MILLIGRAM(S): 100 TABLET ORAL at 15:15

## 2024-08-20 RX ADMIN — Medication 175 MICROGRAM(S): at 05:15

## 2024-08-20 RX ADMIN — DAPSONE 100 MILLIGRAM(S): 25 TABLET ORAL at 11:36

## 2024-08-20 RX ADMIN — ACETAMINOPHEN 650 MILLIGRAM(S): 325 TABLET ORAL at 08:15

## 2024-08-20 NOTE — PROGRESS NOTE ADULT - PROBLEM SELECTOR PLAN 7
Patient reports no BM since surgery on Wednesday 8/14. Also remote history of diverticulosis s/p mesalamine therapy.    -Start miralax and senna

## 2024-08-20 NOTE — PROGRESS NOTE ADULT - ATTENDING COMMENTS
Seen with housestaff this morning. S/p cardioversion yesterday with successful conversion to NSR. Pt reports feeling markedly improved since cardioversion. Plan to discharge home with metoprolol succinate 25mg daily, eliquis 5mg BID. Continuing holding home losartan. Maintain linda catheter that patient came in with (pt to arrange outpatient urology follow up for TOV). PT recommends home PT.
Pt seen this morning in [place] ED. HR 150s with SBP <100. Pt was AAOx4, c/o fatigue but no overt palpitations. On exam, no acute distress, +tachycardic, lungs clear, no lower extremity edema. Plan for cardioversion today.

## 2024-08-20 NOTE — PROGRESS NOTE ADULT - SUBJECTIVE AND OBJECTIVE BOX
Patient is a 64y old  Male who presents with a chief complaint of SOB (19 Aug 2024 03:47)      INTERVAL HPI/OVERNIGHT EVENTS: NAEON. Successfully cardioverted yesterday. Now on 2L NC. Endorses lightheadedness this morning, with soft BP around 90/73. He is AOX3. Endorses fatigue and mild trouble breathing. Apetite decreased. No BM since surgery. Denies chest pain or fevers.      REVIEW OF SYSTEMS: Otherwise negative.      sulfa drugs (Anaphylaxis)  Zosyn (Rash)  sulfa drugs (Seizure)      PHYSICAL EXAM:  Vital Signs Last 24 Hrs  T(C): 36.7 (20 Aug 2024 04:27), Max: 37.1 (19 Aug 2024 13:15)  T(F): 98.1 (20 Aug 2024 04:27), Max: 98.8 (19 Aug 2024 13:15)  HR: 65 (20 Aug 2024 04:27) (65 - 155)  BP: 102/56 (20 Aug 2024 04:27) (89/57 - 120/67)  BP(mean): 79 (19 Aug 2024 23:00) (67 - 90)  RR: 17 (20 Aug 2024 04:27) (15 - 22)  SpO2: 95% (20 Aug 2024 04:27) (93% - 97%)    Parameters below as of 20 Aug 2024 04:27  Patient On (Oxygen Delivery Method): nasal cannula  O2 Flow (L/min): 2    GENERAL: NAD, well-groomed, well-developed. On 2L NC.  HEAD:  Atraumatic, Normocephalic  EYES: EOMI, PERRLA, conjunctiva and sclera clear  NECK: Supple, No JVD, Normal thyroid  NERVOUS SYSTEM:  Alert & Oriented X3, Good concentration; Motor Strength 5/5 B/L upper and lower extremities; DTRs 2+ intact and symmetric  CHEST/LUNG: Clear to percussion bilaterally; No rales, rhonchi, wheezing, or rubs  HEART: Regular rate and rhythm; No murmurs, rubs, or gallops  ABDOMEN: Soft, Nontender, Nondistended; Bowel sounds present  EXTREMITIES:  2+ Peripheral Pulses, No clubbing, cyanosis, or edema. Right posterior thigh with surgical dressing, no surrounding redness or warmth.  LYMPH: No lymphadenopathy noted  SKIN: No rashes or lesions. Chest erythema    Consultant(s) Notes Reviewed:  [x ] YES  [ ] NO  Care Discussed with Consultants/Other Providers [ x] YES  [ ] NO    LABS:                                     9.0    11.39 )-----------( 305      ( 19 Aug 2024 12:35 )             29.2   08-19    135  |  101  |  11  ----------------------------<  102<H>  3.8   |  24  |  0.75    Ca    8.4      19 Aug 2024 06:23    TPro  6.0  /  Alb  2.8<L>  /  TBili  1.0  /  DBili  x   /  AST  24  /  ALT  <5<L>  /  AlkPhos  72  08-19    Blood Gas Profile - Venous (08.18.24 @ 16:51)    pH, Venous: 7.46   pCO2, Venous: 39 mmHg   pO2, Venous: 17 mmHg   HCO3, Venous: 28 mmol/L   Base Excess, Venous: 3.6 mmol/L   Oxygen Saturation, Venous: 20.4 %   Total CO2, Venous: 29 mmol/L        Activated Partial Thromboplastin Time (08.19.24 @ 10:14)    Activated Partial Thromboplastin Time: 95.5: The recommended therapeutic heparin range (full dose) is 58-99 seconds.  Argatroban range is 1.5 to 3.0 times of the baseline APTT value, not to  exceed 100 seconds.  Routine coagulation results should be interpreted with caution when  taking Factor Xa inhibitors or direct thrombin inhibitors; blood sampling  prior to drug intake is recommended. sec        RADIOLOGY & ADDITIONAL TESTS:    Imaging Personally Reviewed:  [X] YES  [ ] NO    < from: TTE W or WO Ultrasound Enhancing Agent (08.19.24 @ 10:58) >     CONCLUSIONS:      1. Left ventricular cavity is small. Left ventricular wall thickness is normal. Left ventricular systolic function is normal with an ejection fraction of 62 % by Harris's method of disks. There are no regional wall motion abnormalities seen.   2. Normal left ventricular diastolic function, with normal left ventricular filling pressure.   3. Normal right ventricular cavity size, with normal wall thickness, and normal right ventricular systolic function.   4. Normal left and right atrial size.   5. No pericardial effusion seen.   6. Estimated pulmonary artery systolic pressure is 30 mmHg.   7. Compared to the transthoracic echocardiogram performed on 5/4/2021, there have been no significant interval changes.    < end of copied text >  < from: ANA W or WO Ultrasound Enhancing Agent (08.19.24 @ 14:49) >    CONCLUSIONS:      1. Left ventricular cavity is normal in size. Left ventricular systolic function is normal. There are no regional wall motion abnormalities seen.   2. No evidence of left atrial or left atrial appendage thrombus.    < end of copied text >      acetaminophen     Tablet .. 650 milliGRAM(s) Oral every 6 hours PRN  aluminum hydroxide/magnesium hydroxide/simethicone Suspension 30 milliLiter(s) Oral every 4 hours PRN  atorvastatin 80 milliGRAM(s) Oral at bedtime  dapsone 100 milliGRAM(s) Oral daily  famotidine    Tablet 20 milliGRAM(s) Oral daily  heparin   Injectable 7500 Unit(s) IV Push every 6 hours PRN  heparin   Injectable 3500 Unit(s) IV Push every 6 hours PRN  heparin  Infusion.  Unit(s)/Hr IV Continuous <Continuous>  lactated ringers. 1000 milliLiter(s) IV Continuous <Continuous>  levothyroxine 175 MICROGram(s) Oral daily  melatonin 3 milliGRAM(s) Oral at bedtime PRN  metoprolol tartrate 25 milliGRAM(s) Oral three times a day  ondansetron Injectable 4 milliGRAM(s) IV Push every 8 hours PRN      HEALTH ISSUES - PROBLEM Dx:  Atrial fibrillation and flutter    Acute hypoxemic respiratory failure    HTN (hypertension)    HLD (hyperlipidemia)    Status post left hip replacement    Preventative health care    Suspected deep vein thrombosis (DVT)    Suspected pulmonary embolism    Diastolic dysfunction with heart failure    Hypothyroidism             Patient is a 64y old  Male who presents with a chief complaint of SOB (19 Aug 2024 03:47)      INTERVAL HPI/OVERNIGHT EVENTS: NAEON. Successfully cardioverted yesterday. Now on RA and feels significantly better. Ready for dc per EP. PT needs to evaluate before dc. Denies chest pain, palpitations, lightheadedness, or fevers.      REVIEW OF SYSTEMS: Otherwise negative.      sulfa drugs (Anaphylaxis)  Zosyn (Rash)  sulfa drugs (Seizure)      PHYSICAL EXAM:  Vital Signs Last 24 Hrs  T(C): 36.7 (20 Aug 2024 04:27), Max: 37.1 (19 Aug 2024 13:15)  T(F): 98.1 (20 Aug 2024 04:27), Max: 98.8 (19 Aug 2024 13:15)  HR: 65 (20 Aug 2024 04:27) (65 - 155)  BP: 102/56 (20 Aug 2024 04:27) (89/57 - 120/67)  BP(mean): 79 (19 Aug 2024 23:00) (67 - 90)  RR: 17 (20 Aug 2024 04:27) (15 - 22)  SpO2: 95% (20 Aug 2024 04:27) (93% - 97%)    Parameters below as of 20 Aug 2024 04:27  Patient On (Oxygen Delivery Method): nasal cannula  O2 Flow (L/min): 2    GENERAL: NAD, well-groomed, well-developed. On 2L NC.  HEAD:  Atraumatic, Normocephalic  EYES: EOMI, PERRLA, conjunctiva and sclera clear  NECK: Supple, No JVD, Normal thyroid  NERVOUS SYSTEM:  Alert & Oriented X3, Good concentration; Motor Strength 5/5 B/L upper and lower extremities; DTRs 2+ intact and symmetric  CHEST/LUNG: Clear to percussion bilaterally; No rales, rhonchi, wheezing, or rubs  HEART: Regular rate and rhythm; No murmurs, rubs, or gallops  ABDOMEN: Soft, Nontender, Nondistended; Bowel sounds present  EXTREMITIES:  2+ Peripheral Pulses, No clubbing, cyanosis, or edema. Right posterior thigh with surgical dressing, no surrounding redness or warmth.  LYMPH: No lymphadenopathy noted  SKIN: No rashes or lesions. Chest erythema    Consultant(s) Notes Reviewed:  [x ] YES  [ ] NO  Care Discussed with Consultants/Other Providers [ x] YES  [ ] NO    LABS:                                   9.0    11.39 )-----------( 305      ( 19 Aug 2024 12:35 )             29.2   08-19    135  |  101  |  11  ----------------------------<  102<H>  3.8   |  24  |  0.75    Ca    8.4      19 Aug 2024 06:23    TPro  6.0  /  Alb  2.8<L>  /  TBili  1.0  /  DBili  x   /  AST  24  /  ALT  <5<L>  /  AlkPhos  72  08-19      Blood Gas Profile - Venous (08.18.24 @ 16:51)    pH, Venous: 7.46   pCO2, Venous: 39 mmHg   pO2, Venous: 17 mmHg   HCO3, Venous: 28 mmol/L   Base Excess, Venous: 3.6 mmol/L   Oxygen Saturation, Venous: 20.4 %   Total CO2, Venous: 29 mmol/L        Activated Partial Thromboplastin Time (08.19.24 @ 10:14)    Activated Partial Thromboplastin Time: 95.5: The recommended therapeutic heparin range (full dose) is 58-99 seconds.  Argatroban range is 1.5 to 3.0 times of the baseline APTT value, not to  exceed 100 seconds.  Routine coagulation results should be interpreted with caution when  taking Factor Xa inhibitors or direct thrombin inhibitors; blood sampling  prior to drug intake is recommended. sec        RADIOLOGY & ADDITIONAL TESTS:    Imaging Personally Reviewed:  [X] YES  [ ] NO    < from: TTE W or WO Ultrasound Enhancing Agent (08.19.24 @ 10:58) >     CONCLUSIONS:      1. Left ventricular cavity is small. Left ventricular wall thickness is normal. Left ventricular systolic function is normal with an ejection fraction of 62 % by Harris's method of disks. There are no regional wall motion abnormalities seen.   2. Normal left ventricular diastolic function, with normal left ventricular filling pressure.   3. Normal right ventricular cavity size, with normal wall thickness, and normal right ventricular systolic function.   4. Normal left and right atrial size.   5. No pericardial effusion seen.   6. Estimated pulmonary artery systolic pressure is 30 mmHg.   7. Compared to the transthoracic echocardiogram performed on 5/4/2021, there have been no significant interval changes.    < end of copied text >  < from: ANA W or WO Ultrasound Enhancing Agent (08.19.24 @ 14:49) >    CONCLUSIONS:      1. Left ventricular cavity is normal in size. Left ventricular systolic function is normal. There are no regional wall motion abnormalities seen.   2. No evidence of left atrial or left atrial appendage thrombus.    < end of copied text >      acetaminophen     Tablet .. 650 milliGRAM(s) Oral every 6 hours PRN  aluminum hydroxide/magnesium hydroxide/simethicone Suspension 30 milliLiter(s) Oral every 4 hours PRN  atorvastatin 80 milliGRAM(s) Oral at bedtime  dapsone 100 milliGRAM(s) Oral daily  famotidine    Tablet 20 milliGRAM(s) Oral daily  heparin   Injectable 7500 Unit(s) IV Push every 6 hours PRN  heparin   Injectable 3500 Unit(s) IV Push every 6 hours PRN  heparin  Infusion.  Unit(s)/Hr IV Continuous <Continuous>  lactated ringers. 1000 milliLiter(s) IV Continuous <Continuous>  levothyroxine 175 MICROGram(s) Oral daily  melatonin 3 milliGRAM(s) Oral at bedtime PRN  metoprolol tartrate 25 milliGRAM(s) Oral three times a day  ondansetron Injectable 4 milliGRAM(s) IV Push every 8 hours PRN      HEALTH ISSUES - PROBLEM Dx:  Atrial fibrillation and flutter    Acute hypoxemic respiratory failure    HTN (hypertension)    HLD (hyperlipidemia)    Status post left hip replacement    Preventative health care    Suspected deep vein thrombosis (DVT)    Suspected pulmonary embolism    Diastolic dysfunction with heart failure    Hypothyroidism

## 2024-08-20 NOTE — PROGRESS NOTE ADULT - NS ATTEND AMEND GEN_ALL_CORE FT
recurrent flutter. Agree with beta blocker (low dose). elective ablation
Recurrent flutter (previously chemically cardioverted).  Plan for cardioversion and elective ablation.

## 2024-08-20 NOTE — PROGRESS NOTE ADULT - PROBLEM SELECTOR PLAN 1
P/w Afib w/ RVR to 160s and hypotension following recent R hip replacement refractory to oral tx. Started on eliquis 2.5mg BID after hip surgery. Presents with lightheadedness but no CP or SOB. Notable leukocytosis without systemic signs of infection iso recent procedure.  -S/p cardizem x3 in ED without change in HR response. EP was consulted, patient was administered IV lopressor 5 mg with improvement in HR to 100s. CTA PE performed and negative for PE but with cardiomegaly.  -S/p LR bolus x2 for hypotension    Plan:   -EP recs appreciated  -Monitor on tele   -Switched Hep GTT to eliquis 5mg BID, confirmed to be ok with patient's [facility] ortho surgeon Dr. Moss  -C/w MTPS 25mg q8h titrate to rates < 110  -ANA shows LV systolic function wnl and no LA/LV appendages  -DCCV successful and patient now in sinus  -Per EP, patient cleared for dc today with outpatient follow up to discuss for typical AFL ablation P/w Afib w/ RVR to 160s and hypotension following recent R hip replacement refractory to oral tx. Started on eliquis 2.5mg BID after hip surgery. Presents with lightheadedness but no CP or SOB. Notable leukocytosis without systemic signs of infection iso recent procedure.  -S/p cardizem x3 in ED without change in HR response. EP was consulted, patient was administered IV lopressor 5 mg with improvement in HR to 100s. CTA PE performed and negative for PE but with cardiomegaly.  -S/p LR bolus x2 for hypotension    Plan:   -EP recs appreciated  -Monitor on tele   -Switched Hep GTT to eliquis 5mg BID, confirmed to be ok with patient's [facility] ortho surgeon Dr. Moss  -ANA shows LV systolic function wnl and no LA/LV appendages  -DCCV successful and patient now in sinus  -Per EP, patient cleared for dc today with outpatient follow up to discuss for typical AFL ablation  -C/w Eliquis 5mg BID on dc  -C/w Toprol XL 25mg daily on dc

## 2024-08-20 NOTE — PROGRESS NOTE ADULT - ASSESSMENT
64M PMH HTN, minimal change disease, thyroid ca s/p thyroidectomy, urticarial vasculitis, multiple joint replacements (most recently R hip replacement at [facility] on 8/14/24), reported hx of DVT following prior surgeries, hx of paroxysmal AFL (seen at [facility] in 2020, chemically cardioverted with sotalol which was stopped 2/2 sinus bradycardia), presents with progressive GONZALEZ, fatigue, and light-headedness found to be in typical AFl w/ RVR.   PE was r/o with CTA. Patient has a history of possible post-op/acute-illness AF, and this current episode may represent post-op AFl after his recent R hip replacement. His marked GONZALEZ and light-headedness are likely in s/o arrythmia, but CM must also be ruled out.    CHADSVASC 2    Pt is s/p successful ANA guided DCCV to NSR 60-70s.     Rec  - Continue uninterrupted Eliquis 5mg BID  - Toprol XL 25mg daily  - Will plan to arrange for outpatient follow up to discuss for typical AFL ablation  - Pt ok for discharge from EP perspective 64M PMH HTN, minimal change disease, thyroid ca s/p thyroidectomy, urticarial vasculitis, multiple joint replacements (most recently R hip replacement at [facility] on 8/14/24), reported hx of DVT following prior surgeries, hx of paroxysmal AFL (seen at [facility] in 2020, chemically cardioverted with sotalol which was stopped 2/2 sinus bradycardia), presents with progressive GONZALEZ, fatigue, and light-headedness found to be in typical AFl w/ RVR.   PE was r/o with CTA. Patient has a history of possible post-op/acute-illness AF, and this current episode may represent post-op AFl after his recent R hip replacement    CHADSVASC 2    Pt is s/p successful ANA guided DCCV to NSR 60-70s.     Rec  - Continue uninterrupted Eliquis 5mg BID  - Toprol XL 25mg daily  - Will plan to arrange for outpatient follow up to discuss for typical AFL ablation  - Pt ok for discharge from EP perspective 64M PMH HTN, minimal change disease, thyroid ca s/p thyroidectomy, urticarial vasculitis, multiple joint replacements (most recently R hip replacement at [facility] on 8/14/24), reported hx of DVT following prior surgeries, hx of paroxysmal AFL (seen at [facility] in 2020, chemically cardioverted with sotalol which was stopped 2/2 sinus bradycardia), presents with progressive GONZALEZ, fatigue, and light-headedness found to be in typical AFl w/ RVR.   PE was r/o with CTA. Patient has a history of possible post-op/acute-illness AF, and this current episode may represent post-op AFl after his recent R hip replacement    CHADSVASC 2    Pt is s/p successful ANA guided DCCV to NSR 60-70s.     Rec  - Continue uninterrupted Eliquis 5mg BID  - Toprol XL 25mg daily on discharge, tolerating low dose BB here  - Will plan to arrange for outpatient follow up to discuss for typical AFL ablation  - Pt ok for discharge from EP perspective

## 2024-08-20 NOTE — PROGRESS NOTE ADULT - PROBLEM SELECTOR PLAN 3
naps during the day/No Hx of multiple joint replacements, most recently R hip replacement at [facility] on 8/14/24). Pain well controlled with oxycodone on arrival.     Plan:  -Wound care per nursing. Ambulate with assistance with fall precautions.  -Consider ortho CS  -Consult PT/OT Hx of multiple joint replacements, most recently R hip replacement at [facility] on 8/14/24). Pain well controlled with oxycodone on arrival.     Plan:  -Wound care per nursing. Ambulate with assistance with fall precautions.  -Consult PT/OT

## 2024-08-20 NOTE — PROGRESS NOTE ADULT - PROBLEM SELECTOR PLAN 8
Patient could not urinate following Wed 8/14 procedure. Linda placed since then.  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  -Linda draining dark orange/red urine    -C/w linda  -Monitor urine output and color Patient could not urinate following Wed 8/14 procedure. Linda placed since then.  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  -Linda draining dark orange/red urine    -C/w linda, will need outpatient f/u for retention  -Monitor urine output and color

## 2024-08-20 NOTE — PROGRESS NOTE ADULT - PROBLEM SELECTOR PLAN 9
Continue with home regimen Rosuvastatin 20mg qd.     -F/u lipid panel
Continue with home regimen Rosuvastatin 20mg qd.     -F/u lipid panel

## 2024-08-20 NOTE — DISCHARGE NOTE NURSING/CASE MANAGEMENT/SOCIAL WORK - PATIENT PORTAL LINK FT
You can access the FollowMyHealth Patient Portal offered by [facility] by registering at the following website: [email or web address]. By joining [employer]’s FollowMyHealth portal, you will also be able to view your health information using other applications (apps) compatible with our system.

## 2024-08-20 NOTE — PROGRESS NOTE ADULT - PROBLEM SELECTOR PLAN 4
S/p thyroidectomy for thyroid cancer. TSH now 6.8 and T4 5.1.    Plan:  -Continue with home regimen Levothyroxine 150mcg qAM  -Can consider Endo consult once cardiac rhythm stabilized S/p thyroidectomy for thyroid cancer. TSH now 6.8 and T4 5.1.    Plan:  -Continue with home regimen Levothyroxine 150mcg qAM  -Recommend Endo f/u outpatient

## 2024-08-20 NOTE — DISCHARGE NOTE NURSING/CASE MANAGEMENT/SOCIAL WORK - NSDCVIVACCINE_GEN_ALL_CORE_FT
Tdap; 28-Mar-2019 17:49; Tamiko Avery (MARGOTH); [employer]; [id number] (Exp. Date: 21-Feb-2021); IntraMuscular; Deltoid Left.; 0.5 milliLiter(s); VIS (VIS Published: 09-May-2013, VIS Presented: 28-Mar-2019);

## 2024-08-20 NOTE — PROGRESS NOTE ADULT - SUBJECTIVE AND OBJECTIVE BOX
24H hour events: Pt feeling well today, much improved after DCCV yesterday. Maintaining NSR    MEDICATIONS:  apixaban 5 milliGRAM(s) Oral every 12 hours  metoprolol tartrate 25 milliGRAM(s) Oral three times a day    dapsone 100 milliGRAM(s) Oral daily      acetaminophen     Tablet .. 650 milliGRAM(s) Oral every 6 hours PRN  melatonin 3 milliGRAM(s) Oral at bedtime PRN  ondansetron Injectable 4 milliGRAM(s) IV Push every 8 hours PRN    aluminum hydroxide/magnesium hydroxide/simethicone Suspension 30 milliLiter(s) Oral every 4 hours PRN  famotidine    Tablet 20 milliGRAM(s) Oral daily  polyethylene glycol 3350 17 Gram(s) Oral daily  senna 2 Tablet(s) Oral at bedtime    atorvastatin 80 milliGRAM(s) Oral at bedtime  levothyroxine 175 MICROGram(s) Oral daily    lactated ringers. 1000 milliLiter(s) IV Continuous <Continuous>      REVIEW OF SYSTEMS:  See HPI, otherwise ROS negative.    PHYSICAL EXAM:  T(C): 36.9 (08-20-24 @ 07:59), Max: 37.1 (08-19-24 @ 13:15)  HR: 68 (08-20-24 @ 07:59) (65 - 155)  BP: 105/67 (08-20-24 @ 07:59) (89/57 - 120/67)  RR: 17 (08-20-24 @ 07:59) (16 - 22)  SpO2: 93% (08-20-24 @ 07:59) (93% - 97%)  Wt(kg): --  I&O's Summary    19 Aug 2024 07:01  -  20 Aug 2024 07:00  --------------------------------------------------------  IN: 120 mL / OUT: 800 mL / NET: -680 mL    20 Aug 2024 07:01  -  20 Aug 2024 10:01  --------------------------------------------------------  IN: 240 mL / OUT: 0 mL / NET: 240 mL        Appearance: Alert. NAD	  HEENT:   NC/AT	  Cardiovascular: +S1S2 RRR no m/g/r  Respiratory: CTA B/L	  Psychiatry: A & O x 3, Mood & affect appropriate  Gastrointestinal:  Soft, NT.ND., + BS	  Skin: No rashes	  Neurologic: Non-focal  Extremities: No edema BLE      LABS:	 	    CBC Full  -  ( 19 Aug 2024 12:35 )  WBC Count : 11.39 K/uL  Hemoglobin : 9.0 g/dL  Hematocrit : 29.2 %  Platelet Count - Automated : 305 K/uL  Mean Cell Volume : 90.1 fl  Mean Cell Hemoglobin : 27.8 pg  Mean Cell Hemoglobin Concentration : 30.8 gm/dL  Auto Neutrophil # : x  Auto Lymphocyte # : x  Auto Monocyte # : x  Auto Eosinophil # : x  Auto Basophil # : x  Auto Neutrophil % : x  Auto Lymphocyte % : x  Auto Monocyte % : x  Auto Eosinophil % : x  Auto Basophil % : x    08-19    135  |  101  |  11  ----------------------------<  102<H>  3.8   |  24  |  0.75  08-18    136  |  96  |  13  ----------------------------<  103<H>  3.3<L>   |  23  |  0.76    Ca    8.4      19 Aug 2024 06:23  Ca    9.6      18 Aug 2024 17:13    TPro  6.0  /  Alb  2.8<L>  /  TBili  1.0  /  DBili  x   /  AST  24  /  ALT  <5<L>  /  AlkPhos  72  08-19  TPro  7.6  /  Alb  3.5  /  TBili  1.3<H>  /  DBili  x   /  AST  37  /  ALT  <5<L>  /  AlkPhos  95  08-18        TELEMETRY: NSR 60-70s  	    ECG:  post DCCV 8/19: NSR at 76bpm  Presenting EKG 8/18: typical atrial flutter, VHR 158bpm	    RADIOLOGY:  < from: TTE W or WO Ultrasound Enhancing Agent (08.19.24 @ 10:58) >  CONCLUSIONS:      1. Left ventricular cavity is small. Left ventricular wall thickness is normal. Left ventricular systolic function is normal with an ejection fraction of 62 % by Harris's method of disks. There are no regional wall motion abnormalities seen.   2. Normal left ventricular diastolic function, with normal left ventricular filling pressure.   3. Normal right ventricular cavity size, with normal wall thickness, and normal right ventricular systolic function.   4. Normal left and right atrial size.   5. No pericardial effusion seen.   6. Estimated pulmonary artery systolic pressure is 30 mmHg.   7. Compared to the transthoracic echocardiogram performed on 5/4/2021, there have been no significant interval changes.    ________________________________________________________________________________________  FINDINGS:     Left Ventricle:  The left ventricular cavity is small. Left ventricular wall thickness is normal. Left ventricular systolic function is normal with a calculated ejection fraction of 62 % by the Harris's biplane method of disks. There are no regional wall motion abnormalities seen. No left ventricular hypertrophy. There is normal left ventricular diastolic function, with normal left ventricular filling pressure.     Right Ventricle:  The right ventricular cavity is normal in size, with normal wall thickness and right ventricular systolic function is normal. Tricuspid annular plane systolic excursion (TAPSE) is 1.8 cm (normal >=1.7 cm). Tricuspid annular tissue Doppler S' is 9.9 cm/s (normal >10 cm/s).     Left Atrium:  The left atrium is normal in size with an indexed volume of 28.26 ml/m².     Right Atrium:  The right atrium is normal in size with an indexed volume of 13.26 ml/m².     Interatrial Septum:  The interatrial septum appears intact.     Aortic Valve:  The aortic valve is tricuspid with normal leaflet excursion. There is no aortic valve stenosis. There is no evidence of aortic regurgitation.     Mitral Valve:  Structurally normal mitral valve with normal leaflet excursion. There is no mitral valve stenosis. There is trace mitral regurgitation.     Tricuspid Valve:  Structurally normal tricuspid valve with normal leaflet excursion. There is mild tricuspid regurgitation. Estimated pulmonary artery systolic pressure is 30 mmHg.     Pulmonic Valve:  Structurally normal pulmonic valve with normal leaflet excursion. There is trace pulmonic regurgitation.     Aorta:  The aortic root appears normal in size.     Pericardium:  No pericardial effusion seen.     Systemic Veins:  The inferior vena cava is dilated measuring 2.18 cm in diameter, (dilated >2.1cm) with normal inspiratory collapse (normal >50%) consistent with mildly elevated right atrial pressure (~8, range 5-10mmHg).  ____________________________________________________________________  QUANTITATIVE DATA:  Left Ventricle Measurements: (Indexed to BSA)     IVSd (2D):   1.3 cm  LVPWd (2D):  1.5 cm  LVIDd (2D):  3.6 cm  LVIDs (2D):  2.4 cm  LV Mass:     178 g  89.2 g/m²  LV Vol d, MOD A2C: 72.3 ml 36.17 ml/m²  LV Vol d, MOD A4C: 87.9 ml 43.97 ml/m²  LV Vol d, MOD BP:  80.0 ml 40.04 ml/m²  LV Vol s, MOD A2C: 29.0 ml 14.51 ml/m²  LV Vol s, MOD A4C: 30.9 ml 15.46 ml/m²  LV Vol s, MOD BP:  30.1 ml 15.05 ml/m²  LVOT SV MOD BP:    49.9 ml  LV EF% MOD BP:     62 %     MV E Vmax:   0.42 m/s  MV A Vmax:   0.60 m/s  MV E/A:      0.70  e' medial:   8.59 cm/s  E/e' medial: 4.84    Aorta Measurements: (Normal range) (Indexed to BSA)     Ao Root d     3.40 cm (3.1 - 3.7 cm) 1.70 cm/m²  Ao Asc d, 2D: 3.30  Ao Asc prox:  3.30 cm                1.65 cm/m²  Ao Arch:      3.4 cm            Left Atrium Measurements: (Indexed to BSA)  LA Diam 2D:        4.10 cm  LA Vol s, MOD A4C: 51.70 ml.  LA Vol s, MOD A2C: 55.20 ml.  LA Vol s, MOD BP:  56.50 ml  28.26 ml/m²         RightVentricle Measurements: Right Atrial Measurements:     TAPSE:           1.8 cm       RA Vol:       26.50 ml  RV Base (RVID1): 3.8 cm       RA Vol Index: 13.26 ml/m²  RV Mid (RVID2):  2.6 cm       LVOT / RVOT/ Qp/Qs Data: (Indexed to BSA)  LVOT Diameter:  2.10 cm  LVOT Area:      3.46 cm²  LVOT Vmax:      1.02 m/s  LVOT Vmn:       0.697 m/s  LVOT VTI:       11.40 cm  LVOT peak grad: 4 mmHg  LVOT mean grad: 2.0 mmHg  LVOT SV:        39.5 ml   19.75 ml/m²    Mitral Valve Measurements:     MV E Vmax:0.4 m/s  MV A Vmax: 0.6 m/s  MV E/A:    0.7       Tricuspid Valve Measurements:     TV S'             9.9 cm/s  TR Vmax:          2.3 m/s  TR Peak Gradient: 21.7 mmHg  RA Pressure:      8 mmHg  PASP:             30 mmHg    < end of copied text >    	  ASSESSMENT/PLAN:

## 2024-08-20 NOTE — PROGRESS NOTE ADULT - PROBLEM SELECTOR PLAN 6
Hold home regimen Losartan 100mg qd iso hypotension.  Hold tamsulosin for hypotentsion. Hold home Losartan 100mg qd iso hypotension.  Hold tamsulosin for hypotentsion.    -Recommend holding losartan for softer BPs with metoprolol, until next outpatient f/u

## 2024-08-20 NOTE — PROGRESS NOTE ADULT - ASSESSMENT
64M PMH HTN, minimal change disease, thyroid ca s/p thyroidectomy, urticarial vasculitis, multiple joint replacements (most recently R hip replacement at [facility] on 8/14/24), reported hx of DVT following prior surgeries, ?hx of pAF, presents with progressive GONZALEZ, fatigue, and light-headedness found to be in Aflutter/Afib w/ RVR.

## 2024-08-20 NOTE — PROGRESS NOTE ADULT - PROBLEM SELECTOR PLAN 5
History of urticarial vasculitis. Follows with rheumatology outpatient.    Plan:  -C/w dapsone 100 mg daily

## 2024-08-20 NOTE — PROGRESS NOTE ADULT - PROBLEM SELECTOR PLAN 2
TTE done 7/15 showing severe reversible diastolic dysfunction Stage III. Pro BNP 1957 on admission.  TTE on 8/19 showing mildly dilated IVC, EF 62%, otherwise wnl.     Plan:   -C/w MTPS 25mg q8h titrate to rates < 110  -Losartan on hold for low Bps  -C/w Lipitor   -Monitor for volume overload given recent IVF boluses TTE done 7/15 showing severe reversible diastolic dysfunction Stage III. Pro BNP 1957 on admission.  TTE on 8/19 showing mildly dilated IVC, EF 62%, otherwise wnl.     Plan:   -toprol XL 25mg daily   -Losartan on hold for low Bps  -C/w Lipitor   -Monitor for volume overload given recent IVF boluses

## 2024-08-20 NOTE — PROGRESS NOTE ADULT - PROBLEM SELECTOR PLAN 10
Fluids: LR bolus, mIVF   Diet: NPO   DVT ppx: Hep GTT     GoC: full code  PT: Pending, ambulate with assistance   Dispo: Pending clinical course Fluids: LR bolus, mIVF   Diet: NPO   DVT ppx: Hep GTT     GoC: full code  PT: home PT  Dispo: home with home PT

## 2024-09-16 NOTE — HISTORY OF PRESENT ILLNESS
[FreeTextEntry1] : 64M PMH HTN, minimal change disease, thyroid ca s/p thyroidectomy, urticarial vasculitis, multiple joint replacements (most recently R hip replacement at [facility] on 8/14/24), reported hx of DVT following prior surgeries, hx of paroxysmal AFL (seen at [facility] in 2020, chemically cardioverted with sotalol which was stopped 2/2 sinus bradycardia), presents with progressive GONZALEZ, fatigue, and light-headedness found to be in typical AFl w/ RVR.  PE was r/o with CTA. Patient has a history of possible post-op/acute-illness AF, and this current episode may represent post-op AFl after his recent R hip replacement.  Ablation was deferred to allow for recovery from his hip procedure. He presents today for follow up.

## 2024-09-16 NOTE — CARDIOLOGY SUMMARY
[de-identified] : 8/19/2024  1. Left ventricular cavity is normal in size. Left ventricular systolic function is normal. There are no regional wall motion abnormalities seen.  2. No evidence of left atrial or left atrial appendage thrombus.  8/19/2024  1. Left ventricular cavity is small. Left ventricular wall thickness is normal. Left ventricular systolic function is normal with an ejection fraction of 62 % by Harris's method of disks. There are no regional wall motion abnormalities seen.  2. Normal left ventricular diastolic function, with normal left ventricular filling pressure.  3. Normal right ventricular cavity size, with normal wall thickness, and normal right ventricular systolic function.  4. Normal left and right atrial size.  5. No pericardial effusion seen.  6. Estimated pulmonary artery systolic pressure is 30 mmHg.  7. Compared to the transthoracic echocardiogram performed on 5/4/2021, there have been no significant interval changes.

## 2024-09-17 ENCOUNTER — APPOINTMENT (OUTPATIENT)
Dept: ELECTROPHYSIOLOGY | Facility: CLINIC | Age: 64
End: 2024-09-17

## 2024-09-29 PROCEDURE — 84443 ASSAY THYROID STIM HORMONE: CPT

## 2024-09-29 PROCEDURE — 93312 ECHO TRANSESOPHAGEAL: CPT

## 2024-09-29 PROCEDURE — 84132 ASSAY OF SERUM POTASSIUM: CPT

## 2024-09-29 PROCEDURE — 36415 COLL VENOUS BLD VENIPUNCTURE: CPT

## 2024-09-29 PROCEDURE — 83880 ASSAY OF NATRIURETIC PEPTIDE: CPT

## 2024-09-29 PROCEDURE — 85018 HEMOGLOBIN: CPT

## 2024-09-29 PROCEDURE — 71045 X-RAY EXAM CHEST 1 VIEW: CPT

## 2024-09-29 PROCEDURE — 82947 ASSAY GLUCOSE BLOOD QUANT: CPT

## 2024-09-29 PROCEDURE — 93306 TTE W/DOPPLER COMPLETE: CPT

## 2024-09-29 PROCEDURE — 85027 COMPLETE CBC AUTOMATED: CPT

## 2024-09-29 PROCEDURE — 84295 ASSAY OF SERUM SODIUM: CPT

## 2024-09-29 PROCEDURE — 93325 DOPPLER ECHO COLOR FLOW MAPG: CPT

## 2024-09-29 PROCEDURE — 97161 PT EVAL LOW COMPLEX 20 MIN: CPT

## 2024-09-29 PROCEDURE — 85025 COMPLETE CBC W/AUTO DIFF WBC: CPT

## 2024-09-29 PROCEDURE — 96375 TX/PRO/DX INJ NEW DRUG ADDON: CPT

## 2024-09-29 PROCEDURE — 80053 COMPREHEN METABOLIC PANEL: CPT

## 2024-09-29 PROCEDURE — 99285 EMERGENCY DEPT VISIT HI MDM: CPT | Mod: 25

## 2024-09-29 PROCEDURE — 82803 BLOOD GASES ANY COMBINATION: CPT

## 2024-09-29 PROCEDURE — 83605 ASSAY OF LACTIC ACID: CPT

## 2024-09-29 PROCEDURE — 83036 HEMOGLOBIN GLYCOSYLATED A1C: CPT

## 2024-09-29 PROCEDURE — 85014 HEMATOCRIT: CPT

## 2024-09-29 PROCEDURE — 93320 DOPPLER ECHO COMPLETE: CPT

## 2024-09-29 PROCEDURE — 84484 ASSAY OF TROPONIN QUANT: CPT

## 2024-09-29 PROCEDURE — 92960 CARDIOVERSION ELECTRIC EXT: CPT

## 2024-09-29 PROCEDURE — 82330 ASSAY OF CALCIUM: CPT

## 2024-09-29 PROCEDURE — 84436 ASSAY OF TOTAL THYROXINE: CPT

## 2024-09-29 PROCEDURE — 85730 THROMBOPLASTIN TIME PARTIAL: CPT

## 2024-09-29 PROCEDURE — 93005 ELECTROCARDIOGRAM TRACING: CPT

## 2024-09-29 PROCEDURE — 96376 TX/PRO/DX INJ SAME DRUG ADON: CPT

## 2024-09-29 PROCEDURE — 96374 THER/PROPH/DIAG INJ IV PUSH: CPT

## 2024-09-29 PROCEDURE — 80061 LIPID PANEL: CPT

## 2024-09-29 PROCEDURE — 85610 PROTHROMBIN TIME: CPT

## 2024-09-29 PROCEDURE — 81001 URINALYSIS AUTO W/SCOPE: CPT

## 2024-09-29 PROCEDURE — 82435 ASSAY OF BLOOD CHLORIDE: CPT

## 2024-09-29 PROCEDURE — 71275 CT ANGIOGRAPHY CHEST: CPT | Mod: MC

## 2025-01-08 ENCOUNTER — APPOINTMENT (OUTPATIENT)
Dept: UROLOGY | Facility: CLINIC | Age: 65
End: 2025-01-08

## 2025-06-17 NOTE — PHYSICAL THERAPY INITIAL EVALUATION ADULT - PHYSICAL ASSIST/NONPHYSICAL ASSIST: STAND/SIT, REHAB EVAL
GENERAL ANESTHESIA POST SURGERY INSTRUCTIONS    1. Rest at home (not necessarily in bed) for 24 hours following anesthesia. You may feel sleepy for the next 24 hours. Do not drive an automobile, operate heavy machinery, or make important decisions.    2. Your diet should start with clear liquids increasing to a light diet on the day of surgery. You may then resume your normal diet. Do not drink alcoholic beverages for 24 hours. If nausea occurs, limit diet to clear liquids (soda, tea, broth, Jell-O). If nausea continues for more than 12 hours, call your doctor.    3. The doctor prescribed [Norco] for pain. Take as directed. If nothing was prescribed, you may take a non-prescription non- aspirin containing pain medicine such as Tylenol, Advil, etc. If pain is not relieved, call your doctor.  **Norco is a narcotic and may make you sleepy. Do not drive, drink alcohol, or do anything that requires your full attention while taking it. Take Norco with food to avoid an upset stomach. Do not take Tylenol and Norco at the same time. Norco may constipate you, please take Colace/an over the counter stool softener to prevent constipation.     4. Call your doctor if there is excessive:   A. Bleeding or drainage   B. Fever-10 1°F or higher   C. Swelling or redness    5. We strongly recommend a responsible adult to be with you for 24 hours following surgery. This recommendation is for your protection and safety.    6. Avoid smoking for 24 hours following general anesthesia.    7. Drink plenty of fluids. If you are unable to urinate by 9 pm or a feeling of pressure occurs, call your surgeon and or go to the nearest emergency center.    8. If you any questions please call your Surgeon. If you cannot reach your doctor, call [facility] [place] Emergency Department at [phone].        POSTOPERATIVE ADENOIDECTOMY INSTRUCTIONS    Recovery Time  Expect 14 days.  Activities   No strenuous activity for 14  days    Stay hydrated - drink lots of liquids at whatever temperature feels comfortable for your throat.    This evening or tomorrow, start with the softest foods only - pudding, pancakes, macaroni, for example. Continue soft foods as tolerated. Avoid hard/crunchy foods for 2 weeks.    Pain Control  A pain medication will be prescribed for you at the hospital. You may call [phone] for a refill or medication problems. Your pain medication may be needed for 10 days.    Take tylenol/acetaminophen for moderate pain, not to exceed 4000mg/day total. Take norco for severe pain. Advil/Ibuprofen should only be used as a backup medication.    Around day 3-5, if pain control is inadequate, begin medrol dosepack as presecribed.    Maintain a regular dosing of pain medicine throughout the day and night to keep the discomfort on an even level.    Ear pain may develop; this is referred pain originating from a branch of nerves in the throat. Use prescribed pain medication.    Postoperative Symptoms    Swallowing may be difficult at first. We expect this to improve as you heal.    At the back of your throat you will notice a yellow film present over the tonsil area. This is a scab and will start coming off between the 5th and the 7th day. The tonsil area will then become red and your discomfort may increase. This is normal part of the healing process; continue with your pain medication as prescribed.    Signs of Infection   Chills and night sweats   Temperature above 102 degrees     Please notify our office if the above symptoms occur     Please call to schedule office appointment 2-4 weeks after surgery at     Bleeding  Try to avoid using anti-inflammatory medications for 10 days after surgery Example: Aspirin, Ibuprofen, Estephanie [place]. These medications may encourage a tonsillar bleed.    Postoperative bleeding may occur as late as 10 days after surgery.    For mild bleeding - please call Dr. Marie  For severe bleeding,  proceed to nearby ER, please notify us as well.    [place]  [phone]          Want to recognize a nurse?  The [employer] Award® is a way to recognize nurses who provided an outstanding level of care to you during your visit.   Submit a nomination using any method below.     [email or web address]   supervision/verbal cues/nonverbal cues (demo/gestures)